# Patient Record
Sex: FEMALE | Race: WHITE | NOT HISPANIC OR LATINO | Employment: OTHER | URBAN - METROPOLITAN AREA
[De-identification: names, ages, dates, MRNs, and addresses within clinical notes are randomized per-mention and may not be internally consistent; named-entity substitution may affect disease eponyms.]

---

## 2017-02-13 ENCOUNTER — LAB (OUTPATIENT)
Dept: ENDOCRINOLOGY | Age: 57
End: 2017-02-13

## 2017-02-13 DIAGNOSIS — IMO0002 UNCONTROLLED TYPE 2 DIABETES MELLITUS WITH DIABETIC NEUROPATHY, WITH LONG-TERM CURRENT USE OF INSULIN: Primary | ICD-10-CM

## 2017-02-13 DIAGNOSIS — IMO0002 UNCONTROLLED TYPE 2 DIABETES MELLITUS WITH DIABETIC NEUROPATHY, WITH LONG-TERM CURRENT USE OF INSULIN: ICD-10-CM

## 2017-02-14 LAB
ALBUMIN SERPL-MCNC: 5 G/DL (ref 3.5–5.2)
ALBUMIN/CREAT UR: 299.5 MG/G CREAT (ref 0–30)
ALBUMIN/GLOB SERPL: 2.1 G/DL
ALP SERPL-CCNC: 117 U/L (ref 39–117)
ALT SERPL-CCNC: 23 U/L (ref 1–33)
AST SERPL-CCNC: 22 U/L (ref 1–32)
BILIRUB SERPL-MCNC: 0.5 MG/DL (ref 0.1–1.2)
BUN SERPL-MCNC: 16 MG/DL (ref 6–20)
BUN/CREAT SERPL: 17 (ref 7–25)
CALCIUM SERPL-MCNC: 10.1 MG/DL (ref 8.6–10.5)
CHLORIDE SERPL-SCNC: 99 MMOL/L (ref 98–107)
CO2 SERPL-SCNC: 24.4 MMOL/L (ref 22–29)
CREAT SERPL-MCNC: 0.94 MG/DL (ref 0.57–1)
CREAT UR-MCNC: 63.5 MG/DL
GLOBULIN SER CALC-MCNC: 2.4 GM/DL
GLUCOSE SERPL-MCNC: 146 MG/DL (ref 65–99)
HBA1C MFR BLD: 7.9 % (ref 4.8–5.6)
MICROALBUMIN UR-MCNC: 190.2 UG/ML
POTASSIUM SERPL-SCNC: 4.7 MMOL/L (ref 3.5–5.2)
PROT SERPL-MCNC: 7.4 G/DL (ref 6–8.5)
SODIUM SERPL-SCNC: 141 MMOL/L (ref 136–145)
TSH SERPL DL<=0.005 MIU/L-ACNC: 1.99 MIU/ML (ref 0.27–4.2)

## 2017-02-20 ENCOUNTER — OFFICE VISIT (OUTPATIENT)
Dept: ENDOCRINOLOGY | Age: 57
End: 2017-02-20

## 2017-02-20 ENCOUNTER — RESULTS ENCOUNTER (OUTPATIENT)
Dept: ENDOCRINOLOGY | Age: 57
End: 2017-02-20

## 2017-02-20 VITALS
OXYGEN SATURATION: 99 % | HEART RATE: 93 BPM | HEIGHT: 65 IN | DIASTOLIC BLOOD PRESSURE: 64 MMHG | WEIGHT: 177.2 LBS | BODY MASS INDEX: 29.52 KG/M2 | SYSTOLIC BLOOD PRESSURE: 132 MMHG

## 2017-02-20 DIAGNOSIS — R63.5 ABNORMAL WEIGHT GAIN: ICD-10-CM

## 2017-02-20 DIAGNOSIS — IMO0002 UNCONTROLLED TYPE II DIABETES MELLITUS WITH NEPHROPATHY: ICD-10-CM

## 2017-02-20 DIAGNOSIS — IMO0002 UNCONTROLLED TYPE 2 DIABETES MELLITUS WITH DIABETIC NEUROPATHY, WITH LONG-TERM CURRENT USE OF INSULIN: ICD-10-CM

## 2017-02-20 DIAGNOSIS — E16.1 HYPERINSULINISM: ICD-10-CM

## 2017-02-20 DIAGNOSIS — IMO0002 UNCONTROLLED TYPE 2 DIABETES MELLITUS WITH COMPLICATION, WITH LONG-TERM CURRENT USE OF INSULIN: ICD-10-CM

## 2017-02-20 DIAGNOSIS — IMO0002 UNCONTROLLED TYPE 2 DIABETES MELLITUS WITH COMPLICATION, WITH LONG-TERM CURRENT USE OF INSULIN: Primary | ICD-10-CM

## 2017-02-20 DIAGNOSIS — Z79.4 ENCOUNTER FOR LONG-TERM (CURRENT) USE OF INSULIN (HCC): ICD-10-CM

## 2017-02-20 PROCEDURE — 99214 OFFICE O/P EST MOD 30 MIN: CPT | Performed by: INTERNAL MEDICINE

## 2017-02-20 NOTE — PATIENT INSTRUCTIONS
Advised to increase the insulin gradually to keep the blood sugars less than 200  Also advised to check frequently  Try CoQ10 for cholesterol problem

## 2017-02-20 NOTE — PROGRESS NOTES
57 y.o.    Patient Care Team:  Hollis Dawkins MD as PCP - General    Chief Complaint:        F/U TYPE 2 DIABETES, UNCONTROLLED.   HERE TO DISCUSS LAB RESULTS.  Subjective     HPI     Patient is a 57-year-old white female with a past medical history of uncontrolled type 2 diabetes mellitus came for followup.  Patient is currently taking Humulin U-500 insulin.  She takes approximately 11-12 Kacie at about noon, which is her morning.  She takes an additional 8 or 9. Kacie at 9 PM while at work.    Patient reported that her blood sugars have been elevated over the past few months. Due to increasing stress at home and work.  She has several blood sugars in the 200-300 range and she is not adjusted her insulin upwards.  She was unable to get Saxenda from the insurance company. So she stopped using it  Uncontrolled type 2 diabetes mellitus with neuropathy.  Patient asymptomatic but has also previous chemotherapy contributing to neuropathy.  Uncontrolled type 2 diabetes mellitus with nephropathy.  Patient has microalbuminuria and elevated creatinine.  Hypoglycemia.  None recently in the past few months.  Abnormal weight gain.  Patient gained further weight in the past few months, primarily from lack of activity and noncompliance with her diet      Interval History    She is taking 10-15 kacie in the morning in 8-10 kacie in the evening  Uncontrolled type 2 diabetes mellitus with neuropathy  Patient is asymptomatic but some of the symptoms could be from previous chemotherapy  Uncontrolled type 2 diabetes mellitus with nephropathy and elevated creatinine  Patient is microalbuminuria  Patient denied any knowledge of diabetic retinopathy  Hypoglycemia  Patient had a few episodes of hypoglycemia in the past month  Patient had GOUT in the right knee and received a steroid injection. As a result her blood sugars are going up  She also had recurrent UTI and has been taking antibiotics     Interval History     .   SUMMARY  Patient  was extremely insulin resistant and has tried several insulins before trying Humulin U-500 insulin  patient is currently injecting up to 13 Harsha in the morning and 9 Harsha in the evening.  patient's blood sugars are fluctuating between   She occasionally has hypoglycemia usually at work, usually does not check but is able to take care of herself.  she reports that she stopped using 70/30 insulin   Diabetic neuropathy  Patient certainly is symptomatic but some of the symptoms may be from prior chemotherapy as well during breast cancer therapy  patient denied any knowledge of diabetic retinopathy  She also denied any knowledge of diabetic nephropathy  hypertension  Patient could not tolerate lisinopril in the past  Abnormal weight gain  Patient reported that she actually started losing weight. She started portion control and was dieting as well as eating healthier and exercising. She lost nearly 13 pounds in the past 6-8 months  She was able to decrease the insulin dose significantly and trying to keep her blood sugars mostly below 150  Patient reported that she has not been very compliant with her diet recently due to extreme stress at home she is trying to take custody of her granddaughter from her son and has been going through courts and other legal issues and has been very stressful.  Patient's currently under a lot of stress from work and has been losing hair over the past few months. Her primary care physician started her on antidepressives and approximately 3 months ago. She cannot tell about the response yet       Interval history      Patient reported that she recently resumed custody of her granddaughter and is feeling much better with very minimal stress. Work is hectic but she's feeling better  She reported that she's been cutting back on insulin since starting SAXENDA. Her appetite is different a suppressed. She denied any side effects.  She also started taking fish oil capsules 2 capsules  daily  She probably lost about 4-5 pounds since starting SAXENDA    The following portions of the patient's history were reviewed and updated as appropriate: allergies, current medications, past family history, past medical history, past social history, past surgical history and problem list.    Past Medical History   Diagnosis Date   • Breast cancer    • Diabetes mellitus type 2 with complications, uncontrolled    • Obesity    • Vitamin B12 deficiency      Family History   Problem Relation Age of Onset   • Heart disease Mother    • Heart disease Brother    • Heart disease Maternal Uncle      Social History     Social History   • Marital status:      Spouse name: N/A   • Number of children: N/A   • Years of education: N/A     Occupational History   • Not on file.     Social History Main Topics   • Smoking status: Never Smoker   • Smokeless tobacco: Not on file   • Alcohol use No   • Drug use: Not on file   • Sexual activity: Not on file     Other Topics Concern   • Not on file     Social History Narrative     Allergies   Allergen Reactions   • Sulfa Antibiotics Other (See Comments)     Abdominal Pain and Nausea   • Latex Itching       Current Outpatient Prescriptions:   •  allopurinol (ZYLOPRIM) 100 MG tablet, Take 100 mg by mouth daily., Disp: , Rfl:   •  cetirizine (ZyrTEC) 10 MG tablet, Take 10 mg by mouth daily., Disp: , Rfl:   •  ciprofloxacin (CIPRO) 500 MG tablet, Take 1 tablet by mouth 2 (Two) Times a Day., Disp: 20 tablet, Rfl: 0  •  Cranberry 200 MG capsule, Take  by mouth., Disp: , Rfl:   •  cyanocobalamin 1000 MCG/ML injection, Cyanocobalamin 1000 MCG/ML Injection Solution; Patient Sig: Cyanocobalamin 1000 MCG/ML Injection Solution ; 3; 0; 04-Nov-2014; Active, Disp: , Rfl:   •  diclofenac (VOLTAREN) 75 MG EC tablet, Take 75 mg by mouth 2 (two) times a day., Disp: , Rfl:   •  glucose blood test strip, OneTouch Ultra Blue In Vitro Strip; Patient Sig: OneTouch Ultra Blue In Vitro Strip TEST 5 TIMES  "DAILY; 450; 1; 19-Aug-2013; Active, Disp: , Rfl:   •  glucose blood test strip, 1 each by Other route 5 (five) times a day. Use as instructed, Disp: 450 each, Rfl: 1  •  HYDROcodone-acetaminophen (NORCO) 7.5-325 MG per tablet, Take 1 tablet by mouth every 6 (six) hours as needed for moderate pain (4-6)., Disp: 24 tablet, Rfl: 0  •  indomethacin (INDOCIN) 50 MG capsule, Take 50 mg by mouth 3 (three) times a day as needed for mild pain (1-3)., Disp: , Rfl:   •  Insulin Pen Needle 32G X 6 MM misc, , Disp: , Rfl:   •  insulin regular (HumuLIN R) 500 UNIT/ML CONCENTRATED injection, Inject  under the skin 2 (Two) Times a Day Before Meals. 15 DINO TWICE DAILY, Disp: 40 mL, Rfl: 2  •  Insulin Syringe-Needle U-100 31G X 15/64\" 1 ML misc, , Disp: , Rfl:   •  Insulin Syringe-Needle U-100 31G X 5/16\" 0.5 ML misc, 1 each 5 (five) times a day., Disp: 450 each, Rfl: 1  •  Liraglutide -Weight Management 18 MG/3ML solution pen-injector, Inject under the skin daily., Disp: , Rfl:   •  Omega-3 Fatty Acids (OMEGA 3 PO), Take  by mouth., Disp: , Rfl:   •  omeprazole (PriLOSEC) 20 MG capsule, Take 1 capsule (20 mg total) by mouth daily., Disp: 90 capsule, Rfl: 1  •  ONETOUCH DELICA LANCETS 33G misc, 4 each 4 (four) times a day., Disp: 360 each, Rfl: 1  •  pitavastatin calcium (LIVALO) 2 MG tablet tablet, Take 1 tablet by mouth nightly., Disp: , Rfl:         Review of Systems   Constitutional: Positive for fatigue. Negative for appetite change, chills and fever.   Cardiovascular: Negative for chest pain and palpitations.   Gastrointestinal: Negative for abdominal pain, constipation, diarrhea, nausea and vomiting.   Endocrine: Negative for cold intolerance and heat intolerance.   All other systems reviewed and are negative.      Objective       Vitals:    02/20/17 1409   BP: 132/64   Pulse: 93   SpO2: 99%   Weight: 177 lb 3.2 oz (80.4 kg)   Height: 65\" (165.1 cm)     Body mass index is 29.49 kg/(m^2).      Physical Exam   Constitutional: " She is oriented to person, place, and time. She appears well-developed and well-nourished.   HENT:   Head: Normocephalic and atraumatic.   Eyes: Conjunctivae and EOM are normal. Pupils are equal, round, and reactive to light.   Neck: Normal range of motion. Neck supple. No thyromegaly present.   Cardiovascular: Normal rate, regular rhythm, normal heart sounds and intact distal pulses.    Pulmonary/Chest: Effort normal and breath sounds normal.   Abdominal: Soft. Bowel sounds are normal. She exhibits no distension. There is no tenderness.   Musculoskeletal: Normal range of motion. She exhibits no edema.   Neurological: She is alert and oriented to person, place, and time. She has normal reflexes.   Skin: Skin is warm and dry. No rash noted.   Psychiatric: She has a normal mood and affect. Her behavior is normal.   Nursing note and vitals reviewed.    Results Review:     I reviewed the patient's new clinical results.    Medical records reviewed  Summary:      Lab on 02/13/2017   Component Date Value Ref Range Status   • Glucose 02/13/2017 146* 65 - 99 mg/dL Final   • BUN 02/13/2017 16  6 - 20 mg/dL Final   • Creatinine 02/13/2017 0.94  0.57 - 1.00 mg/dL Final   • eGFR Non  Am 02/13/2017 61  >60 mL/min/1.73 Final   • eGFR African Am 02/13/2017 74  >60 mL/min/1.73 Final   • BUN/Creatinine Ratio 02/13/2017 17.0  7.0 - 25.0 Final   • Sodium 02/13/2017 141  136 - 145 mmol/L Final   • Potassium 02/13/2017 4.7  3.5 - 5.2 mmol/L Final   • Chloride 02/13/2017 99  98 - 107 mmol/L Final   • Total CO2 02/13/2017 24.4  22.0 - 29.0 mmol/L Final   • Calcium 02/13/2017 10.1  8.6 - 10.5 mg/dL Final   • Total Protein 02/13/2017 7.4  6.0 - 8.5 g/dL Final   • Albumin 02/13/2017 5.00  3.50 - 5.20 g/dL Final   • Globulin 02/13/2017 2.4  gm/dL Final   • A/G Ratio 02/13/2017 2.1  g/dL Final   • Total Bilirubin 02/13/2017 0.5  0.1 - 1.2 mg/dL Final   • Alkaline Phosphatase 02/13/2017 117  39 - 117 U/L Final   • AST (SGOT) 02/13/2017 22   1 - 32 U/L Final   • ALT (SGPT) 02/13/2017 23  1 - 33 U/L Final   • Hemoglobin A1C 02/13/2017 7.90* 4.80 - 5.60 % Final    Comment: Hemoglobin A1C Ranges:  Increased Risk for Diabetes  5.7% to 6.4%  Diabetes                     >= 6.5%  Diabetic Goal                < 7.0%     • TSH 02/13/2017 1.990  0.270 - 4.200 mIU/mL Final   • Creatinine, Urine 02/13/2017 63.5  Not Estab. mg/dL Final   • Microalbumin, Urine 02/13/2017 190.2  Not Estab. ug/mL Final   • Microalbumin/Creatinine Ratio Urine 02/13/2017 299.5* 0.0 - 30.0 mg/g creat Final     Lab Results   Component Value Date    HGBA1C 7.90 (H) 02/13/2017    HGBA1C 6.80 (H) 10/10/2016    HGBA1C 6.7 (H) 02/05/2016     Lab Results   Component Value Date    MICROALBUR 190.2 02/13/2017    CREATININE 0.94 02/13/2017     Imaging Results (most recent)     None                Assessment and Plan:    Laxmi was seen today for diabetes.    Diagnoses and all orders for this visit:    Uncontrolled type 2 diabetes mellitus with complication, with long-term current use of insulin  -     Vitamin D 25 Hydroxy; Future  -     Vitamin B12; Future  -     Lipid Panel; Future  -     Hemoglobin A1c; Future  -     Comprehensive Metabolic Panel; Future  -     Microalbumin / Creatinine Urine Ratio; Future    Uncontrolled type II diabetes mellitus with nephropathy  -     Vitamin D 25 Hydroxy; Future  -     Vitamin B12; Future  -     Lipid Panel; Future  -     Hemoglobin A1c; Future  -     Comprehensive Metabolic Panel; Future  -     Microalbumin / Creatinine Urine Ratio; Future    Uncontrolled type 2 diabetes mellitus with diabetic neuropathy, with long-term current use of insulin  -     Vitamin D 25 Hydroxy; Future  -     Vitamin B12; Future  -     Lipid Panel; Future  -     Hemoglobin A1c; Future  -     Comprehensive Metabolic Panel; Future  -     Microalbumin / Creatinine Urine Ratio; Future    Abnormal weight gain  -     Vitamin D 25 Hydroxy; Future  -     Vitamin B12; Future  -     Lipid  Panel; Future  -     Hemoglobin A1c; Future  -     Comprehensive Metabolic Panel; Future  -     Microalbumin / Creatinine Urine Ratio; Future    Encounter for long-term (current) use of insulin  -     Vitamin D 25 Hydroxy; Future  -     Vitamin B12; Future  -     Lipid Panel; Future  -     Hemoglobin A1c; Future  -     Comprehensive Metabolic Panel; Future  -     Microalbumin / Creatinine Urine Ratio; Future    Hyperinsulinism  -     Vitamin D 25 Hydroxy; Future  -     Vitamin B12; Future  -     Lipid Panel; Future  -     Hemoglobin A1c; Future  -     Comprehensive Metabolic Panel; Future  -     Microalbumin / Creatinine Urine Ratio; Future        #1 uncontrolled type 2 diabetes mellitus     #2 hypoglycemia patient is extremely concerned about hypoglycemia reports that blood sugar of 70 causes her to be hypo   #3 diabetic neuropathy patient reports intermittent sharp shooting pains in the feet  #4 insulin management  #5 hypertension stable  #6 diabetic nephropathy with microalbuminuria  #7 hyperlipidemia:       Glucometer downloaded reviewed  Most of the blood sugars are 150-300 range.  Patient has very few readings in the past one month.    Her recent hemoglobin A1c 7.9% much higher than before.  I encouraged the patient to be compliant with her insulin regimen and diet. Once again.  I advised her to increase the Humulin U-500 insulin to 11 or 12 Harsha twice daily.  I also advised her to try CoQ10 for hyperlipidemia. She is unable to take any medication at this point.  Pitavastatin was not covered by her insurance company.    Patient will get lab testing before next visit.  She will return to follow up in 3 months    The total time spent for old record and lab review and face- to- face was more than 25 min of which greater than 50% of time was spent on counseling the patient on recommended evaluation and treatment options, instructions for management/treatment and /or follow up  and importance of compliance with  chosen management or treatment options       Miah Upton MD. FACE    02/20/17      EMR Dragon / transcription disclaimer:    Much of this encounter note is an electronic transcription/ translation of spoken language to printed text.  Electronic translation of spoken language may permit erroneous, or at times, nonsensical words or phrases to be inadvertently transcribed; although I have reviewed the note for such errors, some may still exist.

## 2017-02-25 ENCOUNTER — RESULTS ENCOUNTER (OUTPATIENT)
Dept: ENDOCRINOLOGY | Age: 57
End: 2017-02-25

## 2017-02-25 DIAGNOSIS — IMO0002 UNCONTROLLED TYPE II DIABETES MELLITUS WITH NEPHROPATHY: ICD-10-CM

## 2017-02-25 DIAGNOSIS — IMO0002 UNCONTROLLED TYPE 2 DIABETES MELLITUS WITH DIABETIC NEUROPATHY, WITH LONG-TERM CURRENT USE OF INSULIN: ICD-10-CM

## 2017-02-25 DIAGNOSIS — IMO0002 UNCONTROLLED TYPE 2 DIABETES MELLITUS WITH COMPLICATION, WITH LONG-TERM CURRENT USE OF INSULIN: ICD-10-CM

## 2017-02-25 DIAGNOSIS — Z79.4 ENCOUNTER FOR LONG-TERM (CURRENT) USE OF INSULIN (HCC): ICD-10-CM

## 2017-02-25 DIAGNOSIS — E16.1 HYPERINSULINISM: ICD-10-CM

## 2017-02-25 DIAGNOSIS — R63.5 ABNORMAL WEIGHT GAIN: ICD-10-CM

## 2017-05-22 ENCOUNTER — TELEPHONE (OUTPATIENT)
Dept: ENDOCRINOLOGY | Age: 57
End: 2017-05-22

## 2017-05-23 ENCOUNTER — TELEPHONE (OUTPATIENT)
Dept: ENDOCRINOLOGY | Age: 57
End: 2017-05-23

## 2017-06-05 ENCOUNTER — APPOINTMENT (OUTPATIENT)
Dept: WOMENS IMAGING | Facility: HOSPITAL | Age: 57
End: 2017-06-05

## 2017-06-05 PROCEDURE — 77062 BREAST TOMOSYNTHESIS BI: CPT | Performed by: RADIOLOGY

## 2017-06-05 PROCEDURE — G0279 TOMOSYNTHESIS, MAMMO: HCPCS | Performed by: RADIOLOGY

## 2017-06-05 PROCEDURE — G0204 DX MAMMO INCL CAD BI: HCPCS | Performed by: RADIOLOGY

## 2017-06-05 PROCEDURE — 77066 DX MAMMO INCL CAD BI: CPT | Performed by: RADIOLOGY

## 2017-06-05 PROCEDURE — 76641 ULTRASOUND BREAST COMPLETE: CPT | Performed by: RADIOLOGY

## 2017-06-05 PROCEDURE — MDREVSPNEW: Performed by: RADIOLOGY

## 2017-06-07 DIAGNOSIS — N64.4 BREAST PAIN: ICD-10-CM

## 2017-07-25 LAB
25(OH)D3+25(OH)D2 SERPL-MCNC: 32.2 NG/ML (ref 30–100)
ALBUMIN SERPL-MCNC: 4.9 G/DL (ref 3.5–5.2)
ALBUMIN/GLOB SERPL: 1.7 G/DL
ALP SERPL-CCNC: 125 U/L (ref 39–117)
ALT SERPL-CCNC: 24 U/L (ref 1–33)
AST SERPL-CCNC: 19 U/L (ref 1–32)
BILIRUB SERPL-MCNC: 0.6 MG/DL (ref 0.1–1.2)
BUN SERPL-MCNC: 19 MG/DL (ref 6–20)
BUN/CREAT SERPL: 19.4 (ref 7–25)
CALCIUM SERPL-MCNC: 10.8 MG/DL (ref 8.6–10.5)
CHLORIDE SERPL-SCNC: 100 MMOL/L (ref 98–107)
CHOLEST SERPL-MCNC: 290 MG/DL (ref 0–200)
CO2 SERPL-SCNC: 23.2 MMOL/L (ref 22–29)
CREAT SERPL-MCNC: 0.98 MG/DL (ref 0.57–1)
GLOBULIN SER CALC-MCNC: 2.9 GM/DL
GLUCOSE SERPL-MCNC: 203 MG/DL (ref 65–99)
HBA1C MFR BLD: 7.7 % (ref 4.8–5.6)
HDLC SERPL-MCNC: 40 MG/DL (ref 40–60)
LDLC SERPL CALC-MCNC: 171 MG/DL (ref 0–100)
POTASSIUM SERPL-SCNC: 5.2 MMOL/L (ref 3.5–5.2)
PROT SERPL-MCNC: 7.8 G/DL (ref 6–8.5)
SODIUM SERPL-SCNC: 141 MMOL/L (ref 136–145)
TRIGL SERPL-MCNC: 394 MG/DL (ref 0–150)
VIT B12 SERPL-MCNC: 517 PG/ML (ref 211–946)
VLDLC SERPL CALC-MCNC: 78.8 MG/DL (ref 5–40)

## 2017-07-31 ENCOUNTER — OFFICE VISIT (OUTPATIENT)
Dept: ENDOCRINOLOGY | Age: 57
End: 2017-07-31

## 2017-07-31 VITALS
WEIGHT: 173.2 LBS | SYSTOLIC BLOOD PRESSURE: 128 MMHG | DIASTOLIC BLOOD PRESSURE: 68 MMHG | OXYGEN SATURATION: 98 % | BODY MASS INDEX: 34 KG/M2 | HEIGHT: 60 IN | HEART RATE: 94 BPM

## 2017-07-31 DIAGNOSIS — IMO0002 UNCONTROLLED TYPE II DIABETES MELLITUS WITH NEPHROPATHY: ICD-10-CM

## 2017-07-31 DIAGNOSIS — IMO0002 UNCONTROLLED TYPE 2 DIABETES MELLITUS WITH COMPLICATION, WITH LONG-TERM CURRENT USE OF INSULIN: Primary | ICD-10-CM

## 2017-07-31 DIAGNOSIS — Z79.4 ENCOUNTER FOR LONG-TERM (CURRENT) USE OF INSULIN (HCC): ICD-10-CM

## 2017-07-31 DIAGNOSIS — IMO0002 UNCONTROLLED TYPE 2 DIABETES MELLITUS WITH DIABETIC NEUROPATHY, WITH LONG-TERM CURRENT USE OF INSULIN: ICD-10-CM

## 2017-07-31 DIAGNOSIS — R63.5 ABNORMAL WEIGHT GAIN: ICD-10-CM

## 2017-07-31 DIAGNOSIS — E16.1 HYPERINSULINISM: ICD-10-CM

## 2017-07-31 PROCEDURE — 99214 OFFICE O/P EST MOD 30 MIN: CPT | Performed by: INTERNAL MEDICINE

## 2017-07-31 RX ORDER — ESTRADIOL 0.1 MG/G
CREAM VAGINAL
Refills: 3 | COMMUNITY
Start: 2017-05-18 | End: 2018-04-13

## 2017-11-15 RX ORDER — PEN NEEDLE, DIABETIC 32GX 5/32"
NEEDLE, DISPOSABLE MISCELLANEOUS
Qty: 200 EACH | Refills: 3 | Status: SHIPPED | OUTPATIENT
Start: 2017-11-15 | End: 2018-04-13 | Stop reason: SDUPTHER

## 2018-01-15 ENCOUNTER — RESULTS ENCOUNTER (OUTPATIENT)
Dept: ENDOCRINOLOGY | Age: 58
End: 2018-01-15

## 2018-01-15 DIAGNOSIS — IMO0002 UNCONTROLLED TYPE 2 DIABETES MELLITUS WITH COMPLICATION, WITH LONG-TERM CURRENT USE OF INSULIN: ICD-10-CM

## 2018-01-15 DIAGNOSIS — IMO0002 UNCONTROLLED TYPE 2 DIABETES MELLITUS WITH COMPLICATION, WITH LONG-TERM CURRENT USE OF INSULIN: Primary | ICD-10-CM

## 2018-03-27 ENCOUNTER — TELEPHONE (OUTPATIENT)
Dept: ENDOCRINOLOGY | Age: 58
End: 2018-03-27

## 2018-03-27 NOTE — TELEPHONE ENCOUNTER
----- Message from Tessie JORI Anderson sent at 3/6/2018  4:14 PM EST -----  Contact: PATIENT LEFT VOICEMAIL 3-6-18 3:30PM  PATIENT RETURNED MY CALL DUE TO DR. GALARZA NOT AVAIL. ON 3-9-18. IN MESSAGE SHE  SAID SHE RECENTLY HAD QUADTRUPLE BYPASS AND SINCE HER SURGERY HER BLOOD SUGAR HAS BEEN RUNNING ABOUT 300 AND HAS REALLY  BEEN STRUGGLING AND ALMOST HAS TO NOT EAT TO KEEP HER BLOOD SUGAR DOWN. ASKING FOR CALL BACK CELL  [phone].  I RETURNED HER CALL AND OFFERED HER SOONER APPT THAN I HAD SCHEDULED ON 4-13-18, 1PM BUT SHE WAS NOT AVAILABLE.   I ASKED HER FOR BLOOD SUGAR READINGS AND IF I COULD TRANSFER HER CALL TO YOU AND SHE SAID SHE WILL CALL YOU BACK TOMORROW DUE TO GETTING READY FOR WORK. THANK YOU.    PATIENT NEVER CALLED BACK, I CALLED PATIENT AS WELL WITH NO RESPONSE.

## 2018-04-13 ENCOUNTER — OFFICE VISIT (OUTPATIENT)
Dept: ENDOCRINOLOGY | Age: 58
End: 2018-04-13

## 2018-04-13 VITALS
BODY MASS INDEX: 28.69 KG/M2 | SYSTOLIC BLOOD PRESSURE: 118 MMHG | WEIGHT: 172.2 LBS | HEART RATE: 74 BPM | DIASTOLIC BLOOD PRESSURE: 60 MMHG | HEIGHT: 65 IN

## 2018-04-13 DIAGNOSIS — IMO0002 UNCONTROLLED TYPE 2 DIABETES MELLITUS WITH COMPLICATION, WITH LONG-TERM CURRENT USE OF INSULIN: Primary | ICD-10-CM

## 2018-04-13 DIAGNOSIS — E16.1 HYPERINSULINISM: ICD-10-CM

## 2018-04-13 DIAGNOSIS — R63.5 ABNORMAL WEIGHT GAIN: ICD-10-CM

## 2018-04-13 DIAGNOSIS — Z79.4 ENCOUNTER FOR LONG-TERM (CURRENT) USE OF INSULIN (HCC): ICD-10-CM

## 2018-04-13 DIAGNOSIS — IMO0002 UNCONTROLLED TYPE II DIABETES MELLITUS WITH NEPHROPATHY: ICD-10-CM

## 2018-04-13 DIAGNOSIS — IMO0002 UNCONTROLLED TYPE 2 DIABETES MELLITUS WITH DIABETIC NEUROPATHY, WITH LONG-TERM CURRENT USE OF INSULIN: ICD-10-CM

## 2018-04-13 PROCEDURE — 99214 OFFICE O/P EST MOD 30 MIN: CPT | Performed by: INTERNAL MEDICINE

## 2018-04-13 RX ORDER — ATORVASTATIN CALCIUM 40 MG/1
40 TABLET, FILM COATED ORAL DAILY
Refills: 3 | COMMUNITY
Start: 2018-03-04 | End: 2018-08-06

## 2018-04-13 RX ORDER — PANTOPRAZOLE SODIUM 40 MG/1
40 TABLET, DELAYED RELEASE ORAL DAILY
Refills: 5 | COMMUNITY
Start: 2018-04-03 | End: 2019-10-08

## 2018-04-13 RX ORDER — ALLOPURINOL 300 MG/1
300 TABLET ORAL DAILY
Refills: 3 | COMMUNITY
Start: 2018-02-26 | End: 2019-03-11

## 2018-04-13 NOTE — PROGRESS NOTES
58 y.o.    Patient Care Team:  Hollis Dawkins MD as PCP - General    Chief Complaint:      F/U TYPE 2 DIABETES, UNCONTROLLED.   LABS DONE 2/1/2018     Subjective     HPI   Patient is a 58-year-old white female with a past history of uncontrolled type 2 diabetes mellitus came for follow-up  Patient is currently taking Humulin U-500 insulin  She is taking approximately 60 units in the morning and 50 units at night  She reports her blood sugars are anywhere in the 100-300 range  Hypoglycemia  Patient has had episodes of hypoglycemia with blood sugar dropping to 50-60 range  Uncontrolled type 2 diabetes mellitus with neuropathy  Patient is symptomatic  Uncontrolled type 2 diabetes mellitus with nephropathy  Patient has microalbuminuria and elevated creatinine.  Patient denied any knowledge of diabetic retinopathy  Abnormal weight gain  Patient is currently 172 pounds with a BMI of 28.7.      Interval History      .   SUMMARY  Patient was extremely insulin resistant and has tried several insulins before trying Humulin U-500 insulin  patient is currently injecting up to 13 Harsha in the morning and 9 Harsha in the evening.  patient's blood sugars are fluctuating between   She occasionally has hypoglycemia usually at work, usually does not check but is able to take care of herself.  she reports that she stopped using 70/30 insulin   Diabetic neuropathy  Patient certainly is symptomatic but some of the symptoms may be from prior chemotherapy as well during breast cancer therapy  patient denied any knowledge of diabetic retinopathy  She also denied any knowledge of diabetic nephropathy  hypertension  Patient could not tolerate lisinopril in the past  Abnormal weight gain  Patient reported that she actually started losing weight. She started portion control and was dieting as well as eating healthier and exercising. She lost nearly 13 pounds in the past 6-8 months  She was able to decrease the insulin dose  significantly and trying to keep her blood sugars mostly below 150  Patient reported that she has not been very compliant with her diet recently due to extreme stress at home she is trying to take custody of her granddaughter from her son and has been going through courts and other legal issues and has been very stressful.  Patient's currently under a lot of stress from work and has been losing hair over the past few months. Her primary care physician started her on antidepressives and approximately 3 months ago. She cannot tell about the response yet   The following portions of the patient's history were reviewed and updated as appropriate: allergies, current medications, past family history, past medical history, past social history, past surgical history and problem list.    Past Medical History:   Diagnosis Date   • Breast cancer    • Diabetes mellitus type 2 with complications, uncontrolled    • Obesity    • Vitamin B12 deficiency      Family History   Problem Relation Age of Onset   • Heart disease Mother    • Heart disease Brother    • Heart disease Maternal Uncle      Social History     Social History   • Marital status:      Spouse name: N/A   • Number of children: N/A   • Years of education: N/A     Occupational History   • Not on file.     Social History Main Topics   • Smoking status: Never Smoker   • Smokeless tobacco: Not on file   • Alcohol use No   • Drug use: Unknown   • Sexual activity: Not on file     Other Topics Concern   • Not on file     Social History Narrative   • No narrative on file     Allergies   Allergen Reactions   • Sulfa Antibiotics Other (See Comments)     Abdominal Pain and Nausea  Abdominal Pain and Nausea   • Latex Itching       Current Outpatient Prescriptions:   •  allopurinol (ZYLOPRIM) 100 MG tablet, Take 100 mg by mouth daily., Disp: , Rfl:   •  BD PEN NEEDLE NIURKA U/F 32G X 4 MM misc, USE TWICE DAILY, Disp: 200 each, Rfl: 3  •  cetirizine (ZyrTEC) 10 MG tablet, Take  "10 mg by mouth daily., Disp: , Rfl:   •  diclofenac (VOLTAREN) 75 MG EC tablet, Take 75 mg by mouth 2 (two) times a day., Disp: , Rfl:   •  ESTRACE VAGINAL 0.1 MG/GM vaginal cream, APPLY A PEA SIZED AMOUNT WITH FINGERTIP TO VAGINAL OPENING NIGHTLY FOR 2 WEEKS, THEN 3 NIGHTS A WEEK, Disp: , Rfl: 3  •  glucose blood test strip, OneTouch Ultra Blue In Vitro Strip; Patient Sig: OneTouch Ultra Blue In Vitro Strip TEST 5 TIMES DAILY; 450; 1; 19-Aug-2013; Active, Disp: , Rfl:   •  glucose blood test strip, 1 each by Other route 5 (five) times a day. Use as instructed, Disp: 450 each, Rfl: 1  •  indomethacin (INDOCIN) 50 MG capsule, Take 50 mg by mouth 3 (three) times a day as needed for mild pain (1-3)., Disp: , Rfl:   •  Insulin Regular Human, Conc, (HUMULIN R U-500 KWIKPEN) 500 UNIT/ML solution pen-injector CONCENTRATED injection, 75 UNITS BEFORE BREAKFAST AND 60 UNITS BEFORE SUPPER SC, Disp: 9 pen, Rfl: 3  •  Insulin Syringe-Needle U-100 31G X 15/64\" 1 ML misc, , Disp: , Rfl:   •  Insulin Syringe-Needle U-100 31G X 5/16\" 0.5 ML misc, 1 each 5 (five) times a day., Disp: 450 each, Rfl: 1  •  Omega-3 Fatty Acids (OMEGA 3 PO), Take  by mouth., Disp: , Rfl:   •  omeprazole (PriLOSEC) 20 MG capsule, Take 1 capsule (20 mg total) by mouth daily., Disp: 90 capsule, Rfl: 1  •  ONETOUCH DELICA LANCETS 33G misc, 4 each 4 (four) times a day., Disp: 360 each, Rfl: 1  •  terconazole (TERAZOL 7) 0.4 % vaginal cream, INSERT 1 APPLICATORFUL VAGINALLY AT BEDTIME, Disp: , Rfl: 0        Review of Systems   Constitutional: Negative for chills, fatigue and fever.   Cardiovascular: Negative for chest pain and palpitations.   Gastrointestinal: Negative for abdominal pain, constipation, diarrhea, nausea and vomiting.   Endocrine: Negative for cold intolerance and heat intolerance.   All other systems reviewed and are negative.      Objective       Vitals:    04/13/18 1314   BP: 118/60   Pulse: 74   Weight: 78.1 kg (172 lb 3.2 oz)   Height: 165.1 " "cm (65\")     Body mass index is 28.66 kg/m².      Physical Exam   Constitutional: She is oriented to person, place, and time. She appears well-developed and well-nourished.   HENT:   Head: Normocephalic and atraumatic.   Eyes: Conjunctivae and EOM are normal. Pupils are equal, round, and reactive to light.   Neck: Normal range of motion. Neck supple. No thyromegaly present.   Cardiovascular: Normal rate, regular rhythm, normal heart sounds and intact distal pulses.    Pulmonary/Chest: Effort normal and breath sounds normal.   Abdominal: Soft. Bowel sounds are normal. She exhibits no distension. There is no tenderness.   Musculoskeletal: Normal range of motion. She exhibits no edema.   Neurological: She is alert and oriented to person, place, and time. She has normal reflexes.   Skin: Skin is warm and dry. No rash noted.   Psychiatric: She has a normal mood and affect. Her behavior is normal.   Nursing note and vitals reviewed.    Results Review:     I reviewed the patient's new clinical results.    Medical records reviewed  Summary:      Results Encounter on 02/25/2017   Component Date Value Ref Range Status   • 25 Hydroxy, Vitamin D 07/25/2017 32.2  30.0 - 100.0 ng/mL Final    Comment: Reference Range for Total Vitamin D 25(OH)  Deficiency    <20.0 ng/mL  Insufficiency 21-29 ng/mL  Sufficiency    ng/mL  Toxicity      >100 ng/ml        • Vitamin B-12 07/25/2017 517  211 - 946 pg/mL Final   • Total Cholesterol 07/25/2017 290* 0 - 200 mg/dL Final   • Triglycerides 07/25/2017 394* 0 - 150 mg/dL Final   • HDL Cholesterol 07/25/2017 40  40 - 60 mg/dL Final   • VLDL Cholesterol 07/25/2017 78.8* 5 - 40 mg/dL Final   • LDL Cholesterol  07/25/2017 171* 0 - 100 mg/dL Final   • Hemoglobin A1C 07/25/2017 7.70* 4.80 - 5.60 % Final    Comment: Hemoglobin A1C Ranges:  Increased Risk for Diabetes  5.7% to 6.4%  Diabetes                     >= 6.5%  Diabetic Goal                < 7.0%     • Glucose 07/25/2017 203* 65 - 99 " mg/dL Final   • BUN 07/25/2017 19  6 - 20 mg/dL Final   • Creatinine 07/25/2017 0.98  0.57 - 1.00 mg/dL Final   • eGFR Non African Am 07/25/2017 58* >60 mL/min/1.73 Final   • eGFR African Am 07/25/2017 70  >60 mL/min/1.73 Final   • BUN/Creatinine Ratio 07/25/2017 19.4  7.0 - 25.0 Final   • Sodium 07/25/2017 141  136 - 145 mmol/L Final   • Potassium 07/25/2017 5.2  3.5 - 5.2 mmol/L Final   • Chloride 07/25/2017 100  98 - 107 mmol/L Final   • Total CO2 07/25/2017 23.2  22.0 - 29.0 mmol/L Final   • Calcium 07/25/2017 10.8* 8.6 - 10.5 mg/dL Final   • Total Protein 07/25/2017 7.8  6.0 - 8.5 g/dL Final   • Albumin 07/25/2017 4.90  3.50 - 5.20 g/dL Final   • Globulin 07/25/2017 2.9  gm/dL Final   • A/G Ratio 07/25/2017 1.7  g/dL Final   • Total Bilirubin 07/25/2017 0.6  0.1 - 1.2 mg/dL Final   • Alkaline Phosphatase 07/25/2017 125* 39 - 117 U/L Final   • AST (SGOT) 07/25/2017 19  1 - 32 U/L Final   • ALT (SGPT) 07/25/2017 24  1 - 33 U/L Final     Lab Results   Component Value Date    HGBA1C 7.70 (H) 07/24/2017    HGBA1C 7.90 (H) 02/13/2017    HGBA1C 6.80 (H) 10/10/2016     Lab Results   Component Value Date    MICROALBUR 190.2 02/13/2017    CREATININE 0.98 07/24/2017     Imaging Results (most recent)     None          Assessment and Plan:    Laxmi was seen today for diabetes.    Diagnoses and all orders for this visit:    Uncontrolled type 2 diabetes mellitus with complication, with long-term current use of insulin    Uncontrolled type II diabetes mellitus with nephropathy    Hyperinsulinism    Uncontrolled type 2 diabetes mellitus with diabetic neuropathy, with long-term current use of insulin    Encounter for long-term (current) use of insulin    Abnormal weight gain    Other orders  -     Insulin Regular Human, Conc, (HUMULIN R U-500 KWIKPEN) 500 UNIT/ML solution pen-injector CONCENTRATED injection; 75 UNITS BEFORE BREAKFAST AND 60 UNITS BEFORE SUPPER SC  -     glucose blood test strip; OneTouch Ultra Blue In Vitro  "Strip; Patient Sig: OneTouch Ultra Blue In Vitro Strip TEST 5 TIMES DAILY; 450; 1; 19-Aug-2013; Active  -     Insulin Pen Needle (BD PEN NEEDLE NIURKA U/F) 32G X 4 MM misc; 2 times daily         #1 uncontrolled type 2 diabetes mellitus      #2 hypoglycemia patient is extremely concerned about hypoglycemia reports that blood sugar of 70 causes her to be hypo   #3 diabetic neuropathy patient reports intermittent sharp shooting pains in the feet  #4 insulin management  #5 hypertension stable  #6 diabetic nephropathy with microalbuminuria  #7 hyperlipidemia:      Glucometer download reviewed  Patient's blood sugars are ranging from   she does have hypoglycemia at 65  Patient recently had coronary artery bypass grafting in December 2017  she is feeling much better  she is going to rehabilitation now    She is taking Humulin U-500 insulin twice daily  She is taking 60 units in the morning and 50 units at night  I advised him to continue to adjust insulin until the blood sugars are below 200    The total time spent for old record and lab review and face- to- face was more than 25 min of which greater than 15 min of time ( greater than 50% of the total time )  was spent face to face with the patient counseling and coordination of care on recommended evaluation and treatment options, instructions for management/treatment and /or follow up  and importance of compliance with chosen management or treatment options    Miah Upton MD. FACE    04/13/18      EMR Dragon / transcription disclaimer:     \"Dictated utilizing Dragon dictation\".         "

## 2018-08-06 ENCOUNTER — OFFICE VISIT (OUTPATIENT)
Dept: ENDOCRINOLOGY | Age: 58
End: 2018-08-06

## 2018-08-06 VITALS
BODY MASS INDEX: 28.22 KG/M2 | DIASTOLIC BLOOD PRESSURE: 62 MMHG | HEART RATE: 88 BPM | HEIGHT: 65 IN | SYSTOLIC BLOOD PRESSURE: 110 MMHG | WEIGHT: 169.4 LBS

## 2018-08-06 DIAGNOSIS — Z79.4 ENCOUNTER FOR LONG-TERM (CURRENT) USE OF INSULIN (HCC): ICD-10-CM

## 2018-08-06 DIAGNOSIS — E16.1 HYPERINSULINISM: ICD-10-CM

## 2018-08-06 DIAGNOSIS — R63.5 ABNORMAL WEIGHT GAIN: ICD-10-CM

## 2018-08-06 DIAGNOSIS — E78.5 HYPERLIPIDEMIA, UNSPECIFIED HYPERLIPIDEMIA TYPE: ICD-10-CM

## 2018-08-06 DIAGNOSIS — IMO0002 UNCONTROLLED TYPE II DIABETES MELLITUS WITH NEPHROPATHY: ICD-10-CM

## 2018-08-06 DIAGNOSIS — IMO0002 UNCONTROLLED TYPE 2 DIABETES MELLITUS WITH DIABETIC NEUROPATHY, WITH LONG-TERM CURRENT USE OF INSULIN: ICD-10-CM

## 2018-08-06 DIAGNOSIS — IMO0002 UNCONTROLLED TYPE 2 DIABETES MELLITUS WITH COMPLICATION, WITH LONG-TERM CURRENT USE OF INSULIN: Primary | ICD-10-CM

## 2018-08-06 PROCEDURE — 99214 OFFICE O/P EST MOD 30 MIN: CPT | Performed by: INTERNAL MEDICINE

## 2018-08-06 RX ORDER — NITROFURANTOIN 25; 75 MG/1; MG/1
CAPSULE ORAL
Refills: 0 | COMMUNITY
Start: 2018-07-09 | End: 2018-11-26

## 2018-08-06 NOTE — PROGRESS NOTES
To Whom It May Concern    Mrs. Laxmi Marcus is an uncontrolled type II diabetic currently under my care using high concentrated insulin twice daily and works night shift and had significant difficulty managing her diabetes as such.  She is very fearful of hypoglycemia and is advised to eat at least every 2 hours and check her blood sugars frequently to prevent hypoglycemia symptoms  Patient has indicated to me that she is being called for jury duty and in my honest opinion I do not believe she will be able to handle the jury duty with any focus since she is constantly concerned about her blood sugars and has to eat every 2 hours to prevent hypoglycemia  I request that she may be exempt from the jury duty for the above reasons     please feel free to call me if they have any further questions

## 2018-08-06 NOTE — PROGRESS NOTES
58 y.o.    Patient Care Team:  Hollis Dawkins MD as PCP - General    Chief Complaint:      F/U TYPE 2 DIABETES, UNCONTROLLED.  NO RECENT LABS.  Subjective     HPI   Patient is a 58-year-old white female with a past history of uncontrolled type 2 diabetes mellitus came for follow-up  Patient is currently taking Humulin U-500 insulin  She currently takes 75 units at approximately 2 PM upon waking up and takes about 60 units at midnight at work  She works night shift  Patient's blood sugars are ranging from 110-300   Hypoglycemia  Patient has been expressing occasional hypoglycemia usually at work  Uncontrolled type 2 diabetes mellitus with neuropathy  Patient is symptomatic of neuropathy  Uncontrolled type 2 diabetes mellitus with nephropathy  Patient is microalbuminuria and elevated creatinine  Patient denies any knowledge of diabetic retinopathy.  Abnormal weight gain  Patient currently weighs 169 pounds with a BMI of 28.2.        Interval History      .   SUMMARY  Patient was extremely insulin resistant and has tried several insulins before trying Humulin U-500 insulin  patient is currently injecting up to 13 Harsha in the morning and 9 Harsha in the evening.  patient's blood sugars are fluctuating between   She occasionally has hypoglycemia usually at work, usually does not check but is able to take care of herself.  she reports that she stopped using 70/30 insulin   Diabetic neuropathy  Patient certainly is symptomatic but some of the symptoms may be from prior chemotherapy as well during breast cancer therapy  patient denied any knowledge of diabetic retinopathy  She also denied any knowledge of diabetic nephropathy  hypertension  Patient could not tolerate lisinopril in the past  Abnormal weight gain  Patient reported that she actually started losing weight. She started portion control and was dieting as well as eating healthier and exercising. She lost nearly 13 pounds in the past 6-8 months  She was  able to decrease the insulin dose significantly and trying to keep her blood sugars mostly below 150  Patient reported that she has not been very compliant with her diet recently due to extreme stress at home she is trying to take custody of her granddaughter from her son and has been going through courts and other legal issues and has been very stressful.  Patient's currently under a lot of stress from work and has been losing hair over the past few months. Her primary care physician started her on antidepressives and approximately 3 months ago. She cannot tell about the response yet   The following portions of the patient's history were reviewed and updated as appropriate: allergies, current medications, past family history, past medical history, past social history, past surgical history and problem list.    Past Medical History:   Diagnosis Date   • Breast cancer (CMS/[facility])    • Diabetes mellitus type 2 with complications, uncontrolled (CMS/[facility])    • Obesity    • Vitamin B12 deficiency      Family History   Problem Relation Age of Onset   • Heart disease Mother    • Heart disease Brother    • Heart disease Maternal Uncle      Social History     Social History   • Marital status:      Spouse name: N/A   • Number of children: N/A   • Years of education: N/A     Occupational History   • Not on file.     Social History Main Topics   • Smoking status: Never Smoker   • Smokeless tobacco: Never Used   • Alcohol use No   • Drug use: Unknown   • Sexual activity: Not on file     Other Topics Concern   • Not on file     Social History Narrative   • No narrative on file     Allergies   Allergen Reactions   • Sulfa Antibiotics Other (See Comments)     Abdominal Pain and Nausea  Abdominal Pain and Nausea   • Latex Itching       Current Outpatient Prescriptions:   •  allopurinol (ZYLOPRIM) 300 MG tablet, Take 300 mg by mouth Daily., Disp: , Rfl: 3  •  aspirin 81 MG tablet, Take 81 mg by mouth., Disp: , Rfl:   •   "atorvastatin (LIPITOR) 40 MG tablet, Take 40 mg by mouth Daily., Disp: , Rfl: 3  •  cetirizine (ZyrTEC) 10 MG tablet, Take 10 mg by mouth daily., Disp: , Rfl:   •  diclofenac (VOLTAREN) 75 MG EC tablet, Take 75 mg by mouth 2 (two) times a day., Disp: , Rfl:   •  glucose blood test strip, 1 each by Other route 5 (five) times a day. Use as instructed, Disp: 450 each, Rfl: 1  •  glucose blood test strip, OneTouch Ultra Blue In Vitro Strip; Patient Sig: OneTouch Ultra Blue In Vitro Strip TEST 5 TIMES DAILY; 450; 1; 19-Aug-2013; Active, Disp: 450 each, Rfl: 1  •  Insulin Pen Needle (BD PEN NEEDLE NIURKA U/F) 32G X 4 MM misc, 2 times daily, Disp: 200 each, Rfl: 3  •  Insulin Regular Human, Conc, (HUMULIN R U-500 KWIKPEN) 500 UNIT/ML solution pen-injector CONCENTRATED injection, 75 UNITS BEFORE BREAKFAST AND 60 UNITS BEFORE SUPPER SC, Disp: 9 pen, Rfl: 3  •  Insulin Syringe-Needle U-100 31G X 15/64\" 1 ML misc, , Disp: , Rfl:   •  Insulin Syringe-Needle U-100 31G X 5/16\" 0.5 ML misc, 1 each 5 (five) times a day., Disp: 450 each, Rfl: 1  •  metoprolol tartrate (LOPRESSOR) 25 MG tablet, 2 (Two) Times a Day., Disp: , Rfl: 3  •  ONETOUCH DELICA LANCETS 33G misc, 4 each 4 (four) times a day., Disp: 360 each, Rfl: 1  •  pantoprazole (PROTONIX) 40 MG EC tablet, Take 40 mg by mouth Daily., Disp: , Rfl: 5        Review of Systems   Constitutional: Negative for chills, fatigue and fever.   Cardiovascular: Negative for chest pain and palpitations.   Gastrointestinal: Negative for abdominal pain, constipation, diarrhea, nausea and vomiting.   Endocrine: Negative for cold intolerance.   All other systems reviewed and are negative.      Objective       Vitals:    08/06/18 1349   BP: 110/62   Pulse: 88   Weight: 76.8 kg (169 lb 6.4 oz)   Height: 165.1 cm (65\")     Body mass index is 28.19 kg/m².      Physical Exam   Constitutional: She is oriented to person, place, and time. She appears well-developed and well-nourished.   HENT:   Head: " Normocephalic and atraumatic.   Eyes: Pupils are equal, round, and reactive to light. Conjunctivae and EOM are normal.   Neck: Normal range of motion. Neck supple. No thyromegaly present.   Cardiovascular: Normal rate, regular rhythm, normal heart sounds and intact distal pulses.    Pulmonary/Chest: Effort normal and breath sounds normal.   Abdominal: Soft. Bowel sounds are normal. She exhibits no distension. There is no tenderness.   Musculoskeletal: Normal range of motion. She exhibits no edema.   Neurological: She is alert and oriented to person, place, and time. She has normal reflexes.   Skin: Skin is warm and dry. No rash noted.   Psychiatric: She has a normal mood and affect. Her behavior is normal.   Nursing note and vitals reviewed.    Results Review:     I reviewed the patient's new clinical results.    Medical records reviewed  Summary:      Results Encounter on 02/25/2017   Component Date Value Ref Range Status   • 25 Hydroxy, Vitamin D 07/24/2017 32.2  30.0 - 100.0 ng/mL Final    Comment: Reference Range for Total Vitamin D 25(OH)  Deficiency    <20.0 ng/mL  Insufficiency 21-29 ng/mL  Sufficiency    ng/mL  Toxicity      >100 ng/ml        • Vitamin B-12 07/24/2017 517  211 - 946 pg/mL Final   • Total Cholesterol 07/24/2017 290* 0 - 200 mg/dL Final   • Triglycerides 07/24/2017 394* 0 - 150 mg/dL Final   • HDL Cholesterol 07/24/2017 40  40 - 60 mg/dL Final   • VLDL Cholesterol 07/24/2017 78.8* 5 - 40 mg/dL Final   • LDL Cholesterol  07/24/2017 171* 0 - 100 mg/dL Final   • Hemoglobin A1C 07/24/2017 7.70* 4.80 - 5.60 % Final    Comment: Hemoglobin A1C Ranges:  Increased Risk for Diabetes  5.7% to 6.4%  Diabetes                     >= 6.5%  Diabetic Goal                < 7.0%     • Glucose 07/24/2017 203* 65 - 99 mg/dL Final   • BUN 07/24/2017 19  6 - 20 mg/dL Final   • Creatinine 07/24/2017 0.98  0.57 - 1.00 mg/dL Final   • eGFR Non  Am 07/24/2017 58* >60 mL/min/1.73 Final   • eGFR African Am  07/24/2017 70  >60 mL/min/1.73 Final   • BUN/Creatinine Ratio 07/24/2017 19.4  7.0 - 25.0 Final   • Sodium 07/24/2017 141  136 - 145 mmol/L Final   • Potassium 07/24/2017 5.2  3.5 - 5.2 mmol/L Final   • Chloride 07/24/2017 100  98 - 107 mmol/L Final   • Total CO2 07/24/2017 23.2  22.0 - 29.0 mmol/L Final   • Calcium 07/24/2017 10.8* 8.6 - 10.5 mg/dL Final   • Total Protein 07/24/2017 7.8  6.0 - 8.5 g/dL Final   • Albumin 07/24/2017 4.90  3.50 - 5.20 g/dL Final   • Globulin 07/24/2017 2.9  gm/dL Final   • A/G Ratio 07/24/2017 1.7  g/dL Final   • Total Bilirubin 07/24/2017 0.6  0.1 - 1.2 mg/dL Final   • Alkaline Phosphatase 07/24/2017 125* 39 - 117 U/L Final   • AST (SGOT) 07/24/2017 19  1 - 32 U/L Final   • ALT (SGPT) 07/24/2017 24  1 - 33 U/L Final     Lab Results   Component Value Date    HGBA1C 7.70 (H) 07/24/2017    HGBA1C 7.90 (H) 02/13/2017    HGBA1C 6.80 (H) 10/10/2016     Lab Results   Component Value Date    MICROALBUR 190.2 02/13/2017    CREATININE 0.98 07/24/2017     Imaging Results (most recent)     None                Assessment and Plan:    Laxmi was seen today for diabetes.    Diagnoses and all orders for this visit:    Uncontrolled type 2 diabetes mellitus with complication, with long-term current use of insulin (CMS/[facility])  -     Comprehensive Metabolic Panel  -     Hemoglobin A1c  -     TSH  -     Microalbumin / Creatinine Urine Ratio - Urine, Clean Catch    Uncontrolled type 2 diabetes mellitus with diabetic neuropathy, with long-term current use of insulin (CMS/[facility])    Uncontrolled type II diabetes mellitus with nephropathy (CMS/[facility])    Hyperinsulinism    Encounter for long-term (current) use of insulin (CMS/[facility])    Abnormal weight gain    Hyperlipidemia, unspecified hyperlipidemia type      #1 uncontrolled type 2 diabetes mellitus      #2 hypoglycemia patient is extremely concerned about hypoglycemia reports that blood sugar of 70 causes her to be hypo   #3 diabetic neuropathy patient reports  "intermittent sharp shooting pains in the feet  #4 insulin management  #5 hypertension stable  #6 diabetic nephropathy with microalbuminuria  #7 hyperlipidemia:      Glucometer download reviewed  Patient's blood sugars are fluctuating from 100-300  She is not checking frequently  She is currently taking 75 units at 2 PM and she wakes up and 60 units at 10-12 midnight  Her blood sugars are generally higher  Patient is unable to check more frequently    I recommended that she might try [employer] personal CGM  Patient will get lab testing done today  After the lab results have been reviewed she'll be advised of any further changes in her insulin dosing  Patient verbalized understanding    Patient will return to follow-up in 3-4 months    The total time spent  was more than 25 min of which greater than 15 min of time ( greater than 50% of the total time )  was spent face to face with the patient counseling and coordination of care on recommended evaluation and treatment options, instructions for management/treatment and /or follow up  and importance of compliance with chosen management or treatment options    Miah Upton MD. FACE    08/06/18      EMR Dragon / transcription disclaimer:     \"Dictated utilizing Dragon dictation\".         "

## 2018-08-07 LAB
ALBUMIN SERPL-MCNC: 5 G/DL (ref 3.5–5.2)
ALBUMIN/CREAT UR: 61.4 MG/G CREAT (ref 0–30)
ALBUMIN/GLOB SERPL: 2.1 G/DL
ALP SERPL-CCNC: 114 U/L (ref 39–117)
ALT SERPL-CCNC: 22 U/L (ref 1–33)
AST SERPL-CCNC: 17 U/L (ref 1–32)
BILIRUB SERPL-MCNC: 0.6 MG/DL (ref 0.1–1.2)
BUN SERPL-MCNC: 23 MG/DL (ref 8–23)
BUN/CREAT SERPL: 21.7 (ref 7–25)
CALCIUM SERPL-MCNC: 10.5 MG/DL (ref 8.6–10.5)
CHLORIDE SERPL-SCNC: 101 MMOL/L (ref 98–107)
CO2 SERPL-SCNC: 20.9 MMOL/L (ref 22–29)
CREAT SERPL-MCNC: 1.06 MG/DL (ref 0.57–1)
CREAT UR-MCNC: 68.2 MG/DL
GLOBULIN SER CALC-MCNC: 2.4 GM/DL
GLUCOSE SERPL-MCNC: 167 MG/DL (ref 65–99)
HBA1C MFR BLD: 7.9 % (ref 4.8–5.6)
MICROALBUMIN UR-MCNC: 41.9 UG/ML
POTASSIUM SERPL-SCNC: 4.8 MMOL/L (ref 3.5–5.2)
PROT SERPL-MCNC: 7.4 G/DL (ref 6–8.5)
SODIUM SERPL-SCNC: 141 MMOL/L (ref 136–145)
TSH SERPL DL<=0.005 MIU/L-ACNC: 1.54 MIU/ML (ref 0.27–4.2)

## 2018-11-26 ENCOUNTER — OFFICE VISIT (OUTPATIENT)
Dept: ENDOCRINOLOGY | Age: 58
End: 2018-11-26

## 2018-11-26 VITALS
WEIGHT: 172.4 LBS | HEIGHT: 65 IN | SYSTOLIC BLOOD PRESSURE: 130 MMHG | HEART RATE: 74 BPM | BODY MASS INDEX: 28.72 KG/M2 | DIASTOLIC BLOOD PRESSURE: 72 MMHG

## 2018-11-26 DIAGNOSIS — IMO0002 DIABETES MELLITUS TYPE 2, UNCONTROLLED, WITH COMPLICATIONS: ICD-10-CM

## 2018-11-26 DIAGNOSIS — E78.5 HYPERLIPIDEMIA ASSOCIATED WITH TYPE 2 DIABETES MELLITUS (HCC): ICD-10-CM

## 2018-11-26 DIAGNOSIS — IMO0002 UNCONTROLLED TYPE II DIABETES MELLITUS WITH NEPHROPATHY: ICD-10-CM

## 2018-11-26 DIAGNOSIS — E11.69 HYPERLIPIDEMIA ASSOCIATED WITH TYPE 2 DIABETES MELLITUS (HCC): ICD-10-CM

## 2018-11-26 DIAGNOSIS — E16.1 HYPERINSULINISM: ICD-10-CM

## 2018-11-26 DIAGNOSIS — IMO0002 UNCONTROLLED TYPE 2 DIABETES MELLITUS WITH COMPLICATION, WITH LONG-TERM CURRENT USE OF INSULIN: Primary | ICD-10-CM

## 2018-11-26 DIAGNOSIS — IMO0002 TYPE II DIABETES MELLITUS WITH NEUROLOGICAL MANIFESTATIONS, UNCONTROLLED: ICD-10-CM

## 2018-11-26 DIAGNOSIS — Z79.4 ENCOUNTER FOR LONG-TERM (CURRENT) USE OF INSULIN (HCC): ICD-10-CM

## 2018-11-26 PROCEDURE — 99214 OFFICE O/P EST MOD 30 MIN: CPT | Performed by: INTERNAL MEDICINE

## 2018-11-26 NOTE — PROGRESS NOTES
58 y.o.    Patient Care Team:  Hollis Dawkins MD as PCP - General    Chief Complaint:      F/U TYPE 2 DIABETES, UNCONTROLLED.  NO RECENT LABS.  Subjective     HPI   Patient is a 58-year-old white female with a past history of uncontrolled type 2 diabetes mellitus with complications came for follow-up    Uncontrolled type 2 diabetes mellitus  Patient is currently taking Humulin U-500 insulin  She takes approximately 75 units at 2 PM 1 waking up and takes about 60 units at the midnight at work with a snack  Patient works night shift  She teaches students at [employer]  Her blood sugars are mostly in the 100 300 range  Hypoglycemia  Patient has occasional hypoglycemia with a blood sugar dropping below 100  Lowest blood sugar she remembers was 63  Uncontrolled type 2 diabetes mellitus with neuropathy  Patient is symptomatic of neuropathy  Uncontrolled type 2 diabetes mellitus with nephropathy  Patient reports elevated microalbumin and creatinine  Patient has regular nephrology follow-up  Patient denied any knowledge of diabetic retinopathy  Abnormal weight gain  Patient currently weighs 172 pounds with a BMI of 29  Hyperlipidemia associated with diabetes  Patient is currently on Lipitor 40 mg daily.  She reports compliance with the medication  Denies any side effects.    Interval History    SUMMARY  Patient was extremely insulin resistant and has tried several insulins before trying Humulin U-500 insulin  patient is currently injecting up to 13 Harsha in the morning and 9 Harsha in the evening.  patient's blood sugars are fluctuating between   She occasionally has hypoglycemia usually at work, usually does not check but is able to take care of herself.  she reports that she stopped using 70/30 insulin   Diabetic neuropathy  Patient certainly is symptomatic but some of the symptoms may be from prior chemotherapy as well during breast cancer therapy  patient denied any knowledge of diabetic retinopathy  She also denied  any knowledge of diabetic nephropathy  hypertension  Patient could not tolerate lisinopril in the past  Abnormal weight gain  Patient reported that she actually started losing weight. She started portion control and was dieting as well as eating healthier and exercising. She lost nearly 13 pounds in the past 6-8 months  She was able to decrease the insulin dose significantly and trying to keep her blood sugars mostly below 150  Patient reported that she has not been very compliant with her diet recently due to extreme stress at home she is trying to take custody of her granddaughter from her son and has been going through courts and other legal issues and has been very stressful.  Patient's currently under a lot of stress from work and has been losing hair over the past few months. Her primary care physician started her on antidepressives and approximately 3 months ago. She cannot tell about the response yet         The following portions of the patient's history were reviewed and updated as appropriate: allergies, current medications, past family history, past medical history, past social history, past surgical history and problem list.    Past Medical History:   Diagnosis Date   • Breast cancer (CMS/[facility])    • Diabetes mellitus type 2 with complications, uncontrolled (CMS/[facility])    • Obesity    • Vitamin B12 deficiency      Family History   Problem Relation Age of Onset   • Heart disease Mother    • Heart disease Brother    • Heart disease Maternal Uncle      Social History     Socioeconomic History   • Marital status:      Spouse name: Not on file   • Number of children: Not on file   • Years of education: Not on file   • Highest education level: Not on file   Social Needs   • Financial resource strain: Not on file   • Food insecurity - worry: Not on file   • Food insecurity - inability: Not on file   • Transportation needs - medical: Not on file   • Transportation needs - non-medical: Not on file  "  Occupational History   • Not on file   Tobacco Use   • Smoking status: Never Smoker   • Smokeless tobacco: Never Used   Substance and Sexual Activity   • Alcohol use: No   • Drug use: Not on file   • Sexual activity: Not on file   Other Topics Concern   • Not on file   Social History Narrative   • Not on file     Allergies   Allergen Reactions   • Sulfa Antibiotics Other (See Comments)     Abdominal Pain and Nausea  Abdominal Pain and Nausea  Abdominal Pain and Nausea   • Latex Itching       Current Outpatient Medications:   •  allopurinol (ZYLOPRIM) 300 MG tablet, Take 300 mg by mouth Daily., Disp: , Rfl: 3  •  aspirin 81 MG tablet, Take 81 mg by mouth., Disp: , Rfl:   •  cetirizine (ZyrTEC) 10 MG tablet, Take 10 mg by mouth daily., Disp: , Rfl:   •  diclofenac (VOLTAREN) 75 MG EC tablet, Take 75 mg by mouth 2 (two) times a day., Disp: , Rfl:   •  glucose blood test strip, 1 each by Other route 5 (five) times a day. Use as instructed, Disp: 450 each, Rfl: 1  •  Insulin Pen Needle (BD PEN NEEDLE NIURKA U/F) 32G X 4 MM misc, 2 times daily, Disp: 200 each, Rfl: 3  •  Insulin Regular Human, Conc, (HUMULIN R U-500 KWIKPEN) 500 UNIT/ML solution pen-injector CONCENTRATED injection, 75 UNITS BEFORE BREAKFAST AND 60 UNITS BEFORE SUPPER SC, Disp: 9 pen, Rfl: 3  •  Insulin Syringe-Needle U-100 31G X 15/64\" 1 ML misc, , Disp: , Rfl:   •  Insulin Syringe-Needle U-100 31G X 5/16\" 0.5 ML misc, 1 each 5 (five) times a day., Disp: 450 each, Rfl: 1  •  metoprolol tartrate (LOPRESSOR) 25 MG tablet, 2 (Two) Times a Day., Disp: , Rfl: 3  •  nitrofurantoin, macrocrystal-monohydrate, (MACROBID) 100 MG capsule, TAKE 1 CAPSULE BY MOUTH TWICE A DAY FOR 7 DAYS, Disp: , Rfl: 0  •  ONE TOUCH ULTRA TEST test strip, TEST 5 TIME PER DAY, Disp: 450 each, Rfl: 1  •  ONE TOUCH ULTRA TEST test strip, TEST 5 TIME PER DAY, Disp: 450 each, Rfl: 1  •  ONETOUCH DELICA LANCETS 33G misc, 4 each 4 (four) times a day., Disp: 360 each, Rfl: 1  •  pantoprazole " "(PROTONIX) 40 MG EC tablet, Take 40 mg by mouth Daily., Disp: , Rfl: 5        Review of Systems   Constitutional: Negative for chills, fatigue and fever.   Cardiovascular: Negative for chest pain and palpitations.   Gastrointestinal: Negative for abdominal pain, constipation, diarrhea, nausea and vomiting.   Endocrine: Negative for heat intolerance.   All other systems reviewed and are negative.      Objective       Vitals:    11/26/18 1418   BP: 130/72   Pulse: 74   Weight: 78.2 kg (172 lb 6.4 oz)   Height: 165.1 cm (65\")     Body mass index is 28.69 kg/m².      Physical Exam   Constitutional: She is oriented to person, place, and time. She appears well-developed and well-nourished.   HENT:   Head: Normocephalic and atraumatic.   Eyes: Conjunctivae and EOM are normal. Pupils are equal, round, and reactive to light.   Neck: Normal range of motion. Neck supple. No thyromegaly present.   Cardiovascular: Normal rate, regular rhythm, normal heart sounds and intact distal pulses.   Pulmonary/Chest: Effort normal and breath sounds normal.   Abdominal: Soft. Bowel sounds are normal. She exhibits no distension. There is no tenderness.   Musculoskeletal: Normal range of motion. She exhibits no edema.   Neurological: She is alert and oriented to person, place, and time. She has normal reflexes.   Skin: Skin is warm and dry. No rash noted.   Psychiatric: She has a normal mood and affect. Her behavior is normal.   Nursing note and vitals reviewed.    Results Review:     I reviewed the patient's new clinical results.    Medical records reviewed  Summary:      Office Visit on 08/06/2018   Component Date Value Ref Range Status   • Glucose 08/06/2018 167* 65 - 99 mg/dL Final   • BUN 08/06/2018 23  8 - 23 mg/dL Final   • Creatinine 08/06/2018 1.06* 0.57 - 1.00 mg/dL Final   • eGFR Non  Am 08/06/2018 53* >60 mL/min/1.73 Final   • eGFR African Am 08/06/2018 64  >60 mL/min/1.73 Final   • BUN/Creatinine Ratio 08/06/2018 21.7  7.0 " - 25.0 Final   • Sodium 08/06/2018 141  136 - 145 mmol/L Final   • Potassium 08/06/2018 4.8  3.5 - 5.2 mmol/L Final   • Chloride 08/06/2018 101  98 - 107 mmol/L Final   • Total CO2 08/06/2018 20.9* 22.0 - 29.0 mmol/L Final   • Calcium 08/06/2018 10.5  8.6 - 10.5 mg/dL Final   • Total Protein 08/06/2018 7.4  6.0 - 8.5 g/dL Final   • Albumin 08/06/2018 5.00  3.50 - 5.20 g/dL Final   • Globulin 08/06/2018 2.4  gm/dL Final   • A/G Ratio 08/06/2018 2.1  g/dL Final   • Total Bilirubin 08/06/2018 0.6  0.1 - 1.2 mg/dL Final   • Alkaline Phosphatase 08/06/2018 114  39 - 117 U/L Final   • AST (SGOT) 08/06/2018 17  1 - 32 U/L Final   • ALT (SGPT) 08/06/2018 22  1 - 33 U/L Final   • Hemoglobin A1C 08/06/2018 7.90* 4.80 - 5.60 % Final    Comment: Hemoglobin A1C Ranges:  Increased Risk for Diabetes  5.7% to 6.4%  Diabetes                     >= 6.5%  Diabetic Goal                < 7.0%     • TSH 08/06/2018 1.540  0.270 - 4.200 mIU/mL Final   • Creatinine, Urine 08/06/2018 68.2  Not Estab. mg/dL Final   • Microalbumin, Urine 08/06/2018 41.9  Not Estab. ug/mL Final   • Microalbumin/Creatinine Ratio 08/06/2018 61.4* 0.0 - 30.0 mg/g creat Final     Lab Results   Component Value Date    HGBA1C 7.90 (H) 08/06/2018    HGBA1C 7.70 (H) 07/24/2017    HGBA1C 7.90 (H) 02/13/2017     Lab Results   Component Value Date    MICROALBUR 41.9 08/06/2018    CREATININE 1.06 (H) 08/06/2018     Imaging Results (most recent)     None                Assessment and Plan:    Laxmi was seen today for diabetes.    Diagnoses and all orders for this visit:    Uncontrolled type 2 diabetes mellitus with complication, with long-term current use of insulin (CMS/[facility])  -     Comprehensive Metabolic Panel  -     Hemoglobin A1c  -     TSH  -     Microalbumin / Creatinine Urine Ratio - Urine, Clean Catch    Diabetes mellitus type 2, uncontrolled, with complications (CMS/[facility])    Type II diabetes mellitus with neurological manifestations, uncontrolled  "(CMS/[facility])    Uncontrolled type II diabetes mellitus with nephropathy (CMS/[facility])    Hyperinsulinism    Encounter for long-term (current) use of insulin (CMS/[facility])    Hyperlipidemia associated with type 2 diabetes mellitus (CMS/[facility])    Patient is currently taking Humulin U-500 insulin 75 units at 2 PM  She also takes 60 units at 10 PM to 1 AM at work  Patient's blood sugars are ranging from   Patient is unable to adjust insulin further  She typically eats small snacks all night at work rather than 1 large meal    Dexcom OR FreeStyle CGM would be ideal in her situation to monitor Accu-Cheks more closely and then adjust insulin as needed  Patient verbalized understanding  Patient will get lab testing done today  She will return to follow-up in 3-4 months.    The total time spent  was more than 25 min of which greater than 15 min of time (greater than 50% of the total time)  was spent face to face with the patient counseling and coordination of care on recommended evaluation and treatment options, instructions for management/treatment and /or follow up and importance of compliance with chosen management or treatment options      iMah Upton MD. FACE    11/26/18      EM[facility] Dragon / transcription disclaimer:     \"Dictated utilizing Dragon dictation\".         "

## 2018-11-27 LAB
ALBUMIN SERPL-MCNC: 4.5 G/DL (ref 3.5–5.2)
ALBUMIN/GLOB SERPL: 1.7 G/DL
ALP SERPL-CCNC: 107 U/L (ref 39–117)
ALT SERPL-CCNC: 23 U/L (ref 1–33)
AST SERPL-CCNC: 18 U/L (ref 1–32)
BILIRUB SERPL-MCNC: 0.4 MG/DL (ref 0.1–1.2)
BUN SERPL-MCNC: 20 MG/DL (ref 8–23)
BUN/CREAT SERPL: 21.3 (ref 7–25)
CALCIUM SERPL-MCNC: 10.1 MG/DL (ref 8.6–10.5)
CHLORIDE SERPL-SCNC: 102 MMOL/L (ref 98–107)
CO2 SERPL-SCNC: 24.8 MMOL/L (ref 22–29)
CREAT SERPL-MCNC: 0.94 MG/DL (ref 0.57–1)
GLOBULIN SER CALC-MCNC: 2.6 GM/DL
GLUCOSE SERPL-MCNC: 191 MG/DL (ref 65–99)
HBA1C MFR BLD: 7.21 % (ref 4.8–5.6)
POTASSIUM SERPL-SCNC: 4.6 MMOL/L (ref 3.5–5.2)
PROT SERPL-MCNC: 7.1 G/DL (ref 6–8.5)
SODIUM SERPL-SCNC: 141 MMOL/L (ref 136–145)
TSH SERPL DL<=0.005 MIU/L-ACNC: 1.77 MIU/ML (ref 0.27–4.2)
UNABLE TO VOID: NORMAL

## 2019-01-08 ENCOUNTER — TELEPHONE (OUTPATIENT)
Dept: ENDOCRINOLOGY | Age: 59
End: 2019-01-08

## 2019-01-08 NOTE — TELEPHONE ENCOUNTER
----- Message from Nelida Thapa MA sent at 1/7/2019  3:25 PM EST -----  Patient called stating that she has been sick since 12/20/18 and has been placed on steroids she stated that her blood sugars are over 400 and wants to know how much extra insulin she can take to get them back down   Good call back # is [phone]    CALLED AND LEFT MESSAGE FOR PATIENT THAT PER DR. GALARZA SHE CAN RAISE INSULIN BY 5 UNITS FOR BREAKFAST AND SUPPER DAILY UNTIL BELOW 200. I ASKED HER TO CALL AND LET ME KNOW SHE RECEIVED THIS MESSAGE

## 2019-03-11 ENCOUNTER — OFFICE VISIT (OUTPATIENT)
Dept: ENDOCRINOLOGY | Age: 59
End: 2019-03-11

## 2019-03-11 VITALS
SYSTOLIC BLOOD PRESSURE: 120 MMHG | WEIGHT: 168 LBS | DIASTOLIC BLOOD PRESSURE: 64 MMHG | BODY MASS INDEX: 27.99 KG/M2 | HEIGHT: 65 IN | HEART RATE: 88 BPM

## 2019-03-11 DIAGNOSIS — IMO0002 TYPE II DIABETES MELLITUS WITH NEUROLOGICAL MANIFESTATIONS, UNCONTROLLED: ICD-10-CM

## 2019-03-11 DIAGNOSIS — IMO0002 DIABETES MELLITUS TYPE 2, UNCONTROLLED, WITH COMPLICATIONS: Primary | ICD-10-CM

## 2019-03-11 DIAGNOSIS — Z79.4 ENCOUNTER FOR LONG-TERM (CURRENT) USE OF INSULIN (HCC): ICD-10-CM

## 2019-03-11 DIAGNOSIS — E78.5 HYPERLIPIDEMIA ASSOCIATED WITH TYPE 2 DIABETES MELLITUS (HCC): ICD-10-CM

## 2019-03-11 DIAGNOSIS — E11.69 HYPERLIPIDEMIA ASSOCIATED WITH TYPE 2 DIABETES MELLITUS (HCC): ICD-10-CM

## 2019-03-11 DIAGNOSIS — E16.1 HYPERINSULINISM: ICD-10-CM

## 2019-03-11 DIAGNOSIS — IMO0002 UNCONTROLLED TYPE II DIABETES MELLITUS WITH NEPHROPATHY: ICD-10-CM

## 2019-03-11 DIAGNOSIS — R63.5 ABNORMAL WEIGHT GAIN: ICD-10-CM

## 2019-03-11 PROCEDURE — 99214 OFFICE O/P EST MOD 30 MIN: CPT | Performed by: INTERNAL MEDICINE

## 2019-03-11 RX ORDER — LANCETS
EACH MISCELLANEOUS
Qty: 500 EACH | Refills: 2 | Status: SHIPPED | OUTPATIENT
Start: 2019-03-11

## 2019-03-11 RX ORDER — RANOLAZINE 1000 MG/1
1000 TABLET, FILM COATED, EXTENDED RELEASE ORAL EVERY 12 HOURS SCHEDULED
COMMUNITY
Start: 2019-02-26 | End: 2021-08-30 | Stop reason: ALTCHOICE

## 2019-03-11 RX ORDER — ROSUVASTATIN CALCIUM 5 MG/1
20 TABLET, COATED ORAL DAILY
COMMUNITY
Start: 2019-02-26 | End: 2021-08-30 | Stop reason: ALTCHOICE

## 2019-03-11 RX ORDER — TICAGRELOR 90 MG/1
90 TABLET ORAL 2 TIMES DAILY
COMMUNITY
Start: 2019-02-26 | End: 2021-08-30 | Stop reason: ALTCHOICE

## 2019-03-11 NOTE — PATIENT INSTRUCTIONS
Humulin U-500 insulin instructions    Blood sugar less than 150 take 50 units before breakfast and before dinner  150-200 then take 60 units before breakfast and dinner  201 or higher takes 70 units before breakfast and dinner    Documented log and insulin dose and bring it to the next office visit

## 2019-03-11 NOTE — PROGRESS NOTES
59 y.o.    Patient Care Team:  Hollis Dawkins MD as PCP - General    Chief Complaint:      F/U TYPE 2 DIABETES, UNCONTROLLED.  LAST LABS DONE IN HOSPITAL 2/23/19    Subjective     HPI   Patient is a 59-year-old white female with a past medical history of uncontrolled type 2 diabetes mellitus with complications came for follow-up    Uncontrolled type 2 diabetes mellitus  Patient is currently using Humulin U-500 insulin  She reports that she takes approximately 60 units in the morning and 50 units in the evening  She used to take higher doses but decrease the dose due to hypoglycemia  Patient is also fearful of hypoglycemia and keeping her blood sugars in the 150-250 range  As a result her hemoglobin A1c is still in the 7.5-7.7% range  Patient apparently had a heart attack in February 2019 and had to get 3 stents placed and is currently on blood thinners  Her cardiologist is recommending that she do a better job on the diabetes    Hypoglycemia  Patient occasionally reports hypoglycemia with a blood sugar dropping below 100  Uncontrolled type 2 diabetes mellitus with neuropathy  Patient is symptomatic of neuropathy in both feet  Uncontrolled type 2 diabetes mellitus nephropathy  Patient has elevated microalbuminuria and creatinine elevation  Patient has chronic kidney disease and has regular nephrology follow-up  Patient denied any knowledge of diabetic retinopathy    Abnormal weight gain  Patient currently weighs 168 pounds which is slightly lower than before  Hyperlipidemia associated with diabetes  Patient is currently on Lipitor 40 mg daily.  She reports compliance with medication  Denies any side effects.      Interval History    SUMMARY  Patient was extremely insulin resistant and has tried several insulins before trying Humulin U-500 insulin  patient is currently injecting up to 13 Harsha in the morning and 9 Harsha in the evening.  patient's blood sugars are fluctuating between   She occasionally has  hypoglycemia usually at work, usually does not check but is able to take care of herself.  she reports that she stopped using 70/30 insulin   Diabetic neuropathy  Patient certainly is symptomatic but some of the symptoms may be from prior chemotherapy as well during breast cancer therapy  patient denied any knowledge of diabetic retinopathy  She also denied any knowledge of diabetic nephropathy  hypertension  Patient could not tolerate lisinopril in the past  Abnormal weight gain  Patient reported that she actually started losing weight. She started portion control and was dieting as well as eating healthier and exercising. She lost nearly 13 pounds in the past 6-8 months  She was able to decrease the insulin dose significantly and trying to keep her blood sugars mostly below 150  Patient reported that she has not been very compliant with her diet recently due to extreme stress at home she is trying to take custody of her granddaughter from her son and has been going through courts and other legal issues and has been very stressful.  Patient's currently under a lot of stress from work and has been losing hair over the past few months. Her primary care physician started her on antidepressives and approximately 3 months ago. She cannot tell about the response yet        The following portions of the patient's history were reviewed and updated as appropriate: allergies, current medications, past family history, past medical history, past social history, past surgical history and problem list.    Past Medical History:   Diagnosis Date   • Breast cancer (CMS/HCC)    • Diabetes mellitus type 2 with complications, uncontrolled (CMS/HCC)    • Obesity    • Vitamin B12 deficiency      Family History   Problem Relation Age of Onset   • Heart disease Mother    • Heart disease Brother    • Heart disease Maternal Uncle      Social History     Socioeconomic History   • Marital status:      Spouse name: Not on file   •  "Number of children: Not on file   • Years of education: Not on file   • Highest education level: Not on file   Social Needs   • Financial resource strain: Not on file   • Food insecurity - worry: Not on file   • Food insecurity - inability: Not on file   • Transportation needs - medical: Not on file   • Transportation needs - non-medical: Not on file   Occupational History   • Not on file   Tobacco Use   • Smoking status: Never Smoker   • Smokeless tobacco: Never Used   Substance and Sexual Activity   • Alcohol use: No   • Drug use: Not on file   • Sexual activity: Not on file   Other Topics Concern   • Not on file   Social History Narrative   • Not on file     Allergies   Allergen Reactions   • Allopurinol Other (See Comments)     Mouth ulcers   • Sulfa Antibiotics Other (See Comments)     Abdominal Pain and Nausea  Abdominal Pain and Nausea  Abdominal Pain and Nausea   • Latex Itching       Current Outpatient Medications:   •  allopurinol (ZYLOPRIM) 300 MG tablet, Take 300 mg by mouth Daily., Disp: , Rfl: 3  •  aspirin 81 MG tablet, Take 81 mg by mouth., Disp: , Rfl:   •  cetirizine (ZyrTEC) 10 MG tablet, Take 10 mg by mouth daily., Disp: , Rfl:   •  diclofenac (VOLTAREN) 75 MG EC tablet, Take 75 mg by mouth 2 (two) times a day., Disp: , Rfl:   •  glucose blood test strip, 1 each by Other route 5 (five) times a day. Use as instructed, Disp: 450 each, Rfl: 1  •  Insulin Pen Needle (BD PEN NEEDLE NIURKA U/F) 32G X 4 MM misc, 2 times daily, Disp: 200 each, Rfl: 3  •  Insulin Regular Human, Conc, (HUMULIN R U-500 KWIKPEN) 500 UNIT/ML solution pen-injector CONCENTRATED injection, 75 UNITS BEFORE BREAKFAST AND 60 UNITS BEFORE SUPPER SC, Disp: 9 pen, Rfl: 3  •  Insulin Syringe-Needle U-100 31G X 15/64\" 1 ML misc, , Disp: , Rfl:   •  Insulin Syringe-Needle U-100 31G X 5/16\" 0.5 ML misc, 1 each 5 (five) times a day., Disp: 450 each, Rfl: 1  •  metoprolol tartrate (LOPRESSOR) 25 MG tablet, 2 (Two) Times a Day., Disp: , Rfl: " "3  •  MULTIPLE VIT-MIN-CALCIUM-FA PO, Take  by mouth., Disp: , Rfl:   •  ONE TOUCH ULTRA TEST test strip, TEST 5 TIME PER DAY, Disp: 450 each, Rfl: 1  •  ONE TOUCH ULTRA TEST test strip, TEST 5 TIME PER DAY, Disp: 450 each, Rfl: 1  •  ONETOUCH DELICA LANCETS 33G misc, 4 each 4 (four) times a day., Disp: 360 each, Rfl: 1  •  pantoprazole (PROTONIX) 40 MG EC tablet, Take 40 mg by mouth Daily., Disp: , Rfl: 5        Review of Systems   Constitutional: Positive for fatigue. Negative for chills and fever.   Cardiovascular: Negative for chest pain and palpitations.   Gastrointestinal: Positive for nausea. Negative for abdominal pain, constipation, diarrhea and vomiting.   Endocrine: Negative for cold intolerance and heat intolerance.   All other systems reviewed and are negative.      Objective       Vitals:    03/11/19 1444   BP: 120/64   Pulse: 88   Weight: 76.2 kg (168 lb)   Height: 165.1 cm (65\")     Body mass index is 27.96 kg/m².      Physical Exam   Constitutional: She is oriented to person, place, and time. She appears well-developed and well-nourished.   HENT:   Head: Normocephalic and atraumatic.   Eyes: Conjunctivae and EOM are normal. Pupils are equal, round, and reactive to light.   Neck: Normal range of motion. Neck supple. No thyromegaly present.   Cardiovascular: Normal rate, regular rhythm, normal heart sounds and intact distal pulses.   Pulmonary/Chest: Effort normal and breath sounds normal.   Abdominal: Soft. Bowel sounds are normal. She exhibits no distension. There is no tenderness.   Musculoskeletal: Normal range of motion. She exhibits no edema.   Neurological: She is alert and oriented to person, place, and time. She has normal reflexes.   Skin: Skin is warm and dry. No rash noted.   Psychiatric: She has a normal mood and affect. Her behavior is normal.   Nursing note and vitals reviewed.    Results Review:     I reviewed the patient's new clinical results.    Medical records " reviewed  Summary:      Office Visit on 11/26/2018   Component Date Value Ref Range Status   • Glucose 11/26/2018 191* 65 - 99 mg/dL Final   • BUN 11/26/2018 20  8 - 23 mg/dL Final   • Creatinine 11/26/2018 0.94  0.57 - 1.00 mg/dL Final   • eGFR Non  Am 11/26/2018 61  >60 mL/min/1.73 Final   • eGFR African Am 11/26/2018 74  >60 mL/min/1.73 Final   • BUN/Creatinine Ratio 11/26/2018 21.3  7.0 - 25.0 Final   • Sodium 11/26/2018 141  136 - 145 mmol/L Final   • Potassium 11/26/2018 4.6  3.5 - 5.2 mmol/L Final   • Chloride 11/26/2018 102  98 - 107 mmol/L Final   • Total CO2 11/26/2018 24.8  22.0 - 29.0 mmol/L Final   • Calcium 11/26/2018 10.1  8.6 - 10.5 mg/dL Final   • Total Protein 11/26/2018 7.1  6.0 - 8.5 g/dL Final   • Albumin 11/26/2018 4.50  3.50 - 5.20 g/dL Final   • Globulin 11/26/2018 2.6  gm/dL Final   • A/G Ratio 11/26/2018 1.7  g/dL Final   • Total Bilirubin 11/26/2018 0.4  0.1 - 1.2 mg/dL Final   • Alkaline Phosphatase 11/26/2018 107  39 - 117 U/L Final   • AST (SGOT) 11/26/2018 18  1 - 32 U/L Final   • ALT (SGPT) 11/26/2018 23  1 - 33 U/L Final   • Hemoglobin A1C 11/26/2018 7.21* 4.80 - 5.60 % Final    Comment: Hemoglobin A1C Ranges:  Increased Risk for Diabetes  5.7% to 6.4%  Diabetes                     >= 6.5%  Diabetic Goal                < 7.0%     • TSH 11/26/2018 1.770  0.270 - 4.200 mIU/mL Final   • Unable to Void 11/26/2018 Comment   Final    Comment: The patient was not able to render a urine sample and has been  instructed to return for a urine collection at their earliest  convenience.  The urine testing that you have requested has  been deleted from this report.  When the patient returns and  provides a urine specimen, the urine testing will be performed  and separately reported.       Lab Results   Component Value Date    HGBA1C 7.21 (H) 11/26/2018    HGBA1C 7.90 (H) 08/06/2018    HGBA1C 7.70 (H) 07/24/2017     Lab Results   Component Value Date    MICROALBUR 41.9 08/06/2018     CREATININE 0.94 11/26/2018     Imaging Results (most recent)     None                        Assessment and Plan:    Laxmi was seen today for diabetes.    Diagnoses and all orders for this visit:    Diabetes mellitus type 2, uncontrolled, with complications (CMS/[facility])    Type II diabetes mellitus with neurological manifestations, uncontrolled (CMS/[facility])    Uncontrolled type II diabetes mellitus with nephropathy (CMS/[facility])    Abnormal weight gain    Encounter for long-term (current) use of insulin (CMS/[facility])    Hyperinsulinism    Hyperlipidemia associated with type 2 diabetes mellitus (CMS/[facility])    Other orders  -     glucose blood (ACCU-CHEK SMARTVIEW) test strip; Use as instructed 5 TIMES DAILY  -     ACCU-CHEK FASTCLIX LANCETS misc; TO CHECK 5 TIMES DAILY  -     Insulin Regular Human, Conc, (HUMULIN R U-500 KWIKPEN) 500 UNIT/ML solution pen-injector CONCENTRATED injection; 75 UNITS BEFORE BREAKFAST AND 60 UNITS BEFORE SUPPER SC        Patient's glucose log reviewed  Majority of the blood sugars are still in the 200 range  They range from 100-250  No hypoglycemia noted    Patient is currently taking Humulin U-500 insulin 60 units in the morning and 50 units in the night before dinner  She is fearful of hypoglycemia and so she is taking smaller doses  Patient is also very concerned about checking frequently though she does check 4-5 times daily  I recommended dexcom CGM  Patient is not very keen on that  at this time due to insurance coverage    Due to the history of coronary artery bypass grafting 14 months ago and the recent myocardial infarction needing 3 stents 1 month ago and the patient's hemoglobin A1c is consistently in the 7.2-7.7% range I strongly recommended that patient must take a bolus type to get the blood sugars under control and get the hemoglobin A1c in the 6% range    I recommended MedSolfo insulin pump with CGM to get to goal at the same time avoid hypoglycemia  Patient will think it over    Patient  "will return to follow-up in 1 month    The total time spent  was more than 25 min of which greater than 15 min of time (greater than 50% of the total time)  was spent face to face with the patient counseling and coordination of care on recommended evaluation and treatment options, instructions for management/treatment and /or follow up and importance of compliance with chosen management or treatment options  Miah Upton MD. FACE    03/11/19      EMR Dragon / transcription disclaimer:     \"Dictated utilizing Dragon dictation\".         "

## 2019-03-15 ENCOUNTER — TELEPHONE (OUTPATIENT)
Dept: ENDOCRINOLOGY | Age: 59
End: 2019-03-15

## 2019-03-15 RX ORDER — BLOOD SUGAR DIAGNOSTIC
1 STRIP MISCELLANEOUS
Qty: 450 EACH | Refills: 1 | Status: SHIPPED | OUTPATIENT
Start: 2019-03-15 | End: 2023-02-06

## 2019-03-15 RX ORDER — BLOOD-GLUCOSE METER
1 EACH MISCELLANEOUS
Qty: 1 KIT | Refills: 1 | Status: SHIPPED | OUTPATIENT
Start: 2019-03-15 | End: 2023-02-06

## 2019-03-15 NOTE — TELEPHONE ENCOUNTER
----- Message from Tessie Anderson sent at 3/14/2019  3:50 PM EDT -----  Contact: patient  Patient said she printed a coupon online for a Anitra coupon that she got off the box and  [facility] [phone] told her the coupon did not accept Anitra.  [facility] has sent a request to our office.     Patient requested scripts for Accu-Chek Guide meter and she can get free with the coupon and a script for Accu Chek Guide test strips, 5xday, 90 day supply     Patient has to pay the same for 30 day supply as she does for 90 day supply for  Humulin R U500 Kwikpen, 70 units morning & 60 units at night. . She did not pick script up and asked for new script to be sent for 90 day supply to [address]. She has a coupon and will pay $25. For 90 day supply    Pt - [phone]    SCRIPT SENT

## 2019-03-18 ENCOUNTER — DOCUMENTATION (OUTPATIENT)
Dept: ENDOCRINOLOGY | Age: 59
End: 2019-03-18

## 2019-03-18 ENCOUNTER — TELEPHONE (OUTPATIENT)
Dept: ENDOCRINOLOGY | Age: 59
End: 2019-03-18

## 2019-03-18 NOTE — TELEPHONE ENCOUNTER
----- Message from Yara Sapp MA sent at 3/18/2019  8:34 AM EDT -----  Contact: pt  Pt called stated she called on Friday wanting to know if Dr. Sotelo will give her a medical excuse to be excluded from  jury duty.  Stated she had a heart attack and her b/s are running high and low. She asked to be called back. [phone]    [facility] , CALLED AND LEFT A MESSAGE ADVISING HER OF THIS.

## 2019-03-18 NOTE — PROGRESS NOTES
To whom it may concern    Laxmi Marcus date of birth January 12, 1960 he is currently a patient of uncontrolled type 2 diabetes mellitus with complications and is under my care for the past several years    Patient recently had CABG 14 months ago and a heart attack with stents 1 month ago.  Her blood sugar control is not optimized yet  Patient is physically not in shape to attend to jury duty and I request that she may be exempt from this responsibility    If you still have any further questions please feel free to call me at my office

## 2019-04-23 ENCOUNTER — OFFICE VISIT (OUTPATIENT)
Dept: ENDOCRINOLOGY | Age: 59
End: 2019-04-23

## 2019-04-23 VITALS
HEIGHT: 65 IN | DIASTOLIC BLOOD PRESSURE: 70 MMHG | HEART RATE: 69 BPM | SYSTOLIC BLOOD PRESSURE: 120 MMHG | WEIGHT: 162.6 LBS | BODY MASS INDEX: 27.09 KG/M2

## 2019-04-23 DIAGNOSIS — IMO0002 TYPE II DIABETES MELLITUS WITH NEUROLOGICAL MANIFESTATIONS, UNCONTROLLED: ICD-10-CM

## 2019-04-23 DIAGNOSIS — IMO0002 DIABETES MELLITUS TYPE 2, UNCONTROLLED, WITH COMPLICATIONS: Primary | ICD-10-CM

## 2019-04-23 DIAGNOSIS — I10 ESSENTIAL HYPERTENSION: ICD-10-CM

## 2019-04-23 DIAGNOSIS — E11.69 HYPERLIPIDEMIA ASSOCIATED WITH TYPE 2 DIABETES MELLITUS (HCC): ICD-10-CM

## 2019-04-23 DIAGNOSIS — IMO0002 UNCONTROLLED TYPE II DIABETES MELLITUS WITH NEPHROPATHY: ICD-10-CM

## 2019-04-23 DIAGNOSIS — E16.1 HYPERINSULINISM: ICD-10-CM

## 2019-04-23 DIAGNOSIS — Z79.4 ENCOUNTER FOR LONG-TERM (CURRENT) USE OF INSULIN (HCC): ICD-10-CM

## 2019-04-23 DIAGNOSIS — E78.5 HYPERLIPIDEMIA ASSOCIATED WITH TYPE 2 DIABETES MELLITUS (HCC): ICD-10-CM

## 2019-04-23 DIAGNOSIS — R63.5 ABNORMAL WEIGHT GAIN: ICD-10-CM

## 2019-04-23 PROCEDURE — 99214 OFFICE O/P EST MOD 30 MIN: CPT | Performed by: INTERNAL MEDICINE

## 2019-07-08 ENCOUNTER — OFFICE VISIT (OUTPATIENT)
Dept: ENDOCRINOLOGY | Age: 59
End: 2019-07-08

## 2019-07-08 VITALS
HEART RATE: 84 BPM | BODY MASS INDEX: 26.69 KG/M2 | DIASTOLIC BLOOD PRESSURE: 82 MMHG | HEIGHT: 65 IN | WEIGHT: 160.2 LBS | SYSTOLIC BLOOD PRESSURE: 120 MMHG

## 2019-07-08 DIAGNOSIS — R63.5 ABNORMAL WEIGHT GAIN: ICD-10-CM

## 2019-07-08 DIAGNOSIS — E16.1 HYPERINSULINISM: ICD-10-CM

## 2019-07-08 DIAGNOSIS — E78.5 HYPERLIPIDEMIA ASSOCIATED WITH TYPE 2 DIABETES MELLITUS (HCC): ICD-10-CM

## 2019-07-08 DIAGNOSIS — IMO0002 UNCONTROLLED TYPE II DIABETES MELLITUS WITH NEPHROPATHY: ICD-10-CM

## 2019-07-08 DIAGNOSIS — IMO0002 TYPE II DIABETES MELLITUS WITH NEUROLOGICAL MANIFESTATIONS, UNCONTROLLED: ICD-10-CM

## 2019-07-08 DIAGNOSIS — Z79.4 ENCOUNTER FOR LONG-TERM (CURRENT) USE OF INSULIN (HCC): ICD-10-CM

## 2019-07-08 DIAGNOSIS — E78.5 HYPERLIPIDEMIA, UNSPECIFIED HYPERLIPIDEMIA TYPE: ICD-10-CM

## 2019-07-08 DIAGNOSIS — IMO0002 DIABETES MELLITUS TYPE 2, UNCONTROLLED, WITH COMPLICATIONS: Primary | ICD-10-CM

## 2019-07-08 DIAGNOSIS — E11.69 HYPERLIPIDEMIA ASSOCIATED WITH TYPE 2 DIABETES MELLITUS (HCC): ICD-10-CM

## 2019-07-08 PROCEDURE — 99214 OFFICE O/P EST MOD 30 MIN: CPT | Performed by: INTERNAL MEDICINE

## 2019-07-08 NOTE — PROGRESS NOTES
59 y.o.    Patient Care Team:  Hollis Dawkins MD as PCP - General    Chief Complaint:      Subjective     HPI   Patient is a 59-year-old white female with a past history of uncontrolled type 2 diabetes mellitus with complications came for follow-up    Uncontrolled type 2 diabetes mellitus  Patient is currently taking Humulin U-500 insulin  He takes approximately 40 units in the p.m. after she wakes up 20 to 25 units with midnight meal patient reports that fasting blood sugars are still elevated    Patient does present fear of hypoglycemia  Uncontrolled type 2 diabetes mellitus with nephropathy  Patient has elevated microalbumin with creatinine elevation  He has chronic kidney disease and has regular nephrology follow-up  Uncontrolled type 2 diabetes mellitus and neuropathy  Patient reports symptoms of tingling and numbness in both feet    Patient denies any knowledge of diabetic retinopathy  Abnormal weight gain  Patient currently weighs 160 pounds with a BMI of 27  Hyperlipidemia associated with diabetes  Patient is currently taking Lipitor 40 mg daily.  Compliance with the medication    Patient reports that she is exercising almost daily  Exercise hypoglycemia noted      Interval History    SUMMARY  Patient was extremely insulin resistant and has tried several insulins before trying Humulin U-500 insulin  patient is currently injecting up to 13 Harsha in the morning and 9 Harsha in the evening.  patient's blood sugars are fluctuating between   She occasionally has hypoglycemia usually at work, usually does not check but is able to take care of herself.  she reports that she stopped using 70/30 insulin   Diabetic neuropathy  Patient certainly is symptomatic but some of the symptoms may be from prior chemotherapy as well during breast cancer therapy  patient denied any knowledge of diabetic retinopathy  She also denied any knowledge of diabetic nephropathy  hypertension  Patient could not tolerate lisinopril  in the past  Abnormal weight gain  Patient reported that she actually started losing weight. She started portion control and was dieting as well as eating healthier and exercising. She lost nearly 13 pounds in the past 6-8 months  She was able to decrease the insulin dose significantly and trying to keep her blood sugars mostly below 150  Patient reported that she has not been very compliant with her diet recently due to extreme stress at home she is trying to take custody of her granddaughter from her son and has been going through courts and other legal issues and has been very stressful.  Patient's currently under a lot of stress from work and has been losing hair over the past few months. Her primary care physician started her on antidepressives and approximately 3 months ago. She cannot tell about the response yet         The following portions of the patient's history were reviewed and updated as appropriate: allergies, current medications, past family history, past medical history, past social history, past surgical history and problem list.    Past Medical History:   Diagnosis Date   • Breast cancer (CMS/[facility])    • Diabetes mellitus type 2 with complications, uncontrolled (CMS/[facility])    • Obesity    • Vitamin B12 deficiency      Family History   Problem Relation Age of Onset   • Heart disease Mother    • Heart disease Brother    • Heart disease Maternal Uncle      Social History     Socioeconomic History   • Marital status:      Spouse name: Not on file   • Number of children: Not on file   • Years of education: Not on file   • Highest education level: Not on file   Tobacco Use   • Smoking status: Never Smoker   • Smokeless tobacco: Never Used   Substance and Sexual Activity   • Alcohol use: No     Allergies   Allergen Reactions   • Allopurinol Other (See Comments)     Mouth ulcers   • Sulfa Antibiotics Other (See Comments)     Abdominal Pain and Nausea  Abdominal Pain and Nausea  Abdominal Pain and  "Nausea   • Latex Itching       Current Outpatient Medications:   •  ACCU-CHEK FASTCLIX LANCETS misc, TO CHECK 5 TIMES DAILY, Disp: 500 each, Rfl: 2  •  ACCU-CHEK GUIDE test strip, 1 each by Other route 5 (Five) Times a Day. Use as instructed, Disp: 450 each, Rfl: 1  •  aspirin 81 MG tablet, Take 81 mg by mouth., Disp: , Rfl:   •  Blood Glucose Monitoring Suppl (ACCU-CHEK GUIDE) w/Device kit, 1 each 5 (Five) Times a Day., Disp: 1 kit, Rfl: 1  •  BRILINTA 90 MG tablet tablet, , Disp: , Rfl:   •  cetirizine (ZyrTEC) 10 MG tablet, Take 10 mg by mouth daily., Disp: , Rfl:   •  glucose blood (ACCU-CHEK SMARTVIEW) test strip, Use as instructed 5 TIMES DAILY, Disp: 450 each, Rfl: 2  •  Insulin Pen Needle (BD PEN NEEDLE NIURKA U/F) 32G X 4 MM misc, USE 2 TIMES DAILY, Disp: 200 each, Rfl: 1  •  Insulin Regular Human, Conc, (HUMULIN R U-500 KWIKPEN) 500 UNIT/ML solution pen-injector CONCENTRATED injection, 75 UNITS BEFORE BREAKFAST AND 60 UNITS BEFORE SUPPER SC, Disp: 27 pen, Rfl: 1  •  Insulin Syringe-Needle U-100 31G X 15/64\" 1 ML misc, , Disp: , Rfl:   •  Insulin Syringe-Needle U-100 31G X 5/16\" 0.5 ML misc, 1 each 5 (five) times a day., Disp: 450 each, Rfl: 1  •  metoprolol tartrate (LOPRESSOR) 25 MG tablet, 2 (Two) Times a Day., Disp: , Rfl: 3  •  MULTIPLE VIT-MIN-CALCIUM-FA PO, Take  by mouth., Disp: , Rfl:   •  pantoprazole (PROTONIX) 40 MG EC tablet, Take 40 mg by mouth Daily., Disp: , Rfl: 5  •  RANEXA 1000 MG 12 hr tablet, , Disp: , Rfl:   •  rosuvastatin (CRESTOR) 5 MG tablet, , Disp: , Rfl:         Review of Systems   Constitutional: Positive for fatigue. Negative for chills and fever.   Cardiovascular: Negative for chest pain and palpitations.   Gastrointestinal: Negative for abdominal pain, constipation, diarrhea, nausea and vomiting.   Endocrine: Positive for heat intolerance. Negative for cold intolerance.   All other systems reviewed and are negative.      Objective       Vitals:    07/08/19 1334   BP: 120/82 " "  Pulse: 84   Weight: 72.7 kg (160 lb 3.2 oz)   Height: 165.1 cm (65\")     Body mass index is 26.66 kg/m².      Physical Exam   Constitutional: She is oriented to person, place, and time. She appears well-developed and well-nourished.   HENT:   Head: Normocephalic and atraumatic.   Eyes: Conjunctivae and EOM are normal. Pupils are equal, round, and reactive to light.   Neck: Normal range of motion. Neck supple. No thyromegaly present.   Cardiovascular: Normal rate, regular rhythm, normal heart sounds and intact distal pulses.   Pulmonary/Chest: Effort normal and breath sounds normal.   Abdominal: Soft. Bowel sounds are normal. She exhibits no distension. There is no tenderness.   Musculoskeletal: Normal range of motion. She exhibits no edema.   Neurological: She is alert and oriented to person, place, and time. She has normal reflexes.   Skin: Skin is warm and dry. No rash noted.   Psychiatric: She has a normal mood and affect. Her behavior is normal.   Nursing note and vitals reviewed.    Results Review:     I reviewed the patient's new clinical results.    Medical records reviewed  Summary:      Office Visit on 11/26/2018   Component Date Value Ref Range Status   • Glucose 11/26/2018 191* 65 - 99 mg/dL Final   • BUN 11/26/2018 20  8 - 23 mg/dL Final   • Creatinine 11/26/2018 0.94  0.57 - 1.00 mg/dL Final   • eGFR Non  Am 11/26/2018 61  >60 mL/min/1.73 Final   • eGFR African Am 11/26/2018 74  >60 mL/min/1.73 Final   • BUN/Creatinine Ratio 11/26/2018 21.3  7.0 - 25.0 Final   • Sodium 11/26/2018 141  136 - 145 mmol/L Final   • Potassium 11/26/2018 4.6  3.5 - 5.2 mmol/L Final   • Chloride 11/26/2018 102  98 - 107 mmol/L Final   • Total CO2 11/26/2018 24.8  22.0 - 29.0 mmol/L Final   • Calcium 11/26/2018 10.1  8.6 - 10.5 mg/dL Final   • Total Protein 11/26/2018 7.1  6.0 - 8.5 g/dL Final   • Albumin 11/26/2018 4.50  3.50 - 5.20 g/dL Final   • Globulin 11/26/2018 2.6  gm/dL Final   • A/G Ratio 11/26/2018 1.7  g/dL " Final   • Total Bilirubin 11/26/2018 0.4  0.1 - 1.2 mg/dL Final   • Alkaline Phosphatase 11/26/2018 107  39 - 117 U/L Final   • AST (SGOT) 11/26/2018 18  1 - 32 U/L Final   • ALT (SGPT) 11/26/2018 23  1 - 33 U/L Final   • Hemoglobin A1C 11/26/2018 7.21* 4.80 - 5.60 % Final    Comment: Hemoglobin A1C Ranges:  Increased Risk for Diabetes  5.7% to 6.4%  Diabetes                     >= 6.5%  Diabetic Goal                < 7.0%     • TSH 11/26/2018 1.770  0.270 - 4.200 mIU/mL Final   • Unable to Void 11/26/2018 Comment   Final    Comment: The patient was not able to render a urine sample and has been  instructed to return for a urine collection at their earliest  convenience.  The urine testing that you have requested has  been deleted from this report.  When the patient returns and  provides a urine specimen, the urine testing will be performed  and separately reported.       Lab Results   Component Value Date    HGBA1C 7.21 (H) 11/26/2018    HGBA1C 7.90 (H) 08/06/2018    HGBA1C 7.70 (H) 07/24/2017     Lab Results   Component Value Date    MICROALBUR 41.9 08/06/2018    CREATININE 1.4 03/21/2019     Imaging Results (most recent)     None                Assessment and Plan:    Laxmi was seen today for diabetes.    Diagnoses and all orders for this visit:    Diabetes mellitus type 2, uncontrolled, with complications (CMS/[facility])  -     Comprehensive Metabolic Panel  -     Hemoglobin A1c  -     TSH  -     Lipid Panel  -     Microalbumin / Creatinine Urine Ratio - Urine, Clean Catch    Hyperlipidemia, unspecified hyperlipidemia type  -     Comprehensive Metabolic Panel  -     Hemoglobin A1c  -     TSH  -     Lipid Panel  -     Microalbumin / Creatinine Urine Ratio - Urine, Clean Catch    Type II diabetes mellitus with neurological manifestations, uncontrolled (CMS/[facility])    Uncontrolled type II diabetes mellitus with nephropathy (CMS/[facility])    Encounter for long-term (current) use of insulin (CMS/[facility])    Abnormal weight  "gain    Hyperinsulinism    Hyperlipidemia associated with type 2 diabetes mellitus (CMS/[facility])      Glucose log reviewed  Patient has blood sugars ranging from   Majority are in the 100 range  Patient had freestyle CGM placed last visit but apparently she threw it away and did not know that she has to bring it back    Her glucometer apparently communicates with the nursing team and they usually call her if the blood sugars are low or high-it appears to be a Wi-Fi enabled glucometer    Patient is currently taking only 35 to 40 units twice daily at 2 PM and 1 AM  She goes to bed at 7 AM in the morning  She reports that her blood sugar in the morning is usually 150 but after sleep it becomes 250  I advised the patient to take at least 10 or 15 units before he goes to bed at 6 AM     Patient will get lab testing done today  To return to follow-up in 3 months.     The total time spent  was more than 25 min of which greater than 15 min of time (greater than 50% of the total time)  was spent face to face with the patient counseling and coordination of care on recommended evaluation and treatment options, instructions for management/treatment and /or follow up and importance of compliance with chosen management or treatment options    Miah Upton MD. FACE    07/08/19      EMR Dragon / transcription disclaimer:     \"Dictated utilizing Dragon dictation\".         "

## 2019-07-09 LAB
ALBUMIN SERPL-MCNC: 4.9 G/DL (ref 3.5–5.2)
ALBUMIN/CREAT UR: 43.9 MG/G CREAT (ref 0–30)
ALBUMIN/GLOB SERPL: 2 G/DL
ALP SERPL-CCNC: 92 U/L (ref 39–117)
ALT SERPL-CCNC: 19 U/L (ref 1–33)
AST SERPL-CCNC: 18 U/L (ref 1–32)
BILIRUB SERPL-MCNC: 0.6 MG/DL (ref 0.2–1.2)
BUN SERPL-MCNC: 24 MG/DL (ref 8–23)
BUN/CREAT SERPL: 21.2 (ref 7–25)
CALCIUM SERPL-MCNC: 9.9 MG/DL (ref 8.6–10.5)
CHLORIDE SERPL-SCNC: 99 MMOL/L (ref 98–107)
CHOLEST SERPL-MCNC: 273 MG/DL (ref 0–200)
CO2 SERPL-SCNC: 26 MMOL/L (ref 22–29)
CREAT SERPL-MCNC: 1.13 MG/DL (ref 0.57–1)
CREAT UR-MCNC: 110.1 MG/DL
GLOBULIN SER CALC-MCNC: 2.5 GM/DL
GLUCOSE SERPL-MCNC: 151 MG/DL (ref 65–99)
HBA1C MFR BLD: 6.6 % (ref 4.8–5.6)
HDLC SERPL-MCNC: 50 MG/DL (ref 40–60)
INTERPRETATION: NORMAL
LDLC SERPL CALC-MCNC: 151 MG/DL (ref 0–100)
Lab: NORMAL
MICROALBUMIN UR-MCNC: 48.3 UG/ML
POTASSIUM SERPL-SCNC: 4.2 MMOL/L (ref 3.5–5.2)
PROT SERPL-MCNC: 7.4 G/DL (ref 6–8.5)
SODIUM SERPL-SCNC: 140 MMOL/L (ref 136–145)
TRIGL SERPL-MCNC: 359 MG/DL (ref 0–150)
TSH SERPL DL<=0.005 MIU/L-ACNC: 1.57 MIU/ML (ref 0.27–4.2)
VLDLC SERPL CALC-MCNC: 71.8 MG/DL

## 2019-10-08 ENCOUNTER — OFFICE VISIT (OUTPATIENT)
Dept: ENDOCRINOLOGY | Age: 59
End: 2019-10-08

## 2019-10-08 VITALS
WEIGHT: 157 LBS | SYSTOLIC BLOOD PRESSURE: 120 MMHG | HEART RATE: 79 BPM | BODY MASS INDEX: 26.16 KG/M2 | DIASTOLIC BLOOD PRESSURE: 60 MMHG | HEIGHT: 65 IN

## 2019-10-08 DIAGNOSIS — IMO0002 DIABETES MELLITUS TYPE 2, UNCONTROLLED, WITH COMPLICATIONS: Primary | ICD-10-CM

## 2019-10-08 DIAGNOSIS — R63.5 ABNORMAL WEIGHT GAIN: ICD-10-CM

## 2019-10-08 DIAGNOSIS — E11.69 HYPERLIPIDEMIA ASSOCIATED WITH TYPE 2 DIABETES MELLITUS (HCC): ICD-10-CM

## 2019-10-08 DIAGNOSIS — IMO0002 TYPE II DIABETES MELLITUS WITH NEUROLOGICAL MANIFESTATIONS, UNCONTROLLED: ICD-10-CM

## 2019-10-08 DIAGNOSIS — E78.5 HYPERLIPIDEMIA, UNSPECIFIED HYPERLIPIDEMIA TYPE: ICD-10-CM

## 2019-10-08 DIAGNOSIS — Z79.4 ENCOUNTER FOR LONG-TERM (CURRENT) USE OF INSULIN (HCC): ICD-10-CM

## 2019-10-08 DIAGNOSIS — E78.5 HYPERLIPIDEMIA ASSOCIATED WITH TYPE 2 DIABETES MELLITUS (HCC): ICD-10-CM

## 2019-10-08 DIAGNOSIS — IMO0002 UNCONTROLLED TYPE II DIABETES MELLITUS WITH NEPHROPATHY: ICD-10-CM

## 2019-10-08 DIAGNOSIS — E16.1 HYPERINSULINISM: ICD-10-CM

## 2019-10-08 PROCEDURE — 99214 OFFICE O/P EST MOD 30 MIN: CPT | Performed by: INTERNAL MEDICINE

## 2019-10-08 NOTE — PROGRESS NOTES
59 y.o.    Patient Care Team:  Hollis Dawkins MD as PCP - General    Chief Complaint:      F/U TYPE 2 DIABETES, UNCONTROLLED  NO RECENT LABS.  Subjective     HPI   Patient is a 59-year-old white female with a past history of uncontrolled type 2 diabetes mellitus with complications came for follow-up    Uncontrolled type 2 diabetes mellitus  Patient is currently taking Humulin U-500 insulin  She takes 40 units and 35 to 40 units twice daily  She works all night  Patient reports her blood sugars have been in the 200 range recently  They have been worsening over the past several months due to stress  Hypoglycemia  Patient is reporting occasional hypoglycemia with the blood sugars dropping below 70 usually at night at work  Uncontrolled type 2 diabetes mellitus with nephropathy  Patient has elevated microalbumin and creatinine  she has chronic kidney disease with regular follow-up with nephrology  Uncontrolled type 2 diabetes mellitus neuropathy  Patient reports symptoms of tingling numbness in both feet  Symptoms are worsening  Patient denies any knowledge of diabetic retinopathy  Abnormal weight gain  Patient currently weighs 157 pounds appears to have lost about 3 pounds since last visit  Hyperlipidemia associated with diabetes  Patient is currently taking Lipitor 40 mg daily.  she reports compliance with the medication and denies any side effects      Interval History    SUMMARY  Patient was extremely insulin resistant and has tried several insulins before trying Humulin U-500 insulin  patient is currently injecting up to 13 Harsha in the morning and 9 Harsha in the evening.  patient's blood sugars are fluctuating between   She occasionally has hypoglycemia usually at work, usually does not check but is able to take care of herself.  she reports that she stopped using 70/30 insulin   Diabetic neuropathy  Patient certainly is symptomatic but some of the symptoms may be from prior chemotherapy as well during  breast cancer therapy  patient denied any knowledge of diabetic retinopathy  She also denied any knowledge of diabetic nephropathy  hypertension  Patient could not tolerate lisinopril in the past  Abnormal weight gain  Patient reported that she actually started losing weight. She started portion control and was dieting as well as eating healthier and exercising. She lost nearly 13 pounds in the past 6-8 months  She was able to decrease the insulin dose significantly and trying to keep her blood sugars mostly below 150  Patient reported that she has not been very compliant with her diet recently due to extreme stress at home she is trying to take custody of her granddaughter from her son and has been going through courts and other legal issues and has been very stressful.  Patient's currently under a lot of stress from work and has been losing hair over the past few months. Her primary care physician started her on antidepressives and approximately 3 months ago. She cannot tell about the response yet      The following portions of the patient's history were reviewed and updated as appropriate: allergies, current medications, past family history, past medical history, past social history, past surgical history and problem list.    Past Medical History:   Diagnosis Date   • Breast cancer (CMS/[facility])    • Diabetes mellitus type 2 with complications, uncontrolled (CMS/[facility])    • Obesity    • Vitamin B12 deficiency      Family History   Problem Relation Age of Onset   • Heart disease Mother    • Heart disease Brother    • Heart disease Maternal Uncle      Social History     Socioeconomic History   • Marital status:      Spouse name: Not on file   • Number of children: Not on file   • Years of education: Not on file   • Highest education level: Not on file   Tobacco Use   • Smoking status: Never Smoker   • Smokeless tobacco: Never Used   Substance and Sexual Activity   • Alcohol use: No     Allergies   Allergen  "Reactions   • Allopurinol Other (See Comments)     Mouth ulcers   • Sulfa Antibiotics Other (See Comments)     Abdominal Pain and Nausea  Abdominal Pain and Nausea  Abdominal Pain and Nausea   • Latex Itching       Current Outpatient Medications:   •  ACCU-CHEK FASTCLIX LANCETS misc, TO CHECK 5 TIMES DAILY, Disp: 500 each, Rfl: 2  •  ACCU-CHEK GUIDE test strip, 1 each by Other route 5 (Five) Times a Day. Use as instructed, Disp: 450 each, Rfl: 1  •  aspirin 81 MG tablet, Take 81 mg by mouth., Disp: , Rfl:   •  BIOTIN PO, Take  by mouth., Disp: , Rfl:   •  Blood Glucose Monitoring Suppl (ACCU-CHEK GUIDE) w/Device kit, 1 each 5 (Five) Times a Day., Disp: 1 kit, Rfl: 1  •  BRILINTA 90 MG tablet tablet, , Disp: , Rfl:   •  cetirizine (ZyrTEC) 10 MG tablet, Take 10 mg by mouth daily., Disp: , Rfl:   •  glucose blood (ACCU-CHEK SMARTVIEW) test strip, Use as instructed 5 TIMES DAILY, Disp: 450 each, Rfl: 2  •  Insulin Pen Needle (BD PEN NEEDLE NIURKA U/F) 32G X 4 MM misc, USE 2 TIMES DAILY, Disp: 200 each, Rfl: 1  •  Insulin Regular Human, Conc, (HUMULIN R U-500 KWIKPEN) 500 UNIT/ML solution pen-injector CONCENTRATED injection, 75 UNITS BEFORE BREAKFAST AND 60 UNITS BEFORE SUPPER SC, Disp: 27 pen, Rfl: 1  •  Insulin Syringe-Needle U-100 31G X 15/64\" 1 ML misc, , Disp: , Rfl:   •  Insulin Syringe-Needle U-100 31G X 5/16\" 0.5 ML misc, 1 each 5 (five) times a day., Disp: 450 each, Rfl: 1  •  metoprolol tartrate (LOPRESSOR) 25 MG tablet, 2 (Two) Times a Day., Disp: , Rfl: 3  •  MULTIPLE VIT-MIN-CALCIUM-FA PO, Take  by mouth., Disp: , Rfl:   •  NIACIN PO, Take  by mouth., Disp: , Rfl:   •  RANEXA 1000 MG 12 hr tablet, , Disp: , Rfl:   •  rosuvastatin (CRESTOR) 5 MG tablet, , Disp: , Rfl:         Review of Systems   Constitutional: Positive for fatigue. Negative for chills and fever.   Cardiovascular: Positive for chest pain. Negative for palpitations.   Gastrointestinal: Negative for abdominal pain, constipation, diarrhea, nausea " "and vomiting.   Endocrine: Negative for cold intolerance and heat intolerance.   All other systems reviewed and are negative.      Objective       Vitals:    10/08/19 1357   BP: 120/60   Pulse: 79   Weight: 71.2 kg (157 lb)   Height: 165.1 cm (65\")     Body mass index is 26.13 kg/m².      Physical Exam   Constitutional: She is oriented to person, place, and time. She appears well-developed and well-nourished.   HENT:   Head: Normocephalic and atraumatic.   Eyes: Conjunctivae and EOM are normal. Pupils are equal, round, and reactive to light.   Neck: Normal range of motion. Neck supple. No thyromegaly present.   Cardiovascular: Normal rate, regular rhythm, normal heart sounds and intact distal pulses.   Pulmonary/Chest: Effort normal and breath sounds normal.   Abdominal: Soft. Bowel sounds are normal. She exhibits no distension. There is no tenderness.   Musculoskeletal: Normal range of motion. She exhibits no edema.   Neurological: She is alert and oriented to person, place, and time. She has normal reflexes.   Skin: Skin is warm and dry. No rash noted.   Negative for acanthosis and vitiligo or purple striae   Psychiatric: She has a normal mood and affect. Her behavior is normal.   Nursing note and vitals reviewed.    Results Review:     I reviewed the patient's new clinical results.    Medical records reviewed  Summary:      Office Visit on 07/08/2019   Component Date Value Ref Range Status   • Glucose 07/08/2019 151* 65 - 99 mg/dL Final   • BUN 07/08/2019 24* 8 - 23 mg/dL Final   • Creatinine 07/08/2019 1.13* 0.57 - 1.00 mg/dL Final   • eGFR Non  Am 07/08/2019 49* >60 mL/min/1.73 Final   • eGFR African Am 07/08/2019 59* >60 mL/min/1.73 Final   • BUN/Creatinine Ratio 07/08/2019 21.2  7.0 - 25.0 Final   • Sodium 07/08/2019 140  136 - 145 mmol/L Final   • Potassium 07/08/2019 4.2  3.5 - 5.2 mmol/L Final   • Chloride 07/08/2019 99  98 - 107 mmol/L Final   • Total CO2 07/08/2019 26.0  22.0 - 29.0 mmol/L Final "   • Calcium 07/08/2019 9.9  8.6 - 10.5 mg/dL Final   • Total Protein 07/08/2019 7.4  6.0 - 8.5 g/dL Final   • Albumin 07/08/2019 4.90  3.50 - 5.20 g/dL Final   • Globulin 07/08/2019 2.5  gm/dL Final   • A/G Ratio 07/08/2019 2.0  g/dL Final   • Total Bilirubin 07/08/2019 0.6  0.2 - 1.2 mg/dL Final   • Alkaline Phosphatase 07/08/2019 92  39 - 117 U/L Final   • AST (SGOT) 07/08/2019 18  1 - 32 U/L Final   • ALT (SGPT) 07/08/2019 19  1 - 33 U/L Final   • Hemoglobin A1C 07/08/2019 6.60* 4.80 - 5.60 % Final    Comment: Hemoglobin A1C Ranges:  Increased Risk for Diabetes  5.7% to 6.4%  Diabetes                     >= 6.5%  Diabetic Goal                < 7.0%     • TSH 07/08/2019 1.570  0.270 - 4.200 mIU/mL Final   • Total Cholesterol 07/08/2019 273* 0 - 200 mg/dL Final   • Triglycerides 07/08/2019 359* 0 - 150 mg/dL Final   • HDL Cholesterol 07/08/2019 50  40 - 60 mg/dL Final   • VLDL Cholesterol 07/08/2019 71.8  mg/dL Final   • LDL Cholesterol  07/08/2019 151* 0 - 100 mg/dL Final   • Creatinine, Urine 07/08/2019 110.1  Not Estab. mg/dL Final   • Microalbumin, Urine 07/08/2019 48.3  Not Estab. ug/mL Final   • Microalbumin/Creatinine Ratio 07/08/2019 43.9* 0.0 - 30.0 mg/g creat Final    Comment:                      Normal:                0.0 -  30.0                       Albuminuria:          31.0 - 300.0                       Clinical albuminuria:       >300.0     • Interpretation 07/08/2019 Note   Final    Supplemental report is available.   • PDF Image 07/08/2019 Not applicable   Final     Lab Results   Component Value Date    HGBA1C 6.40 (H) 10/08/2019    HGBA1C 6.60 (H) 07/08/2019    HGBA1C 7.21 (H) 11/26/2018     Lab Results   Component Value Date    MICROALBUR 24.7 10/08/2019    CREATININE 1.18 (H) 10/08/2019     Imaging Results (most recent)     None            Assessment and Plan:    Laxmi was seen today for diabetes.    Diagnoses and all orders for this visit:    Diabetes mellitus type 2, uncontrolled, with  "complications (CMS/[facility])  -     Comprehensive Metabolic Panel  -     Hemoglobin A1c  -     TSH  -     Lipid Panel  -     Microalbumin / Creatinine Urine Ratio - Urine, Clean Catch    Hyperlipidemia, unspecified hyperlipidemia type  -     Comprehensive Metabolic Panel  -     Hemoglobin A1c  -     TSH  -     Lipid Panel  -     Microalbumin / Creatinine Urine Ratio - Urine, Clean Catch    Type II diabetes mellitus with neurological manifestations, uncontrolled (CMS/[facility])    Uncontrolled type II diabetes mellitus with nephropathy (CMS/[facility])    Hyperlipidemia associated with type 2 diabetes mellitus (CMS/[facility])    Abnormal weight gain    Encounter for long-term (current) use of insulin (CMS/[facility])    Hyperinsulinism    Other orders  -     Cardiovascular Risk Assessment  -     Diabetes Patient Education    Patient's blood sugars are in the 100 200 range patient reports that her blood sugars have gotten worse over the past months  She is under a lot of stress at work  Patient is currently taking Humulin U-500 insulin 35 to 40 units twice daily since 2 PM and 1 AM he goes to bed at 7 AM in the morning and works all night ,  Patient reports hypoglycemia usually at work    I advised the patient to decrease the night insulin to 30 units to prevent hypoglycemia at work    Patient will get lab testing done today  To return to follow-up in 3 months      Miah Upton MD. FACE    10/25/19      EMR Dragon / transcription disclaimer:     \"Dictated utilizing Dragon dictation\".         "

## 2019-10-09 LAB
ALBUMIN SERPL-MCNC: 4.7 G/DL (ref 3.5–5.2)
ALBUMIN/CREAT UR: 41.8 MG/G CREAT (ref 0–30)
ALBUMIN/GLOB SERPL: 2.1 G/DL
ALP SERPL-CCNC: 98 U/L (ref 39–117)
ALT SERPL-CCNC: 15 U/L (ref 1–33)
AST SERPL-CCNC: 16 U/L (ref 1–32)
BILIRUB SERPL-MCNC: 0.7 MG/DL (ref 0.2–1.2)
BUN SERPL-MCNC: 21 MG/DL (ref 8–23)
BUN/CREAT SERPL: 17.8 (ref 7–25)
CALCIUM SERPL-MCNC: 9.6 MG/DL (ref 8.6–10.5)
CHLORIDE SERPL-SCNC: 101 MMOL/L (ref 98–107)
CHOLEST SERPL-MCNC: 226 MG/DL (ref 0–200)
CO2 SERPL-SCNC: 22.5 MMOL/L (ref 22–29)
CREAT SERPL-MCNC: 1.18 MG/DL (ref 0.57–1)
CREAT UR-MCNC: 59.1 MG/DL
GLOBULIN SER CALC-MCNC: 2.2 GM/DL
GLUCOSE SERPL-MCNC: 151 MG/DL (ref 65–99)
HBA1C MFR BLD: 6.4 % (ref 4.8–5.6)
HDLC SERPL-MCNC: 51 MG/DL (ref 40–60)
INTERPRETATION: NORMAL
LDLC SERPL CALC-MCNC: 128 MG/DL (ref 0–100)
Lab: NORMAL
MICROALBUMIN UR-MCNC: 24.7 UG/ML
POTASSIUM SERPL-SCNC: 4.6 MMOL/L (ref 3.5–5.2)
PROT SERPL-MCNC: 6.9 G/DL (ref 6–8.5)
SODIUM SERPL-SCNC: 141 MMOL/L (ref 136–145)
TRIGL SERPL-MCNC: 236 MG/DL (ref 0–150)
TSH SERPL DL<=0.005 MIU/L-ACNC: 1.3 UIU/ML (ref 0.27–4.2)
VLDLC SERPL CALC-MCNC: 47.2 MG/DL

## 2019-10-14 ENCOUNTER — TELEPHONE (OUTPATIENT)
Dept: ENDOCRINOLOGY | Age: 59
End: 2019-10-14

## 2019-10-14 NOTE — TELEPHONE ENCOUNTER
Sore big toe  She is a diabetic  Nail grown in the skin  Tried to cut it out and couldn't  Doesn't want to get an infection    Can you recommend a podiatrist for her    Please call [phone]

## 2019-10-15 DIAGNOSIS — IMO0002 DIABETES MELLITUS TYPE 2, UNCONTROLLED, WITH COMPLICATIONS: Primary | ICD-10-CM

## 2019-12-05 RX ORDER — INSULIN HUMAN 500 [IU]/ML
INJECTION, SOLUTION SUBCUTANEOUS
Qty: 24 ML | Refills: 2 | Status: SHIPPED | OUTPATIENT
Start: 2019-12-05 | End: 2020-01-20

## 2020-01-20 ENCOUNTER — OFFICE VISIT (OUTPATIENT)
Dept: ENDOCRINOLOGY | Age: 60
End: 2020-01-20

## 2020-01-20 VITALS
WEIGHT: 154 LBS | HEART RATE: 83 BPM | DIASTOLIC BLOOD PRESSURE: 68 MMHG | HEIGHT: 65 IN | BODY MASS INDEX: 25.66 KG/M2 | SYSTOLIC BLOOD PRESSURE: 110 MMHG

## 2020-01-20 DIAGNOSIS — E16.1 HYPERINSULINISM: ICD-10-CM

## 2020-01-20 DIAGNOSIS — Z79.4 ENCOUNTER FOR LONG-TERM (CURRENT) USE OF INSULIN (HCC): ICD-10-CM

## 2020-01-20 DIAGNOSIS — E11.69 HYPERLIPIDEMIA ASSOCIATED WITH TYPE 2 DIABETES MELLITUS (HCC): ICD-10-CM

## 2020-01-20 DIAGNOSIS — IMO0002 TYPE II DIABETES MELLITUS WITH NEUROLOGICAL MANIFESTATIONS, UNCONTROLLED: ICD-10-CM

## 2020-01-20 DIAGNOSIS — IMO0002 DIABETES MELLITUS TYPE 2, UNCONTROLLED, WITH COMPLICATIONS: Primary | ICD-10-CM

## 2020-01-20 DIAGNOSIS — IMO0002 UNCONTROLLED TYPE II DIABETES MELLITUS WITH NEPHROPATHY: ICD-10-CM

## 2020-01-20 DIAGNOSIS — E78.5 HYPERLIPIDEMIA ASSOCIATED WITH TYPE 2 DIABETES MELLITUS (HCC): ICD-10-CM

## 2020-01-20 DIAGNOSIS — E78.5 HYPERLIPIDEMIA, UNSPECIFIED HYPERLIPIDEMIA TYPE: ICD-10-CM

## 2020-01-20 PROCEDURE — 99214 OFFICE O/P EST MOD 30 MIN: CPT | Performed by: INTERNAL MEDICINE

## 2020-01-20 RX ORDER — METOPROLOL SUCCINATE 50 MG/1
12.5 TABLET, EXTENDED RELEASE ORAL EVERY EVENING
COMMUNITY
Start: 2019-12-10 | End: 2023-02-06

## 2020-01-20 NOTE — PROGRESS NOTES
60 y.o.    Patient Care Team:  Hollis Dawkins MD as PCP - General    Chief Complaint:      F/U TYPE 2 DIABETES, UNCONTROLLED.  NO RECENT LABS.  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  She reports compliance and denies any side effects    Coronary artery disease  Patient is status post CABG 2 years ago and apparently had a stent placed in December 2019.  Her cardiologist reported that her vessels are getting narrow and she may not be able to get any more stents and may need to get another bypass and patient is extremely afraid of that  Diabetes   Associated symptoms include fatigue. Pertinent negatives for diabetes include no chest pain.         Interval History      The following portions of the patient's history were reviewed and updated as appropriate: allergies, current medications, past family history, past medical history, past social history, past surgical history and problem list.    Past Medical History:   Diagnosis Date   • Breast cancer (CMS/HCC)    • Diabetes mellitus type 2 with complications, uncontrolled (CMS/HCC)    • Obesity    • Vitamin B12 deficiency   "    Family History   Problem Relation Age of Onset   • Heart disease Mother    • Heart disease Brother    • Heart disease Maternal Uncle      Social History     Socioeconomic History   • Marital status:      Spouse name: Not on file   • Number of children: Not on file   • Years of education: Not on file   • Highest education level: Not on file   Tobacco Use   • Smoking status: Never Smoker   • Smokeless tobacco: Never Used   Substance and Sexual Activity   • Alcohol use: No     Allergies   Allergen Reactions   • Allopurinol Other (See Comments)     Mouth ulcers   • Sulfa Antibiotics Other (See Comments)     Abdominal Pain and Nausea  Abdominal Pain and Nausea  Abdominal Pain and Nausea   • Latex Itching       Current Outpatient Medications:   •  ACCU-CHEK FASTCLIX LANCETS misc, TO CHECK 5 TIMES DAILY, Disp: 500 each, Rfl: 2  •  ACCU-CHEK GUIDE test strip, 1 each by Other route 5 (Five) Times a Day. Use as instructed, Disp: 450 each, Rfl: 1  •  aspirin 81 MG tablet, Take 81 mg by mouth., Disp: , Rfl:   •  BIOTIN PO, Take  by mouth., Disp: , Rfl:   •  Blood Glucose Monitoring Suppl (ACCU-CHEK GUIDE) w/Device kit, 1 each 5 (Five) Times a Day., Disp: 1 kit, Rfl: 1  •  BRILINTA 90 MG tablet tablet, , Disp: , Rfl:   •  cetirizine (ZyrTEC) 10 MG tablet, Take 10 mg by mouth daily., Disp: , Rfl:   •  glucose blood (ACCU-CHEK SMARTVIEW) test strip, Use as instructed 5 TIMES DAILY, Disp: 450 each, Rfl: 2  •  Insulin Pen Needle (BD PEN NEEDLE NIURKA U/F) 32G X 4 MM misc, USE 2 TIMES DAILY, Disp: 200 each, Rfl: 1  •  Insulin Syringe-Needle U-100 31G X 15/64\" 1 ML misc, , Disp: , Rfl:   •  MULTIPLE VIT-MIN-CALCIUM-FA PO, Take  by mouth., Disp: , Rfl:   •  RANEXA 1000 MG 12 hr tablet, , Disp: , Rfl:   •  rosuvastatin (CRESTOR) 5 MG tablet, , Disp: , Rfl:   •  insulin aspart (NOVOLOG FLEXPEN) 100 UNIT/ML solution pen-injector sc pen, 6 UNITS TIDAC SC, Disp: 15 mL, Rfl: 5  •  insulin degludec (TRESIBA FLEXTOUCH) 100 UNIT/ML " "solution pen-injector injection, 25 UNITS QAM, Disp: 15 mL, Rfl: 5  •  metoprolol succinate XL (TOPROL-XL) 50 MG 24 hr tablet, , Disp: , Rfl:         Review of Systems   Constitutional: Positive for fatigue. Negative for chills and fever.   Cardiovascular: Negative for chest pain and palpitations.   Gastrointestinal: Negative for abdominal pain, constipation, diarrhea, nausea and vomiting.   Endocrine: Negative for cold intolerance and heat intolerance.   All other systems reviewed and are negative.      Objective       Vitals:    01/20/20 1414   BP: 110/68   Pulse: 83   Weight: 69.9 kg (154 lb)   Height: 165.1 cm (65\")     Body mass index is 25.63 kg/m².      Physical Exam   Constitutional: She is oriented to person, place, and time. She appears well-developed and well-nourished.   HENT:   Head: Normocephalic and atraumatic.   Eyes: Pupils are equal, round, and reactive to light. Conjunctivae and EOM are normal.   Neck: Normal range of motion. Neck supple. No thyromegaly present.   Cardiovascular: Normal rate, regular rhythm, normal heart sounds and intact distal pulses.   Pulmonary/Chest: Effort normal and breath sounds normal.   Abdominal: Soft. Bowel sounds are normal. She exhibits no distension. There is no tenderness.   Musculoskeletal: Normal range of motion. She exhibits no edema.   Neurological: She is alert and oriented to person, place, and time. She has normal reflexes.   Skin: Skin is warm and dry. No rash noted.   Negative for acanthosis and vitiligo or purple striae   Psychiatric: She has a normal mood and affect. Her behavior is normal.   Nursing note and vitals reviewed.    Results Review:     I reviewed the patient's new clinical results.    Medical records reviewed  Summary: Ophthalmology records reviewed  Patient has no evidence of diabetic retinopathy      Office Visit on 10/08/2019   Component Date Value Ref Range Status   • Glucose 10/08/2019 151* 65 - 99 mg/dL Final   • BUN 10/08/2019 21  8 - " 23 mg/dL Final   • Creatinine 10/08/2019 1.18* 0.57 - 1.00 mg/dL Final   • eGFR Non  Am 10/08/2019 47* >60 mL/min/1.73 Final   • eGFR African Am 10/08/2019 56* >60 mL/min/1.73 Final   • BUN/Creatinine Ratio 10/08/2019 17.8  7.0 - 25.0 Final   • Sodium 10/08/2019 141  136 - 145 mmol/L Final   • Potassium 10/08/2019 4.6  3.5 - 5.2 mmol/L Final   • Chloride 10/08/2019 101  98 - 107 mmol/L Final   • Total CO2 10/08/2019 22.5  22.0 - 29.0 mmol/L Final   • Calcium 10/08/2019 9.6  8.6 - 10.5 mg/dL Final   • Total Protein 10/08/2019 6.9  6.0 - 8.5 g/dL Final   • Albumin 10/08/2019 4.70  3.50 - 5.20 g/dL Final   • Globulin 10/08/2019 2.2  gm/dL Final   • A/G Ratio 10/08/2019 2.1  g/dL Final   • Total Bilirubin 10/08/2019 0.7  0.2 - 1.2 mg/dL Final   • Alkaline Phosphatase 10/08/2019 98  39 - 117 U/L Final   • AST (SGOT) 10/08/2019 16  1 - 32 U/L Final   • ALT (SGPT) 10/08/2019 15  1 - 33 U/L Final   • Hemoglobin A1C 10/08/2019 6.40* 4.80 - 5.60 % Final    Comment: Hemoglobin A1C Ranges:  Increased Risk for Diabetes  5.7% to 6.4%  Diabetes                     >= 6.5%  Diabetic Goal                < 7.0%     • TSH 10/08/2019 1.300  0.270 - 4.200 uIU/mL Final   • Total Cholesterol 10/08/2019 226* 0 - 200 mg/dL Final   • Triglycerides 10/08/2019 236* 0 - 150 mg/dL Final   • HDL Cholesterol 10/08/2019 51  40 - 60 mg/dL Final   • VLDL Cholesterol 10/08/2019 47.2  mg/dL Final   • LDL Cholesterol  10/08/2019 128* 0 - 100 mg/dL Final   • Creatinine, Urine 10/08/2019 59.1  Not Estab. mg/dL Final   • Microalbumin, Urine 10/08/2019 24.7  Not Estab. ug/mL Final   • Microalbumin/Creatinine Ratio 10/08/2019 41.8* 0.0 - 30.0 mg/g creat Final    Comment:                      Normal:                0.0 -  30.0                       Albuminuria:          31.0 - 300.0                       Clinical albuminuria:       >300.0     • Interpretation 10/08/2019 Note   Final    Supplemental report is available.   • PDF Image 10/08/2019 Not  applicable   Final     Lab Results   Component Value Date    HGBA1C 6.40 (H) 10/08/2019    HGBA1C 6.60 (H) 07/08/2019    HGBA1C 7.7 (H) 02/23/2019     Lab Results   Component Value Date    MICROALBUR 24.7 10/08/2019    CREATININE 1.1 12/10/2019     Imaging Results (Most Recent)     None                Assessment and Plan:    Laxmi was seen today for diabetes.    Diagnoses and all orders for this visit:    Diabetes mellitus type 2, uncontrolled, with complications (CMS/[facility])  -     Comprehensive Metabolic Panel  -     Hemoglobin A1c  -     TSH  -     Microalbumin / Creatinine Urine Ratio - Urine, Clean Catch    Hyperlipidemia, unspecified hyperlipidemia type  -     Lipid Panel    Type II diabetes mellitus with neurological manifestations, uncontrolled (CMS/[facility])    Uncontrolled type II diabetes mellitus with nephropathy (CMS/[facility])    Hyperlipidemia associated with type 2 diabetes mellitus (CMS/[facility])    Hyperinsulinism    Encounter for long-term (current) use of insulin (CMS/[facility])    Other orders  -     insulin degludec (TRESIBA FLEXTOUCH) 100 UNIT/ML solution pen-injector injection; 25 UNITS QAM  -     insulin aspart (NOVOLOG FLEXPEN) 100 UNIT/ML solution pen-injector sc pen; 6 UNITS TIDAC SC  -     Insulin Pen Needle (BD PEN NEEDLE NIURKA U/F) 32G X 4 MM misc; USE 2 TIMES DAILY    Patient's blood sugars are ranging from   She is having extreme difficulty managing the insulin at work  She is taking currently Humulin U-500 insulin 25 units twice daily  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  She works nighttime at [employer]    Patient is very concerned about the recent cardiac stent placed in December 2019  She was apparently told that our next option would be another bypass surgery and she is worried about that    I advised the patient to discontinue the current insulin regimen  She will start Tresiba 25 units in the  "morning before she goes to bed  She will take NovoLog 6 units before each meal up to 3 times daily  Patient will bring the log sheet to the next office visit  She will return to follow-up in 6 weeks.        Miah Upton MD. FACE    01/20/20      EMR Dragon / transcription disclaimer:     \"Dictated utilizing Dragon dictation\".         "

## 2020-01-20 NOTE — PATIENT INSTRUCTIONS
STOP U-500 INSULIN    START TRESIBA 25 UNITS BEFORE BED IN AM    NOVOLOG 6 UNITS BEFORE EACH MEAL    NO MEAL= NO SHOT

## 2020-01-21 LAB
ALBUMIN SERPL-MCNC: 4.9 G/DL (ref 3.8–4.8)
ALBUMIN/CREAT UR: 40 MG/G CREAT (ref 0–29)
ALBUMIN/GLOB SERPL: 1.8 {RATIO} (ref 1.2–2.2)
ALP SERPL-CCNC: 110 IU/L (ref 39–117)
ALT SERPL-CCNC: 19 IU/L (ref 0–32)
AST SERPL-CCNC: 19 IU/L (ref 0–40)
BILIRUB SERPL-MCNC: 0.7 MG/DL (ref 0–1.2)
BUN SERPL-MCNC: 29 MG/DL (ref 8–27)
BUN/CREAT SERPL: 21 (ref 12–28)
CALCIUM SERPL-MCNC: 10.3 MG/DL (ref 8.7–10.3)
CHLORIDE SERPL-SCNC: 99 MMOL/L (ref 96–106)
CHOLEST SERPL-MCNC: 201 MG/DL (ref 100–199)
CO2 SERPL-SCNC: 21 MMOL/L (ref 20–29)
CREAT SERPL-MCNC: 1.4 MG/DL (ref 0.57–1)
CREAT UR-MCNC: 100.1 MG/DL
GLOBULIN SER CALC-MCNC: 2.7 G/DL (ref 1.5–4.5)
GLUCOSE SERPL-MCNC: 176 MG/DL (ref 65–99)
HBA1C MFR BLD: 6.7 % (ref 4.8–5.6)
HDLC SERPL-MCNC: 49 MG/DL
INTERPRETATION: NORMAL
INTERPRETATION: NORMAL
LDLC SERPL CALC-MCNC: 110 MG/DL (ref 0–99)
Lab: NORMAL
Lab: NORMAL
MICROALBUMIN UR-MCNC: 40.1 UG/ML
POTASSIUM SERPL-SCNC: 4.4 MMOL/L (ref 3.5–5.2)
PROT SERPL-MCNC: 7.6 G/DL (ref 6–8.5)
SODIUM SERPL-SCNC: 140 MMOL/L (ref 134–144)
TRIGL SERPL-MCNC: 211 MG/DL (ref 0–149)
TSH SERPL DL<=0.005 MIU/L-ACNC: 1.53 UIU/ML (ref 0.45–4.5)
VLDLC SERPL CALC-MCNC: 42 MG/DL (ref 5–40)

## 2020-02-24 ENCOUNTER — TELEPHONE (OUTPATIENT)
Dept: ENDOCRINOLOGY | Age: 60
End: 2020-02-24

## 2020-02-24 RX ORDER — INSULIN ASPART 100 [IU]/ML
15 INJECTION, SOLUTION INTRAVENOUS; SUBCUTANEOUS
Qty: 45 ML | Refills: 1 | Status: SHIPPED | OUTPATIENT
Start: 2020-02-24 | End: 2020-03-19

## 2020-02-24 NOTE — TELEPHONE ENCOUNTER
Script was written wrong for the quantity  Only got one box instead of 3    novolog flex pen     She received one box and now she is out     Patient states that the way he wrote it is that one box would last three months     Patient states that she needs samples while she is waiting to get this fixed   She last took it yesterday  Can she  some today around 3    Please give patient a call to fix this    She uses [facility] [place]  [phone]

## 2020-03-18 ENCOUNTER — TELEPHONE (OUTPATIENT)
Dept: ENDOCRINOLOGY | Age: 60
End: 2020-03-18

## 2020-03-19 ENCOUNTER — OFFICE VISIT (OUTPATIENT)
Dept: ENDOCRINOLOGY | Age: 60
End: 2020-03-19

## 2020-03-19 VITALS
BODY MASS INDEX: 25.52 KG/M2 | HEART RATE: 87 BPM | HEIGHT: 65 IN | DIASTOLIC BLOOD PRESSURE: 70 MMHG | OXYGEN SATURATION: 98 % | SYSTOLIC BLOOD PRESSURE: 134 MMHG | WEIGHT: 153.2 LBS

## 2020-03-19 DIAGNOSIS — E16.1 HYPERINSULINISM: ICD-10-CM

## 2020-03-19 DIAGNOSIS — IMO0002 UNCONTROLLED TYPE II DIABETES MELLITUS WITH NEPHROPATHY: ICD-10-CM

## 2020-03-19 DIAGNOSIS — E78.5 HYPERLIPIDEMIA ASSOCIATED WITH TYPE 2 DIABETES MELLITUS (HCC): ICD-10-CM

## 2020-03-19 DIAGNOSIS — R63.5 ABNORMAL WEIGHT GAIN: ICD-10-CM

## 2020-03-19 DIAGNOSIS — E11.69 HYPERLIPIDEMIA ASSOCIATED WITH TYPE 2 DIABETES MELLITUS (HCC): ICD-10-CM

## 2020-03-19 DIAGNOSIS — Z79.4 ENCOUNTER FOR LONG-TERM (CURRENT) USE OF INSULIN (HCC): ICD-10-CM

## 2020-03-19 DIAGNOSIS — IMO0002 DIABETES MELLITUS TYPE 2, UNCONTROLLED, WITH COMPLICATIONS: Primary | ICD-10-CM

## 2020-03-19 DIAGNOSIS — IMO0002 TYPE II DIABETES MELLITUS WITH NEUROLOGICAL MANIFESTATIONS, UNCONTROLLED: ICD-10-CM

## 2020-03-19 PROCEDURE — 99214 OFFICE O/P EST MOD 30 MIN: CPT | Performed by: INTERNAL MEDICINE

## 2020-03-19 RX ORDER — INSULIN ASPART INJECTION 100 [IU]/ML
INJECTION, SOLUTION SUBCUTANEOUS
Qty: 45 ML | Refills: 3 | Status: SHIPPED | OUTPATIENT
Start: 2020-03-19 | End: 2020-08-24 | Stop reason: SDUPTHER

## 2020-03-19 NOTE — PROGRESS NOTES
60 y.o.    Patient Care Team:  Hollis Dawkins MD as PCP - General    Chief Complaint:      F/U TYPE 2 DIABETES, UNCONTROLLED.  NO RECENT LABS.     Subjective     HPI   Patient is a 60-year-old white female with a history of uncontrolled type 2 diabetes mellitus with complications came for follow-up    Uncontrolled type 2 diabetes mellitus  Patient is currently taking Tresiba 24 units at night and Fiasp 68 units 4 times daily  She reported her blood sugars are much better with the new insulin  She works at night at [employer]  Hypoglycemia  Has occasional episodes of hypoglycemia with blood sugar dropping to 50 range  Uncontrolled type 2 diabetes mellitus with nephropathy  Patient has microalbuminuria and has chronic kidney disease with regular follow-up with nephrology  Uncontrolled type 2 diabetes mellitus with neuropathy  Patient reports symptoms are tingling numbness in both feet  Patient denied any knowledge of diabetic retinopathy  Hyperlipidemia associated with diabetes  Patient is currently taking Lipitor 40 mg daily.  Reports compliance denies side effects      Interval History      The following portions of the patient's history were reviewed and updated as appropriate: allergies, current medications, past family history, past medical history, past social history, past surgical history and problem list.    Past Medical History:   Diagnosis Date   • Breast cancer (CMS/[facility])    • Diabetes mellitus type 2 with complications, uncontrolled (CMS/[facility])    • Obesity    • Vitamin B12 deficiency      Family History   Problem Relation Age of Onset   • Heart disease Mother    • Heart disease Brother    • Heart disease Maternal Uncle      Social History     Socioeconomic History   • Marital status:      Spouse name: Not on file   • Number of children: Not on file   • Years of education: Not on file   • Highest education level: Not on file   Tobacco Use   • Smoking status: Never Smoker   • Smokeless tobacco: Never  "Used   Substance and Sexual Activity   • Alcohol use: No     Allergies   Allergen Reactions   • Allopurinol Other (See Comments)     Mouth ulcers   • Sulfa Antibiotics Other (See Comments)     Abdominal Pain and Nausea  Abdominal Pain and Nausea  Abdominal Pain and Nausea   • Latex Itching       Current Outpatient Medications:   •  ACCU-CHEK FASTCLIX LANCETS misc, TO CHECK 5 TIMES DAILY, Disp: 500 each, Rfl: 2  •  ACCU-CHEK GUIDE test strip, 1 each by Other route 5 (Five) Times a Day. Use as instructed, Disp: 450 each, Rfl: 1  •  aspirin 81 MG tablet, Take 81 mg by mouth., Disp: , Rfl:   •  BIOTIN PO, Take  by mouth., Disp: , Rfl:   •  Blood Glucose Monitoring Suppl (ACCU-CHEK GUIDE) w/Device kit, 1 each 5 (Five) Times a Day., Disp: 1 kit, Rfl: 1  •  BRILINTA 90 MG tablet tablet, 90 mg 2 (Two) Times a Day., Disp: , Rfl:   •  cetirizine (ZyrTEC) 10 MG tablet, Take 10 mg by mouth daily., Disp: , Rfl:   •  glucose blood (ACCU-CHEK SMARTVIEW) test strip, Use as instructed 5 TIMES DAILY, Disp: 450 each, Rfl: 2  •  Insulin Aspart, w/Niacinamide, (FIASP FLEXTOUCH SC), Inject  under the skin into the appropriate area as directed. 6-10 units qid, Disp: , Rfl:   •  insulin degludec (TRESIBA FLEXTOUCH) 100 UNIT/ML solution pen-injector injection, 25 UNITS QAM, Disp: 15 mL, Rfl: 5  •  Insulin Pen Needle (BD PEN NEEDLE NIURKA U/F) 32G X 4 MM misc, USE 2 TIMES DAILY, Disp: 200 each, Rfl: 1  •  Insulin Syringe-Needle U-100 31G X 15/64\" 1 ML misc, , Disp: , Rfl:   •  metoprolol succinate XL (TOPROL-XL) 50 MG 24 hr tablet, , Disp: , Rfl:   •  MULTIPLE VIT-MIN-CALCIUM-FA PO, Take  by mouth., Disp: , Rfl:   •  RANEXA 1000 MG 12 hr tablet, Take 1,000 mg by mouth Every 12 (Twelve) Hours., Disp: , Rfl:   •  rosuvastatin (CRESTOR) 5 MG tablet, Take 20 mg by mouth Daily., Disp: , Rfl:   •  Insulin aspart (FIASP FLEXTOUCH) 100 UNIT/ML solution pen-injector injection pen, 15 units sc tidac, Disp: 45 mL, Rfl: 3        Review of Systems " "  Constitutional: Negative.  Negative for chills, fatigue and fever.   Cardiovascular: Negative.  Negative for chest pain and palpitations.   Gastrointestinal: Negative.  Negative for abdominal pain, constipation, diarrhea, nausea and vomiting.   Endocrine: Negative.  Negative for cold intolerance and heat intolerance.   All other systems reviewed and are negative.      Objective       Vitals:    03/19/20 1306   BP: 134/70   Pulse: 87   SpO2: 98%   Weight: 69.5 kg (153 lb 3.2 oz)   Height: 165.1 cm (65\")     Body mass index is 25.49 kg/m².      Physical Exam   Constitutional: She is oriented to person, place, and time. She appears well-developed and well-nourished.   HENT:   Head: Normocephalic and atraumatic.   Eyes: Pupils are equal, round, and reactive to light. Conjunctivae and EOM are normal.   Neck: Normal range of motion. Neck supple. No thyromegaly present.   Cardiovascular: Normal rate, regular rhythm, normal heart sounds and intact distal pulses.   Pulmonary/Chest: Effort normal and breath sounds normal.   Abdominal: Soft. Bowel sounds are normal. She exhibits no distension. There is no tenderness.   Musculoskeletal: Normal range of motion. She exhibits no edema.   Neurological: She is alert and oriented to person, place, and time. She has normal reflexes.   Skin: Skin is warm and dry. No rash noted.   Negative for acanthosis and vitiligo or purple striae   Psychiatric: She has a normal mood and affect. Her behavior is normal.   Nursing note and vitals reviewed.    Results Review:     I reviewed the patient's new clinical results.    Medical records reviewed  Summary:      Office Visit on 01/20/2020   Component Date Value Ref Range Status   • Glucose 01/20/2020 176* 65 - 99 mg/dL Final   • BUN 01/20/2020 29* 8 - 27 mg/dL Final   • Creatinine 01/20/2020 1.40* 0.57 - 1.00 mg/dL Final   • eGFR Non African Am 01/20/2020 40* >59 mL/min/1.73 Final   • eGFR African Am 01/20/2020 46* >59 mL/min/1.73 Final   • " BUN/Creatinine Ratio 01/20/2020 21  12 - 28 Final   • Sodium 01/20/2020 140  134 - 144 mmol/L Final   • Potassium 01/20/2020 4.4  3.5 - 5.2 mmol/L Final   • Chloride 01/20/2020 99  96 - 106 mmol/L Final   • Total CO2 01/20/2020 21  20 - 29 mmol/L Final   • Calcium 01/20/2020 10.3  8.7 - 10.3 mg/dL Final   • Total Protein 01/20/2020 7.6  6.0 - 8.5 g/dL Final   • Albumin 01/20/2020 4.9* 3.8 - 4.8 g/dL Final                  **Please note reference interval change**   • Globulin 01/20/2020 2.7  1.5 - 4.5 g/dL Final   • A/G Ratio 01/20/2020 1.8  1.2 - 2.2 Final   • Total Bilirubin 01/20/2020 0.7  0.0 - 1.2 mg/dL Final   • Alkaline Phosphatase 01/20/2020 110  39 - 117 IU/L Final   • AST (SGOT) 01/20/2020 19  0 - 40 IU/L Final   • ALT (SGPT) 01/20/2020 19  0 - 32 IU/L Final   • Hemoglobin A1C 01/20/2020 6.7* 4.8 - 5.6 % Final    Comment:          Prediabetes: 5.7 - 6.4           Diabetes: >6.4           Glycemic control for adults with diabetes: <7.0     • TSH 01/20/2020 1.530  0.450 - 4.500 uIU/mL Final   • Creatinine, Urine 01/20/2020 100.1  Not Estab. mg/dL Final   • Microalbumin, Urine 01/20/2020 40.1  Not Estab. ug/mL Final   • Microalbumin/Creatinine Ratio 01/20/2020 40* 0 - 29 mg/g creat Final    Comment:                        Normal:                0 -  29                         Moderately increased: 30 - 300                         Severely Increased:       >300                **Please note reference interval change**     • Total Cholesterol 01/20/2020 201* 100 - 199 mg/dL Final   • Triglycerides 01/20/2020 211* 0 - 149 mg/dL Final   • HDL Cholesterol 01/20/2020 49  >39 mg/dL Final   • VLDL Cholesterol 01/20/2020 42* 5 - 40 mg/dL Final   • LDL Cholesterol  01/20/2020 110* 0 - 99 mg/dL Final   • Interpretation 01/20/2020 Note   Final    Supplemental report is available.   • PDF Image 01/20/2020 Not applicable   Final   • Interpretation 01/20/2020 Note   Final    Comment: -------------------------------  CHRONIC  KIDNEY DISEASE:  EGFR, BLOOD PRESSURE, AND PROTEINURIA ASSESSMENT  The overall regression of eGFR with time is not  statistically significant. Current eGFR is 40 mL/min/1.73mE2  corresponding to CKD stage 3b. Multiply eGFR by 1.159 if  patient is . Potassium is within goal and  has not changed significantly, was 4.6 and now is 4.4  mmol/L. Albuminuria is present and has not changed  significantly, was 41.8 and now is 40.1 mg/g creat. Glycemic  control (HB A1c: 6.7 %) is within goal.  EGFR, BLOOD PRESSURE, AND PROTEINURIA TREATMENT SUGGESTIONS  -  The current eGFR is significantly below the expected value  and has decreased, was 47 and now is 40 mL/min/1.73mE2.  Renal artery stenosis, obstruction, volume depletion,  NSAIDs, ACEI or ARB, or other medications and contrast  agents are common causes of a fall in eGFR. Based upon  current eGFR and presence of moderate proteinuria, patient  is at very high risk for adverse outcomes such                            as CKD  progression, CVD, and mortality. Guidelines suggest a target  blood pressure of 130/80 mmHg or less in patients with  albuminuria or proteinuria to reduce cardiovascular risk and  CKD progression. Proteinuria generally warrants use of ACEI  or ARB to slow CKD progression. Weight loss and optimal  glycemic control (in diabetes) are helpful in reducing  proteinuria.  EGFR, BLOOD PRESSURE, AND PROTEINURIA FOLLOW-UP  -  Spot Urine Panel (Albumin preferred) and Hemoglobin A1C  within 6 months; fasting Renal Panel within 1 week;  -  BONE and MINERAL ASSESSMENT  Calcium is within goal and has risen, was 9.6 and now is  10.3 mg/dL. Carbon Dioxide is below goal and has decreased,  was 23 and now is 21 mmol/L. Guidelines recommend the  measurement of 25-hydroxy vitamin D in patients with CKD.  BONE and MINERAL TREATMENT SUGGESTIONS  -  Interpretations require simultaneous measurements of serum  calcium and phosphorus. If not on alkali, begin  sodium  bicarbonate, one 650 mg pill 2-3 time                           s daily, otherwise  increase dose.  BONE and MINERAL FOLLOW-UP  -  fasting Renal Panel within 1 week; fasting PTH with Renal  Panel and 25-Hydroxy Vitamin D are recommended by KDOQI  guidelines, at least yearly;  -  LIPIDS ASSESSMENT  Blood was non-fasting; interpret these results with caution.  LDL-C is borderline high and has decreased, was 128 and now  is 110 mg/dL. Triglyceride is high and has decreased, was  236 and now is 211 mg/dL. Non-HDL Cholesterol is borderline  high and has decreased, was 175 and now is 152 mg/dL. HDL-C  is normal and has not changed significantly, was 51 and now  is 49 mg/dL.  LIPIDS TREATMENT SUGGESTIONS  -  Therapeutic lifestyle changes are always valuable to  maintain optimal blood lipid status (diet, exercise, weight  management). Begin statin. If statin already in use,  consider increasing dose to achieve at least a 50% LDL  reduction from baseline. Moderate or high intensity statin  is preferred. If statin cannot be tolerated or increased,  alternati                           ves include use of an intestinal agent (ezetimibe  or bile acid sequestrant), niacin, and/or fish oil.  LIPIDS FOLLOW-UP  -  fasting Lipid Panel within 3 months;  -  ANEMIA ASSESSMENT  Most recent order does not include a CBC Panel or iron  studies.  ANEMIA FOLLOW-UP  -  CBC is recommended by KDOQI guidelines, at least yearly;  -------------------------------  DISCLAIMER  These assessments and treatment suggestions are provided as  a convenience in support of the physician-patient  relationship and are not intended to replace the physician's  clinical judgment. They are derived from national guidelines  in addition to other evidence and expert opinion. The  clinician should consider this information within the  context of clinical opinion and the individual patient.  SEE GUIDANCE FOR CHRONIC KIDNEY DISEASE PROGRAM: Kidney  Disease  Improving Global Outcomes (KDIGO) clinical practice  guidelines are at http://kdigo.org/home/guidelines/.  [employer] Kidney Disease Outcomes                            Quality  Initiative (KDOQI (TM)), with its limitations and  disclaimers, are at www.kidney.org/professionals/KDOQI. This  program is intended for patients who have been diagnosed  with stages 3, 4, or pre-dialysis 5 CKD. It is not intended  for children, pregnant patients, or transplant patients.     • PDF Image 01/20/2020 Not applicable   Final     Lab Results   Component Value Date    HGBA1C 6.7 (H) 01/20/2020    HGBA1C 6.40 (H) 10/08/2019    HGBA1C 6.60 (H) 07/08/2019     Lab Results   Component Value Date    MICROALBUR 40.1 01/20/2020    CREATININE 1.40 (H) 01/20/2020     Imaging Results (Most Recent)     None                Assessment and Plan:    Laxmi was seen today for diabetes.    Diagnoses and all orders for this visit:    Diabetes mellitus type 2, uncontrolled, with complications (CMS/[facility])    Type II diabetes mellitus with neurological manifestations, uncontrolled (CMS/[facility])    Uncontrolled type II diabetes mellitus with nephropathy (CMS/[facility])    Hyperinsulinism    Encounter for long-term (current) use of insulin (CMS/[facility])    Abnormal weight gain    Hyperlipidemia associated with type 2 diabetes mellitus (CMS/[facility])    Other orders  -     Insulin aspart (FIASP FLEXTOUCH) 100 UNIT/ML solution pen-injector injection pen; 15 units sc tidac        Glucose log reviewed  Majority are in the 130-200 range  She occasionally has high blood sugars  Hemoglobin A1c 6.7% in January 2020 not under control    Patient is considering retiring through medical disability  Her cardiologist is supportive  I advised her to consider getting a  and then I will provide supportive documents    Encouraged the patient to consider adjusting her insulin until the blood sugars are below 150  She is currently taking Tresiba 24 units at night  She reports  "that the Fiasp was working better and is currently taking 6 to 8 units 4 times daily  Prescription was given  Patient return to follow-up in 3 months  Miah Upton MD. FACE    03/19/20      EMR Dragon / transcription disclaimer:     \"Dictated utilizing Dragon dictation\".         "

## 2020-03-23 ENCOUNTER — TELEPHONE (OUTPATIENT)
Dept: ENDOCRINOLOGY | Age: 60
End: 2020-03-23

## 2020-03-23 NOTE — TELEPHONE ENCOUNTER
Patient would like a return call today      Patient can be reached at [phone]     Needs a note from dr anthony   Needs disability   Does not want to work because she works around a lot of people and she has diabetes  And had cancer   Immune system is very low         Please call the patient   I explained to her that Dr anthony is not here but she is asking for a return call today

## 2020-03-23 NOTE — TELEPHONE ENCOUNTER
Patient would like a return call today     Patient can be reached at [phone]    Needs a note from dr anthony   Needs disability   Does not want to work because she works around a lot of people and she has diabetes  And had cancer   Immune system is very low       Please call the patient   I explained to her that Dr anthony is not here but she is asking for a return call today

## 2020-03-27 ENCOUNTER — DOCUMENTATION (OUTPATIENT)
Dept: ENDOCRINOLOGY | Age: 60
End: 2020-03-27

## 2020-03-27 NOTE — PROGRESS NOTES
To whom it may concern    Laxmi Marcus is a diabetic with complications currently under my care.  She has peripheral neuropathy and nephropathy leading to chronic kidney disease  Patient also has coronary artery disease and is status post CABG  Her functional status is very poor and I recommend applying for Social Security and disability

## 2020-08-24 ENCOUNTER — TELEPHONE (OUTPATIENT)
Dept: ENDOCRINOLOGY | Age: 60
End: 2020-08-24

## 2020-08-24 DIAGNOSIS — E11.65 UNCONTROLLED TYPE 2 DIABETES MELLITUS WITH HYPERGLYCEMIA (HCC): Primary | ICD-10-CM

## 2020-08-24 RX ORDER — INSULIN ASPART INJECTION 100 [IU]/ML
INJECTION, SOLUTION SUBCUTANEOUS
Qty: 45 ML | Refills: 3 | Status: SHIPPED | OUTPATIENT
Start: 2020-08-24 | End: 2021-08-30 | Stop reason: ALTCHOICE

## 2020-08-24 NOTE — TELEPHONE ENCOUNTER
FIASP  PHARMACY IS NOT FILLING DUE TO BEING FILLED TO SOON   SHOULD BE AT A LATER DATE  10 DAYS AWAY    SHE NEEDS IT   UNDER A LOT OF STRESS AND USING MORE THAN USUAL     USES [facility] [phone]

## 2021-07-16 ENCOUNTER — APPOINTMENT (OUTPATIENT)
Dept: WOMENS IMAGING | Facility: HOSPITAL | Age: 61
End: 2021-07-16

## 2021-07-27 ENCOUNTER — APPOINTMENT (OUTPATIENT)
Dept: WOMENS IMAGING | Facility: HOSPITAL | Age: 61
End: 2021-07-27

## 2021-07-27 PROCEDURE — 76641 ULTRASOUND BREAST COMPLETE: CPT | Performed by: RADIOLOGY

## 2021-07-27 PROCEDURE — G0279 TOMOSYNTHESIS, MAMMO: HCPCS | Performed by: RADIOLOGY

## 2021-07-27 PROCEDURE — 77066 DX MAMMO INCL CAD BI: CPT | Performed by: RADIOLOGY

## 2021-07-27 PROCEDURE — 77062 BREAST TOMOSYNTHESIS BI: CPT | Performed by: RADIOLOGY

## 2021-08-06 ENCOUNTER — APPOINTMENT (OUTPATIENT)
Dept: WOMENS IMAGING | Facility: HOSPITAL | Age: 61
End: 2021-08-06

## 2021-08-06 PROCEDURE — 19083 BX BREAST 1ST LESION US IMAG: CPT | Performed by: RADIOLOGY

## 2021-08-11 ENCOUNTER — TELEPHONE (OUTPATIENT)
Dept: SURGERY | Facility: CLINIC | Age: 61
End: 2021-08-11

## 2021-08-11 NOTE — TELEPHONE ENCOUNTER
New patient appointment with Dr. Red is scheduled on 8/20/2021 @ 12:30pm.    Called and left message with patient about appointment time, patient to call back and confirm.    Sent patient a reminder letter in the mail.

## 2021-08-12 ENCOUNTER — TELEPHONE (OUTPATIENT)
Dept: SURGERY | Facility: CLINIC | Age: 61
End: 2021-08-12

## 2021-08-12 NOTE — TELEPHONE ENCOUNTER
Breast MRI is scheduled on 8/18/2021 @ 10:45am, patient arrival 10:15am.    Called and left message with patient about appointment time, patient to call back and confirm.

## 2021-08-18 ENCOUNTER — HOSPITAL ENCOUNTER (OUTPATIENT)
Dept: MRI IMAGING | Facility: HOSPITAL | Age: 61
Discharge: HOME | End: 2021-08-18
Admitting: SURGERY

## 2021-08-18 DIAGNOSIS — C50.412 MALIGNANT NEOPLASM OF UPPER-OUTER QUADRANT OF LEFT FEMALE BREAST, UNSPECIFIED ESTROGEN RECEPTOR STATUS (HCC): ICD-10-CM

## 2021-08-18 PROCEDURE — 77049 MRI BREAST C-+ W/CAD BI: CPT

## 2021-08-18 PROCEDURE — 82565 ASSAY OF CREATININE: CPT

## 2021-08-18 PROCEDURE — A9577 INJ MULTIHANCE: HCPCS | Performed by: SURGERY

## 2021-08-18 PROCEDURE — 0 GADOBENATE DIMEGLUMINE 529 MG/ML SOLUTION: Performed by: SURGERY

## 2021-08-18 RX ADMIN — GADOBENATE DIMEGLUMINE 15 ML: 529 INJECTION, SOLUTION INTRAVENOUS at 11:40

## 2021-08-19 NOTE — PROGRESS NOTES
[facility]     Referring Provider: Will Johnston II, MD     Chief complaint: Newly diagnosed breast cancer     HPI: Ms. Laxmi Marcus is a 62 yo woman, seen at the request of Dr. Will Johnston, for a new diagnosis of left breast cancer. She has a past history of left breast cancer in 2006, pT1N1, intermediate grade, invasive ductal carcinoma, ER/IA positive, HER-2 negative. She underwent left lumpectomy & SLNB with completion axillary dissection, followed by reexcision for close margins. This was followed by AC x4 and 4 cycles of Taxol, then radiation. She was on tamoxifen for 2-1/2 years, then says she was switched to a different medication for a total of 5 years of endocrine therapy. Currently, I only have access to one note from the [facility] group in 2008. I have tried to get copies of the operative report and pathology report from [place], however these records are no longer held. The patient says that she thinks she had 9 lymph nodes removed.      She reports that she had a lot of issues with chemo, including severe bone pain requiring Dilaudid for months. Shortly after finishing all of her breast treatment, she had severe pancreatitis and shortly after that she became a brittle diabetic. She has a past history of CAD sp CABGx4 in 2017. Unfortunately it sounds like she had some sort of immediate stenosis, because she had a heart attack in early 2019 requiring stent placement x4. She is followed by cardiology and on Brilinta.    In May of this year, she noticed a painful left breast lump near the prior lumpectomy scar. She does not think it has changed at all since she first noticed. Her work-up is detailed in the oncologic history below. She denies any family history of breast or ovarian cancer. She was joined today in clinic by her . She gave consent for her  to be present during her examination and participate in the discussion.        Oncology/Hematology History Overview Note    06/05/2017, Bilateral Diagnostic MMG with [facility] & Bilateral Breast US ([facility]):  MMG:  Scattered areas pf fibroglandular density.   1. There is a stable post-surgical scar with associated trabecular thickening seen in the upper outer region of the left breast. Post-surgical scar is in an area of prior lumpectomy. Findings are consistent with post-surgical and post-radiation therapy changes.  2. The patient indicates recent onset of pain in the lateral and axillary tail region of the left breast. The symptomatic region is clearly marked and there is no suspicious finding in the region of clinical concern.  3. There is a stable small intramammary lymph node measuring 7 millimeters seen in the posterior one-third 1:30 o'clock region of the left breast located 8 centimeters from the nipple. There has been no significant change from the prior exam(s).  6. There is a stable area of asymmetric breast tissue seen in the superior region of the right breast.  US:  1. There is no evidence of any solid mass or abnormal cystic elements.  2. There is no evidence of any solid mass or abnormal cystic elements.  3. Ultrasound is suggestive of an oval small intramammary lymph node measuring 7 millimeters.  4. There is a small lymph node measuring 6 millimeters in the left axilla.  5. There is an oval small simple cyst with circumscribed margins measuring 4 millimeters seen in the right breast at 3 o'clock.  6. There is no evidence of any solid mass or abnormal cystic elements.  BI-RADS 2: Benign.    05/2021: Pt noticed a right breast lump.     Malignant neoplasm of upper-outer quadrant of left breast in female, estrogen receptor negative (CMS/HCC)   7/26/2021 Initial Diagnosis    Malignant neoplasm of upper-outer quadrant of left breast in female, estrogen receptor negative (CMS/HCC)     7/27/2021 Imaging    Bilateral Diagnostic MMG with [facility] & Left Breast US ([facility]):  MMG:  Scattered areas of fibroglandular density.  1. There is a new  oval mass measuring 20 mm with indistinct margins seen in the 1:30 o'clock region of the left breast located 4 centimeters from the nipple. This correlates to the palpable mass.   2. There is a stable post-surgical scar seen in the posterior one-third region of the left breast at 2 o'clock. Post-surgical scar is in an area of prior lumpectomy. Findings are consistent with post-surgical and post-radiation therapy changes.   In the right breast, no suspicious masses, significant calcifications or other abnormalities are seen.  US:  1. Ultrasound demonstrates a new oval parallel solid mass with poorly defined margins measuring 16 x 14 x 15 mm seen in the 1:30 o'clock region of the left breast located 4 centimeters from the nipple. This correlates with the mammographic finding.  3. There is an oval lymph node measuring 9 x 6 x 8 mm in the left axilla. This has an abnormal appearance with little hilum.  BI-RADS 4B: Suspicious.     8/6/2021 Biopsy    Left Breast & Axilla, US-Guided Biopsies ([facility]):    1. Breast, Left 1:30 O'clock, Core Needle Biopsy:  Invasive mammary carcinoma, no special type (invasive ductal carcinoma), combined histologic grade 3 (tubule formation 3, nuclear pleomorphism 3, mitotic activity 2), 12 mm in greatest dimension, present in 5 of 15 cores.   Focal ductal carcinoma in situ (DCIS), high nuclear grade, solid pattern, with associated focal necrosis 1.5 mm in greatest dimension, present in 3 of 15 cores.   Microcalcifications associated with invasive carcinoma, DCIS, and benign mammary ducts/lobules.  -Minicork clip.     ER negative (0%)  MT negative (0%)  Her2 negative (IHC 1+)  Ki-67 94.88%    2. Lymph Node, Left Axilla, Core Needle Biopsy:  Benign lymph node tissue with reactive follicular hyperplasia.   No definitive evidence of carcinoma (AE1/AE3).   No definitive evidence of lymphoma (CD3, CD20, CD21, and CD30).  -Hydromark clip. Concordant.     8/18/2021 Imaging    Bilateral Breast MRI (  [facility]):  In the posterior one-third lateral aspect of the left breast centered at the 3-o'clock position on the order of 6.5 cm from the nipple there is a focal signal void that is seen within the superior aspect of an irregular heterogenous masslike region that measures on the order of 2.4 cm in the superior inferior dimension, 2.5 cm in the anterior posterior dimension and 2.7 cm in the medial lateral dimension. Some of the internal character of the lesion demonstrates absence of enhancement. This represents the biopsy-proven site of malignancy with the mini  cork-shaped metallic clip in the superomedial portion of the lesion. Some areas of increased T1-weighted signal intensity are noted in the region and are consistent with areas of postbiopsy related hemorrhage.   There is postsurgical change seen in the left breast in this region associated with prior breast conservation therapy. No other areas of abnormal enhancement or morphology are seen within the left breast. I see no evidence for abnormal left breast skin, nipple or chest wall enhancement and there is no evidence for left axillary adenopathy. The patient is status post median sternotomy which limits the evaluation of the internal mammary chain for internal mammary adenopathy. Mild diffuse left breast skin thickening associated with prior breast conservation therapy is noted, but there is no abnormal enhancement of the skin.    There are no areas of abnormal enhancement or morphology in the right breast. Scattered stippled foci of variable enhancement are seen throughout the right breast, none of which appear dominant or clumped or associated with any suspicious mammographic features. I see no evidence for abnormal right breast skin, nipple or chest wall enhancement and there is no evidence for right axillary or internal mammary chain adenopathy. Visualization of the right internal mammary chain is not prohibited by the artifact from the patient's median  sternotomy wires.  BI-RADS 6: Known malignancy.         Review of Systems   Constitutional: Negative for appetite change, chills, diaphoresis, fatigue, fever and unexpected weight change.   HENT:   Positive for mouth sores. Negative for hearing loss, lump/mass, nosebleeds, sore throat, tinnitus, trouble swallowing and voice change.         Oral lichens planus   Eyes: Negative for eye problems and icterus.   Respiratory: Negative for chest tightness, cough, hemoptysis, shortness of breath and wheezing.    Cardiovascular: Negative for chest pain, leg swelling and palpitations.   Gastrointestinal: Negative for abdominal distention, abdominal pain, blood in stool, constipation, diarrhea, nausea, rectal pain and vomiting.   Endocrine: Negative for hot flashes.   Genitourinary: Negative for bladder incontinence, difficulty urinating, dyspareunia, dysuria, frequency, hematuria, menstrual problem, nocturia, pelvic pain, vaginal bleeding and vaginal discharge.    Musculoskeletal: Negative for arthralgias, back pain, flank pain, gait problem, myalgias, neck pain and neck stiffness.   Skin: Negative for itching, rash and wound.   Neurological: Negative for dizziness, extremity weakness, gait problem, headaches, light-headedness, numbness, seizures and speech difficulty.   Hematological: Negative for adenopathy. Does not bruise/bleed easily.   Psychiatric/Behavioral: Negative for confusion, decreased concentration, depression, sleep disturbance and suicidal ideas. The patient is not nervous/anxious.    I personally reviewed and updated the ROS.      Medications:    Current Outpatient Medications:   •  Insulin Pen Needle 32G X 5 MM misc, Inject 1 each under the skin into the appropriate area as directed 4 (Four) Times a Day., Disp: , Rfl:   •  ACCU-CHEK FASTCLIX LANCETS misc, TO CHECK 5 TIMES DAILY, Disp: 500 each, Rfl: 2  •  ACCU-CHEK GUIDE test strip, 1 each by Other route 5 (Five) Times a Day. Use as instructed, Disp: 450  "each, Rfl: 1  •  ascorbic acid (VITAMIN C) 250 MG tablet, Take 250 mg by mouth Daily., Disp: , Rfl:   •  aspirin 81 MG tablet, Take 81 mg by mouth., Disp: , Rfl:   •  BIOTIN PO, Take  by mouth., Disp: , Rfl:   •  Blood Glucose Monitoring Suppl (ACCU-CHEK GUIDE) w/Device kit, 1 each 5 (Five) Times a Day., Disp: 1 kit, Rfl: 1  •  BRILINTA 90 MG tablet tablet, 90 mg 2 (Two) Times a Day., Disp: , Rfl:   •  cetirizine (ZyrTEC) 10 MG tablet, Take 10 mg by mouth daily., Disp: , Rfl:   •  cholecalciferol (Cholecalciferol) 25 MCG (1000 UT) tablet, Take 1,000 Units by mouth Daily., Disp: , Rfl:   •  glucose blood (ACCU-CHEK SMARTVIEW) test strip, Use as instructed 5 TIMES DAILY, Disp: 450 each, Rfl: 2  •  Insulin aspart (FIASP FLEXTOUCH) 100 UNIT/ML solution pen-injector injection pen, 15-25  units sc tidac, Disp: 45 mL, Rfl: 3  •  Insulin Aspart, w/Niacinamide, (FIASP FLEXTOUCH SC), Inject  under the skin into the appropriate area as directed. 6-10 units qid, Disp: , Rfl:   •  insulin degludec (TRESIBA FLEXTOUCH) 100 UNIT/ML solution pen-injector injection, 25 UNITS QAM, Disp: 15 mL, Rfl: 5  •  Insulin Pen Needle (BD PEN NEEDLE NIURKA U/F) 32G X 4 MM misc, USE 2 TIMES DAILY, Disp: 200 each, Rfl: 1  •  Insulin Syringe-Needle U-100 31G X 15/64\" 1 ML misc, , Disp: , Rfl:   •  metoprolol succinate XL (TOPROL-XL) 50 MG 24 hr tablet, , Disp: , Rfl:   •  MULTIPLE VIT-MIN-CALCIUM-FA PO, Take  by mouth., Disp: , Rfl:   •  RANEXA 1000 MG 12 hr tablet, Take 1,000 mg by mouth Every 12 (Twelve) Hours., Disp: , Rfl:   •  rosuvastatin (CRESTOR) 5 MG tablet, Take 20 mg by mouth Daily., Disp: , Rfl:   •  zinc sulfate (ZINCATE) 220 (50 Zn) MG capsule, Take 220 mg by mouth Daily., Disp: , Rfl:       Allergies   Allergen Reactions   • Allopurinol Other (See Comments)     Mouth ulcers   • Dulaglutide Nausea And Vomiting   • Sulfa Antibiotics Other (See Comments)     Abdominal Pain and Nausea  Abdominal Pain and Nausea  Abdominal Pain and Nausea "   • Latex Itching       Past Medical History:   Diagnosis Date   • Breast cancer (CMS/HCC)    • Coronary artery disease    • Diabetes mellitus type 2 with complications, uncontrolled (CMS/HCC)    • Hyperlipidemia    • Obesity    • Vitamin B12 deficiency        Past Surgical History:   Procedure Laterality Date   • APPENDECTOMY     • BREAST LUMPECTOMY     • BREAST LUMPECTOMY WITH SENTINEL NODE BIOPSY AND AXILLARY NODE DISSECTION     • CHOLECYSTECTOMY     • CORONARY ARTERY BYPASS GRAFT     • DENTAL PROCEDURE     • HYSTERECTOMY         Family History   Problem Relation Age of Onset   • Heart disease Mother    • Heart disease Brother    • Heart disease Maternal Uncle    • Prostate cancer Maternal Uncle         Social History     Socioeconomic History   • Marital status:      Spouse name: Noel   • Number of children: 3   • Years of education: Not on file   • Highest education level: Not on file   Tobacco Use   • Smoking status: Never Smoker   • Smokeless tobacco: Never Used   Substance and Sexual Activity   • Alcohol use: No     Patient drinks 1 servings of caffeine per day.       GYNECOLOGIC HISTORY:   G: 3. P: 3. AB: 0.  Last menstrual period: , sp hysterectomy  Age at menarche: 9  Age at first childbirth: 17  Lactation/How lon months  Age at menopause: 49   Total years of oral contraceptive use: 25-30 years  Total years of hormone replacement therapy: 0      PHYSICAL EXAMINATION:   Vitals:    21 1253   BP: 106/69   Pulse: 71   Temp: 98.6 °F (37 °C)   SpO2: 98%     ECOG 0 - Asymptomatic  General: NAD, well appearing  Psych: a&o x 3, normal mood and affect  Eyes: EOMI, no scleral icterus  ENMT: neck supple without masses or thyromegaly, mucus membranes moist  Resp: normal effort, CTAB  CV: RRR, no murmurs, no edema  GI: soft, NT, ND  MSK: normal gait, normal ROM in bilateral shoulders  Lymph nodes: Left axillary scar; no cervical, supraclavicular or axillary lymphadenopathy  Breast: moderate  size, grade 3 ptosis, sternal scar  Right: No visible abnormalities on inspection while seated, with arms raised or hands on hips. No masses, skin changes, or nipple abnormalities.  Left: On inspection the left breast is smaller and uplifted versus the right. There is an upper outer quadrant horizontal scar with scar retraction. At 2:00, 5 cm FN, there is a firm 3 x 2.5 cm mass. This is located just lateral/inferior to the scar. No nipple abnormalities    Left breast, in-office ultrasound: At 2:00, 5 cm FN, there is an irregular hypoechoic mass with biopsy clip visualized. This is located adjacent to the underlying pectoralis muscle, without a clearly defined plane. This is also located about 2 mm deep to the skin.       I have independently reviewed her imaging and here are my findings:   In the left upper outer breast, there is an irregular mass which measures 2.7 cm maximally on MRI.      Assessment:  61 y.o. F with a new diagnosis of left breast cancer: High grade, triple negative, invasive ductal carcinoma; clinical T2N0, anatomic stage IIA, prognostic stage IIB.    -She has a past history of left breast cancer in 2006, pT1N1, intermediate grade, invasive ductal carcinoma, ER/TN positive, HER-2 negative. She underwent left lumpectomy & SLNB with completion axillary dissection, followed by reexcision for close margins. This was followed by AC x4 and 4 cycles of Taxol, then radiation and endocrine therapy.     Discussion:  I had an extensive discussion with the patient and her family about the nature of her breast cancer diagnosis. We reviewed the components of breast tissue including ducts and lobules. We reviewed her pathology report in detail. We reviewed breast cancer histology, including stage, grade, ER/TN receptors, HER2 receptors and how this applies to her diagnosis. We reviewed the basics of systemic and local/regional management of breast cancer. I explained that this is a new primary breast cancer and  not a recurrence.     We discussed that in her case, with triple negative cancer, chemotherapy or immunotherapy are really the only options for systemic treatment. We reviewed potential surgical treatments to include partial mastectomy, mastectomy, sentinel lymph node biopsy and axillary node dissection and discussed the rationale associated with each approach.     Regarding surgical management, because she has already had a lumpectomy and radiation, her only option is a mastectomy. Also she has already had a sentinel lymph node biopsy and axillary dissection, but she clearly has residual axillary lymph nodes given the benign biopsy. She is not a candidate for a repeat sentinel lymph node biopsy given the altered anatomy and will require an axillary dissection.    We discussed, that given her stage and tumor biology, chemotherapy is recommended and can occur either before or after surgery. She is a candidate for neoadjuvant chemotherapy. I explained that one benefit of neoadjuvant therapy is that it could potentially downstage the breast, making it easier to obtain negative margins. An additional benefit of neoadjuvant chemotherapy, is that it allows us to assess the in vivo response and need for any additional adjuvant treatment based on the final surgical pathology. In an ideal world, she would undergo some sort of neoadjuvant therapy, however her options will certainly be limited due to her previous treatment. I have already discussed her case with Dr. Beal today and she will be seeing her back in the next week.     We discussed that most breast cancer is not hereditary, however given her personal history of 2 primary breast cancers, this may play a role in her case. Genetic testing is warranted and was sent today. This could affect surgical decision making. Should the results show a pathologic mutation, I would recommend a contralateral risk-reduction mastectomy. She understands that this would not be for the  treatment of her left breast cancer and will not improve her prognosis long term. This would be to reduce her risk of a second, primary breast cancer.    Depending on what treatment Dr. Beal recommends, she may require port placement, so we went ahead and discussed this procedure. I described risks and potential complications associated with surgery, including, but not limited to, bleeding, infection, deformity, chronic pain, numbness, seroma, hematoma, deep venous thrombosis, pneumothorax, port malfunction, and the possibility of requiring additional surgery. She expressed an understanding of these factors and wished to proceed.    Plan:  A multidisciplinary plan has been formulated for the patient:      (1) Breast Surgical Oncology:  -F/u genetic testing. I will call her with results.  -Preoperative/prechemo cardiology evaluation.  -Possible port placement.  -I will see her mid-treatment with repeat in-office ultrasound and posttreatment with repeat MRI.     (2) Medical Oncology:  -Appointment scheduled with Dr. Beal on 8/30/2021    (3) Radiation Oncology:  -Not indicated.    Indigo Red MD    I spent 120 minutes caring for Laxmi on this date of service. This time includes time spent by me in the following activities: preparing for the visit, performing a medically appropriate examination and/or evaluation , counseling and educating the patient/family/caregiver, ordering medications, tests, or procedures, referring and communicating with other health care professionals , documenting information in the medical record and independently interpreting results and communicating that information with the patient/family/caregiver.      CC:  MD Hollis Brennan II, MD Vipul Panchal, MD

## 2021-08-20 ENCOUNTER — OFFICE VISIT (OUTPATIENT)
Dept: SURGERY | Facility: CLINIC | Age: 61
End: 2021-08-20

## 2021-08-20 VITALS
WEIGHT: 154 LBS | OXYGEN SATURATION: 98 % | BODY MASS INDEX: 25.66 KG/M2 | SYSTOLIC BLOOD PRESSURE: 106 MMHG | HEART RATE: 71 BPM | HEIGHT: 65 IN | TEMPERATURE: 98.6 F | DIASTOLIC BLOOD PRESSURE: 69 MMHG

## 2021-08-20 DIAGNOSIS — C50.412 MALIGNANT NEOPLASM OF UPPER-OUTER QUADRANT OF LEFT BREAST IN FEMALE, ESTROGEN RECEPTOR NEGATIVE (HCC): Primary | ICD-10-CM

## 2021-08-20 DIAGNOSIS — Z17.1 MALIGNANT NEOPLASM OF UPPER-OUTER QUADRANT OF LEFT BREAST IN FEMALE, ESTROGEN RECEPTOR NEGATIVE (HCC): Primary | ICD-10-CM

## 2021-08-20 LAB — CREAT BLDA-MCNC: 1.6 MG/DL (ref 0.6–1.3)

## 2021-08-20 PROCEDURE — 99205 OFFICE O/P NEW HI 60 MIN: CPT | Performed by: SURGERY

## 2021-08-20 PROCEDURE — 99417 PROLNG OP E/M EACH 15 MIN: CPT | Performed by: SURGERY

## 2021-08-20 RX ORDER — ASCORBIC ACID 250 MG
250 TABLET ORAL DAILY
COMMUNITY
End: 2021-09-13

## 2021-08-20 RX ORDER — MELATONIN
1000 DAILY
COMMUNITY
End: 2023-02-06

## 2021-08-20 RX ORDER — ZINC SULFATE 50(220)MG
220 CAPSULE ORAL DAILY
COMMUNITY
End: 2023-02-06

## 2021-08-23 ENCOUNTER — DOCUMENTATION (OUTPATIENT)
Dept: ONCOLOGY | Facility: CLINIC | Age: 61
End: 2021-08-23

## 2021-08-24 ENCOUNTER — TRANSCRIBE ORDERS (OUTPATIENT)
Dept: SURGERY | Facility: CLINIC | Age: 61
End: 2021-08-24

## 2021-08-24 DIAGNOSIS — Z17.1 MALIGNANT NEOPLASM OF UPPER-OUTER QUADRANT OF LEFT BREAST IN FEMALE, ESTROGEN RECEPTOR NEGATIVE (HCC): Primary | ICD-10-CM

## 2021-08-24 DIAGNOSIS — C50.412 MALIGNANT NEOPLASM OF UPPER-OUTER QUADRANT OF LEFT BREAST IN FEMALE, ESTROGEN RECEPTOR NEGATIVE (HCC): Primary | ICD-10-CM

## 2021-08-25 ENCOUNTER — PREP FOR SURGERY (OUTPATIENT)
Dept: OTHER | Facility: HOSPITAL | Age: 61
End: 2021-08-25

## 2021-08-25 DIAGNOSIS — C50.412 MALIGNANT NEOPLASM OF UPPER-OUTER QUADRANT OF LEFT BREAST IN FEMALE, ESTROGEN RECEPTOR NEGATIVE (HCC): Primary | ICD-10-CM

## 2021-08-25 DIAGNOSIS — Z17.1 MALIGNANT NEOPLASM OF UPPER-OUTER QUADRANT OF LEFT BREAST IN FEMALE, ESTROGEN RECEPTOR NEGATIVE (HCC): Primary | ICD-10-CM

## 2021-08-25 RX ORDER — CEFAZOLIN SODIUM 2 G/100ML
2 INJECTION, SOLUTION INTRAVENOUS ONCE
Status: CANCELLED | OUTPATIENT
Start: 2021-09-14 | End: 2021-08-25

## 2021-08-27 ENCOUNTER — TELEPHONE (OUTPATIENT)
Dept: SURGERY | Facility: CLINIC | Age: 61
End: 2021-08-27

## 2021-08-27 ENCOUNTER — TRANSCRIBE ORDERS (OUTPATIENT)
Dept: PREADMISSION TESTING | Facility: HOSPITAL | Age: 61
End: 2021-08-27

## 2021-08-27 DIAGNOSIS — Z01.818 OTHER SPECIFIED PRE-OPERATIVE EXAMINATION: Primary | ICD-10-CM

## 2021-08-27 NOTE — TELEPHONE ENCOUNTER
Faxed cardiac clearance request to Dr. Ogden on 8/30/2021, spoke with Glo on 8/26/2021, still pending, she said she will send another message

## 2021-08-27 NOTE — PROGRESS NOTES
Subjective     REASON FOR CONSULTATION: Ipsilateral left breast recurrent cancer triple negative  Provide an opinion on any further workup or treatment                             REQUESTING PHYSICIAN: MD Bradley Liao MD    RECORDS OBTAINED:  Records of the patients history including those obtained from the referring provider were reviewed and summarized in detail.    HISTORY OF PRESENT ILLNESS:  The patient is a 61 y.o. year old female who is here for an opinion about the above issue.    History of Present Illness patient is a 61-year-old white female who I had seen in 2006 with a  pT1cN1 grade 2 infiltrating ductal carcinoma of the left breast associated with high-grade DCIS solid and cribriform with lymphovascular invasion and a very close margin of in situ carcinoma for which she had reresection.  Tumor was ER/PA positive HER-2 negative and she had ??1 node positive.    At that time she had 4 cycles of Adriamycin Cytoxan followed by Taxol 175 every 3 weeks followed by radiation and 5 years of blockade 2-1/2 with tamoxifen in 2-1/2 with Aromasin.    She has not had a mammogram in a while and palpated a mass in her left breast and brought this to the attention of her physician who ordered diagnostic imaging.  Imaging confirmed a 2-1/2 cm mass in the left breast and this was biopsied along with a suspicious left axillary node.  The mass in the breast showed a grade 3 infiltrating ductal carcinoma that was triple negative with a Ki-67 of 90% and the axillary node was thankfully benign.  She was presented at our multidisciplinary conference and based on her comorbidities we felt that options for neoadjuvant chemotherapy with immunotherapy needed to be discussed and she is here today to discuss this.    Since I saw her in 2013 she has developed diabetes and is now on insulin.  She has coronary artery disease had open heart surgery with bypass and a  year and a half later had to have 3 stents placed.  Her last echocardiogram shows an ejection fraction of just under 50% in 2019.    She has some mild renal insufficiency.    She did have a history of polymyalgia rheumatica in 2010 and treated with steroids for a while    She is not a smoker or drinker never has been    47 gene [employer] genetics panel was negative    Past Medical History:   Diagnosis Date   • Breast cancer (CMS/[facility]) 2021    Left   • CKD (chronic kidney disease)     stage 3   • Coronary artery disease    • Diabetes mellitus type 2 with complications, uncontrolled (CMS/[facility])    • Gout    • History of pneumonia    • Hyperlipidemia    • Hypertension    • MRSA (methicillin resistant Staphylococcus aureus)    • NSTEMI (non-ST elevated myocardial infarction) (CMS/[facility]) 2019   • Obesity    • Vitamin B12 deficiency         Past Surgical History:   Procedure Laterality Date   • APPENDECTOMY     • BREAST LUMPECTOMY  2006   • BREAST LUMPECTOMY WITH SENTINEL NODE BIOPSY AND AXILLARY NODE DISSECTION     • CARDIAC CATHETERIZATION  2019    new de nova Ostial Ramus 95% stenosis, severe multivessel diabetic coronary vasculopathy with small vessels, occluded 3 of 4 bypass conduits, LIMA to LAD patent, all 3 vein grafts are occluded, PTCA/EMMANUEL to ostial Ramus 95% stenosis, patent OM1 and proximal and mid RCA stents x 3 from 2/2019, preserved LVSF, normal LVEDP, no significant AS or MR     • CHOLECYSTECTOMY  2006   • CORONARY ARTERY BYPASS GRAFT  2017    x4 with LIMA to mLAD, SVG to diagonal, SVG to OM, SVG to distal PDA     • CORONARY STENT PLACEMENT  12/2019    Drug eluting stent placement--2.0 x 26 mm Resolute [place] EMMANUEL to ostial Ramus     • CORONARY STENT PLACEMENT  02/2019    Drug eluting stent--2.25 x 38 mm and 2.5 x 15 mm Resolute [place] EMMANUEL to proximal mid RCA, 2.0 x 26 mm Resolute [place] EMMANUEL to mid circumflex/proximal OM1   • DENTAL PROCEDURE     • HYSTERECTOMY  2009   • TONSILLECTOMY  1961    age 1      ONCOLOGIC  HISTORY:  In the Spring of 2006 routine screening mammo  graphy was performed with abnormality noted.  Biopsy is performed with ultrasound guidance consistent with invasive ductal carcinoma.  She subsequently underwent lumpectomy with sentinel node biopsy with subsequent axillary dissection performed with findi  ngs of grade 2 intraductal and invasive carcinoma.  ER/TN and HER2/keith are said to be performed under accession #[id number] at [facility].  Final diagnosis revealed invasive ductal carcinoma at 1.3 cm intermediate grade with Sarah   score 6/9, high grade DCIS solid and cribriform types are noted, extensive intraductal component is absent.  Lymphovascular invasion was present.  Invasive carcinoma was present within microns of the inked anterior specimen margin and .15 cm of the inked   medial specimen margin.  In situ carcinoma was present 0.05 cm of the inferior specimen margin focally with T1,PN1,MX or stage IIA at least confirmed by pathology review.   The patient'  s primary tumor was ER/TN positive, HER-2 oncoprotein negative.          With that, case was discussed with the patient's surgeon, Wilfredo Hernandez Jr., M.D. with plans to proceed with re-excision.          Re-excision was performed on 8/07/06 (specimen #[id number]) with no evidence of malignancy or atypia appreciated.  With that on 9/13/06 Ad  riamycin/Cytoxan x four cycles to be followed by Taxol x four cycles, subsequent radiation therapy and Arimidex therapy will be initiated.        Taxol therapy x four is initiated 12/6/06 with Arimidex to follow.  The patient had significant arthralgias related to Taxol therapy after her first cycle of treatment.        After the second cycle of  Taxol January 2007, she underwent MRI of the liver to further evaluate fatty changes of the liver and they were confirmed with some focal areas suggesting hemangioma or adenom  a ?.  A PET scan was recommended.  It was noted some increase  in the transaminases and on 1/17/06 after discussion between Dr. Tracy, Dr. Molina and Dr. Nicole, it was elected to withhold the third cycle of Taxol and do further evaluation including GI c  onsultation with Dr. aLne.  This was approved by Dr. Gala Veras.        Dr. Lane saw the patient and confirmed the diagnosis of fatty liver.  He saw no objection to completing the last two cycles of chemotherapy that recommended the patient lose 15-20 lbs an  d have a followup scan in three months.  The third dose of Taxol was given on 02/07/07.          Fourth cycle of Taxol given 2/28/07.        The patient has had an endometrial ablation in the past but am unsure of her menopausal status and therefore LH, FSH and Estrad  iol will be drawn.  We obtain our prior DEXA scan from 2006 as well.  We will determine whether she is a candidate for tamoxifen versus Arimidex adjuvant therapy and  will start adjuvant radiation therapy 3/28/07 .  On 4/13/07 she is back for review with   a  n estradiol of 21, FSH 43, LH 31 which are all in the post menopausal range.  However, because of persistent severe post Taxol bony and joint aches requiring Dilaudid, we elected to not start with an aromatase inhibitor but start with tamoxifen and then s  witch to Arimidex after two and a half years.    The patient received B12 injections monthly from 6-09-06 until 3-28-07 and then resumed injections on     7-18-07.         The patient has had an endometrial ablation in the past but I am unsure of her menopausal status and therefore LH, FSH and Estradiol will be drawn.  We obtain our prior DEXA scan from 2006 as well.  We will determine whether she is a candida  te for tamoxifen versus Arimidex adjuvant therapy and will start adjuvant radiation therapy 3/28/07.  On 4/13/07 she is back for review with an estradiol of 21, FSH 43, LH 31 which are all in the post-menopausal range.  However, because of persistent angie  re post Taxol bony and joint  aches requiring Dilaudid, we elected to not start with an aromatase inhibitor but start with tamoxifen and then switch to Arimidex after two and a half years.        The patient was re-evaluated on 2-11-09.  She has been at her baseline health condition.  She had mammogram in September 2008 in Dr. Snowden'   office, her gynecologist.   We will try and obtain a report of that.  The patient continues taking tamoxifen daily   with significant hot flashes and sweating.  The patient has been taking Effexor 75 mg q. day.   I discussed with the patient that we will add low dose Clonidine 0.1 mg daily before sleep.   I cautioned the patient that this may cause low blood pressure wi  t  h dizziness and fainting.   I discussed with the patient that if Clonidine does not help with symptom control for hot flashes and sweating, that her lisinopril dose needs to be decreased.   If Clonidine does not help with symptom control but causes side e  f  fects with dizziness and low blood pressure that it will need to be discontinued.   According to previous plan, the patient will be switched to Arimidex after 2-1/2 years of tamoxifen which will be close to the time when she comes back for the next follow  up.           Aromasin 25 mg daily is initiated 08/10/09 approximately 2 1/2 years out from initiation of tamoxifen therapy.  Five years of total therapy would be intended.        With complaints of diffuse arthralgias, Aromasin is withheld while further evaluation is undertaken.         On review through Rheumatology, Dr. Lori Moore, polymyalgia rheumatic is confirmed and steroid therapy initiated.          On 6-21-10, Aromasin therapy was resumed in the background of prednisone 6 mg q. day.           On review 11/09/11 with increase in left knee pain, she had accelerated her Diclofenac therapies and presents with hemoglobin of 10.5 with anemia studies requested.         PET scan 6/15/12 reveals no hypermetabolic activity with  particular attention paid to a paraspinous palpable nodule.          She was discharged from the practice in 2013    Current Outpatient Medications on File Prior to Visit   Medication Sig Dispense Refill   • ACCU-CHEK FASTCLIX LANCETS misc TO CHECK 5 TIMES DAILY 500 each 2   • ACCU-CHEK GUIDE test strip 1 each by Other route 5 (Five) Times a Day. Use as instructed 450 each 1   • ascorbic acid (VITAMIN C) 250 MG tablet Take 250 mg by mouth Daily.     • aspirin 81 MG tablet Take 81 mg by mouth.     • Blood Glucose Monitoring Suppl (ACCU-CHEK GUIDE) w/Device kit 1 each 5 (Five) Times a Day. 1 kit 1   • cetirizine (ZyrTEC) 10 MG tablet Take 10 mg by mouth daily.     • cholecalciferol (Cholecalciferol) 25 MCG (1000 UT) tablet Take 1,000 Units by mouth Daily.     • glucose blood (ACCU-CHEK SMARTVIEW) test strip Use as instructed 5 TIMES DAILY 450 each 2   • Insulin Aspart, w/Niacinamide, (FIASP FLEXTOUCH SC) Inject  under the skin into the appropriate area as directed. 6-10 units qid     • Insulin Degludec (Tresiba FlexTouch) 200 UNIT/ML solution pen-injector pen injection INJECT 26 UNITS INTO THE SKIN EVERY MORNING FOR 90 DAYS.     • Insulin Pen Needle 32G X 6 MM misc INJECT 1 EACH INTO THE SKIN 4 (FOUR) TIMES DAILY.     • metoprolol succinate XL (TOPROL-XL) 50 MG 24 hr tablet      • MULTIPLE VIT-MIN-CALCIUM-FA PO Take  by mouth.     • ranolazine (RANEXA) 500 MG 12 hr tablet TAKE 1 TABLET BY MOUTH 2 (TWO) TIMES DAILY.     • rosuvastatin (CRESTOR) 20 MG tablet Take 20 mg by mouth Daily.     • ticagrelor (Brilinta) 60 MG tablet tablet Take 60 mg by mouth.     • zinc sulfate (ZINCATE) 220 (50 Zn) MG capsule Take 220 mg by mouth Daily.     • [DISCONTINUED] BIOTIN PO Take  by mouth.     • [DISCONTINUED] BRILINTA 90 MG tablet tablet 90 mg 2 (Two) Times a Day.     • [DISCONTINUED] Insulin aspart (FIASP FLEXTOUCH) 100 UNIT/ML solution pen-injector injection pen 15-25  units sc tidac 45 mL 3   • [DISCONTINUED] insulin degludec  "(TRESIBA FLEXTOUCH) 100 UNIT/ML solution pen-injector injection 25 UNITS QAM 15 mL 5   • [DISCONTINUED] Insulin Pen Needle (BD PEN NEEDLE NIURKA U/F) 32G X 4 MM misc USE 2 TIMES DAILY 200 each 1   • [DISCONTINUED] Insulin Pen Needle 32G X 5 MM misc Inject 1 each under the skin into the appropriate area as directed 4 (Four) Times a Day.     • [DISCONTINUED] Insulin Syringe-Needle U-100 31G X 15/64\" 1 ML misc      • [DISCONTINUED] RANEXA 1000 MG 12 hr tablet Take 1,000 mg by mouth Every 12 (Twelve) Hours.     • [DISCONTINUED] rosuvastatin (CRESTOR) 5 MG tablet Take 20 mg by mouth Daily.       No current facility-administered medications on file prior to visit.        ALLERGIES:    Allergies   Allergen Reactions   • Allopurinol Other (See Comments)     Mouth ulcers   • Dulaglutide Nausea And Vomiting   • Sulfa Antibiotics Other (See Comments)     Abdominal Pain and Nausea  Abdominal Pain and Nausea  Abdominal Pain and Nausea   • Latex Itching        Social History     Socioeconomic History   • Marital status:      Spouse name: Noel   • Number of children: 3   • Years of education: Not on file   • Highest education level: Not on file   Tobacco Use   • Smoking status: Never Smoker   • Smokeless tobacco: Never Used   Substance and Sexual Activity   • Alcohol use: No        Family History   Problem Relation Age of Onset   • Heart disease Mother    • Coronary artery disease Mother    • Heart disease Brother    • Heart disease Maternal Uncle    • Prostate cancer Maternal Uncle    • Heart disease Maternal Grandfather         Review of Systems   Constitutional: Positive for fatigue.   HENT: Negative.    Respiratory: Negative.    Cardiovascular: Negative.    Gastrointestinal: Negative.    Genitourinary: Negative.    Musculoskeletal: Negative.    Neurological: Positive for numbness (Minimal numbness in her feet).   Psychiatric/Behavioral: Negative.         Objective     Vitals:    08/30/21 0912   BP: 141/75   Pulse: 66 " "  Resp: 16   Temp: 96.8 °F (36 °C)   TempSrc: Skin   SpO2: 100%   Weight: 71.3 kg (157 lb 1.6 oz)   Height: 163.8 cm (64.49\")  Comment: NEW Ht   PainSc: 0-No pain     Current Status 8/30/2021   ECOG score 0       Physical Exam  Chest:             CONSTITUTIONAL:  Vital signs reviewed.  No distress, looks comfortable.  EYES:  Conjunctivae and lids unremarkable.  PERRLA  EARS,NOSE,MOUTH,THROAT:  Ears and nose appear unremarkable.  Lips, teeth, gums appear unremarkable.  RESPIRATORY:  Normal respiratory effort.  Lungs clear to auscultation bilaterally  BREASTS: Left breast shows a 2 x 2 centimeter mass lateral to the nipple areolar complex at 3:00 freely mobile no axillary adenopathy right breast is benign.  CARDIOVASCULAR:  Normal S1, S2.  No murmurs rubs or gallops.  No significant lower extremity edema.  GASTROINTESTINAL: Abdomen appears unremarkable.  Nontender.  No hepatomegaly.  No splenomegaly.  LYMPHATIC:  No cervical, supraclavicular, axillary lymphadenopathy.  SKIN:  Warm.  No rashes.  PSYCHIATRIC:  Normal judgment and insight.  Normal mood and affect.      RECENT LABS:  Hematology WBC   Date Value Ref Range Status   08/30/2021 10.86 (H) 3.40 - 10.80 10*3/mm3 Final   12/10/2019 9.53 4.5 - 11.0 10*3/uL Final     RBC   Date Value Ref Range Status   08/30/2021 4.30 3.77 - 5.28 10*6/mm3 Final   12/10/2019 4.07 4.0 - 5.2 10*6/uL Final     Hemoglobin   Date Value Ref Range Status   08/30/2021 11.5 (L) 12.0 - 15.9 g/dL Final   12/10/2019 10.7 (L) 12.0 - 16.0 g/dL Final     Hematocrit   Date Value Ref Range Status   08/30/2021 36.0 34.0 - 46.6 % Final   12/10/2019 33.4 (L) 36.0 - 46.0 % Final     Platelets   Date Value Ref Range Status   08/30/2021 358 140 - 450 10*3/mm3 Final   12/10/2019 313 140 - 440 10*3/uL Final          08/06/21 08/17/2021 08/18/2021    COMPARISON: Mammography, 07/27/2021 and 08/06/2021.  IMPRESSION:  1. Biopsy-proven malignancy in the posterior one-third of the left  breast " at the 3-o'clock position measuring on the order of 2.7 cm in  greatest dimension. The mini cork-shaped metallic clip is within the  superomedial aspect of the lesion. No other suspicious findings are seen  within left breast and there is no evidence for left axillary  adenopathy. Evaluation for left internal mammary chain adenopathy is  limited due to the presence of median sternotomy wires.  2. There are no findings suspicious for malignancy in the right breast.     BI-RADS Category 6: Known biopsy-proven malignancy.     This report was finalized on 8/18/2021 5:33 PM by Dr. Jon Granados M.D.           Assessment/Plan   1.[id number] grade 3 triple negative left breast cancer ipsilateral recurrence 15 years after prior breast cancer-    2.pT1cN1 grade 2 infiltrating ductal carcinoma left breast ER/IL positive HER-2 negative in 2006-  Patient had a lumpectomy radiation/Adriamycin Cytoxan and Taxol/2 half years of tamoxifen and 2 and half years of aromatase inhibitor    3.  History of polymyalgia rheumatica currently on no treatment    4.  Currently type 2 diabetes insulin-dependent    5.  Coronary artery disease post bypass surgery and stenting with a baseline ejection fraction of 48% in 2019    6.  Mild peripheral neuropathy in her feet    7.  CKD 2/3 probably from diabetes    8. unvaccinated against COVID-19    Plan  1.  PET scan for staging and repeat echocardiogram  2.  Port placement for neoadjuvant chemo  3.Patient has an appointment for second opinion [place] in about 10 days and we will review these recommendations.    I had a long discussion with her and I told her that this breast cancer was much more aggressive than the first breast cancer she had and the only option for treatment with chemotherapy with or without immunotherapy.    Based on her cardiac dysfunction on her last echocardiogram I doubt we can give her any meaningful amount of anthracyclines and I recommended weekly carbotaxol and Keytruda  which has been recently approved in the situation followed by surgery and further decisions depending on residual disease in the breast.    She has not had any issues with polymyalgia and while we will have to watch closely for this with the immunotherapy.    She is currently not a candidate for clinical trial based on her prior cancer and her underlying comorbidities    She is agreeable to chemotherapy but wants to see what we will have to say which I think is very reasonable as this is a complicated clinical situation with her comorbidities.    I strongly urged her to get vaccinated against COVID-19 which she is at high risk for based on her diabetes and age    I spent 60 total minutes, face-to-face, caring for Laxmi today.  Greater than 50% of this time involved counseling and/or coordination of care as documented within this note.

## 2021-08-30 ENCOUNTER — APPOINTMENT (OUTPATIENT)
Dept: PREADMISSION TESTING | Facility: HOSPITAL | Age: 61
End: 2021-08-30

## 2021-08-30 ENCOUNTER — TELEPHONE (OUTPATIENT)
Dept: ONCOLOGY | Facility: CLINIC | Age: 61
End: 2021-08-30

## 2021-08-30 ENCOUNTER — CONSULT (OUTPATIENT)
Dept: ONCOLOGY | Facility: CLINIC | Age: 61
End: 2021-08-30

## 2021-08-30 ENCOUNTER — LAB (OUTPATIENT)
Dept: OTHER | Facility: HOSPITAL | Age: 61
End: 2021-08-30

## 2021-08-30 VITALS
HEART RATE: 66 BPM | BODY MASS INDEX: 26.82 KG/M2 | SYSTOLIC BLOOD PRESSURE: 141 MMHG | HEIGHT: 64 IN | OXYGEN SATURATION: 100 % | WEIGHT: 157.1 LBS | DIASTOLIC BLOOD PRESSURE: 75 MMHG | RESPIRATION RATE: 16 BRPM | TEMPERATURE: 96.8 F

## 2021-08-30 DIAGNOSIS — Z17.1 MALIGNANT NEOPLASM OF UPPER-OUTER QUADRANT OF LEFT BREAST IN FEMALE, ESTROGEN RECEPTOR NEGATIVE (HCC): Primary | ICD-10-CM

## 2021-08-30 DIAGNOSIS — C50.412 MALIGNANT NEOPLASM OF UPPER-OUTER QUADRANT OF LEFT BREAST IN FEMALE, ESTROGEN RECEPTOR NEGATIVE (HCC): Primary | ICD-10-CM

## 2021-08-30 DIAGNOSIS — C50.919 MALIGNANT NEOPLASM OF FEMALE BREAST, UNSPECIFIED ESTROGEN RECEPTOR STATUS, UNSPECIFIED LATERALITY, UNSPECIFIED SITE OF BREAST (HCC): Primary | ICD-10-CM

## 2021-08-30 PROBLEM — Z45.2 FITTING AND ADJUSTMENT OF VASCULAR CATHETER: Status: ACTIVE | Noted: 2021-08-30

## 2021-08-30 LAB
BASOPHILS # BLD AUTO: 0.12 10*3/MM3 (ref 0–0.2)
BASOPHILS NFR BLD AUTO: 1.1 % (ref 0–1.5)
DEPRECATED RDW RBC AUTO: 43.8 FL (ref 37–54)
EOSINOPHIL # BLD AUTO: 0.4 10*3/MM3 (ref 0–0.4)
EOSINOPHIL NFR BLD AUTO: 3.7 % (ref 0.3–6.2)
ERYTHROCYTE [DISTWIDTH] IN BLOOD BY AUTOMATED COUNT: 14.6 % (ref 12.3–15.4)
HCT VFR BLD AUTO: 36 % (ref 34–46.6)
HGB BLD-MCNC: 11.5 G/DL (ref 12–15.9)
IMM GRANULOCYTES # BLD AUTO: 0.06 10*3/MM3 (ref 0–0.05)
IMM GRANULOCYTES NFR BLD AUTO: 0.6 % (ref 0–0.5)
LYMPHOCYTES # BLD AUTO: 2.1 10*3/MM3 (ref 0.7–3.1)
LYMPHOCYTES NFR BLD AUTO: 19.3 % (ref 19.6–45.3)
MCH RBC QN AUTO: 26.7 PG (ref 26.6–33)
MCHC RBC AUTO-ENTMCNC: 31.9 G/DL (ref 31.5–35.7)
MCV RBC AUTO: 83.7 FL (ref 79–97)
MONOCYTES # BLD AUTO: 0.8 10*3/MM3 (ref 0.1–0.9)
MONOCYTES NFR BLD AUTO: 7.4 % (ref 5–12)
NEUTROPHILS NFR BLD AUTO: 67.9 % (ref 42.7–76)
NEUTROPHILS NFR BLD AUTO: 7.38 10*3/MM3 (ref 1.7–7)
NRBC BLD AUTO-RTO: 0 /100 WBC (ref 0–0.2)
PLATELET # BLD AUTO: 358 10*3/MM3 (ref 140–450)
PMV BLD AUTO: 9.7 FL (ref 6–12)
RBC # BLD AUTO: 4.3 10*6/MM3 (ref 3.77–5.28)
WBC # BLD AUTO: 10.86 10*3/MM3 (ref 3.4–10.8)

## 2021-08-30 PROCEDURE — 36415 COLL VENOUS BLD VENIPUNCTURE: CPT

## 2021-08-30 PROCEDURE — 99205 OFFICE O/P NEW HI 60 MIN: CPT | Performed by: INTERNAL MEDICINE

## 2021-08-30 PROCEDURE — 85025 COMPLETE CBC W/AUTO DIFF WBC: CPT | Performed by: INTERNAL MEDICINE

## 2021-08-30 RX ORDER — RANOLAZINE 500 MG/1
TABLET, EXTENDED RELEASE ORAL
COMMUNITY
Start: 2021-06-21

## 2021-08-30 RX ORDER — HEPARIN SODIUM (PORCINE) LOCK FLUSH IV SOLN 100 UNIT/ML 100 UNIT/ML
500 SOLUTION INTRAVENOUS AS NEEDED
OUTPATIENT
Start: 2021-09-13

## 2021-08-30 RX ORDER — ROSUVASTATIN CALCIUM 20 MG/1
20 TABLET, COATED ORAL NIGHTLY
COMMUNITY
Start: 2021-06-25

## 2021-08-30 RX ORDER — INSULIN DEGLUDEC 200 U/ML
INJECTION, SOLUTION SUBCUTANEOUS
COMMUNITY
Start: 2021-05-10

## 2021-08-30 RX ORDER — SODIUM CHLORIDE 0.9 % (FLUSH) 0.9 %
10 SYRINGE (ML) INJECTION AS NEEDED
OUTPATIENT
Start: 2021-09-13

## 2021-08-30 NOTE — TELEPHONE ENCOUNTER
Clinical Case Management/[place]:    Patient is a 61 year old female who was seen today by Dr. Beal for a new oncology consultation regarding recent diagnosis of malignant neoplasm of left breast upper-outer quadrant, estrogen receptor negative. During this consultation with Dr. Beal, patient completed the Distress Thermometer which indicated a score of 7, marking the following concerns: treatment decisions, dealing with partner, fears, sadness, worry, sleep, and feeling swollen.     OSW made two attempts to reach patient by home phone and cell phone to introduce self/role and to assess for any needs. OSW left messages with contact information and requested a return call.    OSW to remain available.     NATIVIDAD Horn, CSW  Oncology Social Worker   [facility]

## 2021-09-01 ENCOUNTER — TELEPHONE (OUTPATIENT)
Dept: SURGERY | Facility: CLINIC | Age: 61
End: 2021-09-01

## 2021-09-01 NOTE — TELEPHONE ENCOUNTER
Port placement is scheduled on 9/14/2021 @ 3:00pm, patient arrival 1:00pm.    PAT is scheduled on 9/13/2021 @ 9:30am.    Patient to start holding bilenta 9/8/2021.    Called and spoke to patient, patient expressed v/u of appointment time.    Sent patient a reminder letter in the mail.

## 2021-09-03 ENCOUNTER — APPOINTMENT (OUTPATIENT)
Dept: CARDIOLOGY | Facility: HOSPITAL | Age: 61
End: 2021-09-03

## 2021-09-04 ENCOUNTER — APPOINTMENT (OUTPATIENT)
Dept: LAB | Facility: HOSPITAL | Age: 61
End: 2021-09-04

## 2021-09-08 ENCOUNTER — APPOINTMENT (OUTPATIENT)
Dept: PET IMAGING | Facility: HOSPITAL | Age: 61
End: 2021-09-08

## 2021-09-13 ENCOUNTER — APPOINTMENT (OUTPATIENT)
Dept: ONCOLOGY | Facility: HOSPITAL | Age: 61
End: 2021-09-13

## 2021-09-13 ENCOUNTER — PRE-ADMISSION TESTING (OUTPATIENT)
Dept: PREADMISSION TESTING | Facility: HOSPITAL | Age: 61
End: 2021-09-13

## 2021-09-13 ENCOUNTER — TELEPHONE (OUTPATIENT)
Dept: SURGERY | Facility: CLINIC | Age: 61
End: 2021-09-13

## 2021-09-13 VITALS
WEIGHT: 155 LBS | TEMPERATURE: 99.5 F | BODY MASS INDEX: 26.46 KG/M2 | OXYGEN SATURATION: 100 % | HEART RATE: 66 BPM | DIASTOLIC BLOOD PRESSURE: 74 MMHG | HEIGHT: 64 IN | SYSTOLIC BLOOD PRESSURE: 120 MMHG | RESPIRATION RATE: 20 BRPM

## 2021-09-13 LAB
ANION GAP SERPL CALCULATED.3IONS-SCNC: 13.8 MMOL/L (ref 5–15)
BUN SERPL-MCNC: 27 MG/DL (ref 8–23)
BUN/CREAT SERPL: 19.4 (ref 7–25)
CALCIUM SPEC-SCNC: 9.5 MG/DL (ref 8.6–10.5)
CHLORIDE SERPL-SCNC: 100 MMOL/L (ref 98–107)
CO2 SERPL-SCNC: 20.2 MMOL/L (ref 22–29)
CREAT SERPL-MCNC: 1.39 MG/DL (ref 0.57–1)
DEPRECATED RDW RBC AUTO: 45 FL (ref 37–54)
EOSINOPHIL # BLD MANUAL: 0.06 10*3/MM3 (ref 0–0.4)
EOSINOPHIL NFR BLD MANUAL: 1 % (ref 0.3–6.2)
ERYTHROCYTE [DISTWIDTH] IN BLOOD BY AUTOMATED COUNT: 14.5 % (ref 12.3–15.4)
GFR SERPL CREATININE-BSD FRML MDRD: 38 ML/MIN/1.73
GLUCOSE SERPL-MCNC: 158 MG/DL (ref 65–99)
HCT VFR BLD AUTO: 35.6 % (ref 34–46.6)
HGB BLD-MCNC: 11.3 G/DL (ref 12–15.9)
LYMPHOCYTES # BLD MANUAL: 0.17 10*3/MM3 (ref 0.7–3.1)
LYMPHOCYTES NFR BLD MANUAL: 1 % (ref 5–12)
LYMPHOCYTES NFR BLD MANUAL: 3 % (ref 19.6–45.3)
MCH RBC QN AUTO: 27.1 PG (ref 26.6–33)
MCHC RBC AUTO-ENTMCNC: 31.7 G/DL (ref 31.5–35.7)
MCV RBC AUTO: 85.4 FL (ref 79–97)
MONOCYTES # BLD AUTO: 0.06 10*3/MM3 (ref 0.1–0.9)
NEUTROPHILS # BLD AUTO: 5.49 10*3/MM3 (ref 1.7–7)
NEUTROPHILS NFR BLD MANUAL: 95 % (ref 42.7–76)
NRBC BLD AUTO-RTO: 0 /100 WBC (ref 0–0.2)
PLAT MORPH BLD: NORMAL
PLATELET # BLD AUTO: 214 10*3/MM3 (ref 140–450)
PMV BLD AUTO: 9.8 FL (ref 6–12)
POTASSIUM SERPL-SCNC: 4.2 MMOL/L (ref 3.5–5.2)
RBC # BLD AUTO: 4.17 10*6/MM3 (ref 3.77–5.28)
RBC MORPH BLD: NORMAL
SARS-COV-2 ORF1AB RESP QL NAA+PROBE: DETECTED
SODIUM SERPL-SCNC: 134 MMOL/L (ref 136–145)
WBC # BLD AUTO: 5.78 10*3/MM3 (ref 3.4–10.8)
WBC MORPH BLD: NORMAL

## 2021-09-13 PROCEDURE — 85007 BL SMEAR W/DIFF WBC COUNT: CPT

## 2021-09-13 PROCEDURE — 85025 COMPLETE CBC W/AUTO DIFF WBC: CPT

## 2021-09-13 PROCEDURE — 36415 COLL VENOUS BLD VENIPUNCTURE: CPT

## 2021-09-13 PROCEDURE — 80048 BASIC METABOLIC PNL TOTAL CA: CPT

## 2021-09-13 PROCEDURE — C9803 HOPD COVID-19 SPEC COLLECT: HCPCS

## 2021-09-13 PROCEDURE — U0004 COV-19 TEST NON-CDC HGH THRU: HCPCS

## 2021-09-13 RX ORDER — ONDANSETRON HYDROCHLORIDE 8 MG/1
8 TABLET, FILM COATED ORAL EVERY 8 HOURS PRN
COMMUNITY
Start: 2021-08-31 | End: 2021-12-28

## 2021-09-13 RX ORDER — LIDOCAINE AND PRILOCAINE 25; 25 MG/G; MG/G
1 CREAM TOPICAL AS NEEDED
Status: ON HOLD | COMMUNITY
Start: 2021-08-31 | End: 2022-01-04

## 2021-09-13 RX ORDER — ACETAMINOPHEN 500 MG
1000 TABLET ORAL EVERY 6 HOURS PRN
COMMUNITY

## 2021-09-13 RX ORDER — PROMETHAZINE HYDROCHLORIDE 25 MG/1
12.5-25 TABLET ORAL EVERY 6 HOURS PRN
COMMUNITY
Start: 2021-08-31 | End: 2021-12-28

## 2021-09-13 NOTE — DISCHARGE INSTRUCTIONS
Take the following medications the morning of surgery:    ranexa    If you are on prescription narcotic pain medication to control your pain you may also take that medication the morning of surgery.    General Instructions:  • Do not eat solid food after midnight the night before surgery.  • You may drink clear liquids day of surgery but must stop at least one hour before your hospital arrival time.  • It is beneficial for you to have a clear drink that contains carbohydrates the day of surgery.  We suggest a 12 to 20 ounce bottle of Gatorade or Powerade for non-diabetic patients or a 12 to 20 ounce bottle of G2 or Powerade Zero for diabetic patients. (Pediatric patients, are not advised to drink a 12 to 20 ounce carbohydrate drink)    Clear liquids are liquids you can see through.  Nothing red in color.     Plain water                               Sports drinks  Sodas                                   Gelatin (Jell-O)  Fruit juices without pulp such as white grape juice and apple juice  Popsicles that contain no fruit or yogurt  Tea or coffee (no cream or milk added)  Gatorade / Powerade  G2 / Powerade Zero    • Infants may have breast milk up to four hours before surgery.  • Infants drinking formula may drink formula up to six hours before surgery.   • Patients who avoid smoking, chewing tobacco and alcohol for 4 weeks prior to surgery have a reduced risk of post-operative complications.  Quit smoking as many days before surgery as you can.  • Do not smoke, use chewing tobacco or drink alcohol the day of surgery.   • If applicable bring your C-PAP/ BI-PAP machine.  • Bring any papers given to you in the doctor’s office.  • Wear clean comfortable clothes.  • Do not wear contact lenses, false eyelashes or make-up.  Bring a case for your glasses.   • Bring crutches or walker if applicable.  • Remove all piercings.  Leave jewelry and any other valuables at home.  • Hair extensions with metal clips must be removed  prior to surgery.  • The Pre-Admission Testing nurse will instruct you to bring medications if unable to obtain an accurate list in Pre-Admission Testing.        If you were given a blood bank ID arm band remember to bring it with you the day of surgery.    Preventing a Surgical Site Infection:  • For 2 to 3 days before surgery, avoid shaving with a razor because the razor can irritate skin and make it easier to develop an infection.    • Any areas of open skin can increase the risk of a post-operative wound infection by allowing bacteria to enter and travel throughout the body.  Notify your surgeon if you have any skin wounds / rashes even if it is not near the expected surgical site.  The area will need assessed to determine if surgery should be delayed until it is healed.  • The night prior to surgery shower using a fresh bar of anti-bacterial soap (such as Dial) and clean washcloth.  Sleep in a clean bed with clean clothing.  Do not allow pets to sleep with you.  • Shower on the morning of surgery using a fresh bar of anti-bacterial soap (such as Dial) and clean washcloth.  Dry with a clean towel and dress in clean clothing.  • Ask your surgeon if you will be receiving antibiotics prior to surgery.  • Make sure you, your family, and all healthcare providers clean their hands with soap and water or an alcohol based hand  before caring for you or your wound.    Day of surgery:9/14/2021   1300 ( 1pm)  Your arrival time is approximately two hours before your scheduled surgery time.  Upon arrival, a Pre-op nurse and Anesthesiologist will review your health history, obtain vital signs, and answer questions you may have.  The only belongings needed at this time will be a list of your home medications and if applicable your C-PAP/BI-PAP machine.  A Pre-op nurse will start an IV and you may receive medication in preparation for surgery, including something to help you relax.     Please be aware that surgery does  come with discomfort.  We want to make every effort to control your discomfort so please discuss any uncontrolled symptoms with your nurse.   Your doctor will most likely have prescribed pain medications.      If you are going home after surgery you will receive individualized written care instructions before being discharged.  A responsible adult must drive you to and from the hospital on the day of your surgery and stay with you for 24 hours.  Discharge prescriptions can be filled by the hospital pharmacy during regular pharmacy hours.  If you are having surgery late in the day/evening your prescription may be e-prescribed to your pharmacy.  Please verify your pharmacy hours or chose a 24 hour pharmacy to avoid not having access to your prescription because your pharmacy has closed for the day.    If you are staying overnight following surgery, you will be transported to your hospital room following the recovery period.  [facility] has all private rooms.    If you have any questions please call Pre-Admission Testing at [phone].  Deductibles and co-payments are collected on the day of service. Please be prepared to pay the required co-pay, deductible or deposit on the day of service as defined by your plan.    Patient Education for Self-Quarantine Process    • Following your COVID testing, we strongly recommend that you wear a mask when you are with other people and practice social distancing.   • Limit your activities to only required outings.  • Wash your hands with soap and water frequently for at least 20 seconds.   • Avoid touching your eyes, nose and mouth with unwashed hands.  • Do not share anything - utensils, drinking glasses, food from the same bowl.   • Sanitize household surfaces daily. Include all high touch areas (door handles, light switches, phones, countertops, etc.)    Call your surgeon immediately if you experience any of the following symptoms:  • Sore Throat  • Shortness  of Breath or difficulty breathing  • Cough  • Chills  • Body soreness or muscle pain  • Headache  • Fever  • New loss of taste or smell  • Do not arrive for your surgery ill.  Your procedure will need to be rescheduled to another time.  You will need to call your physician before the day of surgery to avoid any unnecessary exposure to hospital staff as well as other patients.

## 2021-09-13 NOTE — TELEPHONE ENCOUNTER
Pre-op COVID test on 9/13/2021 came back positive.      Port placement on 9/14/2021 has been cancelled.    Port placement will be rescheduled to 10/1/2021, I will call patient back with details.    Dr. Red spoke to Dr. Sandoval and Dr. Sandoval's office will contact patient regarding chemo.    Called and spoke to patient, patient expressed v/u of this.

## 2021-09-14 ENCOUNTER — TELEPHONE (OUTPATIENT)
Dept: SURGERY | Facility: CLINIC | Age: 61
End: 2021-09-14

## 2021-09-14 NOTE — TELEPHONE ENCOUNTER
I called patient and let her know that her genetic testing was negative for a mutation.     Dr. Red will give her a copy of the results at her postoperative appointment.

## 2021-09-15 ENCOUNTER — TRANSCRIBE ORDERS (OUTPATIENT)
Dept: SLEEP MEDICINE | Facility: HOSPITAL | Age: 61
End: 2021-09-15

## 2021-09-15 DIAGNOSIS — Z01.818 OTHER SPECIFIED PRE-OPERATIVE EXAMINATION: Primary | ICD-10-CM

## 2021-09-21 ENCOUNTER — TELEPHONE (OUTPATIENT)
Dept: SURGERY | Facility: CLINIC | Age: 61
End: 2021-09-21

## 2021-09-21 NOTE — TELEPHONE ENCOUNTER
Port placement is scheduled on 10/1/2021 @ 8:00am, patient arrival 6:00am.    COVID test is scheduled on 9/29/2021 @ 12:00pm.    Called and left message with patient about appointment times, patient to call back and confirm.

## 2021-09-29 ENCOUNTER — LAB (OUTPATIENT)
Dept: LAB | Facility: HOSPITAL | Age: 61
End: 2021-09-29

## 2021-09-29 DIAGNOSIS — Z01.818 OTHER SPECIFIED PRE-OPERATIVE EXAMINATION: ICD-10-CM

## 2021-09-29 LAB — SARS-COV-2 ORF1AB RESP QL NAA+PROBE: DETECTED

## 2021-09-29 PROCEDURE — U0004 COV-19 TEST NON-CDC HGH THRU: HCPCS

## 2021-09-29 PROCEDURE — C9803 HOPD COVID-19 SPEC COLLECT: HCPCS

## 2021-10-01 ENCOUNTER — APPOINTMENT (OUTPATIENT)
Dept: GENERAL RADIOLOGY | Facility: HOSPITAL | Age: 61
End: 2021-10-01

## 2021-10-01 ENCOUNTER — HOSPITAL ENCOUNTER (OUTPATIENT)
Facility: HOSPITAL | Age: 61
Setting detail: HOSPITAL OUTPATIENT SURGERY
Discharge: HOME | End: 2021-10-01
Attending: SURGERY | Admitting: SURGERY

## 2021-10-01 ENCOUNTER — ANESTHESIA (OUTPATIENT)
Dept: PERIOP | Facility: HOSPITAL | Age: 61
End: 2021-10-01

## 2021-10-01 ENCOUNTER — ANESTHESIA EVENT (OUTPATIENT)
Dept: PERIOP | Facility: HOSPITAL | Age: 61
End: 2021-10-01

## 2021-10-01 VITALS
TEMPERATURE: 97.5 F | OXYGEN SATURATION: 99 % | SYSTOLIC BLOOD PRESSURE: 144 MMHG | RESPIRATION RATE: 16 BRPM | HEART RATE: 73 BPM | DIASTOLIC BLOOD PRESSURE: 77 MMHG

## 2021-10-01 DIAGNOSIS — C50.412 MALIGNANT NEOPLASM OF UPPER-OUTER QUADRANT OF LEFT BREAST IN FEMALE, ESTROGEN RECEPTOR NEGATIVE (HCC): ICD-10-CM

## 2021-10-01 DIAGNOSIS — Z17.1 MALIGNANT NEOPLASM OF UPPER-OUTER QUADRANT OF LEFT BREAST IN FEMALE, ESTROGEN RECEPTOR NEGATIVE (HCC): ICD-10-CM

## 2021-10-01 LAB
GLUCOSE BLDC GLUCOMTR-MCNC: 179 MG/DL (ref 70–130)
GLUCOSE BLDC GLUCOMTR-MCNC: 185 MG/DL (ref 70–130)

## 2021-10-01 PROCEDURE — S0260 H&P FOR SURGERY: HCPCS | Performed by: SURGERY

## 2021-10-01 PROCEDURE — 25010000002 ONDANSETRON PER 1 MG: Performed by: NURSE ANESTHETIST, CERTIFIED REGISTERED

## 2021-10-01 PROCEDURE — 77001 FLUOROGUIDE FOR VEIN DEVICE: CPT | Performed by: SURGERY

## 2021-10-01 PROCEDURE — 25010000002 HEPARIN (PORCINE) PER 1000 UNITS: Performed by: SURGERY

## 2021-10-01 PROCEDURE — 25010000002 PROPOFOL 10 MG/ML EMULSION: Performed by: NURSE ANESTHETIST, CERTIFIED REGISTERED

## 2021-10-01 PROCEDURE — 82962 GLUCOSE BLOOD TEST: CPT

## 2021-10-01 PROCEDURE — 76000 FLUOROSCOPY <1 HR PHYS/QHP: CPT

## 2021-10-01 PROCEDURE — 25010000002 MIDAZOLAM PER 1 MG: Performed by: ANESTHESIOLOGY

## 2021-10-01 PROCEDURE — 25010000003 CEFAZOLIN IN DEXTROSE 2-4 GM/100ML-% SOLUTION: Performed by: SURGERY

## 2021-10-01 PROCEDURE — 36561 INSERT TUNNELED CV CATH: CPT | Performed by: SURGERY

## 2021-10-01 PROCEDURE — C1788 PORT, INDWELLING, IMP: HCPCS | Performed by: SURGERY

## 2021-10-01 PROCEDURE — 76937 US GUIDE VASCULAR ACCESS: CPT | Performed by: SURGERY

## 2021-10-01 PROCEDURE — 25010000002 PHENYLEPHRINE 10 MG/ML SOLUTION: Performed by: NURSE ANESTHETIST, CERTIFIED REGISTERED

## 2021-10-01 DEVICE — POWERPORT CLEARVUE IMPLANTABLE PORT WITH ATTACHABLE 8F POLYURETHANE OPEN-ENDED SINGLE-LUMEN VENOUS CATHETER INTERMEDIATE KIT
Type: IMPLANTABLE DEVICE | Site: CHEST | Status: FUNCTIONAL
Brand: POWERPORT CLEARVUE

## 2021-10-01 RX ORDER — IBUPROFEN 600 MG/1
600 TABLET ORAL ONCE AS NEEDED
Status: DISCONTINUED | OUTPATIENT
Start: 2021-10-01 | End: 2021-10-01 | Stop reason: HOSPADM

## 2021-10-01 RX ORDER — ONDANSETRON 2 MG/ML
4 INJECTION INTRAMUSCULAR; INTRAVENOUS ONCE AS NEEDED
Status: DISCONTINUED | OUTPATIENT
Start: 2021-10-01 | End: 2021-10-01 | Stop reason: HOSPADM

## 2021-10-01 RX ORDER — ONDANSETRON 2 MG/ML
INJECTION INTRAMUSCULAR; INTRAVENOUS AS NEEDED
Status: DISCONTINUED | OUTPATIENT
Start: 2021-10-01 | End: 2021-10-01 | Stop reason: SURG

## 2021-10-01 RX ORDER — NALOXONE HCL 0.4 MG/ML
0.2 VIAL (ML) INJECTION AS NEEDED
Status: DISCONTINUED | OUTPATIENT
Start: 2021-10-01 | End: 2021-10-01 | Stop reason: HOSPADM

## 2021-10-01 RX ORDER — DIPHENHYDRAMINE HYDROCHLORIDE 50 MG/ML
12.5 INJECTION INTRAMUSCULAR; INTRAVENOUS
Status: DISCONTINUED | OUTPATIENT
Start: 2021-10-01 | End: 2021-10-01 | Stop reason: HOSPADM

## 2021-10-01 RX ORDER — LIDOCAINE HYDROCHLORIDE 20 MG/ML
INJECTION, SOLUTION INFILTRATION; PERINEURAL AS NEEDED
Status: DISCONTINUED | OUTPATIENT
Start: 2021-10-01 | End: 2021-10-01 | Stop reason: SURG

## 2021-10-01 RX ORDER — DIPHENHYDRAMINE HCL 25 MG
25 CAPSULE ORAL
Status: DISCONTINUED | OUTPATIENT
Start: 2021-10-01 | End: 2021-10-01 | Stop reason: HOSPADM

## 2021-10-01 RX ORDER — FENTANYL CITRATE 50 UG/ML
50 INJECTION, SOLUTION INTRAMUSCULAR; INTRAVENOUS
Status: DISCONTINUED | OUTPATIENT
Start: 2021-10-01 | End: 2021-10-01 | Stop reason: HOSPADM

## 2021-10-01 RX ORDER — HYDROCODONE BITARTRATE AND ACETAMINOPHEN 7.5; 325 MG/1; MG/1
1 TABLET ORAL ONCE AS NEEDED
Status: DISCONTINUED | OUTPATIENT
Start: 2021-10-01 | End: 2021-10-01 | Stop reason: HOSPADM

## 2021-10-01 RX ORDER — LABETALOL HYDROCHLORIDE 5 MG/ML
5 INJECTION, SOLUTION INTRAVENOUS
Status: DISCONTINUED | OUTPATIENT
Start: 2021-10-01 | End: 2021-10-01 | Stop reason: HOSPADM

## 2021-10-01 RX ORDER — PROMETHAZINE HYDROCHLORIDE 25 MG/1
25 TABLET ORAL ONCE AS NEEDED
Status: DISCONTINUED | OUTPATIENT
Start: 2021-10-01 | End: 2021-10-01 | Stop reason: HOSPADM

## 2021-10-01 RX ORDER — SODIUM CHLORIDE 0.9 % (FLUSH) 0.9 %
10 SYRINGE (ML) INJECTION AS NEEDED
Status: DISCONTINUED | OUTPATIENT
Start: 2021-10-01 | End: 2021-10-01 | Stop reason: HOSPADM

## 2021-10-01 RX ORDER — MIDAZOLAM HYDROCHLORIDE 1 MG/ML
1 INJECTION INTRAMUSCULAR; INTRAVENOUS
Status: DISCONTINUED | OUTPATIENT
Start: 2021-10-01 | End: 2021-10-01 | Stop reason: HOSPADM

## 2021-10-01 RX ORDER — LIDOCAINE HYDROCHLORIDE 10 MG/ML
0.5 INJECTION, SOLUTION EPIDURAL; INFILTRATION; INTRACAUDAL; PERINEURAL ONCE AS NEEDED
Status: COMPLETED | OUTPATIENT
Start: 2021-10-01 | End: 2021-10-01

## 2021-10-01 RX ORDER — SODIUM CHLORIDE 0.9 % (FLUSH) 0.9 %
10 SYRINGE (ML) INJECTION EVERY 12 HOURS SCHEDULED
Status: DISCONTINUED | OUTPATIENT
Start: 2021-10-01 | End: 2021-10-01 | Stop reason: HOSPADM

## 2021-10-01 RX ORDER — OXYCODONE AND ACETAMINOPHEN 10; 325 MG/1; MG/1
1 TABLET ORAL EVERY 4 HOURS PRN
Status: DISCONTINUED | OUTPATIENT
Start: 2021-10-01 | End: 2021-10-01 | Stop reason: HOSPADM

## 2021-10-01 RX ORDER — PROMETHAZINE HYDROCHLORIDE 25 MG/1
25 SUPPOSITORY RECTAL ONCE AS NEEDED
Status: DISCONTINUED | OUTPATIENT
Start: 2021-10-01 | End: 2021-10-01 | Stop reason: HOSPADM

## 2021-10-01 RX ORDER — SODIUM CHLORIDE, SODIUM LACTATE, POTASSIUM CHLORIDE, CALCIUM CHLORIDE 600; 310; 30; 20 MG/100ML; MG/100ML; MG/100ML; MG/100ML
9 INJECTION, SOLUTION INTRAVENOUS CONTINUOUS PRN
Status: DISCONTINUED | OUTPATIENT
Start: 2021-10-01 | End: 2021-10-01 | Stop reason: HOSPADM

## 2021-10-01 RX ORDER — CEFAZOLIN SODIUM 2 G/100ML
2 INJECTION, SOLUTION INTRAVENOUS ONCE
Status: COMPLETED | OUTPATIENT
Start: 2021-10-01 | End: 2021-10-01

## 2021-10-01 RX ORDER — PHENYLEPHRINE HYDROCHLORIDE 10 MG/ML
INJECTION INTRAVENOUS AS NEEDED
Status: DISCONTINUED | OUTPATIENT
Start: 2021-10-01 | End: 2021-10-01 | Stop reason: SURG

## 2021-10-01 RX ORDER — PROPOFOL 10 MG/ML
VIAL (ML) INTRAVENOUS AS NEEDED
Status: DISCONTINUED | OUTPATIENT
Start: 2021-10-01 | End: 2021-10-01 | Stop reason: SURG

## 2021-10-01 RX ORDER — HYDRALAZINE HYDROCHLORIDE 20 MG/ML
5 INJECTION INTRAMUSCULAR; INTRAVENOUS
Status: DISCONTINUED | OUTPATIENT
Start: 2021-10-01 | End: 2021-10-01 | Stop reason: HOSPADM

## 2021-10-01 RX ORDER — EPHEDRINE SULFATE 50 MG/ML
INJECTION, SOLUTION INTRAVENOUS AS NEEDED
Status: DISCONTINUED | OUTPATIENT
Start: 2021-10-01 | End: 2021-10-01 | Stop reason: SURG

## 2021-10-01 RX ORDER — FLUMAZENIL 0.1 MG/ML
0.2 INJECTION INTRAVENOUS AS NEEDED
Status: DISCONTINUED | OUTPATIENT
Start: 2021-10-01 | End: 2021-10-01 | Stop reason: HOSPADM

## 2021-10-01 RX ORDER — EPHEDRINE SULFATE 50 MG/ML
5 INJECTION, SOLUTION INTRAVENOUS ONCE AS NEEDED
Status: DISCONTINUED | OUTPATIENT
Start: 2021-10-01 | End: 2021-10-01 | Stop reason: HOSPADM

## 2021-10-01 RX ORDER — BUPIVACAINE HYDROCHLORIDE 2.5 MG/ML
INJECTION, SOLUTION EPIDURAL; INFILTRATION; INTRACAUDAL AS NEEDED
Status: DISCONTINUED | OUTPATIENT
Start: 2021-10-01 | End: 2021-10-01 | Stop reason: HOSPADM

## 2021-10-01 RX ORDER — HYDROMORPHONE HYDROCHLORIDE 1 MG/ML
0.5 INJECTION, SOLUTION INTRAMUSCULAR; INTRAVENOUS; SUBCUTANEOUS
Status: DISCONTINUED | OUTPATIENT
Start: 2021-10-01 | End: 2021-10-01 | Stop reason: HOSPADM

## 2021-10-01 RX ORDER — IPRATROPIUM BROMIDE AND ALBUTEROL SULFATE 2.5; .5 MG/3ML; MG/3ML
3 SOLUTION RESPIRATORY (INHALATION) ONCE AS NEEDED
Status: DISCONTINUED | OUTPATIENT
Start: 2021-10-01 | End: 2021-10-01 | Stop reason: HOSPADM

## 2021-10-01 RX ADMIN — LIDOCAINE HYDROCHLORIDE 100 MG: 20 INJECTION, SOLUTION INFILTRATION; PERINEURAL at 08:03

## 2021-10-01 RX ADMIN — LIDOCAINE HYDROCHLORIDE 0.5 ML: 10 INJECTION, SOLUTION EPIDURAL; INFILTRATION; INTRACAUDAL; PERINEURAL at 07:00

## 2021-10-01 RX ADMIN — SODIUM CHLORIDE, POTASSIUM CHLORIDE, SODIUM LACTATE AND CALCIUM CHLORIDE 9 ML/HR: 600; 310; 30; 20 INJECTION, SOLUTION INTRAVENOUS at 07:00

## 2021-10-01 RX ADMIN — PHENYLEPHRINE HYDROCHLORIDE 100 MCG: 10 INJECTION, SOLUTION INTRAVENOUS at 08:26

## 2021-10-01 RX ADMIN — EPHEDRINE SULFATE 10 MG: 50 INJECTION INTRAVENOUS at 08:26

## 2021-10-01 RX ADMIN — PHENYLEPHRINE HYDROCHLORIDE 100 MCG: 10 INJECTION, SOLUTION INTRAVENOUS at 08:16

## 2021-10-01 RX ADMIN — ONDANSETRON 4 MG: 2 INJECTION INTRAMUSCULAR; INTRAVENOUS at 08:53

## 2021-10-01 RX ADMIN — SODIUM CHLORIDE, POTASSIUM CHLORIDE, SODIUM LACTATE AND CALCIUM CHLORIDE: 600; 310; 30; 20 INJECTION, SOLUTION INTRAVENOUS at 08:51

## 2021-10-01 RX ADMIN — PROPOFOL 150 MG: 10 INJECTION, EMULSION INTRAVENOUS at 08:03

## 2021-10-01 RX ADMIN — MIDAZOLAM 1 MG: 1 INJECTION INTRAMUSCULAR; INTRAVENOUS at 07:52

## 2021-10-01 RX ADMIN — CEFAZOLIN SODIUM 2 G: 2 INJECTION, SOLUTION INTRAVENOUS at 08:06

## 2021-10-01 RX ADMIN — PHENYLEPHRINE HYDROCHLORIDE 100 MCG: 10 INJECTION, SOLUTION INTRAVENOUS at 08:51

## 2021-10-01 NOTE — OP NOTE
Operative Report    Patient Name:  Laxmi Marcus  YOB: 1960    Date of Surgery:  10/1/2021    Pre-op Diagnosis:   Malignant neoplasm of upper-outer quadrant of left breast in female, estrogen receptor negative (CMS/HCC) [C50.412, Z17.1]       Post-Op Diagnosis:  Malignant neoplasm of upper-outer quadrant of left breast in female, estrogen receptor negative (HCC) [C50.412, Z17.1]    Procedure:  INSERTION VENOUS ACCESS DEVICE      Staff:  Surgeon(s):  Indigo Red MD      Anesthesia: General    Estimated Blood Loss: 5 mL    Implants:    Implant Name Type Inv. Item Serial No.  Lot No. LRB No. Used Action   KT PORT CLEARVUE POWERPORT ISP W/8F POLY CATH - [id number] Implant KT PORT CLEARVUE POWERPORT ISP W/8F POLY CATH  [employer] [id number] Right 1 Implanted       Specimen:          None        Findings: Successful right IJ port placement with tip at the cavoatrial junction.     Complications: None     Indications: The patient is a 61 y.o. F with a new diagnosis of left breast cancer: High grade, triple negative, invasive ductal carcinoma; clinical T2N0, anatomic stage IIA, prognostic stage IIB. Her treatment will include neoadjuvant chemotherapy. She presents today for port placement. The risks and benefits were discussed preoperatively and she understood and agreed to proceed.        Description of Procedure:  The patient was identified in the preoperative area and brought to the operating room and placed supine on the table. Patient verification was performed and anesthesia was induced. The patient was positioned with a shoulder roll and both arms tucked. The anterior chest and neck were prepped and draped in standard sterile fashion. A time out was performed and all were in agreement. I confirmed that the patient had received preoperative antibiotics.     The patient was placed in Trendelenburg position and I used ultrasound to identify the right internal jugular  vein. The skin above this area was infiltrated with local anesthesia. A small stab incision was made at the site of needle entry. The right internal jugular vein was accessed with the needle on the first stick. The guidewire was advanced using a Seldinger technique with no resistance. I checked wire placement with ultrasound and visualized the wire within the vein. Wire location was then verified using fluoroscopy. A transverse incision was made on the right chest two finger breadths below the clavicle and an anterior chest wall pocket was created. The port was placed inside with a snug fit and two 2-0 prolene stitches were placed from the chest wall to each side of the port. These were tagged with hemostats. The catheter was tunneled to the area of wire entry in the neck. The 2-0 prolene was then tied.     Fluoroscopy was used to verify wire placement and to tailor the length of the catheter. The patient was again placed in Trendelenburg position and I advanced the dilator and introducer over the guidewire under fluoroscopic guidance. The dilator and the wire were removed and the catheter was placed into the introducer, which was then peeled away. Fluoroscopy demonstrated good placement of the port with no kinks in the catheter. I accessed the port and it had excellent backflow and flushed easily. The chest incision was closed with interrupted 3-0 Vicryl deep dermal sutures and a running subcuticular 4-0 Monocryl suture. The neck incision was closed with a single interrupted 3-0 Vicryl deep dermal suture. Dermabond was applied to both wounds. Counts were correct at the end of the case. The patient was awakened from anesthesia and taken to the PACU in stable condition.    Indigo Red MD     Date: 10/1/2021  Time: 09:05 EDT

## 2021-10-01 NOTE — H&P
[facility]      Referring Provider: Will Johnston II, MD     Chief complaint: Newly diagnosed breast cancer     HPI:   8/20/21:  Ms. Laxmi Marcus is a 60 yo woman, seen at the request of Dr. Will Johnston, for a new diagnosis of left breast cancer. She has a past history of left breast cancer in 2006, pT1N1, intermediate grade, invasive ductal carcinoma, ER/TX positive, HER-2 negative. She underwent left lumpectomy & SLNB with completion axillary dissection, followed by reexcision for close margins. This was followed by AC x4 and 4 cycles of Taxol, then radiation. She was on tamoxifen for 2-1/2 years, then says she was switched to a different medication for a total of 5 years of endocrine therapy. Currently, I only have access to one note from the [facility] group in 2008. I have tried to get copies of the operative report and pathology report from [place], however these records are no longer held. The patient says that she thinks she had 9 lymph nodes removed.       She reports that she had a lot of issues with chemo, including severe bone pain requiring Dilaudid for months. Shortly after finishing all of her breast treatment, she had severe pancreatitis and shortly after that she became a brittle diabetic. She has a past history of CAD sp CABGx4 in 2017. Unfortunately it sounds like she had some sort of immediate stenosis, because she had a heart attack in early 2019 requiring stent placement x4. She is followed by cardiology and on Brilinta.     In May of this year, she noticed a painful left breast lump near the prior lumpectomy scar. She does not think it has changed at all since she first noticed. Her work-up is detailed in the oncologic history below. She denies any family history of breast or ovarian cancer.     10/1/21, Interval History:  Port placement was initially delayed because she was positive for Covid. She has already had 2 cycles of chemotherapy via a peripheral IV. She presents  today for surgery. She denies any new complaints.             Oncology/Hematology History Overview Note    06/05/2017, Bilateral Diagnostic MMG with Jaswant & Bilateral Breast US ([facility]):  MMG:  Scattered areas pf fibroglandular density.   1. There is a stable post-surgical scar with associated trabecular thickening seen in the upper outer region of the left breast. Post-surgical scar is in an area of prior lumpectomy. Findings are consistent with post-surgical and post-radiation therapy changes.  2. The patient indicates recent onset of pain in the lateral and axillary tail region of the left breast. The symptomatic region is clearly marked and there is no suspicious finding in the region of clinical concern.  3. There is a stable small intramammary lymph node measuring 7 millimeters seen in the posterior one-third 1:30 o'clock region of the left breast located 8 centimeters from the nipple. There has been no significant change from the prior exam(s).  6. There is a stable area of asymmetric breast tissue seen in the superior region of the right breast.  US:  1. There is no evidence of any solid mass or abnormal cystic elements.  2. There is no evidence of any solid mass or abnormal cystic elements.  3. Ultrasound is suggestive of an oval small intramammary lymph node measuring 7 millimeters.  4. There is a small lymph node measuring 6 millimeters in the left axilla.  5. There is an oval small simple cyst with circumscribed margins measuring 4 millimeters seen in the right breast at 3 o'clock.  6. There is no evidence of any solid mass or abnormal cystic elements.  BI-RADS 2: Benign.     05/2021: Pt noticed a right breast lump.      Malignant neoplasm of upper-outer quadrant of left breast in female, estrogen receptor negative (CMS/HCC)    7/26/2021 Initial Diagnosis      Malignant neoplasm of upper-outer quadrant of left breast in female, estrogen receptor negative (CMS/HCC)       7/27/2021 Imaging      Bilateral  Diagnostic MMG with Jaswant & Left Breast US ([facility]):  MMG:  Scattered areas of fibroglandular density.  1. There is a new oval mass measuring 20 mm with indistinct margins seen in the 1:30 o'clock region of the left breast located 4 centimeters from the nipple. This correlates to the palpable mass.   2. There is a stable post-surgical scar seen in the posterior one-third region of the left breast at 2 o'clock. Post-surgical scar is in an area of prior lumpectomy. Findings are consistent with post-surgical and post-radiation therapy changes.   In the right breast, no suspicious masses, significant calcifications or other abnormalities are seen.  US:  1. Ultrasound demonstrates a new oval parallel solid mass with poorly defined margins measuring 16 x 14 x 15 mm seen in the 1:30 o'clock region of the left breast located 4 centimeters from the nipple. This correlates with the mammographic finding.  3. There is an oval lymph node measuring 9 x 6 x 8 mm in the left axilla. This has an abnormal appearance with little hilum.  BI-RADS 4B: Suspicious.       8/6/2021 Biopsy      Left Breast & Axilla, US-Guided Biopsies ([facility]):     1. Breast, Left 1:30 O'clock, Core Needle Biopsy:  Invasive mammary carcinoma, no special type (invasive ductal carcinoma), combined histologic grade 3 (tubule formation 3, nuclear pleomorphism 3, mitotic activity 2), 12 mm in greatest dimension, present in 5 of 15 cores.   Focal ductal carcinoma in situ (DCIS), high nuclear grade, solid pattern, with associated focal necrosis 1.5 mm in greatest dimension, present in 3 of 15 cores.   Microcalcifications associated with invasive carcinoma, DCIS, and benign mammary ducts/lobules.  -[place] clip.      ER negative (0%)  AZ negative (0%)  Her2 negative (IHC 1+)  Ki-67 94.88%     2. Lymph Node, Left Axilla, Core Needle Biopsy:  Benign lymph node tissue with reactive follicular hyperplasia.   No definitive evidence of carcinoma (AE1/AE3).   No definitive  evidence of lymphoma (CD3, CD20, CD21, and CD30).  -Hydromark clip. Concordant.       8/18/2021 Imaging      Bilateral Breast MRI ([facility]):  In the posterior one-third lateral aspect of the left breast centered at the 3-o'clock position on the order of 6.5 cm from the nipple there is a focal signal void that is seen within the superior aspect of an irregular heterogenous masslike region that measures on the order of 2.4 cm in the superior inferior dimension, 2.5 cm in the anterior posterior dimension and 2.7 cm in the medial lateral dimension. Some of the internal character of the lesion demonstrates absence of enhancement. This represents the biopsy-proven site of malignancy with the mini  cork-shaped metallic clip in the superomedial portion of the lesion. Some areas of increased T1-weighted signal intensity are noted in the region and are consistent with areas of postbiopsy related hemorrhage.   There is postsurgical change seen in the left breast in this region associated with prior breast conservation therapy. No other areas of abnormal enhancement or morphology are seen within the left breast. I see no evidence for abnormal left breast skin, nipple or chest wall enhancement and there is no evidence for left axillary adenopathy. The patient is status post median sternotomy which limits the evaluation of the internal mammary chain for internal mammary adenopathy. Mild diffuse left breast skin thickening associated with prior breast conservation therapy is noted, but there is no abnormal enhancement of the skin.    There are no areas of abnormal enhancement or morphology in the right breast. Scattered stippled foci of variable enhancement are seen throughout the right breast, none of which appear dominant or clumped or associated with any suspicious mammographic features. I see no evidence for abnormal right breast skin, nipple or chest wall enhancement and there is no evidence for right axillary or internal  mammary chain adenopathy. Visualization of the right internal mammary chain is not prohibited by the artifact from the patient's median sternotomy wires.  BI-RADS 6: Known malignancy.             Review of Systems   See interval history.        Medications:  See chart.               Allergies   Allergen Reactions   • Allopurinol Other (See Comments)       Mouth ulcers   • Dulaglutide Nausea And Vomiting   • Sulfa Antibiotics Other (See Comments)       Abdominal Pain and Nausea  Abdominal Pain and Nausea  Abdominal Pain and Nausea   • Latex Itching         Medical History        Past Medical History:   Diagnosis Date   • Breast cancer (CMS/HCC)     • Coronary artery disease     • Diabetes mellitus type 2 with complications, uncontrolled (CMS/HCC)     • Hyperlipidemia     • Obesity     • Vitamin B12 deficiency              Surgical History         Past Surgical History:   Procedure Laterality Date   • APPENDECTOMY       • BREAST LUMPECTOMY       • BREAST LUMPECTOMY WITH SENTINEL NODE BIOPSY AND AXILLARY NODE DISSECTION       • CHOLECYSTECTOMY       • CORONARY ARTERY BYPASS GRAFT       • DENTAL PROCEDURE       • HYSTERECTOMY                      Family History   Problem Relation Age of Onset   • Heart disease Mother     • Heart disease Brother     • Heart disease Maternal Uncle     • Prostate cancer Maternal Uncle           Social History   Social History            Socioeconomic History   • Marital status:        Spouse name: Noel   • Number of children: 3   • Years of education: Not on file   • Highest education level: Not on file   Tobacco Use   • Smoking status: Never Smoker   • Smokeless tobacco: Never Used   Substance and Sexual Activity   • Alcohol use: No         Patient drinks 1 servings of caffeine per day.        GYNECOLOGIC HISTORY:   G: 3. P: 3. AB: 0.  Last menstrual period: , sp hysterectomy  Age at menarche: 9  Age at first childbirth: 17  Lactation/How lon months  Age at menopause: 49              Total years of oral contraceptive use: 25-30 years  Total years of hormone replacement therapy: 0        PHYSICAL EXAMINATION:   /64 (BP Location: Right arm, Patient Position: Lying)   Pulse 76   Temp 98.2 °F (36.8 °C) (Oral)   Resp 20   SpO2 98%   ECOG 0 - Asymptomatic  General: NAD, well appearing  Psych: a&o x 3, normal mood and affect  Eyes: EOMI, no scleral icterus  ENMT: neck supple without masses or thyromegaly, mucus membranes moist  Resp: normal effort, CTAB  CV: RRR, no murmurs, no edema  GI: soft, NT, ND  MSK: normal gait, normal ROM in bilateral shoulders  Lymph nodes: Left axillary scar; no cervical, supraclavicular or axillary lymphadenopathy  Breast: moderate size, grade 3 ptosis, sternal scar  Right: No visible abnormalities on inspection while seated, with arms raised or hands on hips. No masses, skin changes, or nipple abnormalities.  Left: On inspection the left breast is smaller and uplifted versus the right. There is an upper outer quadrant horizontal scar with scar retraction. At 2:00, 5 cm FN, there is a firm 3 x 2.5 cm mass. This is located just lateral/inferior to the scar. No nipple abnormalities       I have independently reviewed her imaging and here are my findings:   In the left upper outer breast, there is an irregular mass which measures 2.7 cm maximally on MRI.        Assessment:  61 y.o. F with a new diagnosis of left breast cancer: High grade, triple negative, invasive ductal carcinoma; clinical T2N0, anatomic stage IIA, prognostic stage IIB.    -She has a past history of left breast cancer in 2006, pT1N1, intermediate grade, invasive ductal carcinoma, ER/NE positive, HER-2 negative. She underwent left lumpectomy & SLNB with completion axillary dissection, followed by reexcision for close margins. This was followed by AC x4 and 4 cycles of Taxol, then radiation and endocrine therapy.      Plan:  -Port placement.     Indigo Red MD

## 2021-10-01 NOTE — ANESTHESIA POSTPROCEDURE EVALUATION
Patient: Laxmi Marcus    Procedure Summary     Date: 10/01/21 Room / Location:  [facility]  [facility]    Anesthesia Start: 0758 Anesthesia Stop: 0907    Procedure: INSERTION VENOUS ACCESS DEVICE (Right ) Diagnosis:       Malignant neoplasm of upper-outer quadrant of left breast in female, estrogen receptor negative (HCC)      (Malignant neoplasm of upper-outer quadrant of left breast in female, estrogen receptor negative (CMS/HCC) [C50.412, Z17.1])    Surgeons: Indigo Red MD Provider: Josué Ashton MD    Anesthesia Type: general ASA Status: 3          Anesthesia Type: general    Vitals  Vitals Value Taken Time   /64 10/01/21 0931   Temp 36.4 °C (97.5 °F) 10/01/21 0930   Pulse 76 10/01/21 0934   Resp 16 10/01/21 0930   SpO2 100 % 10/01/21 0935   Vitals shown include unvalidated device data.        Post Anesthesia Care and Evaluation    Patient location during evaluation: bedside  Patient participation: complete - patient participated  Level of consciousness: awake and alert  Pain score: 0  Pain management: adequate  Airway patency: patent  Anesthetic complications: No anesthetic complications  PONV Status: none  Cardiovascular status: acceptable and hemodynamically stable  Respiratory status: acceptable and spontaneous ventilation  Hydration status: acceptable    Comments: /77   Pulse 73   Temp 36.4 °C (97.5 °F) (Temporal)   Resp 16   SpO2 99%

## 2021-10-01 NOTE — ANESTHESIA PREPROCEDURE EVALUATION
Anesthesia Evaluation     Patient summary reviewed and Nursing notes reviewed   NPO Solid Status: > 8 hours  NPO Liquid Status: > 2 hours           Airway   Mallampati: II  TM distance: >3 FB  Neck ROM: full  No difficulty expected  Dental - normal exam     Pulmonary - negative pulmonary ROS and normal exam   Cardiovascular - normal exam  Exercise tolerance: good (4-7 METS)    ECG reviewed  PT is on anticoagulation therapy  Patient on routine beta blocker and Beta blocker given within 24 hours of surgery    (+) hypertension, valvular problems/murmurs, past MI  >12 months, CAD, CABG >6 Months, cardiac stents Drug eluting stent more than 12 months ago hyperlipidemia,       Neuro/Psych- negative ROS  GI/Hepatic/Renal/Endo    (+)   renal disease CRI, diabetes mellitus type 2,     Musculoskeletal (-) negative ROS    Abdominal    Substance History - negative use     OB/GYN          Other - negative ROS                       Anesthesia Plan    ASA 3     general     intravenous induction     Anesthetic plan, all risks, benefits, and alternatives have been provided, discussed and informed consent has been obtained with: patient.    Plan discussed with CRNA.

## 2021-10-01 NOTE — ANESTHESIA PROCEDURE NOTES
Airway  Urgency: elective    Date/Time: 10/1/2021 8:04 AM  Airway not difficult    General Information and Staff    Patient location during procedure: OR  Anesthesiologist: Josué Ashton MD  CRNA: Ashlyn Nash CRNA    Indications and Patient Condition  Indications for airway management: airway protection    Preoxygenated: yes  MILS maintained throughout  Mask difficulty assessment: 0 - not attempted    Final Airway Details  Final airway type: supraglottic airway      Successful airway: classic  Size 4    Number of attempts at approach: 1  Assessment: lips, teeth, and gum same as pre-op and atraumatic intubation

## 2021-10-07 ENCOUNTER — TELEPHONE (OUTPATIENT)
Dept: SURGERY | Facility: CLINIC | Age: 61
End: 2021-10-07

## 2021-10-07 NOTE — TELEPHONE ENCOUNTER
Mid-NACT appointment with Dr. Red is scheduled on 10/29/2021 @ 10:00am.    Called and left message with patient about appointment time, patient to call back and confirm.    Sent patient a reminder letter in the mail.

## 2021-10-14 ENCOUNTER — MDT ASSESSMENT (OUTPATIENT)
Dept: OTHER | Facility: HOSPITAL | Age: 61
End: 2021-10-14

## 2021-10-14 NOTE — PROGRESS NOTES
MULTIDISCIPLINARY TREATMENT PLANNING CONFERENCE  DATE: 10/15/2021       SPECIALTY: Breast Conference    PRESENTER: Agnieszka Beal MD    SITE: Left Breast        Please discuss clinical/working stage, TNM, Stage Group, National Treatment Guidelines, and Prognostic Indicators.       CONFERENCE SUMMARY:  Second breast cancer-currently triple negative with poor cardiac function and insulin-dependent diabetes cT2N0?  Triple negative recommend neoadjuvant carbo Taxol and Keytruda                          AJCC STAGE: T2N1M0        REFERRAL SUPPORT   Psychosocial Assessment:  []                Clinical Trials:  []                Genetic Testing:  []                Geriatric Assessment:  []                Smoking Cessation:  []                Nutrition Assessment:  []                Social Work Evaluation/Barriers to Care:  []                Behavioral Oncology Evaluation:  []                Palliative Care:  []        EVIDENCE BASED NATIONAL TREATMENT GUIDELINES:  []                   SOCIAL HISTORY:   reports that she has never smoked. She has never used smokeless tobacco. She reports previous alcohol use. She reports that she does not use drugs.                  PAST MEDICAL HISTORY:   has a past medical history of Breast cancer (HCC) (2021), CKD (chronic kidney disease), Coronary artery disease, Diabetes mellitus type 2 with complications, uncontrolled (HCC), Frequent UTI, Gout, Heart murmur, History of cancer chemotherapy, History of pneumonia, History of radiation therapy, Hyperlipidemia, Hypertension, NSTEMI (non-ST elevated myocardial infarction) (HCC) (2019), Obesity, and Vitamin B12 deficiency.                  PAST SURGICAL HISTORY:  @                 IMAGING:  MRI Breast Bilateral With & Without Contrast    Result Date: 8/18/2021  1. Biopsy-proven malignancy in the posterior one-third of the left breast at the 3-o'clock position measuring on the order of 2.7 cm in greatest dimension. The mini cork-shaped metallic  clip is within the superomedial aspect of the lesion. No other suspicious findings are seen within left breast and there is no evidence for left axillary adenopathy. Evaluation for left internal mammary chain adenopathy is limited due to the presence of median sternotomy wires. 2. There are no findings suspicious for malignancy in the right breast.  BI-RADS Category 6: Known biopsy-proven malignancy.  This report was finalized on 8/18/2021 5:33 PM by Dr. Jon Granados M.D.                      SURGICAL PROCEDURE / PATHOLOGY:     8/6/2021: [id number] (OPUS)                         Labs & Biomarkers:      8/6/2021:           8/20/2021: [facility]: Negative, 47 genes.

## 2021-10-29 ENCOUNTER — OFFICE VISIT (OUTPATIENT)
Dept: SURGERY | Facility: CLINIC | Age: 61
End: 2021-10-29

## 2021-10-29 VITALS
SYSTOLIC BLOOD PRESSURE: 109 MMHG | DIASTOLIC BLOOD PRESSURE: 68 MMHG | HEIGHT: 64 IN | RESPIRATION RATE: 18 BRPM | HEART RATE: 83 BPM | WEIGHT: 155 LBS | BODY MASS INDEX: 26.46 KG/M2 | OXYGEN SATURATION: 98 %

## 2021-10-29 DIAGNOSIS — C50.412 MALIGNANT NEOPLASM OF UPPER-OUTER QUADRANT OF LEFT BREAST IN FEMALE, ESTROGEN RECEPTOR NEGATIVE (HCC): Primary | ICD-10-CM

## 2021-10-29 DIAGNOSIS — Z17.1 MALIGNANT NEOPLASM OF UPPER-OUTER QUADRANT OF LEFT BREAST IN FEMALE, ESTROGEN RECEPTOR NEGATIVE (HCC): Primary | ICD-10-CM

## 2021-10-29 PROCEDURE — 76642 ULTRASOUND BREAST LIMITED: CPT | Performed by: SURGERY

## 2021-10-29 PROCEDURE — 99215 OFFICE O/P EST HI 40 MIN: CPT | Performed by: SURGERY

## 2021-10-29 NOTE — PROGRESS NOTES
[facility]     Referring Provider: Will Johnston II, MD     Chief complaint: F/u Mid-NACT      HPI:   8/20/21:  Ms. Laxmi Marcus is a 60 yo woman, seen at the request of Dr. Will Johnston, for a new diagnosis of left breast cancer. She has a past history of left breast cancer in 2006, pT1N1, intermediate grade, invasive ductal carcinoma, ER/OH positive, HER-2 negative. She underwent left lumpectomy & SLNB with completion axillary dissection, followed by reexcision for close margins. This was followed by AC x4 and 4 cycles of Taxol, then radiation. She was on tamoxifen for 2-1/2 years, then says she was switched to a different medication for a total of 5 years of endocrine therapy. Currently, I only have access to one note from the [facility] group in 2008. I have tried to get copies of the operative report and pathology report from [facility], however these records are no longer held. The patient says that she thinks she had 9 lymph nodes removed.       She reports that she had a lot of issues with chemo, including severe bone pain requiring Dilaudid for months. Shortly after finishing all of her breast treatment, she had severe pancreatitis and shortly after that she became a brittle diabetic. She has a past history of CAD sp CABGx4 in 2017. Unfortunately it sounds like she had some sort of immediate stenosis, because she had a heart attack in early 2019 requiring stent placement x4. She is followed by cardiology and on Brilinta.     In May of this year, she noticed a painful left breast lump near the prior lumpectomy scar. She does not think it has changed at all since she first noticed. Her work-up is detailed in the oncologic history below. She denies any family history of breast or ovarian cancer.      10/29/21, Interval History:  After her last visit, she underwent a PET scan which was negative for distant disease and her genetic testing was negative for mutation. She started neoadjuvant therapy  with carbo/Taxol/Keytruda on 9/7/21. Her second dose of Taxol was temporarily held because she developed Covid, however she received her seventh dose yesterday. She reports that the mass has gotten much smaller and it is now difficult for her to find.       Oncology/Hematology History Overview Note   06/05/2017, Bilateral Diagnostic MMG with [facility] & Bilateral Breast US ([facility]):  MMG:  Scattered areas pf fibroglandular density.   1. There is a stable post-surgical scar with associated trabecular thickening seen in the upper outer region of the left breast. Post-surgical scar is in an area of prior lumpectomy. Findings are consistent with post-surgical and post-radiation therapy changes.  2. The patient indicates recent onset of pain in the lateral and axillary tail region of the left breast. The symptomatic region is clearly marked and there is no suspicious finding in the region of clinical concern.  3. There is a stable small intramammary lymph node measuring 7 millimeters seen in the posterior one-third 1:30 o'clock region of the left breast located 8 centimeters from the nipple. There has been no significant change from the prior exam(s).  6. There is a stable area of asymmetric breast tissue seen in the superior region of the right breast.  US:  1. There is no evidence of any solid mass or abnormal cystic elements.  2. There is no evidence of any solid mass or abnormal cystic elements.  3. Ultrasound is suggestive of an oval small intramammary lymph node measuring 7 millimeters.  4. There is a small lymph node measuring 6 millimeters in the left axilla.  5. There is an oval small simple cyst with circumscribed margins measuring 4 millimeters seen in the right breast at 3 o'clock.  6. There is no evidence of any solid mass or abnormal cystic elements.  BI-RADS 2: Benign.    05/2021: Pt noticed a right breast lump.     Malignant neoplasm of upper-outer quadrant of left breast in female, estrogen receptor negative  (HCC)   7/26/2021 Initial Diagnosis    Malignant neoplasm of upper-outer quadrant of left breast in female, estrogen receptor negative (CMS/HCC)     7/27/2021 Imaging    Bilateral Diagnostic MMG with [facility] & Left Breast US ([facility]):  MMG:  Scattered areas of fibroglandular density.  1. There is a new oval mass measuring 20 mm with indistinct margins seen in the 1:30 o'clock region of the left breast located 4 centimeters from the nipple. This correlates to the palpable mass.   2. There is a stable post-surgical scar seen in the posterior one-third region of the left breast at 2 o'clock. Post-surgical scar is in an area of prior lumpectomy. Findings are consistent with post-surgical and post-radiation therapy changes.   In the right breast, no suspicious masses, significant calcifications or other abnormalities are seen.  US:  1. Ultrasound demonstrates a new oval parallel solid mass with poorly defined margins measuring 16 x 14 x 15 mm seen in the 1:30 o'clock region of the left breast located 4 centimeters from the nipple. This correlates with the mammographic finding.  3. There is an oval lymph node measuring 9 x 6 x 8 mm in the left axilla. This has an abnormal appearance with little hilum.  BI-RADS 4B: Suspicious.     8/6/2021 Biopsy    Left Breast & Axilla, US-Guided Biopsies ([facility]):    1. Breast, Left 1:30 O'clock, Core Needle Biopsy:  Invasive mammary carcinoma, no special type (invasive ductal carcinoma), combined histologic grade 3 (tubule formation 3, nuclear pleomorphism 3, mitotic activity 2), 12 mm in greatest dimension, present in 5 of 15 cores.   Focal ductal carcinoma in situ (DCIS), high nuclear grade, solid pattern, with associated focal necrosis 1.5 mm in greatest dimension, present in 3 of 15 cores.   Microcalcifications associated with invasive carcinoma, DCIS, and benign mammary ducts/lobules.  -Minicork clip.     ER negative (0%)  GA negative (0%)  Her2 negative (IHC 1+)  Ki-67 94.88%    2. Lymph  Node, Left Axilla, Core Needle Biopsy:  Benign lymph node tissue with reactive follicular hyperplasia.   No definitive evidence of carcinoma (AE1/AE3).   No definitive evidence of lymphoma (CD3, CD20, CD21, and CD30).  -Hydromark clip. Concordant.     8/18/2021 Imaging    Bilateral Breast MRI ([facility]):  In the posterior one-third lateral aspect of the left breast centered at the 3-o'clock position on the order of 6.5 cm from the nipple there is a focal signal void that is seen within the superior aspect of an irregular heterogenous masslike region that measures on the order of 2.4 cm in the superior inferior dimension, 2.5 cm in the anterior posterior dimension and 2.7 cm in the medial lateral dimension. Some of the internal character of the lesion demonstrates absence of enhancement. This represents the biopsy-proven site of malignancy with the mini  cork-shaped metallic clip in the superomedial portion of the lesion. Some areas of increased T1-weighted signal intensity are noted in the region and are consistent with areas of postbiopsy related hemorrhage.   There is postsurgical change seen in the left breast in this region associated with prior breast conservation therapy. No other areas of abnormal enhancement or morphology are seen within the left breast. I see no evidence for abnormal left breast skin, nipple or chest wall enhancement and there is no evidence for left axillary adenopathy. The patient is status post median sternotomy which limits the evaluation of the internal mammary chain for internal mammary adenopathy. Mild diffuse left breast skin thickening associated with prior breast conservation therapy is noted, but there is no abnormal enhancement of the skin.    There are no areas of abnormal enhancement or morphology in the right breast. Scattered stippled foci of variable enhancement are seen throughout the right breast, none of which appear dominant or clumped or associated with any suspicious  mammographic features. I see no evidence for abnormal right breast skin, nipple or chest wall enhancement and there is no evidence for right axillary or internal mammary chain adenopathy. Visualization of the right internal mammary chain is not prohibited by the artifact from the patient's median sternotomy wires.  BI-RADS 6: Known malignancy.     8/20/2021 Genetic Testing    [facility] Common Hereditary Cancers Panel (47 genes):    Negative     9/3/2021 Imaging    PET CT ([place]):  1. FDG avid left upper outer breast mass compatible with primary breast malignancy.  2. No evidence of metastatic disease.  3. Subcutaneous infiltrative changes in the ventral lower abdomen with low-level FDG uptake which may represent injection sites, panniculitis, or prior port sites.  4. Air-fluid level within the urinary bladder which may represent cystitis in the absence of recent catheterization.     9/13/2021 - 9/13/2021 Chemotherapy    OP BREAST Pembrolizumab 200 mg / PACLItaxel / CARBOplatin AUC=5         Review of Systems:  See interval history.       Medications:    Current Outpatient Medications:   •  ACCU-CHEK FASTCLIX LANCETS [facility] [place], TO CHECK 5 TIMES DAILY, Disp: 500 each, Rfl: 2  •  ACCU-CHEK GUIDE test strip, 1 each by Other route 5 (Five) Times a Day. Use as instructed, Disp: 450 each, Rfl: 1  •  acetaminophen (TYLENOL) 500 MG tablet, Take 1,000 mg by mouth Every 6 (Six) Hours As Needed for Mild Pain ., Disp: , Rfl:   •  Ascorbic Acid (VITAMIN C PO), Take 1 tablet by mouth Daily., Disp: , Rfl:   •  aspirin 81 MG tablet, Take 81 mg by mouth Daily., Disp: , Rfl:   •  Blood Glucose Monitoring Suppl (ACCU-CHEK GUIDE) w/Device kit, 1 each 5 (Five) Times a Day., Disp: 1 kit, Rfl: 1  •  cetirizine (ZyrTEC) 10 MG tablet, Take 10 mg by mouth daily., Disp: , Rfl:   •  cholecalciferol (Cholecalciferol) 25 MCG (1000 UT) tablet, Take 1,000 Units by mouth Daily., Disp: , Rfl:   •  glucose blood (ACCU-CHEK SMARTVIEW) test strip, Use as  instructed 5 TIMES DAILY, Disp: 450 each, Rfl: 2  •  Insulin Aspart, w/Niacinamide, (FIASP FLEXTOUCH SC), Inject  under the skin into the appropriate area as directed. 15-25 units 4 times daily, Disp: , Rfl:   •  Insulin Degludec (Tresiba FlexTouch) 200 UNIT/ML solution pen-injector pen injection, INJECT 26 UNITS INTO THE SKIN EVERY MORNING FOR 90 DAYS., Disp: , Rfl:   •  Insulin Pen Needle 32G X 6 MM misc, INJECT 1 EACH INTO THE SKIN 4 (FOUR) TIMES DAILY., Disp: , Rfl:   •  lidocaine-prilocaine (EMLA) 2.5-2.5 % cream, Apply 1 application topically to the appropriate area as directed As Needed., Disp: , Rfl:   •  metoprolol succinate XL (TOPROL-XL) 50 MG 24 hr tablet, Take 50 mg by mouth Every Evening., Disp: , Rfl:   •  MULTIPLE VIT-MIN-CALCIUM-FA PO, Take 1 tablet by mouth Daily., Disp: , Rfl:   •  ondansetron (ZOFRAN) 8 MG tablet, Take 8 mg by mouth Every 8 (Eight) Hours As Needed for Nausea., Disp: , Rfl:   •  promethazine (PHENERGAN) 25 MG tablet, Take 12.5-25 mg by mouth Every 6 (Six) Hours As Needed for Nausea., Disp: , Rfl:   •  ranolazine (RANEXA) 500 MG 12 hr tablet, TAKE 1 TABLET BY MOUTH 2 (TWO) TIMES DAILY., Disp: , Rfl:   •  rosuvastatin (CRESTOR) 20 MG tablet, Take 20 mg by mouth Every Night., Disp: , Rfl:   •  ticagrelor (Brilinta) 60 MG tablet tablet, Take 60 mg by mouth 2 (Two) Times a Day., Disp: , Rfl:   •  zinc sulfate (ZINCATE) 220 (50 Zn) MG capsule, Take 220 mg by mouth Daily., Disp: , Rfl:       Allergies   Allergen Reactions   • Dulaglutide Nausea And Vomiting     Another name for trulicity   • Sulfa Antibiotics Other (See Comments)     Abdominal Pain and Nausea  Abdominal Pain and Nausea  Abdominal Pain and Nausea   • Uloric [Febuxostat] Other (See Comments)     Mouth sores   • Latex Itching       Family History   Problem Relation Age of Onset   • Heart disease Mother    • Coronary artery disease Mother    • Heart disease Brother    • Heart disease Maternal Uncle    • Prostate cancer  Maternal Uncle    • Heart disease Maternal Grandfather    • Malig Hyperthermia Neg Hx        PHYSICAL EXAMINATION:   Vitals:    10/29/21 1018   BP: 109/68   Pulse: 83   Resp: 18   SpO2: 98%     ECOG 0 - Asymptomatic  General: NAD, well appearing  Psych: a&o x 3, normal mood and affect  Eyes: EOMI, no scleral icterus  ENMT: neck supple without masses or thyromegaly, mucus membranes moist  MSK: normal gait, normal ROM in bilateral shoulders  Lymph nodes: Left axillary scar; no cervical, supraclavicular or axillary lymphadenopathy  Breast: moderate size, grade 3 ptosis, sternal scar  Right: No visible abnormalities on inspection while seated, with arms raised or hands on hips. No masses, skin changes, or nipple abnormalities.  Left: On inspection the left breast is smaller and uplifted versus the right. There is an upper outer quadrant horizontal scar with scar retraction. At 2:00, 5 cm FN, there is a firm 1.5 cm mass (previously 3 x 2.5 cm). This is located just lateral/inferior to the scar. No nipple abnormalities      Procedure: Diagnostic Ultrasound, Left Breast, Limited  Indication: Assess response mid-NACT  Comparisons: 8/20/21 in-office US  Description of procedure: Using a 10MHz linear transducer, I scanned the breast at the area of interest.  Findings: At 2:00, 5 cm FN, there is an irregular hypoechoic mass with biopsy clip visualized. This measures 15 x 10 x 15 mm (previously 18 x 15 x 24 mm on 8/20/21). In addition, the mass now has a more heterogeneous appearance and is less hypoechoic.  This is still located adjacent to the underlying pectoralis muscle, without a clearly defined plane. This the mass is now located about 5 mm deep to the skin (previously 2 mm).   Impression: Interval decrease in size of the known malignancy in the upper outer left breast. This correlates with clinical exam and is consistent with a partial radiologic response.  BI-RADS 6: Known malignancy       Assessment:  61 y.o. F with a  diagnosis of left breast cancer: High grade, triple negative, invasive ductal carcinoma; clinical T2N0, anatomic stage IIA, prognostic stage IIB. She is currently undergoing neoadjuvant chemotherapy with carbo/Taxol/Keytruda.  -She has a past history of left breast cancer in 2006, pT1N1, intermediate grade, invasive ductal carcinoma, ER/OK positive, HER-2 negative. She underwent left lumpectomy & SLNB with completion axillary dissection, followed by reexcision for close margins. This was followed by AC x4 and 4 cycles of Taxol, then radiation and endocrine therapy.     Discussion:  We again reviewed the plan for surgery, which will be a left modified radical mastectomy (given her history of prior lumpectomy/radiation and sentinel lymph node biopsy/axillary dissection). Because her genetic testing was negative, there is no indication for a contralateral risk reducing mastectomy. She is interested in reconstruction and I will refer her to plastic surgery to discuss what this would entail. Mastectomy will include removal of her nipple-areola-complex, and likely the prior lumpectomy scar in the upper outer quadrant. If her chemotherapy proceeds as scheduled, her last dose of Taxol should be on 12/2/21. We will tentatively plan for surgery about 4 weeks later.    Plan:  -Plastic surgery referral.    -Continue follow-up with Dr. Sandoval.  -Follow-up the week of 12/6/21 for preoperative planning. We will tentatively plan for surgery the week of 1/3/21, which will be a left modified radical mastectomy with immediate reconstruction.  -She will let me know if her chemotherapy schedule is adjusted so that I can plan accordingly.    Indigo Red MD    I spent 50 minutes caring for Laxmi on this date of service. This time includes time spent by me in the following activities: preparing for the visit, performing a medically appropriate examination and/or evaluation , counseling and educating the patient/family/caregiver,  referring and communicating with other health care professionals , documenting information in the medical record and independently interpreting results and communicating that information with the patient/family/caregiver.  I spent 5 minutes on the separately reported service of left breast ultrasound. This time is not included in the time used to support the E/M service also reported today.      CC:  MD Hollis Brennan II, MD Vipul Panchal, MD Blakely Kute, MD

## 2021-11-02 ENCOUNTER — TELEPHONE (OUTPATIENT)
Dept: SURGERY | Facility: CLINIC | Age: 61
End: 2021-11-02

## 2021-11-02 NOTE — TELEPHONE ENCOUNTER
Plastics appointment with 11/16/2021 @ 3:30pm.    F/u appointment with Dr. Red is scheduled on 12/10/2021 @ 10:30am.    Tentative surgery date is 1/4/2021.    Called and spoke to patient, patient expressed v/u of appointment times.    Sent patient a reminder letter in the mail.

## 2021-12-09 NOTE — PROGRESS NOTES
[facility]     Referring Provider: Will Johnston II, MD     Chief complaint: F/u Post-NACT      HPI:   8/20/21:  Ms. Laxmi Marcus is a 62 yo woman, seen at the request of Dr. Will Johnston, for a new diagnosis of left breast cancer. She has a past history of left breast cancer in 2006, pT1N1, intermediate grade, invasive ductal carcinoma, ER/IA positive, HER-2 negative. She underwent left lumpectomy & SLNB with completion axillary dissection, followed by reexcision for close margins. This was followed by AC x4 and 4 cycles of Taxol, then radiation. She was on tamoxifen for 2-1/2 years, then says she was switched to a different medication for a total of 5 years of endocrine therapy. Currently, I only have access to one note from the [facility] group in 2008. I have tried to get copies of the operative report and pathology report from [facility], however these records are no longer held. The patient says that she thinks she had 9 lymph nodes removed.       She reports that she had a lot of issues with chemo, including severe bone pain requiring Dilaudid for months. Shortly after finishing all of her breast treatment, she had severe pancreatitis and shortly after that she became a brittle diabetic. She has a past history of CAD sp CABGx4 in 2017. Unfortunately it sounds like she had some sort of immediate stenosis, because she had a heart attack in early 2019 requiring stent placement x4. She is followed by cardiology and on Brilinta.     In May of this year, she noticed a painful left breast lump near the prior lumpectomy scar. She does not think it has changed at all since she first noticed. Her work-up is detailed in the oncologic history below. She denies any family history of breast or ovarian cancer.      10/29/21:  After her last visit, she underwent a PET scan which was negative for distant disease and her genetic testing was negative for mutation. She started neoadjuvant therapy with  carbo/Taxol/Keytruda on 9/7/21. Her second dose of Taxol was temporarily held because she developed Covid, however she received her seventh dose yesterday. She reports that the mass has gotten much smaller and it is now difficult for her to find.    12/10/21, Interval History:  She completed her last dose of Taxol on 12/4/21. After her last visit, she saw Dr. Araya. Because of her diabetes and history of a CABG, he recommended delayed reconstruction and the patient is okay with this.       Oncology/Hematology History Overview Note   06/05/2017, Bilateral Diagnostic MMG with [facility] & Bilateral Breast US ([facility]):  MMG:  Scattered areas pf fibroglandular density.   1. There is a stable post-surgical scar with associated trabecular thickening seen in the upper outer region of the left breast. Post-surgical scar is in an area of prior lumpectomy. Findings are consistent with post-surgical and post-radiation therapy changes.  2. The patient indicates recent onset of pain in the lateral and axillary tail region of the left breast. The symptomatic region is clearly marked and there is no suspicious finding in the region of clinical concern.  3. There is a stable small intramammary lymph node measuring 7 millimeters seen in the posterior one-third 1:30 o'clock region of the left breast located 8 centimeters from the nipple. There has been no significant change from the prior exam(s).  6. There is a stable area of asymmetric breast tissue seen in the superior region of the right breast.  US:  1. There is no evidence of any solid mass or abnormal cystic elements.  2. There is no evidence of any solid mass or abnormal cystic elements.  3. Ultrasound is suggestive of an oval small intramammary lymph node measuring 7 millimeters.  4. There is a small lymph node measuring 6 millimeters in the left axilla.  5. There is an oval small simple cyst with circumscribed margins measuring 4 millimeters seen in the right breast at 3  o'clock.  6. There is no evidence of any solid mass or abnormal cystic elements.  BI-RADS 2: Benign.    05/2021: Pt noticed a right breast lump.     Malignant neoplasm of upper-outer quadrant of left breast in female, estrogen receptor negative (HCC)   7/26/2021 Initial Diagnosis    Malignant neoplasm of upper-outer quadrant of left breast in female, estrogen receptor negative (CMS/HCC)     7/27/2021 Imaging    Bilateral Diagnostic MMG with J[facility] & Left Breast US ([facility]):  MMG:  Scattered areas of fibroglandular density.  1. There is a new oval mass measuring 20 mm with indistinct margins seen in the 1:30 o'clock region of the left breast located 4 centimeters from the nipple. This correlates to the palpable mass.   2. There is a stable post-surgical scar seen in the posterior one-third region of the left breast at 2 o'clock. Post-surgical scar is in an area of prior lumpectomy. Findings are consistent with post-surgical and post-radiation therapy changes.   In the right breast, no suspicious masses, significant calcifications or other abnormalities are seen.  US:  1. Ultrasound demonstrates a new oval parallel solid mass with poorly defined margins measuring 16 x 14 x 15 mm seen in the 1:30 o'clock region of the left breast located 4 centimeters from the nipple. This correlates with the mammographic finding.  3. There is an oval lymph node measuring 9 x 6 x 8 mm in the left axilla. This has an abnormal appearance with little hilum.  BI-RADS 4B: Suspicious.     8/6/2021 Biopsy    Left Breast & Axilla, US-Guided Biopsies ([facility]):    1. Breast, Left 1:30 O'clock, Core Needle Biopsy:  Invasive mammary carcinoma, no special type (invasive ductal carcinoma), combined histologic grade 3 (tubule formation 3, nuclear pleomorphism 3, mitotic activity 2), 12 mm in greatest dimension, present in 5 of 15 cores.   Focal ductal carcinoma in situ (DCIS), high nuclear grade, solid pattern, with associated focal necrosis 1.5 mm in  greatest dimension, present in 3 of 15 cores.   Microcalcifications associated with invasive carcinoma, DCIS, and benign mammary ducts/lobules.  -[place] clip.     ER negative (0%)  CT negative (0%)  Her2 negative (IHC 1+)  Ki-67 94.88%    2. Lymph Node, Left Axilla, Core Needle Biopsy:  Benign lymph node tissue with reactive follicular hyperplasia.   No definitive evidence of carcinoma (AE1/AE3).   No definitive evidence of lymphoma (CD3, CD20, CD21, and CD30).  -Hydromark clip. Concordant.     8/18/2021 Imaging    Bilateral Breast MRI ([facility]):  In the posterior one-third lateral aspect of the left breast centered at the 3-o'clock position on the order of 6.5 cm from the nipple there is a focal signal void that is seen within the superior aspect of an irregular heterogenous masslike region that measures on the order of 2.4 cm in the superior inferior dimension, 2.5 cm in the anterior posterior dimension and 2.7 cm in the medial lateral dimension. Some of the internal character of the lesion demonstrates absence of enhancement. This represents the biopsy-proven site of malignancy with the mini  cork-shaped metallic clip in the superomedial portion of the lesion. Some areas of increased T1-weighted signal intensity are noted in the region and are consistent with areas of postbiopsy related hemorrhage.   There is postsurgical change seen in the left breast in this region associated with prior breast conservation therapy. No other areas of abnormal enhancement or morphology are seen within the left breast. I see no evidence for abnormal left breast skin, nipple or chest wall enhancement and there is no evidence for left axillary adenopathy. The patient is status post median sternotomy which limits the evaluation of the internal mammary chain for internal mammary adenopathy. Mild diffuse left breast skin thickening associated with prior breast conservation therapy is noted, but there is no abnormal enhancement of the  skin.    There are no areas of abnormal enhancement or morphology in the right breast. Scattered stippled foci of variable enhancement are seen throughout the right breast, none of which appear dominant or clumped or associated with any suspicious mammographic features. I see no evidence for abnormal right breast skin, nipple or chest wall enhancement and there is no evidence for right axillary or internal mammary chain adenopathy. Visualization of the right internal mammary chain is not prohibited by the artifact from the patient's median sternotomy wires.  BI-RADS 6: Known malignancy.     8/20/2021 Genetic Testing    [employer] Common Hereditary Cancers Panel (47 genes):    Negative     9/3/2021 Imaging    PET CT ([place]):  1. FDG avid left upper outer breast mass compatible with primary breast malignancy.  2. No evidence of metastatic disease.  3. Subcutaneous infiltrative changes in the ventral lower abdomen with low-level FDG uptake which may represent injection sites, panniculitis, or prior port sites.  4. Air-fluid level within the urinary bladder which may represent cystitis in the absence of recent catheterization.     9/13/2021 - 9/13/2021 Chemotherapy    OP BREAST Pembrolizumab 200 mg / PACLItaxel / CARBOplatin AUC=5         Review of Systems:  See interval history.       Medications:    Current Outpatient Medications:   •  ACCU-CHEK FASTCLIX LANCETS misc, TO CHECK 5 TIMES DAILY, Disp: 500 each, Rfl: 2  •  ACCU-CHEK GUIDE test strip, 1 each by Other route 5 (Five) Times a Day. Use as instructed, Disp: 450 each, Rfl: 1  •  acetaminophen (TYLENOL) 500 MG tablet, Take 1,000 mg by mouth Every 6 (Six) Hours As Needed for Mild Pain ., Disp: , Rfl:   •  Ascorbic Acid (VITAMIN C PO), Take 1 tablet by mouth Daily., Disp: , Rfl:   •  Blood Glucose Monitoring Suppl (ACCU-CHEK GUIDE) w/Device kit, 1 each 5 (Five) Times a Day., Disp: 1 kit, Rfl: 1  •  cetirizine (ZyrTEC) 10 MG tablet, Take 10 mg by mouth daily., Disp: ,  Rfl:   •  cholecalciferol (Cholecalciferol) 25 MCG (1000 UT) tablet, Take 1,000 Units by mouth Daily., Disp: , Rfl:   •  glucose blood (ACCU-CHEK SMARTVIEW) test strip, Use as instructed 5 TIMES DAILY, Disp: 450 each, Rfl: 2  •  Insulin Aspart, w/Niacinamide, (FIASP FLEXTOUCH SC), Inject  under the skin into the appropriate area as directed. 15-25 units 4 times daily, Disp: , Rfl:   •  Insulin Degludec (Tresiba FlexTouch) 200 UNIT/ML solution pen-injector pen injection, INJECT 26 UNITS INTO THE SKIN EVERY MORNING FOR 90 DAYS., Disp: , Rfl:   •  Insulin Pen Needle 32G X 6 MM misc, INJECT 1 EACH INTO THE SKIN 4 (FOUR) TIMES DAILY., Disp: , Rfl:   •  lidocaine-prilocaine (EMLA) 2.5-2.5 % cream, Apply 1 application topically to the appropriate area as directed As Needed., Disp: , Rfl:   •  MULTIPLE VIT-MIN-CALCIUM-FA PO, Take 1 tablet by mouth Daily., Disp: , Rfl:   •  ondansetron (ZOFRAN) 8 MG tablet, Take 8 mg by mouth Every 8 (Eight) Hours As Needed for Nausea., Disp: , Rfl:   •  promethazine (PHENERGAN) 25 MG tablet, Take 12.5-25 mg by mouth Every 6 (Six) Hours As Needed for Nausea., Disp: , Rfl:   •  ranolazine (RANEXA) 500 MG 12 hr tablet, TAKE 1 TABLET BY MOUTH 2 (TWO) TIMES DAILY., Disp: , Rfl:   •  rosuvastatin (CRESTOR) 20 MG tablet, Take 20 mg by mouth Every Night., Disp: , Rfl:   •  ticagrelor (Brilinta) 60 MG tablet tablet, Take 60 mg by mouth 2 (Two) Times a Day., Disp: , Rfl:   •  zinc sulfate (ZINCATE) 220 (50 Zn) MG capsule, Take 220 mg by mouth Daily., Disp: , Rfl:   •  aspirin 81 MG tablet, Take 81 mg by mouth Daily., Disp: , Rfl:   •  metoprolol succinate XL (TOPROL-XL) 50 MG 24 hr tablet, Take 50 mg by mouth Every Evening., Disp: , Rfl:       Allergies   Allergen Reactions   • Dulaglutide Nausea And Vomiting     Another name for trulicity   • Sulfa Antibiotics Other (See Comments)     Abdominal Pain and Nausea  Abdominal Pain and Nausea  Abdominal Pain and Nausea   • Uloric [Febuxostat] Other (See  Comments)     Mouth sores   • Latex Itching       Family History   Problem Relation Age of Onset   • Heart disease Mother    • Coronary artery disease Mother    • Heart disease Brother    • Heart disease Maternal Uncle    • Prostate cancer Maternal Uncle    • Heart disease Maternal Grandfather    • Malig Hyperthermia Neg Hx        PHYSICAL EXAMINATION:   Vitals:    12/10/21 1129   BP: 132/70   Pulse: 93   SpO2: 97%     ECOG 0 - Asymptomatic  General: NAD, well appearing  Psych: a&o x 3, normal mood and affect  Eyes: EOMI, no scleral icterus  ENMT: neck supple without masses or thyromegaly, mucus membranes moist  MSK: normal gait, normal ROM in bilateral shoulders  Lymph nodes: Left axillary scar; no cervical, supraclavicular or axillary lymphadenopathy  Breast: moderate size, grade 3 ptosis, sternal scar  Right: No visible abnormalities on inspection while seated, with arms raised or hands on hips. No masses, skin changes, or nipple abnormalities.  Left: On inspection the left breast is smaller and uplifted versus the right. There is an upper outer quadrant horizontal scar with scar retraction. At 2:00, 5 cm FN, there is a firm 1 cm mass. This is located just lateral/inferior to the scar. No nipple abnormalities      Procedure: Diagnostic Ultrasound, Left Breast, Limited  Indication: Assess response post-NACT  Comparisons: 10/29/21 in-office US  Description of procedure: Using a 10MHz linear transducer, I scanned the breast at the area of interest.  Findings: At 2:00, 5 cm FN, there is an irregular hypoechoic mass with biopsy clip visualized at the superior/anterior aspect of the mass. This measures 12 x 9 x 12 mm (previously 15 x 10 x 15 mm on 10/29/21 and 18 x 15 x 24 mm on 8/20/21). This is still located adjacent to the underlying pectoralis muscle, without a clearly defined plane. The mass is located about 5 mm deep to the skin.  Impression: Interval decrease in size of the known malignancy in the upper outer  left breast. This correlates with clinical exam and is consistent with a partial radiologic response.  BI-RADS 6: Known malignancy       Assessment:  61 y.o. F with a diagnosis of left breast cancer: High grade, triple negative, invasive ductal carcinoma; clinical T2N0, anatomic stage IIA, prognostic stage IIB. She completed neoadjuvant carbo/Taxol/Keytruda on 12/4/21.  -She has a past history of left breast cancer in 2006, pT1N1, intermediate grade, invasive ductal carcinoma, ER/AL positive, HER-2 negative. She underwent left lumpectomy & SLNB with completion axillary dissection, followed by reexcision for close margins. This was followed by AC x4 and 4 cycles of Taxol, then radiation and endocrine therapy.     Discussion:  We again reviewed the plan for surgery, which will be a left modified radical mastectomy (given her history of prior lumpectomy/radiation and sentinel lymph node biopsy/axillary dissection). Because her genetic testing was negative, there is no indication for a contralateral risk reducing mastectomy. Mastectomy will include removal of her nipple-areola-complex and the prior lumpectomy scar in the upper outer quadrant. I will leave the remaining skin to be used for delayed reconstruction.    I explained that axillary node dissection is associated with an increased risk of lymphedema versus sentinel lymph node biopsy (15-30% vs. <10%), that there is an increased risk of permanent upper inner arm numbness, and a risk of injury to motor nerves that control the shoulder muscles. I described additional risks and potential complications associated with surgery, including, but not limited to, bleeding, infection, deformity/poor cosmetic result, chronic pain, neurovascular injury, numbness, seroma, hematoma, deep venous thrombosis, skin flap necrosis, disease recurrence and the possibility of requiring additional surgery. We also discussed other treatment options including the option of not undergoing  any surgical treatment and the risks associated with this including disease progression. She expressed an understanding of these factors and wished to proceed.    Plan:  -Preoperative OT evaluation for bioimpedance testing.  -Continue follow-up with Dr. Sandoval.  -Left modified radical mastectomy.    Indigo Red MD    I spent 45 minutes caring for Laxmi on this date of service. This time includes time spent by me in the following activities: preparing for the visit, performing a medically appropriate examination and/or evaluation , counseling and educating the patient/family/caregiver, referring and communicating with other health care professionals , documenting information in the medical record and independently interpreting results and communicating that information with the patient/family/caregiver.  I spent 5 minutes on the separately reported service of left breast ultrasound. This time is not included in the time used to support the E/M service also reported today.      CC:  MD Hollis Brennan II, MD Vipul Panchal, MD Blakely Kute, MD

## 2021-12-10 ENCOUNTER — OFFICE VISIT (OUTPATIENT)
Dept: SURGERY | Facility: CLINIC | Age: 61
End: 2021-12-10

## 2021-12-10 ENCOUNTER — PREP FOR SURGERY (OUTPATIENT)
Dept: OTHER | Facility: HOSPITAL | Age: 61
End: 2021-12-10

## 2021-12-10 ENCOUNTER — TRANSCRIBE ORDERS (OUTPATIENT)
Dept: SURGERY | Facility: CLINIC | Age: 61
End: 2021-12-10

## 2021-12-10 VITALS
SYSTOLIC BLOOD PRESSURE: 132 MMHG | OXYGEN SATURATION: 97 % | HEIGHT: 65 IN | BODY MASS INDEX: 26.33 KG/M2 | DIASTOLIC BLOOD PRESSURE: 70 MMHG | WEIGHT: 158 LBS | HEART RATE: 93 BPM

## 2021-12-10 DIAGNOSIS — C50.412 MALIGNANT NEOPLASM OF UPPER-OUTER QUADRANT OF LEFT BREAST IN FEMALE, ESTROGEN RECEPTOR NEGATIVE (HCC): Primary | ICD-10-CM

## 2021-12-10 DIAGNOSIS — Z17.1 MALIGNANT NEOPLASM OF UPPER-OUTER QUADRANT OF LEFT BREAST IN FEMALE, ESTROGEN RECEPTOR NEGATIVE (HCC): Primary | ICD-10-CM

## 2021-12-10 DIAGNOSIS — C50.412 MALIGNANT NEOPLASM OF UPPER-OUTER QUADRANT OF LEFT BREAST IN FEMALE, ESTROGEN RECEPTOR NEGATIVE: Primary | ICD-10-CM

## 2021-12-10 DIAGNOSIS — Z17.1 MALIGNANT NEOPLASM OF UPPER-OUTER QUADRANT OF LEFT BREAST IN FEMALE, ESTROGEN RECEPTOR NEGATIVE: Primary | ICD-10-CM

## 2021-12-10 PROCEDURE — 76642 ULTRASOUND BREAST LIMITED: CPT | Performed by: SURGERY

## 2021-12-10 PROCEDURE — 99215 OFFICE O/P EST HI 40 MIN: CPT | Performed by: SURGERY

## 2021-12-10 RX ORDER — HEPARIN SODIUM 5000 [USP'U]/ML
5000 INJECTION, SOLUTION INTRAVENOUS; SUBCUTANEOUS
Status: CANCELLED | OUTPATIENT
Start: 2022-01-04 | End: 2022-01-05

## 2021-12-10 RX ORDER — CEFAZOLIN SODIUM 2 G/100ML
2 INJECTION, SOLUTION INTRAVENOUS ONCE
Status: CANCELLED | OUTPATIENT
Start: 2022-01-04 | End: 2021-12-10

## 2021-12-15 ENCOUNTER — TELEPHONE (OUTPATIENT)
Dept: SURGERY | Facility: CLINIC | Age: 61
End: 2021-12-15

## 2021-12-15 NOTE — TELEPHONE ENCOUNTER
OT appointment is scheduled on 12/23/2021 @ 8:15am.    Surgery is scheduled on 1/4/2021 @ 9:30am, patient arrival 7:30am.    PAT is scheduled on 12/28/2021 @ 3:00pm.    Post-op appointment with Dr. Red is scheduled on 1/19/2021 @ 8:30am.    Called and spoke to patient, patient expressed v/u of appointment times.    Sent patient a reminder letter in the mail.

## 2021-12-23 ENCOUNTER — HOSPITAL ENCOUNTER (OUTPATIENT)
Dept: OCCUPATIONAL THERAPY | Facility: HOSPITAL | Age: 61
Setting detail: THERAPIES SERIES
Discharge: HOME | End: 2021-12-23

## 2021-12-23 DIAGNOSIS — Z91.89 AT RISK FOR LYMPHEDEMA: Primary | ICD-10-CM

## 2021-12-23 DIAGNOSIS — C50.412 MALIGNANT NEOPLASM OF UPPER-OUTER QUADRANT OF LEFT BREAST IN FEMALE, ESTROGEN RECEPTOR NEGATIVE (HCC): ICD-10-CM

## 2021-12-23 DIAGNOSIS — Z17.1 MALIGNANT NEOPLASM OF UPPER-OUTER QUADRANT OF LEFT BREAST IN FEMALE, ESTROGEN RECEPTOR NEGATIVE (HCC): ICD-10-CM

## 2021-12-23 PROCEDURE — 97165 OT EVAL LOW COMPLEX 30 MIN: CPT

## 2021-12-23 PROCEDURE — 93702 BIS XTRACELL FLUID ANALYSIS: CPT

## 2021-12-23 PROCEDURE — 97535 SELF CARE MNGMENT TRAINING: CPT

## 2021-12-23 NOTE — THERAPY EVALUATION
Outpatient Occupational Therapy Lymphedema Initial Evaluation  [facility]     Patient Name: Laxmi Marcus  : 1960  MRN: [id number]  Today's Date: 2021      Visit Date: 2021  Patient and therapist both masked. Therapist with face shield and gloves.  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 (HCC)   • Hyperinsulinism   • Hyperlipidemia associated with type 2 diabetes mellitus (HCC)   • Malignant neoplasm of upper-outer quadrant of left breast in female, estrogen receptor negative (HCC)   • Fitting and adjustment of vascular catheter        Past Medical History:   Diagnosis Date   • Breast cancer (HCC)     Left   • CKD (chronic kidney disease)     stage 3   • Coronary artery disease    • Diabetes mellitus type 2 with complications, uncontrolled (HCC)    • Frequent UTI    • Gout    • Heart murmur    • History of cancer chemotherapy    • History of pneumonia    • History of radiation therapy    • Hyperlipidemia    • Hypertension    • NSTEMI (non-ST elevated myocardial infarction) (HCC) 2019   • Obesity    • Vitamin B12 deficiency         Past Surgical History:   Procedure Laterality Date   • APPENDECTOMY     • BREAST LUMPECTOMY     • BREAST LUMPECTOMY WITH SENTINEL NODE BIOPSY AND AXILLARY NODE DISSECTION     • CARDIAC CATHETERIZATION  2019    new de nova Ostial Ramus 95% stenosis, severe multivessel diabetic coronary vasculopathy with small vessels, occluded 3 of 4 bypass conduits, LIMA to LAD patent, all 3 vein grafts are occluded, PTCA/EMMANUEL to ostial Ramus 95%  stenosis, patent OM1 and proximal and mid RCA stents x 3 from 2/2019, preserved LVSF, normal LVEDP, no significant AS or MR     • CHOLECYSTECTOMY  2006   • CORONARY ARTERY BYPASS GRAFT  2017    x4 with LIMA to mLAD, SVG to diagonal, SVG to OM, SVG to distal PDA     • CORONARY STENT PLACEMENT  12/2019    Drug eluting stent placement--2.0 x 26 mm Resolute [place] EMMANUEL to ostial Ramus     • CORONARY STENT PLACEMENT  02/2019    Drug eluting stent--2.25 x 38 mm and 2.5 x 15 mm Resolute [place] EMMANUEL to proximal mid RCA, 2.0 x 26 mm Resolute [place] EMMANUEL to mid circumflex/proximal OM1   • DENTAL PROCEDURE     • HYSTERECTOMY  2009   • TONSILLECTOMY  1961    age 1   • VENOUS ACCESS DEVICE (PORT) INSERTION Right 10/1/2021    Procedure: INSERTION VENOUS ACCESS DEVICE;  Surgeon: Indigo Red MD;  Location: [facility] OR [facility] [place];  Service: General;  Laterality: Right;         Visit Dx:     ICD-10-CM ICD-9-CM   1. At risk for lymphedema  Z91.89 V49.89   2. Malignant neoplasm of upper-outer quadrant of left breast in female, estrogen receptor negative (HCC)  C50.412 174.4    Z17.1 V86.1        Patient History     Row Name 12/23/21 1700             History    Chief Complaint --  preoperative evaluation and education  -RE      Date Current Problem(s) Began 12/10/22  -RE      Brief Description of Current Complaint Patient presents for a preopertive evaluation prior to planned L modified radical mastectomy on 1/4/2022.  -RE      Patient/Caregiver Goals Know what to do to help the symptoms; Other (comment)  education  -RE      Are you or can you be pregnant No  -RE              Pain     Pain at Present 0  -RE              Fall Risk Assessment    Any falls in the past year: No  -RE              Services    Are you currently receiving Home Health services No  -RE              Daily Activities    Primary Language English  -RE      Are you able to read Yes  -RE      Are you able to write Yes  -RE      How does patient learn best? Listening; Reading;  Demonstration  -RE      Teaching needs identified Management of Condition  -RE      Patient is concerned about/has problems with Performing home management (household chores, shopping, care of dependents); Difficulty with self care (i.e. bathing, dressing, toileting:; Other (comment)  lymphedema  -RE      Does patient have problems with the following? Anxiety  -RE      Barriers to learning None  -RE      Pt Participated in POC and Goals Yes  -RE              Safety    Are you being hurt, hit, or frightened by anyone at home or in your life? No  -RE      Are you being neglected by a caregiver No  -RE            User Key  (r) = Recorded By, (t) = Taken By, (c) = Cosigned By    Initials Name Provider Type    RE Rachel Toney OTR Occupational Therapist                 Lymphedema     Row Name 12/23/21 1700             Subjective Pain    Able to rate subjective pain? yes  -RE      Pre-Treatment Pain Level 0  -RE              Lymphedema Assessment    Lymphedema Classification LUE:  -RE      Lymphedema Cancer Related Sx left; simple mastectomy; sentinel node biopsy; axillary dissection  2006  -RE      Lymphedema Surgery Comments planned L modified radical mastectomy with delayed reconstruction  -RE      Chemo Received yes  -RE      Chemo Treatments #/Timeframe in 2006 and 2021  -RE      Radiation Therapy Received yes  in 2006  -RE      Infections or Cellulitis? no  -RE              General ROM    GENERAL ROM COMMENTS B UE's WNL  -RE              Compression/Skin Care    Compression/Skin Care Comments Measured for compression sleeve adn gauntlet 20-30 mmHg,  Jobst marci lite both small  -RE              L-Dex Bioimpedence Screening    L-Dex Measurement Extremity LUE  -RE      L-Dex Patient Position Standing  -RE      L-Dex UE Dominate Side Right  -RE      L-Dex UE At Risk Side Left  -RE      L-Dex UE Pre Surgical Value Yes  -RE      L-Dex UE Score 2.5  -RE      L-Dex UE Baseline Score 2.5  -RE      L-Dex UE Value Change 0   -RE      L-Dex UE Comment WNL  -RE            User Key  (r) = Recorded By, (t) = Taken By, (c) = Cosigned By    Initials Name Provider Type    Rachel Beverly OTR Occupational Therapist                        Therapy Education  Education Details: Discussed lymphedema precautions and gave written information.  Recommended compression during exercise and heavy UE activity and air travel. Instructed in use and care of compression sleeve. Explained purpose of Bioimpedance testing including recommended frequency. Discussed patient's L-dex value.  Given: Symptoms/condition management, Other (comment)  Program: New  How Provided: Verbal, Written  Provided to: Patient  Level of Understanding: Teach back education performed, Verbalized  55009 - OT Self Care/Mgmt Minutes: 15         OT Goals     Row Name 12/23/21 1800          OT Short Term Goals    STG Date to Achieve 01/06/22  -RE     STG 1 Patient will demonstrate proper awareness of “What is Lymphedema?” and “Healthy Habits” for improved prevention, management, care of symptoms and ease of transition to self-care of condition.  -RE     STG 1 Progress New  -RE     STG 2 Pt will demonstrate understanding of use of compression sleeve for edema prevention, exercise and air travel.  -RE     STG 2 Progress New  -RE            Long Term Goals    LTG Date to Achieve 01/20/22  -RE     LTG 1 Patient will participate in bioimpedance scans every 3 months as a method of early detection of lymphedema to allow for early intervention.  -RE     LTG 1 Progress New  -RE     LTG 2 Patient's bioimpedance score to remain below 6.5 for decreased risk of stage II lymphedema.  -RE     LTG 2 Progress New  -RE            Time Calculation    OT Goal Re-Cert Due Date 03/23/22  -RE           User Key  (r) = Recorded By, (t) = Taken By, (c) = Cosigned By    Initials Name Provider Type    Rachel Beverly OTR Occupational Therapist                 OT Assessment/Plan     Row Name 12/23/21 3138           OT Assessment    Impairments Impaired lymphatic circulation  -RE     Assessment Comments Patient presents to OT pre-operatively for planned left BRCA surgery to include mastectomy and planned ALND with delayed reconstruction on 1/4/2022.  Baseline ROM, postural and  baseline bioimpedance measurements were taken today to be compared to measurements retaken 3 post-surgery.  At that time, any reduced movement, decline in function or postural issues will be addressed with skilled therapy and new goals will be established.  Personal risk factors for lymphedema post-operatively for the L upper extremity and trunk quadrant were also assessed today and basic lymphedema precautions were discussed.  L-dex was 2.5 which is WNL.  -RE     Please refer to paper survey for additional self-reported information No  -RE     OT Diagnosis at riskk for lymphedema  -RE     OT Rehab Potential Good  -RE     Patient/caregiver participated in establishment of treatment plan and goals Yes  -RE     Patient would benefit from skilled therapy intervention Yes  -RE            OT Plan    OT Frequency Other (comment)  -RE     Predicted Duration of Therapy Intervention (OT) Follow for bioimpedance every 3 months . Follow up ost op for ROM as indicated  -RE     Planned CPT's? OT EVAL LOW COMPLEXITY: 35904; OT SELF CARE/MGMT/TRAIN 15 MIN: 71427; OT MANUAL THERAPY EA 15 MIN: 07681; OT BIS XTRACELL FLUID ANALYSIS: 37960; OT THER PROC EA 15 MIN: [id number]  -RE     Planned Therapy Interventions (Optional Details) home exercise program; manual therapy techniques; patient/family education; other (see comments)  bioimpedance  -RE           User Key  (r) = Recorded By, (t) = Taken By, (c) = Cosigned By    Initials Name Provider Type    RE Rachel Toney OTR Occupational Therapist                Outcome Measure Options: Quick DASH  Quick DASH  Open a tight or new jar.: No Difficulty  Do heavy household chores (e.g., wash walls, wash floors): Moderate  Difficulty  Carry a shopping bag or briefcase: No Difficulty  Wash your back: No Difficulty  Use a knife to cut food: No Difficulty  Recreational activities in which you take some force or impact through your arm, should or hand (e.g. golf, hammering, tennis, etc.): Mild Difficulty  During the past week, to what extent has your arm, shoulder, or hand problem interfered with your normal social activites with family, friends, neighbors or groups?: Not at all  During the past week, were you limited in your work or other regular daily activities as a result of your arm, shoulder or hand problem?: Not limited at all  Arm, Shoulder, or hand pain: None  Tingling (pins and needles) in your arm, shoulder, or hand: Mild  During the past week, how much difficulty have you had sleeping because of the pain in your arm, shoulder or hand?: No difficulty  Number of Questions Answered: 11  Quick DASH Score: 9.09  Quick Dash Comments: 9 percent disability         Time Calculation:   OT Start Time: 0815  OT Stop Time: 0905  OT Time Calculation (min): 50 min  Total Timed Code Minutes- OT: 15 minute(s)  Timed Charges  18053 - OT Self Care/Mgmt Minutes: 15  Untimed Charges  OT Eval/Re-eval Minutes: 20  13745 - OT Bioimpedence Rx Minutes: 15  Total Minutes  Timed Charges Total Minutes: 15  Untimed Charges Total Minutes: 35   Total Minutes: 50     Therapy Charges for Today     Code Description Service Date Service Provider Modifiers Qty    01008475880 HC OT SELF CARE/MGMT/TRAIN EA 15 MIN 12/23/2021 Rachel Toney OTR GO 1    [id number]  OT BIS XTRACELL FLUID ANALYSIS 12/23/2021 Rachel Toney OTR GO 1    [id number]  OT EVAL LOW COMPLEXITY 1 12/23/2021 Rachel Toney OTR GO 1                    CATIE Schumacher  12/23/2021

## 2021-12-28 ENCOUNTER — PRE-ADMISSION TESTING (OUTPATIENT)
Dept: PREADMISSION TESTING | Facility: HOSPITAL | Age: 61
End: 2021-12-28

## 2021-12-28 VITALS
HEART RATE: 97 BPM | HEIGHT: 65 IN | SYSTOLIC BLOOD PRESSURE: 160 MMHG | BODY MASS INDEX: 26.99 KG/M2 | WEIGHT: 162 LBS | OXYGEN SATURATION: 100 % | RESPIRATION RATE: 18 BRPM | TEMPERATURE: 98.6 F | DIASTOLIC BLOOD PRESSURE: 74 MMHG

## 2021-12-28 DIAGNOSIS — C50.412 MALIGNANT NEOPLASM OF UPPER-OUTER QUADRANT OF LEFT BREAST IN FEMALE, ESTROGEN RECEPTOR NEGATIVE (HCC): ICD-10-CM

## 2021-12-28 DIAGNOSIS — Z17.1 MALIGNANT NEOPLASM OF UPPER-OUTER QUADRANT OF LEFT BREAST IN FEMALE, ESTROGEN RECEPTOR NEGATIVE (HCC): ICD-10-CM

## 2021-12-28 LAB
ANION GAP SERPL CALCULATED.3IONS-SCNC: 13.6 MMOL/L (ref 5–15)
BASOPHILS # BLD AUTO: 0.06 10*3/MM3 (ref 0–0.2)
BASOPHILS NFR BLD AUTO: 0.7 % (ref 0–1.5)
BUN SERPL-MCNC: 17 MG/DL (ref 8–23)
BUN/CREAT SERPL: 14.3 (ref 7–25)
CALCIUM SPEC-SCNC: 10.3 MG/DL (ref 8.6–10.5)
CHLORIDE SERPL-SCNC: 101 MMOL/L (ref 98–107)
CO2 SERPL-SCNC: 24.4 MMOL/L (ref 22–29)
CREAT SERPL-MCNC: 1.19 MG/DL (ref 0.57–1)
DEPRECATED RDW RBC AUTO: 56.5 FL (ref 37–54)
EOSINOPHIL # BLD AUTO: 0.16 10*3/MM3 (ref 0–0.4)
EOSINOPHIL NFR BLD AUTO: 1.8 % (ref 0.3–6.2)
ERYTHROCYTE [DISTWIDTH] IN BLOOD BY AUTOMATED COUNT: 15.9 % (ref 12.3–15.4)
GFR SERPL CREATININE-BSD FRML MDRD: 46 ML/MIN/1.73
GLUCOSE SERPL-MCNC: 247 MG/DL (ref 65–99)
HCT VFR BLD AUTO: 31.4 % (ref 34–46.6)
HGB BLD-MCNC: 10.1 G/DL (ref 12–15.9)
IMM GRANULOCYTES # BLD AUTO: 0.12 10*3/MM3 (ref 0–0.05)
IMM GRANULOCYTES NFR BLD AUTO: 1.3 % (ref 0–0.5)
LYMPHOCYTES # BLD AUTO: 1.51 10*3/MM3 (ref 0.7–3.1)
LYMPHOCYTES NFR BLD AUTO: 16.5 % (ref 19.6–45.3)
MCH RBC QN AUTO: 31.2 PG (ref 26.6–33)
MCHC RBC AUTO-ENTMCNC: 32.2 G/DL (ref 31.5–35.7)
MCV RBC AUTO: 96.9 FL (ref 79–97)
MONOCYTES # BLD AUTO: 0.81 10*3/MM3 (ref 0.1–0.9)
MONOCYTES NFR BLD AUTO: 8.9 % (ref 5–12)
NEUTROPHILS NFR BLD AUTO: 6.48 10*3/MM3 (ref 1.7–7)
NEUTROPHILS NFR BLD AUTO: 70.8 % (ref 42.7–76)
NRBC BLD AUTO-RTO: 0.1 /100 WBC (ref 0–0.2)
PLATELET # BLD AUTO: 293 10*3/MM3 (ref 140–450)
PMV BLD AUTO: 8.8 FL (ref 6–12)
POTASSIUM SERPL-SCNC: 4.3 MMOL/L (ref 3.5–5.2)
RBC # BLD AUTO: 3.24 10*6/MM3 (ref 3.77–5.28)
SODIUM SERPL-SCNC: 139 MMOL/L (ref 136–145)
WBC NRBC COR # BLD: 9.14 10*3/MM3 (ref 3.4–10.8)

## 2021-12-28 PROCEDURE — 36415 COLL VENOUS BLD VENIPUNCTURE: CPT

## 2021-12-28 PROCEDURE — 80048 BASIC METABOLIC PNL TOTAL CA: CPT

## 2021-12-28 PROCEDURE — 85025 COMPLETE CBC W/AUTO DIFF WBC: CPT

## 2021-12-28 NOTE — DISCHARGE INSTRUCTIONS
ARRIVE DAY OF SURGERY AT  7:30 AM      Take the following medications the morning of surgery:  RANEXA      If you are on prescription narcotic pain medication to control your pain you may also take that medication the morning of surgery.    General Instructions:  • Do not eat solid food after midnight the night before surgery.  • You may drink clear liquids day of surgery but must stop at least one hour before your hospital arrival time.  • It is beneficial for you to have a clear drink that contains carbohydrates the day of surgery.  We suggest a 12 to 20 ounce bottle of Gatorade or Powerade for non-diabetic patients or a 12 to 20 ounce bottle of G2 or Powerade Zero for diabetic patients. (Pediatric patients, are not advised to drink a 12 to 20 ounce carbohydrate drink)    Clear liquids are liquids you can see through.  Nothing red in color.     Plain water                               Sports drinks  Sodas                                   Gelatin (Jell-O)  Fruit juices without pulp such as white grape juice and apple juice  Popsicles that contain no fruit or yogurt  Tea or coffee (no cream or milk added)  Gatorade / Powerade  G2 / Powerade Zero    • Infants may have breast milk up to four hours before surgery.  • Infants drinking formula may drink formula up to six hours before surgery.   • Patients who avoid smoking, chewing tobacco and alcohol for 4 weeks prior to surgery have a reduced risk of post-operative complications.  Quit smoking as many days before surgery as you can.  • Do not smoke, use chewing tobacco or drink alcohol the day of surgery.   • If applicable bring your C-PAP/ BI-PAP machine.  • Bring any papers given to you in the doctor’s office.  • Wear clean comfortable clothes.  • Do not wear contact lenses, false eyelashes or make-up.  Bring a case for your glasses.   • Bring crutches or walker if applicable.  • Remove all piercings.  Leave jewelry and any other valuables at home.  • Hair  extensions with metal clips must be removed prior to surgery.  • The Pre-Admission Testing nurse will instruct you to bring medications if unable to obtain an accurate list in Pre-Admission Testing.            Preventing a Surgical Site Infection:  • For 2 to 3 days before surgery, avoid shaving with a razor because the razor can irritate skin and make it easier to develop an infection.    • Any areas of open skin can increase the risk of a post-operative wound infection by allowing bacteria to enter and travel throughout the body.  Notify your surgeon if you have any skin wounds / rashes even if it is not near the expected surgical site.  The area will need assessed to determine if surgery should be delayed until it is healed.  • The night prior to surgery shower using a fresh bar of anti-bacterial soap (such as Dial) and clean washcloth.  Sleep in a clean bed with clean clothing.  Do not allow pets to sleep with you.  • Shower on the morning of surgery using a fresh bar of anti-bacterial soap (such as Dial) and clean washcloth.  Dry with a clean towel and dress in clean clothing.  • Ask your surgeon if you will be receiving antibiotics prior to surgery.  • Make sure you, your family, and all healthcare providers clean their hands with soap and water or an alcohol based hand  before caring for you or your wound.    Day of surgery:  Your arrival time is approximately two hours before your scheduled surgery time.  Upon arrival, a Pre-op nurse and Anesthesiologist will review your health history, obtain vital signs, and answer questions you may have.  The only belongings needed at this time will be a list of your home medications and if applicable your C-PAP/BI-PAP machine.  A Pre-op nurse will start an IV and you may receive medication in preparation for surgery, including something to help you relax.     Please be aware that surgery does come with discomfort.  We want to make every effort to control your  discomfort so please discuss any uncontrolled symptoms with your nurse.   Your doctor will most likely have prescribed pain medications.      If you are going home after surgery you will receive individualized written care instructions before being discharged.  A responsible adult must drive you to and from the hospital on the day of your surgery and stay with you for 24 hours.  Discharge prescriptions can be filled by the hospital pharmacy during regular pharmacy hours.  If you are having surgery late in the day/evening your prescription may be e-prescribed to your pharmacy.  Please verify your pharmacy hours or chose a 24 hour pharmacy to avoid not having access to your prescription because your pharmacy has closed for the day.    If you are staying overnight following surgery, you will be transported to your hospital room following the recovery period.  [facility] has all private rooms.    If you have any questions please call Pre-Admission Testing at [phone].  Deductibles and co-payments are collected on the day of service. Please be prepared to pay the required co-pay, deductible or deposit on the day of service as defined by your plan.    Patient Education for Self-Quarantine Process    • Following your COVID testing, we strongly recommend that you wear a mask when you are with other people and practice social distancing.   • Limit your activities to only required outings.  • Wash your hands with soap and water frequently for at least 20 seconds.   • Avoid touching your eyes, nose and mouth with unwashed hands.  • Do not share anything - utensils, drinking glasses, food from the same bowl.   • Sanitize household surfaces daily. Include all high touch areas (door handles, light switches, phones, countertops, etc.)    Call your surgeon immediately if you experience any of the following symptoms:  • Sore Throat  • Shortness of Breath or difficulty breathing  • Cough  • Chills  • Body soreness  or muscle pain  • Headache  • Fever  • New loss of taste or smell  • Do not arrive for your surgery ill.  Your procedure will need to be rescheduled to another time.  You will need to call your physician before the day of surgery to avoid any unnecessary exposure to hospital staff as well as other patients.

## 2022-01-03 ENCOUNTER — LAB (OUTPATIENT)
Dept: LAB | Facility: HOSPITAL | Age: 62
End: 2022-01-03

## 2022-01-03 DIAGNOSIS — Z17.1 MALIGNANT NEOPLASM OF UPPER-OUTER QUADRANT OF LEFT BREAST IN FEMALE, ESTROGEN RECEPTOR NEGATIVE: ICD-10-CM

## 2022-01-03 DIAGNOSIS — C50.412 MALIGNANT NEOPLASM OF UPPER-OUTER QUADRANT OF LEFT BREAST IN FEMALE, ESTROGEN RECEPTOR NEGATIVE: ICD-10-CM

## 2022-01-03 LAB — SARS-COV-2 ORF1AB RESP QL NAA+PROBE: NOT DETECTED

## 2022-01-03 PROCEDURE — C9803 HOPD COVID-19 SPEC COLLECT: HCPCS

## 2022-01-03 PROCEDURE — U0004 COV-19 TEST NON-CDC HGH THRU: HCPCS

## 2022-01-04 ENCOUNTER — ANESTHESIA (OUTPATIENT)
Dept: PERIOP | Facility: HOSPITAL | Age: 62
End: 2022-01-04

## 2022-01-04 ENCOUNTER — HOSPITAL ENCOUNTER (OUTPATIENT)
Facility: HOSPITAL | Age: 62
Discharge: HOME | End: 2022-01-05
Attending: SURGERY | Admitting: SURGERY

## 2022-01-04 ENCOUNTER — ANESTHESIA EVENT (OUTPATIENT)
Dept: PERIOP | Facility: HOSPITAL | Age: 62
End: 2022-01-04

## 2022-01-04 DIAGNOSIS — C50.412 MALIGNANT NEOPLASM OF UPPER-OUTER QUADRANT OF LEFT BREAST IN FEMALE, ESTROGEN RECEPTOR NEGATIVE: ICD-10-CM

## 2022-01-04 DIAGNOSIS — Z17.1 MALIGNANT NEOPLASM OF UPPER-OUTER QUADRANT OF LEFT BREAST IN FEMALE, ESTROGEN RECEPTOR NEGATIVE: ICD-10-CM

## 2022-01-04 LAB
GLUCOSE BLDC GLUCOMTR-MCNC: 172 MG/DL (ref 70–130)
GLUCOSE BLDC GLUCOMTR-MCNC: 194 MG/DL (ref 70–130)
GLUCOSE BLDC GLUCOMTR-MCNC: 206 MG/DL (ref 70–130)

## 2022-01-04 PROCEDURE — 88307 TISSUE EXAM BY PATHOLOGIST: CPT | Performed by: SURGERY

## 2022-01-04 PROCEDURE — 25010000002 FENTANYL CITRATE (PF) 50 MCG/ML SOLUTION: Performed by: NURSE ANESTHETIST, CERTIFIED REGISTERED

## 2022-01-04 PROCEDURE — 88341 IMHCHEM/IMCYTCHM EA ADD ANTB: CPT | Performed by: SURGERY

## 2022-01-04 PROCEDURE — 0 CEFAZOLIN IN DEXTROSE 2-4 GM/100ML-% SOLUTION: Performed by: SURGERY

## 2022-01-04 PROCEDURE — 25010000002 CEFAZOLIN 1-4 GM/50ML-% SOLUTION: Performed by: SURGERY

## 2022-01-04 PROCEDURE — 25010000002 PHENYLEPHRINE 10 MG/ML SOLUTION: Performed by: NURSE ANESTHETIST, CERTIFIED REGISTERED

## 2022-01-04 PROCEDURE — 19307 MAST MOD RAD: CPT | Performed by: SPECIALIST/TECHNOLOGIST, OTHER

## 2022-01-04 PROCEDURE — 25010000002 DROPERIDOL PER 5 MG: Performed by: NURSE ANESTHETIST, CERTIFIED REGISTERED

## 2022-01-04 PROCEDURE — 82962 GLUCOSE BLOOD TEST: CPT

## 2022-01-04 PROCEDURE — 25010000002 SUCCINYLCHOLINE PER 20 MG: Performed by: NURSE ANESTHETIST, CERTIFIED REGISTERED

## 2022-01-04 PROCEDURE — 19307 MAST MOD RAD: CPT | Performed by: SURGERY

## 2022-01-04 PROCEDURE — G0378 HOSPITAL OBSERVATION PER HR: HCPCS

## 2022-01-04 PROCEDURE — 88360 TUMOR IMMUNOHISTOCHEM/MANUAL: CPT | Performed by: SURGERY

## 2022-01-04 PROCEDURE — 88309 TISSUE EXAM BY PATHOLOGIST: CPT | Performed by: SURGERY

## 2022-01-04 PROCEDURE — 94799 UNLISTED PULMONARY SVC/PX: CPT

## 2022-01-04 PROCEDURE — 25010000002 ONDANSETRON PER 1 MG: Performed by: NURSE ANESTHETIST, CERTIFIED REGISTERED

## 2022-01-04 PROCEDURE — 25010000002 PROPOFOL 10 MG/ML EMULSION: Performed by: NURSE ANESTHETIST, CERTIFIED REGISTERED

## 2022-01-04 PROCEDURE — 25010000002 HEPARIN (PORCINE) PER 1000 UNITS: Performed by: SURGERY

## 2022-01-04 PROCEDURE — 25010000002 HYDROMORPHONE PER 4 MG: Performed by: NURSE ANESTHETIST, CERTIFIED REGISTERED

## 2022-01-04 PROCEDURE — C1889 IMPLANT/INSERT DEVICE, NOC: HCPCS | Performed by: SURGERY

## 2022-01-04 PROCEDURE — 88342 IMHCHEM/IMCYTCHM 1ST ANTB: CPT | Performed by: SURGERY

## 2022-01-04 DEVICE — LIGACLIP MCA MULTIPLE CLIP APPLIERS, 20 SMALL CLIPS
Type: IMPLANTABLE DEVICE | Site: BREAST | Status: FUNCTIONAL
Brand: LIGACLIP

## 2022-01-04 DEVICE — LIGACLIP MCA MULTIPLE CLIP APPLIERS, 20 MEDIUM CLIPS
Type: IMPLANTABLE DEVICE | Site: BREAST | Status: FUNCTIONAL
Brand: LIGACLIP

## 2022-01-04 DEVICE — ABSORBABLE HEMOSTAT (OXIDIZED REGENERATED CELLULOSE, U.S.P.)
Type: IMPLANTABLE DEVICE | Site: BREAST | Status: FUNCTIONAL
Brand: SURGICEL

## 2022-01-04 RX ORDER — SODIUM CHLORIDE 0.9 % (FLUSH) 0.9 %
10 SYRINGE (ML) INJECTION EVERY 12 HOURS SCHEDULED
Status: DISCONTINUED | OUTPATIENT
Start: 2022-01-04 | End: 2022-01-04 | Stop reason: HOSPADM

## 2022-01-04 RX ORDER — HYDRALAZINE HYDROCHLORIDE 20 MG/ML
5 INJECTION INTRAMUSCULAR; INTRAVENOUS
Status: DISCONTINUED | OUTPATIENT
Start: 2022-01-04 | End: 2022-01-04 | Stop reason: HOSPADM

## 2022-01-04 RX ORDER — OXYCODONE AND ACETAMINOPHEN 7.5; 325 MG/1; MG/1
1 TABLET ORAL EVERY 4 HOURS PRN
Status: DISCONTINUED | OUTPATIENT
Start: 2022-01-04 | End: 2022-01-04 | Stop reason: HOSPADM

## 2022-01-04 RX ORDER — FAMOTIDINE 10 MG/ML
20 INJECTION, SOLUTION INTRAVENOUS
Status: COMPLETED | OUTPATIENT
Start: 2022-01-04 | End: 2022-01-04

## 2022-01-04 RX ORDER — IBUPROFEN 600 MG/1
600 TABLET ORAL ONCE AS NEEDED
Status: DISCONTINUED | OUTPATIENT
Start: 2022-01-04 | End: 2022-01-04 | Stop reason: HOSPADM

## 2022-01-04 RX ORDER — PROPOFOL 10 MG/ML
VIAL (ML) INTRAVENOUS AS NEEDED
Status: DISCONTINUED | OUTPATIENT
Start: 2022-01-04 | End: 2022-01-04 | Stop reason: SURG

## 2022-01-04 RX ORDER — LABETALOL HYDROCHLORIDE 5 MG/ML
5 INJECTION, SOLUTION INTRAVENOUS
Status: DISCONTINUED | OUTPATIENT
Start: 2022-01-04 | End: 2022-01-04 | Stop reason: HOSPADM

## 2022-01-04 RX ORDER — IPRATROPIUM BROMIDE AND ALBUTEROL SULFATE 2.5; .5 MG/3ML; MG/3ML
3 SOLUTION RESPIRATORY (INHALATION) ONCE AS NEEDED
Status: DISCONTINUED | OUTPATIENT
Start: 2022-01-04 | End: 2022-01-04 | Stop reason: HOSPADM

## 2022-01-04 RX ORDER — LIDOCAINE HYDROCHLORIDE 20 MG/ML
INJECTION, SOLUTION INFILTRATION; PERINEURAL AS NEEDED
Status: DISCONTINUED | OUTPATIENT
Start: 2022-01-04 | End: 2022-01-04 | Stop reason: SURG

## 2022-01-04 RX ORDER — SODIUM CHLORIDE 0.9 % (FLUSH) 0.9 %
10 SYRINGE (ML) INJECTION AS NEEDED
Status: DISCONTINUED | OUTPATIENT
Start: 2022-01-04 | End: 2022-01-04 | Stop reason: HOSPADM

## 2022-01-04 RX ORDER — DROPERIDOL 2.5 MG/ML
0.62 INJECTION, SOLUTION INTRAMUSCULAR; INTRAVENOUS ONCE AS NEEDED
Status: COMPLETED | OUTPATIENT
Start: 2022-01-04 | End: 2022-01-04

## 2022-01-04 RX ORDER — TRAMADOL HYDROCHLORIDE 50 MG/1
50 TABLET ORAL EVERY 6 HOURS PRN
Status: DISCONTINUED | OUTPATIENT
Start: 2022-01-04 | End: 2022-01-05 | Stop reason: HOSPADM

## 2022-01-04 RX ORDER — DIPHENHYDRAMINE HYDROCHLORIDE 50 MG/ML
12.5 INJECTION INTRAMUSCULAR; INTRAVENOUS
Status: DISCONTINUED | OUTPATIENT
Start: 2022-01-04 | End: 2022-01-04 | Stop reason: HOSPADM

## 2022-01-04 RX ORDER — ONDANSETRON 4 MG/1
4 TABLET, FILM COATED ORAL EVERY 6 HOURS PRN
Status: DISCONTINUED | OUTPATIENT
Start: 2022-01-04 | End: 2022-01-05 | Stop reason: HOSPADM

## 2022-01-04 RX ORDER — MIDAZOLAM HYDROCHLORIDE 1 MG/ML
1 INJECTION INTRAMUSCULAR; INTRAVENOUS
Status: DISCONTINUED | OUTPATIENT
Start: 2022-01-04 | End: 2022-01-04 | Stop reason: HOSPADM

## 2022-01-04 RX ORDER — DIPHENHYDRAMINE HCL 25 MG
25 CAPSULE ORAL
Status: DISCONTINUED | OUTPATIENT
Start: 2022-01-04 | End: 2022-01-04 | Stop reason: HOSPADM

## 2022-01-04 RX ORDER — SODIUM CHLORIDE, SODIUM LACTATE, POTASSIUM CHLORIDE, CALCIUM CHLORIDE 600; 310; 30; 20 MG/100ML; MG/100ML; MG/100ML; MG/100ML
100 INJECTION, SOLUTION INTRAVENOUS CONTINUOUS
Status: DISCONTINUED | OUTPATIENT
Start: 2022-01-04 | End: 2022-01-04 | Stop reason: HOSPADM

## 2022-01-04 RX ORDER — ACETAMINOPHEN 500 MG
1000 TABLET ORAL EVERY 8 HOURS SCHEDULED
Status: DISCONTINUED | OUTPATIENT
Start: 2022-01-05 | End: 2022-01-05 | Stop reason: HOSPADM

## 2022-01-04 RX ORDER — HEPARIN SODIUM 5000 [USP'U]/ML
5000 INJECTION, SOLUTION INTRAVENOUS; SUBCUTANEOUS
Status: COMPLETED | OUTPATIENT
Start: 2022-01-04 | End: 2022-01-04

## 2022-01-04 RX ORDER — EPHEDRINE SULFATE 50 MG/ML
5 INJECTION, SOLUTION INTRAVENOUS ONCE AS NEEDED
Status: DISCONTINUED | OUTPATIENT
Start: 2022-01-04 | End: 2022-01-04 | Stop reason: HOSPADM

## 2022-01-04 RX ORDER — DEXTROSE MONOHYDRATE 25 G/50ML
25 INJECTION, SOLUTION INTRAVENOUS
Status: DISCONTINUED | OUTPATIENT
Start: 2022-01-04 | End: 2022-01-05 | Stop reason: HOSPADM

## 2022-01-04 RX ORDER — DOCUSATE SODIUM 100 MG/1
100 CAPSULE, LIQUID FILLED ORAL 2 TIMES DAILY
Status: DISCONTINUED | OUTPATIENT
Start: 2022-01-04 | End: 2022-01-05 | Stop reason: HOSPADM

## 2022-01-04 RX ORDER — METOPROLOL SUCCINATE 25 MG/1
25 TABLET, EXTENDED RELEASE ORAL EVERY EVENING
Status: DISCONTINUED | OUTPATIENT
Start: 2022-01-04 | End: 2022-01-05 | Stop reason: HOSPADM

## 2022-01-04 RX ORDER — FLUMAZENIL 0.1 MG/ML
0.2 INJECTION INTRAVENOUS AS NEEDED
Status: DISCONTINUED | OUTPATIENT
Start: 2022-01-04 | End: 2022-01-04 | Stop reason: HOSPADM

## 2022-01-04 RX ORDER — NALOXONE HCL 0.4 MG/ML
0.2 VIAL (ML) INJECTION AS NEEDED
Status: DISCONTINUED | OUTPATIENT
Start: 2022-01-04 | End: 2022-01-04 | Stop reason: HOSPADM

## 2022-01-04 RX ORDER — SODIUM CHLORIDE, SODIUM LACTATE, POTASSIUM CHLORIDE, CALCIUM CHLORIDE 600; 310; 30; 20 MG/100ML; MG/100ML; MG/100ML; MG/100ML
9 INJECTION, SOLUTION INTRAVENOUS CONTINUOUS PRN
Status: DISCONTINUED | OUTPATIENT
Start: 2022-01-04 | End: 2022-01-04 | Stop reason: HOSPADM

## 2022-01-04 RX ORDER — ONDANSETRON 2 MG/ML
4 INJECTION INTRAMUSCULAR; INTRAVENOUS EVERY 6 HOURS PRN
Status: DISCONTINUED | OUTPATIENT
Start: 2022-01-04 | End: 2022-01-05 | Stop reason: HOSPADM

## 2022-01-04 RX ORDER — HYDROMORPHONE HYDROCHLORIDE 1 MG/ML
0.5 INJECTION, SOLUTION INTRAMUSCULAR; INTRAVENOUS; SUBCUTANEOUS
Status: DISCONTINUED | OUTPATIENT
Start: 2022-01-04 | End: 2022-01-04 | Stop reason: HOSPADM

## 2022-01-04 RX ORDER — ROCURONIUM BROMIDE 10 MG/ML
INJECTION, SOLUTION INTRAVENOUS AS NEEDED
Status: DISCONTINUED | OUTPATIENT
Start: 2022-01-04 | End: 2022-01-04 | Stop reason: SURG

## 2022-01-04 RX ORDER — MAGNESIUM HYDROXIDE 1200 MG/15ML
LIQUID ORAL AS NEEDED
Status: DISCONTINUED | OUTPATIENT
Start: 2022-01-04 | End: 2022-01-04 | Stop reason: HOSPADM

## 2022-01-04 RX ORDER — ONDANSETRON 2 MG/ML
4 INJECTION INTRAMUSCULAR; INTRAVENOUS ONCE AS NEEDED
Status: COMPLETED | OUTPATIENT
Start: 2022-01-04 | End: 2022-01-04

## 2022-01-04 RX ORDER — BUPIVACAINE HYDROCHLORIDE AND EPINEPHRINE 2.5; 5 MG/ML; UG/ML
INJECTION, SOLUTION EPIDURAL; INFILTRATION; INTRACAUDAL; PERINEURAL AS NEEDED
Status: DISCONTINUED | OUTPATIENT
Start: 2022-01-04 | End: 2022-01-04 | Stop reason: HOSPADM

## 2022-01-04 RX ORDER — RANOLAZINE 500 MG/1
500 TABLET, EXTENDED RELEASE ORAL EVERY 12 HOURS SCHEDULED
Status: DISCONTINUED | OUTPATIENT
Start: 2022-01-04 | End: 2022-01-05 | Stop reason: HOSPADM

## 2022-01-04 RX ORDER — PHENYLEPHRINE HYDROCHLORIDE 10 MG/ML
INJECTION INTRAVENOUS AS NEEDED
Status: DISCONTINUED | OUTPATIENT
Start: 2022-01-04 | End: 2022-01-04 | Stop reason: SURG

## 2022-01-04 RX ORDER — SUCCINYLCHOLINE CHLORIDE 20 MG/ML
INJECTION INTRAMUSCULAR; INTRAVENOUS AS NEEDED
Status: DISCONTINUED | OUTPATIENT
Start: 2022-01-04 | End: 2022-01-04 | Stop reason: SURG

## 2022-01-04 RX ORDER — HYDROCODONE BITARTRATE AND ACETAMINOPHEN 7.5; 325 MG/1; MG/1
1 TABLET ORAL ONCE AS NEEDED
Status: DISCONTINUED | OUTPATIENT
Start: 2022-01-04 | End: 2022-01-04 | Stop reason: HOSPADM

## 2022-01-04 RX ORDER — DEXMEDETOMIDINE HYDROCHLORIDE 100 UG/ML
INJECTION, SOLUTION INTRAVENOUS AS NEEDED
Status: DISCONTINUED | OUTPATIENT
Start: 2022-01-04 | End: 2022-01-04 | Stop reason: SURG

## 2022-01-04 RX ORDER — GABAPENTIN 100 MG/1
100 CAPSULE ORAL EVERY 8 HOURS SCHEDULED
Status: DISCONTINUED | OUTPATIENT
Start: 2022-01-05 | End: 2022-01-05 | Stop reason: HOSPADM

## 2022-01-04 RX ORDER — CEFAZOLIN SODIUM 1 G/50ML
1 INJECTION, SOLUTION INTRAVENOUS EVERY 8 HOURS
Status: COMPLETED | OUTPATIENT
Start: 2022-01-04 | End: 2022-01-05

## 2022-01-04 RX ORDER — FENTANYL CITRATE 50 UG/ML
INJECTION, SOLUTION INTRAMUSCULAR; INTRAVENOUS AS NEEDED
Status: DISCONTINUED | OUTPATIENT
Start: 2022-01-04 | End: 2022-01-04 | Stop reason: SURG

## 2022-01-04 RX ORDER — NICOTINE POLACRILEX 4 MG
15 LOZENGE BUCCAL
Status: DISCONTINUED | OUTPATIENT
Start: 2022-01-04 | End: 2022-01-05 | Stop reason: HOSPADM

## 2022-01-04 RX ORDER — CEFAZOLIN SODIUM 2 G/100ML
2 INJECTION, SOLUTION INTRAVENOUS ONCE
Status: COMPLETED | OUTPATIENT
Start: 2022-01-04 | End: 2022-01-04

## 2022-01-04 RX ORDER — FENTANYL CITRATE 50 UG/ML
50 INJECTION, SOLUTION INTRAMUSCULAR; INTRAVENOUS
Status: DISCONTINUED | OUTPATIENT
Start: 2022-01-04 | End: 2022-01-04 | Stop reason: HOSPADM

## 2022-01-04 RX ADMIN — DOCUSATE SODIUM 100 MG: 100 CAPSULE, LIQUID FILLED ORAL at 20:22

## 2022-01-04 RX ADMIN — DEXMEDETOMIDINE 10 MCG: 100 INJECTION, SOLUTION, CONCENTRATE INTRAVENOUS at 12:09

## 2022-01-04 RX ADMIN — HYDROMORPHONE HYDROCHLORIDE 0.5 MG: 1 INJECTION, SOLUTION INTRAMUSCULAR; INTRAVENOUS; SUBCUTANEOUS at 15:21

## 2022-01-04 RX ADMIN — METOPROLOL SUCCINATE 25 MG: 25 TABLET, EXTENDED RELEASE ORAL at 21:32

## 2022-01-04 RX ADMIN — FENTANYL CITRATE 50 MCG: 0.05 INJECTION, SOLUTION INTRAMUSCULAR; INTRAVENOUS at 12:12

## 2022-01-04 RX ADMIN — ONDANSETRON 4 MG: 2 INJECTION INTRAMUSCULAR; INTRAVENOUS at 15:43

## 2022-01-04 RX ADMIN — FENTANYL CITRATE 50 MCG: 50 INJECTION INTRAMUSCULAR; INTRAVENOUS at 14:54

## 2022-01-04 RX ADMIN — PROPOFOL 120 MG: 10 INJECTION, EMULSION INTRAVENOUS at 12:05

## 2022-01-04 RX ADMIN — SODIUM CHLORIDE, POTASSIUM CHLORIDE, SODIUM LACTATE AND CALCIUM CHLORIDE: 600; 310; 30; 20 INJECTION, SOLUTION INTRAVENOUS at 14:31

## 2022-01-04 RX ADMIN — PROPOFOL 30 MG: 10 INJECTION, EMULSION INTRAVENOUS at 13:10

## 2022-01-04 RX ADMIN — DEXMEDETOMIDINE 5 MCG: 100 INJECTION, SOLUTION, CONCENTRATE INTRAVENOUS at 12:45

## 2022-01-04 RX ADMIN — HYDROMORPHONE HYDROCHLORIDE 0.5 MG: 1 INJECTION, SOLUTION INTRAMUSCULAR; INTRAVENOUS; SUBCUTANEOUS at 15:04

## 2022-01-04 RX ADMIN — RANOLAZINE 500 MG: 500 TABLET, FILM COATED, EXTENDED RELEASE ORAL at 21:32

## 2022-01-04 RX ADMIN — FAMOTIDINE 20 MG: 10 INJECTION INTRAVENOUS at 11:48

## 2022-01-04 RX ADMIN — HEPARIN SODIUM 5000 UNITS: 5000 INJECTION INTRAVENOUS; SUBCUTANEOUS at 11:48

## 2022-01-04 RX ADMIN — CEFAZOLIN SODIUM 1 G: 1 INJECTION, SOLUTION INTRAVENOUS at 21:29

## 2022-01-04 RX ADMIN — FENTANYL CITRATE 50 MCG: 0.05 INJECTION, SOLUTION INTRAMUSCULAR; INTRAVENOUS at 12:01

## 2022-01-04 RX ADMIN — PHENYLEPHRINE HYDROCHLORIDE 100 MCG: 10 INJECTION, SOLUTION INTRAVENOUS at 13:25

## 2022-01-04 RX ADMIN — SUCCINYLCHOLINE CHLORIDE 120 MG: 20 INJECTION, SOLUTION INTRAMUSCULAR; INTRAVENOUS; PARENTERAL at 12:05

## 2022-01-04 RX ADMIN — DEXMEDETOMIDINE 5 MCG: 100 INJECTION, SOLUTION, CONCENTRATE INTRAVENOUS at 13:11

## 2022-01-04 RX ADMIN — HYDROMORPHONE HYDROCHLORIDE 0.5 MG: 1 INJECTION, SOLUTION INTRAMUSCULAR; INTRAVENOUS; SUBCUTANEOUS at 15:14

## 2022-01-04 RX ADMIN — HYDROMORPHONE HYDROCHLORIDE 0.5 MG: 1 INJECTION, SOLUTION INTRAMUSCULAR; INTRAVENOUS; SUBCUTANEOUS at 14:57

## 2022-01-04 RX ADMIN — CEFAZOLIN SODIUM 2 G: 2 INJECTION, SOLUTION INTRAVENOUS at 11:49

## 2022-01-04 RX ADMIN — LIDOCAINE HYDROCHLORIDE 60 MG: 20 INJECTION, SOLUTION INFILTRATION; PERINEURAL at 12:04

## 2022-01-04 RX ADMIN — PROPOFOL 50 MG: 10 INJECTION, EMULSION INTRAVENOUS at 12:12

## 2022-01-04 RX ADMIN — ACETAMINOPHEN 1000 MG: 500 TABLET ORAL at 23:33

## 2022-01-04 RX ADMIN — GABAPENTIN 100 MG: 100 CAPSULE ORAL at 23:33

## 2022-01-04 RX ADMIN — SODIUM CHLORIDE, POTASSIUM CHLORIDE, SODIUM LACTATE AND CALCIUM CHLORIDE 9 ML/HR: 600; 310; 30; 20 INJECTION, SOLUTION INTRAVENOUS at 11:47

## 2022-01-04 RX ADMIN — ROCURONIUM BROMIDE 5 MG: 50 INJECTION INTRAVENOUS at 12:04

## 2022-01-04 RX ADMIN — DROPERIDOL 0.62 MG: 2.5 INJECTION, SOLUTION INTRAMUSCULAR; INTRAVENOUS at 16:05

## 2022-01-04 NOTE — ANESTHESIA PREPROCEDURE EVALUATION
Anesthesia Evaluation     Patient summary reviewed                Airway   Mallampati: II  Neck ROM: full  No difficulty expected  Dental      Pulmonary    Cardiovascular     Rhythm: regular    (+) hypertension, past MI , CABG,       Neuro/Psych  GI/Hepatic/Renal/Endo    (+) obesity,   diabetes mellitus,     Musculoskeletal     Abdominal    Substance History      OB/GYN          Other      history of cancer remission      Other Comment: Hb 10.1                  Anesthesia Plan    ASA 3     general       Anesthetic plan, all risks, benefits, and alternatives have been provided, discussed and informed consent has been obtained with: patient.  Use of blood products discussed with patient .

## 2022-01-04 NOTE — ANESTHESIA POSTPROCEDURE EVALUATION
"Patient: Laxmi Marcus    Procedure Summary     Date: 01/04/22 Room / Location: [facility] OR 09 / [facility] MAIN OR    Anesthesia Start: 1158 Anesthesia Stop: 1437    Procedure: LEFT MODIFIED RADICAL MASTECTOMY (Left Breast) Diagnosis:       Malignant neoplasm of upper-outer quadrant of left breast in female, estrogen receptor negative (HCC)      (Malignant neoplasm of upper-outer quadrant of left breast in female, estrogen receptor negative (HCC) [C50.412, Z17.1])    Surgeons: Indigo Red MD Provider: Kiran Davis MD    Anesthesia Type: general ASA Status: 3          Anesthesia Type: general    Vitals  Vitals Value Taken Time   /68 01/04/22 1701   Temp 36.7 °C (98 °F) 01/04/22 1435   Pulse 79 01/04/22 1705   Resp 16 01/04/22 1630   SpO2 95 % 01/04/22 1705   Vitals shown include unvalidated device data.        Post Anesthesia Care and Evaluation    Patient location during evaluation: bedside  Patient participation: complete - patient participated  Level of consciousness: awake and alert  Pain management: adequate  Airway patency: patent  Anesthetic complications: No anesthetic complications    Cardiovascular status: acceptable  Respiratory status: acceptable  Hydration status: acceptable    Comments: /56   Pulse 73   Temp 36.7 °C (98 °F) (Oral)   Resp 16   Ht 165.1 cm (65\")   Wt 72.3 kg (159 lb 6.3 oz)   LMP  (LMP Unknown)   SpO2 99%   BMI 26.52 kg/m²       "

## 2022-01-04 NOTE — OP NOTE
Operative Report    Patient Name:  Laxmi Marcus  YOB: 1960    Date of Surgery:  1/4/2022    Pre-op Diagnosis:   Malignant neoplasm of upper-outer quadrant of left breast in female, estrogen receptor negative (HCC) [C50.412, Z17.1]       Post-Op Diagnosis:  Malignant neoplasm of upper-outer quadrant of left breast in female, estrogen receptor negative (HCC) [C50.412, Z17.1]    Procedure:  LEFT MODIFIED RADICAL MASTECTOMY      Staff:  Surgeon(s):  Indigo Red MD    Assistant: Ally Fraga CSA  was responsible for performing the following activities: Retraction, Suction, Irrigation and Suturing and their skilled assistance was necessary for the success of this case.     Anesthesia: General    Estimated Blood Loss: 30 mL    Implants:    Implant Name Type Inv. Item Serial No.  Lot No. LRB No. Used Action   CLIPAPPLR M/ ENDO LIGACLIP 9 3/8IN SM - [id number] Implant CLIPAPPLR M/ ENDO LIGACLIP 9 3/8IN   [employer]  [employer] J [employer] J . Left 1 Implanted   CLIPAPPLR M/ ENDO LIGACLIP9 3/8IN MD - [id number] Implant CLIPAPPLR M/ ENDO LIGACLIP9 3/8IN MD  [employer]  [employer] J . Left 1 Implanted   HEMOST ABS SURGICEL 2X14 - [id number] Implant HEMOST ABS SURGICEL 2X14  [employer]  [employer] . Left 1 Implanted       Specimen:          Specimens     ID Source Type Tests Collected By Collected At Frozen?    A Breast, Left Tissue · TISSUE PATHOLOGY EXAM   Indigo Red MD 1/4/22 1236 No    Description: left modified radical mastectomy, stitch @ 12 o clock     B Breast, Left Tissue · TISSUE PATHOLOGY EXAM   Indigo Red MD 1/4/22 5327 No    Description: additional posterior margin @ 2 o clock, 3x3 cm, stitch marks true margin             Findings: Dense scar tissue in the left axilla.    Complications: None     Indications: The patient is a 61 y.o. F with a diagnosis of left breast cancer: High grade, triple negative, invasive ductal carcinoma; clinical  T2N0, anatomic stage IIA, prognostic stage IIB. She completed neoadjuvant carbo/Taxol/Keytruda on 12/4/21. She has a past history of left breast cancer in 2006, pT1N1, sp left lumpectomy & SLNB with completion axillary dissection. This was followed by chemotherapy, radiation, and endocrine therapy. She is not a candidate for immediate reconstruction because of her comorbidities, however the plan is for her eventually to have delayed reconstruction. She presents today for surgery. The risks and benefits were discussed preoperatively and she understood and agreed to proceed.    Description of Procedure:   The patient was identified in the preoperative area and brought to the operating room and placed supine on the table. Patient verification was performed and general anesthesia was induced. The patient was prepped and draped in sterile fashion with the left arm prepped into the field. A time out was performed and all were in agreement. I confirmed that the patient had received preoperative antibiotics and chemical prophylaxis.      I marked an elliptical skin sparing incision, which encompassed the nipple-areola-complex, the prior upper outer quadrant lumpectomy scar, and the skin overlying her tumor. I made an incision through the skin and subcutaneous tissue. I created tissue flaps using electrocautery superiorly to the level of the clavicle, inferiorly to the inframammary fold, and medially to the sternal border. The lateral mastectomy flap was continued superiorly to the estimated level of the axillary vein and laterally to the latisimuss dorsi. Once the flaps were raised the breast was carefully removed from the pectoralis major and serratus using electrocautery and reflected laterally. The edge of the pectoralis major was identified and exposed. Dissection progressed under the pectoralis major extending to the pectoralis minor muscle. There was dense scar tissue in her left axilla from her prior axillary  dissection. The pectoral muscles were retracted superiorly and medially with a retractor being careful to preserve the median pectoral bundle. The level II manisha tissue deep to the pectoralis minor was swept down towards the specimen for inclusion. The axillary vein was then identified using careful blunt dissection and cleared of overlying fat. The first branch off the axillary vein was not identified and presumably this was ligated during her prior axillary dissection in 2006. There was some dense scar tissue along the axillary vein in the location where the first branch was likely previously located. The thoracodorsal nerve was then identified deep to this area and preserved. The manisha tissue was carefully dissected off the chest wall and the long thoracic nerve was identified and preserved. The axillary contents were dissected out from medial to lateral being sure to visualize and preserve the neurovascular structures. Once the dissection was complete the axillary contents in continuity with the left breast were removed. The specimen was marked with a short superior stitch (12:00).     Preoperative imaging demonstrated that the mass was close to the underlying pectoralis muscle, so I took an additional posterior margin at 2:00. This consisted of a 3 x 3 cm area of superficial pectoralis muscle fibers. The wound bed was copiously irrigated with sterile water and hemostasis acheived. The axillary vein, thoracodoral nerve bundle and long thoracic nerve were visualized and intact. Next a pectoralis muscle block was performed. The interfascial plane between pectoralis major and minor was identified and 10 mL of 0.25% bupivacaine with epinephrine was injected into this space. Next the fascia overlying the serratus anterior, under pectoralis minor, was identified and this space was injected with 10 mL of local anesthetic. The mastectomy flaps and pectoralis muscle were coated with 5 mL of liquid thrombin. Surgicel was  placed in the axillary wound bed. Two 19 Fr DEE drains were placed, one in the mastectomy cavity and one in the axilla. These were brought out through the lateral chest wall skin inferior to the incision, and secured with a 3-0 nylon. The deep dermis was closed with interrupted 3-0 vicryl suture. The skin was closed with 4-0 subcuticular running monocryl and covered with dermabond. A SorbaView dressing placed over the drain sites. The patient was placed in an Ace wrap which was stuff with fluffs. Counts were correct at the end of the case. The patient was awakened from anesthesia and taken to the PACU in stable condition.    Indigo Red MD     Date: 1/4/2022  Time: 14:40 EST     Wound Care: Petrolatum

## 2022-01-04 NOTE — ANESTHESIA PROCEDURE NOTES
Airway  Urgency: elective    Date/Time: 1/4/2022 12:06 PM  Airway not difficult    General Information and Staff    Patient location during procedure: OR  Anesthesiologist: Kiran Davis MD  CRNA: Tash Carmen CRNA    Indications and Patient Condition  Indications for airway management: airway protection    Preoxygenated: yes  MILS maintained throughout  Mask difficulty assessment: 0 - not attempted    Final Airway Details  Final airway type: endotracheal airway      Successful airway: ETT  Cuffed: yes   Successful intubation technique: direct laryngoscopy  Facilitating devices/methods: anterior pressure/BURP  Endotracheal tube insertion site: oral  Blade: Roxanne  Blade size: 3  ETT size (mm): 7.0  Cormack-Lehane Classification: grade IIa - partial view of glottis  Placement verified by: chest auscultation and capnometry   Cuff volume (mL): 6  Measured from: lips  ETT/EBT  to lips (cm): 21  Number of attempts at approach: 1  Assessment: lips, teeth, and gum same as pre-op and atraumatic intubation    Additional Comments  Atraumatic ET Tube placement.  Teeth as pre-op. BLEBS.  -ABD sounds.  +ET CO2.  Secured to face

## 2022-01-05 VITALS
DIASTOLIC BLOOD PRESSURE: 69 MMHG | HEART RATE: 74 BPM | WEIGHT: 159.39 LBS | TEMPERATURE: 98 F | OXYGEN SATURATION: 95 % | BODY MASS INDEX: 26.56 KG/M2 | HEIGHT: 65 IN | SYSTOLIC BLOOD PRESSURE: 104 MMHG | RESPIRATION RATE: 18 BRPM

## 2022-01-05 LAB — GLUCOSE BLDC GLUCOMTR-MCNC: 158 MG/DL (ref 70–130)

## 2022-01-05 PROCEDURE — 25010000002 CEFAZOLIN 1-4 GM/50ML-% SOLUTION: Performed by: SURGERY

## 2022-01-05 PROCEDURE — 82962 GLUCOSE BLOOD TEST: CPT

## 2022-01-05 PROCEDURE — G0378 HOSPITAL OBSERVATION PER HR: HCPCS

## 2022-01-05 PROCEDURE — 99024 POSTOP FOLLOW-UP VISIT: CPT | Performed by: SURGERY

## 2022-01-05 RX ORDER — PSEUDOEPHEDRINE HCL 30 MG
100 TABLET ORAL 2 TIMES DAILY
Qty: 20 CAPSULE | Refills: 0 | Status: SHIPPED | OUTPATIENT
Start: 2022-01-05 | End: 2022-01-15

## 2022-01-05 RX ORDER — TRAMADOL HYDROCHLORIDE 50 MG/1
50 TABLET ORAL EVERY 6 HOURS PRN
Qty: 16 TABLET | Refills: 0 | Status: SHIPPED | OUTPATIENT
Start: 2022-01-05 | End: 2022-01-11

## 2022-01-05 RX ORDER — GABAPENTIN 100 MG/1
100 CAPSULE ORAL EVERY 8 HOURS SCHEDULED
Qty: 42 CAPSULE | Refills: 0 | Status: SHIPPED | OUTPATIENT
Start: 2022-01-05 | End: 2022-08-26

## 2022-01-05 RX ORDER — INSULIN ASPART INJECTION 100 [IU]/ML
15 INJECTION, SOLUTION SUBCUTANEOUS
Status: DISCONTINUED | OUTPATIENT
Start: 2022-01-05 | End: 2022-01-05 | Stop reason: HOSPADM

## 2022-01-05 RX ORDER — ACETAMINOPHEN 500 MG
1000 TABLET ORAL EVERY 8 HOURS SCHEDULED
Qty: 30 TABLET | Refills: 0 | Status: SHIPPED | OUTPATIENT
Start: 2022-01-05 | End: 2022-01-10

## 2022-01-05 RX ADMIN — RANOLAZINE 500 MG: 500 TABLET, FILM COATED, EXTENDED RELEASE ORAL at 08:46

## 2022-01-05 RX ADMIN — CEFAZOLIN SODIUM 1 G: 1 INJECTION, SOLUTION INTRAVENOUS at 04:20

## 2022-01-05 RX ADMIN — INSULIN ASPART INJECTION 15 UNITS: 100 INJECTION, SOLUTION SUBCUTANEOUS at 08:46

## 2022-01-05 RX ADMIN — DOCUSATE SODIUM 100 MG: 100 CAPSULE, LIQUID FILLED ORAL at 08:46

## 2022-01-05 RX ADMIN — GABAPENTIN 100 MG: 100 CAPSULE ORAL at 06:25

## 2022-01-05 RX ADMIN — ACETAMINOPHEN 1000 MG: 500 TABLET ORAL at 06:25

## 2022-01-05 NOTE — PROGRESS NOTES
Case Management Discharge Note      Final Note: Discharged home. Sara Walker, CYNTHIA         Transportation Services  Private: Car    Final Discharge Disposition Code: 01 - home or self-care

## 2022-01-05 NOTE — PLAN OF CARE
Goal Outcome Evaluation:  VSS, left chest incision dry & intact with liquid skin adhesive, gauze & ace wrap, DEE x 2 draining serosangenous drainage, spouse return demonstrated drain care supplies given, voiding w/o difficulty, no c/o pain d/c home  Plan of Care Reviewed With: patient, spouse

## 2022-01-05 NOTE — PROGRESS NOTES
Breast Surgery Progress Note    Chief complaint: sp surgery    Subjective:  Ms. Marcus did well overnight. She has eaten, has been up out of bed, and her pain is controlled. Her breast DEE put out 60 mL since surgery, her axillary 15 mL, both serosanguineous.    Objective:  Vitals:    01/05/22 0500   BP: 123/69   Pulse: 75   Resp: 18   Temp: 98.3 °F (36.8 °C)   SpO2: 98%       Physical Exam:  General: NAD  HEENT: NCAT, EOMI  Lungs: respirations nonlabored  Chest: Left mastectomy incision is c/d/i with Dermabond, flaps healthy    Assessment:  61 y.o. female POD 1 left modified radical mastectomy.    Plan:  -Drain teaching.  -Discharge home later this morning.   -She already has a follow-up appointment with me scheduled.  -I will call her with pathology results.    Indigo Red MD

## 2022-01-05 NOTE — PLAN OF CARE
Goal Outcome Evaluation:  Plan of Care Reviewed With: patient        Progress: improving  Outcome Summary: VSS. Incision with steristips .  dressingCDI. Roberto x 2 with small output.  Scheduled tylenol and gabapentin given.  voiding freely.  possible discharge today

## 2022-01-05 NOTE — PROGRESS NOTES
Continued Stay Note  [facility]     Patient Name: Laxmi Marcus  MRN: [id number]  Today's Date: 1/5/2022    Admit Date: 1/4/2022     Discharge Plan     Row Name 01/05/22 0930       Plan    Plan Home no needs    Plan Comments Discharge order noted. Met with patient who confirmed DC plan is home. Stated spouse will assist as needed and will provide transportation at DC. Denies any needs/equipment.               Discharge Codes    No documentation.               Expected Discharge Date and Time     Expected Discharge Date Expected Discharge Time    Jan 5, 2022             Sara Walker RN

## 2022-01-10 ENCOUNTER — TELEPHONE (OUTPATIENT)
Dept: SURGERY | Facility: CLINIC | Age: 62
End: 2022-01-10

## 2022-01-10 NOTE — TELEPHONE ENCOUNTER
DEE Drain Totals:     OR Date: 1/4/22    Left axillary drain #1  Wed. 1/5- 42 cc   Thurs. 1/6- 60 cc  Fri. 1/7- 50 cc  Sat. 1/8- 60 cc  Sun. 1/9- 60 cc  Mon. 1/10-55 cc  Tues. 1/11- 45 cc  Wed. 1/12- 40 cc    Left breast drain #2  Wed. 1/5-14 cc  Thurs. 1/6-19 cc  Fri. 1/7- 24 cc  Sat. 1/8- 15 cc  Sun. 1/9- 12 cc   Mon. 1/10-10 cc  Tues. 1/11- 10 cc  Wed. 1/12-12 cc

## 2022-01-10 NOTE — TELEPHONE ENCOUNTER
I called Ms. Marcus to discuss her pathology report. She is recovering well from surgery. I will see her at her scheduled follow-up appointment.      Indigo Red MD

## 2022-01-13 ENCOUNTER — CLINICAL SUPPORT (OUTPATIENT)
Dept: SURGERY | Facility: CLINIC | Age: 62
End: 2022-01-13

## 2022-01-13 NOTE — PROGRESS NOTES
Patient came in today for Left Breast DEE Drain removal.     Patient tolerated removal very well. Dressed with 4x4, patient will keep clean and dry x 2 days.

## 2022-01-19 ENCOUNTER — OFFICE VISIT (OUTPATIENT)
Dept: SURGERY | Facility: CLINIC | Age: 62
End: 2022-01-19

## 2022-01-19 VITALS
RESPIRATION RATE: 17 BRPM | BODY MASS INDEX: 26.49 KG/M2 | DIASTOLIC BLOOD PRESSURE: 68 MMHG | SYSTOLIC BLOOD PRESSURE: 116 MMHG | WEIGHT: 159 LBS | OXYGEN SATURATION: 98 % | HEIGHT: 65 IN | HEART RATE: 76 BPM

## 2022-01-19 DIAGNOSIS — C50.412 MALIGNANT NEOPLASM OF UPPER-OUTER QUADRANT OF LEFT BREAST IN FEMALE, ESTROGEN RECEPTOR NEGATIVE: ICD-10-CM

## 2022-01-19 DIAGNOSIS — Z12.31 ENCOUNTER FOR SCREENING MAMMOGRAM FOR MALIGNANT NEOPLASM OF BREAST: ICD-10-CM

## 2022-01-19 DIAGNOSIS — Z48.89 POSTOPERATIVE VISIT: Primary | ICD-10-CM

## 2022-01-19 DIAGNOSIS — Z17.1 MALIGNANT NEOPLASM OF UPPER-OUTER QUADRANT OF LEFT BREAST IN FEMALE, ESTROGEN RECEPTOR NEGATIVE: ICD-10-CM

## 2022-01-19 PROCEDURE — 99024 POSTOP FOLLOW-UP VISIT: CPT | Performed by: SURGERY

## 2022-01-19 NOTE — PROGRESS NOTES
[facility]     Referring Provider: Will Johnston II, MD     Chief complaint: Postoperative visit      HPI:   8/20/21:  Ms. Laxmi Marcus is a 62 yo woman, seen at the request of Dr. Will Johnston, for a new diagnosis of left breast cancer. She has a past history of left breast cancer in 2006, pT1N1, intermediate grade, invasive ductal carcinoma, ER/TX positive, HER-2 negative. She underwent left lumpectomy & SLNB with completion axillary dissection, followed by reexcision for close margins. This was followed by AC x4 and 4 cycles of Taxol, then radiation. She was on tamoxifen for 2-1/2 years, then says she was switched to a different medication for a total of 5 years of endocrine therapy. Currently, I only have access to one note from the [facility] group in 2008. I have tried to get copies of the operative report and pathology report from [place], however these records are no longer held. The patient says that she thinks she had 9 lymph nodes removed.       She reports that she had a lot of issues with chemo, including severe bone pain requiring Dilaudid for months. Shortly after finishing all of her breast treatment, she had severe pancreatitis and shortly after that she became a brittle diabetic. She has a past history of CAD sp CABGx4 in 2017. Unfortunately it sounds like she had some sort of immediate stenosis, because she had a heart attack in early 2019 requiring stent placement x4. She is followed by cardiology and on Brilinta.     In May of this year, she noticed a painful left breast lump near the prior lumpectomy scar. She does not think it has changed at all since she first noticed. Her work-up is detailed in the oncologic history below. She denies any family history of breast or ovarian cancer.      10/29/21:  After her last visit, she underwent a PET scan which was negative for distant disease and her genetic testing was negative for mutation. She started neoadjuvant therapy with  carbo/Taxol/Keytruda on 9/7/21. Her second dose of Taxol was temporarily held because she developed Covid, however she received her seventh dose yesterday. She reports that the mass has gotten much smaller and it is now difficult for her to find.    12/10/21:  She completed her last dose of Taxol on 12/4/21. After her last visit, she saw Dr. Araya. Because of her diabetes and history of a CABG, he recommended delayed reconstruction and the patient is okay with this.    1/19/22, Interval History:  She underwent left modified radical mastectomy on 1/4/22. Her pathology was sent out for a second opinion and the final report is still pending. She has been recovering well and has no complaints. She came into the office last week to have her breast drain removed. The axillary drain has had out <20 mL/day for the past few days.       Oncology/Hematology History Overview Note   06/05/2017, Bilateral Diagnostic MMG with Jaswant & Bilateral Breast US ([facility]):  MMG:  Scattered areas pf fibroglandular density.   1. There is a stable post-surgical scar with associated trabecular thickening seen in the upper outer region of the left breast. Post-surgical scar is in an area of prior lumpectomy. Findings are consistent with post-surgical and post-radiation therapy changes.  2. The patient indicates recent onset of pain in the lateral and axillary tail region of the left breast. The symptomatic region is clearly marked and there is no suspicious finding in the region of clinical concern.  3. There is a stable small intramammary lymph node measuring 7 millimeters seen in the posterior one-third 1:30 o'clock region of the left breast located 8 centimeters from the nipple. There has been no significant change from the prior exam(s).  6. There is a stable area of asymmetric breast tissue seen in the superior region of the right breast.  US:  1. There is no evidence of any solid mass or abnormal cystic elements.  2. There is no evidence  of any solid mass or abnormal cystic elements.  3. Ultrasound is suggestive of an oval small intramammary lymph node measuring 7 millimeters.  4. There is a small lymph node measuring 6 millimeters in the left axilla.  5. There is an oval small simple cyst with circumscribed margins measuring 4 millimeters seen in the right breast at 3 o'clock.  6. There is no evidence of any solid mass or abnormal cystic elements.  BI-RADS 2: Benign.    05/2021: Pt noticed a right breast lump.     Malignant neoplasm of upper-outer quadrant of left breast in female, estrogen receptor negative (HCC)   7/26/2021 Initial Diagnosis    Malignant neoplasm of upper-outer quadrant of left breast in female, estrogen receptor negative (CMS/HCC)     7/27/2021 Imaging    Bilateral Diagnostic MMG with Jaswant & Left Breast US ([facility]):  MMG:  Scattered areas of fibroglandular density.  1. There is a new oval mass measuring 20 mm with indistinct margins seen in the 1:30 o'clock region of the left breast located 4 centimeters from the nipple. This correlates to the palpable mass.   2. There is a stable post-surgical scar seen in the posterior one-third region of the left breast at 2 o'clock. Post-surgical scar is in an area of prior lumpectomy. Findings are consistent with post-surgical and post-radiation therapy changes.   In the right breast, no suspicious masses, significant calcifications or other abnormalities are seen.  US:  1. Ultrasound demonstrates a new oval parallel solid mass with poorly defined margins measuring 16 x 14 x 15 mm seen in the 1:30 o'clock region of the left breast located 4 centimeters from the nipple. This correlates with the mammographic finding.  3. There is an oval lymph node measuring 9 x 6 x 8 mm in the left axilla. This has an abnormal appearance with little hilum.  BI-RADS 4B: Suspicious.     8/6/2021 Biopsy    Left Breast & Axilla, US-Guided Biopsies ([facility]):    1. Breast, Left 1:30 O'clock, Core Needle  Biopsy:  Invasive mammary carcinoma, no special type (invasive ductal carcinoma), combined histologic grade 3 (tubule formation 3, nuclear pleomorphism 3, mitotic activity 2), 12 mm in greatest dimension, present in 5 of 15 cores.   Focal ductal carcinoma in situ (DCIS), high nuclear grade, solid pattern, with associated focal necrosis 1.5 mm in greatest dimension, present in 3 of 15 cores.   Microcalcifications associated with invasive carcinoma, DCIS, and benign mammary ducts/lobules.  -Minicork clip.     ER negative (0%)  UT negative (0%)  Her2 negative (IHC 1+)  Ki-67 94.88%    2. Lymph Node, Left Axilla, Core Needle Biopsy:  Benign lymph node tissue with reactive follicular hyperplasia.   No definitive evidence of carcinoma (AE1/AE3).   No definitive evidence of lymphoma (CD3, CD20, CD21, and CD30).  -Hydromark clip. Concordant.     8/18/2021 Imaging    Bilateral Breast MRI ([facility]):  In the posterior one-third lateral aspect of the left breast centered at the 3-o'clock position on the order of 6.5 cm from the nipple there is a focal signal void that is seen within the superior aspect of an irregular heterogenous masslike region that measures on the order of 2.4 cm in the superior inferior dimension, 2.5 cm in the anterior posterior dimension and 2.7 cm in the medial lateral dimension. Some of the internal character of the lesion demonstrates absence of enhancement. This represents the biopsy-proven site of malignancy with the mini  cork-shaped metallic clip in the superomedial portion of the lesion. Some areas of increased T1-weighted signal intensity are noted in the region and are consistent with areas of postbiopsy related hemorrhage.   There is postsurgical change seen in the left breast in this region associated with prior breast conservation therapy. No other areas of abnormal enhancement or morphology are seen within the left breast. I see no evidence for abnormal left breast skin, nipple or chest wall  enhancement and there is no evidence for left axillary adenopathy. The patient is status post median sternotomy which limits the evaluation of the internal mammary chain for internal mammary adenopathy. Mild diffuse left breast skin thickening associated with prior breast conservation therapy is noted, but there is no abnormal enhancement of the skin.    There are no areas of abnormal enhancement or morphology in the right breast. Scattered stippled foci of variable enhancement are seen throughout the right breast, none of which appear dominant or clumped or associated with any suspicious mammographic features. I see no evidence for abnormal right breast skin, nipple or chest wall enhancement and there is no evidence for right axillary or internal mammary chain adenopathy. Visualization of the right internal mammary chain is not prohibited by the artifact from the patient's median sternotomy wires.  BI-RADS 6: Known malignancy.     8/20/2021 Genetic Testing    [employer] Common Hereditary Cancers Panel (47 genes):    Negative     9/3/2021 Imaging    PET CT ([place]):  1. FDG avid left upper outer breast mass compatible with primary breast malignancy.  2. No evidence of metastatic disease.  3. Subcutaneous infiltrative changes in the ventral lower abdomen with low-level FDG uptake which may represent injection sites, panniculitis, or prior port sites.  4. Air-fluid level within the urinary bladder which may represent cystitis in the absence of recent catheterization.     9/13/2021 - 9/13/2021 Chemotherapy    OP BREAST Pembrolizumab 200 mg / PACLItaxel / CARBOplatin AUC=5     1/4/2022 Surgery    Left modified radical mastectomy  Preliminary Diagnosis  1. Left Breast, Modified Radical Mastectomy S/P Neoadjuvant Chemotherapy (332 grams):                A. FIBROTIC TUMOR BED WITH FOCAL RESIDUAL ATYPICAL CELL GROUPS (SEE COMMENT).                B. Negative for lymphovascular space invasion.  C. Clip and biopsy site changes  are present within tumor bed.    D. Marked treatment effect with associated reactive epithelial changes and squamous metaplasia,        consistent with prior procedure-related changes.    E. Atypical lobular hyperplasia.   F. Background breast parenchyma with intraductal papilloma involved by usual ductal hyperplasia,       fibroadenomatoid change, and columnar cell change.    G. Unremarkable skin and nipple.    H. Eleven lymph nodes, negative for carcinoma (0/11):                1. Clip and biopsy site changes present in a single lymph node.  2. All lymph nodes are negative for treatment effect.    3. Separate collection of nerves, suggestive of traumatic neuroma.      2. Left Breast, Additional Tissue Margin at 2:00, Re-excision:   A. Benign skeletal muscle with no significant histopathologic changes.         Review of Systems:  See interval history.       Medications:    Current Outpatient Medications:   •  ACCU-CHEK FASTCLIX LANCETS [facility] [place], TO CHECK 5 TIMES DAILY, Disp: 500 each, Rfl: 2  •  ACCU-CHEK GUIDE test strip, 1 each by Other route 5 (Five) Times a Day. Use as instructed, Disp: 450 each, Rfl: 1  •  acetaminophen (TYLENOL) 500 MG tablet, Take 1,000 mg by mouth Every 6 (Six) Hours As Needed for Mild Pain ., Disp: , Rfl:   •  Ascorbic Acid (VITAMIN C PO), Take 1 tablet by mouth Daily., Disp: , Rfl:   •  aspirin 81 MG tablet, Take 81 mg by mouth Daily., Disp: , Rfl:   •  Blood Glucose Monitoring Suppl (ACCU-CHEK GUIDE) w/Device kit, 1 each 5 (Five) Times a Day., Disp: 1 kit, Rfl: 1  •  cetirizine (ZyrTEC) 10 MG tablet, Take 10 mg by mouth daily., Disp: , Rfl:   •  cholecalciferol (Cholecalciferol) 25 MCG (1000 UT) tablet, Take 1,000 Units by mouth Daily., Disp: , Rfl:   •  gabapentin (NEURONTIN) 100 MG capsule, Take 1 capsule by mouth Every 8 (Eight) Hours for 14 days., Disp: 42 capsule, Rfl: 0  •  glucose blood (ACCU-CHEK SMARTVIEW) test strip, Use as instructed 5 TIMES DAILY, Disp: 450 each, Rfl: 2  •   Insulin Aspart, w/Niacinamide, (FIASP FLEXTOUCH SC), Inject  under the skin into the appropriate area as directed. 15-25 units 4 times daily, Disp: , Rfl:   •  Insulin Degludec (Tresiba FlexTouch) 200 UNIT/ML solution pen-injector pen injection, INJECT 26 UNITS INTO THE SKIN EVERY MORNING FOR 90 DAYS., Disp: , Rfl:   •  Insulin Pen Needle 32G X 6 MM misc, INJECT 1 EACH INTO THE SKIN 4 (FOUR) TIMES DAILY., Disp: , Rfl:   •  metoprolol succinate XL (TOPROL-XL) 50 MG 24 hr tablet, Take 25 mg by mouth Every Evening., Disp: , Rfl:   •  MULTIPLE VIT-MIN-CALCIUM-FA PO, Take 1 tablet by mouth Daily., Disp: , Rfl:   •  ranolazine (RANEXA) 500 MG 12 hr tablet, TAKE 1 TABLET BY MOUTH 2 (TWO) TIMES DAILY., Disp: , Rfl:   •  rosuvastatin (CRESTOR) 20 MG tablet, Take 20 mg by mouth Every Night., Disp: , Rfl:   •  ticagrelor (Brilinta) 60 MG tablet tablet, Take 60 mg by mouth 2 (Two) Times a Day., Disp: , Rfl:   •  zinc sulfate (ZINCATE) 220 (50 Zn) MG capsule, Take 220 mg by mouth Daily., Disp: , Rfl:       Allergies   Allergen Reactions   • Dulaglutide Nausea And Vomiting     Another name for trulicity   • Sulfa Antibiotics Other (See Comments)     Abdominal Pain and NauseA   • Uloric [Febuxostat] Other (See Comments)     Mouth sores   • Latex Itching       Family History   Problem Relation Age of Onset   • Heart disease Mother    • Coronary artery disease Mother    • Heart disease Brother    • Heart disease Maternal Uncle    • Prostate cancer Maternal Uncle    • Heart disease Maternal Grandfather    • Malig Hyperthermia Neg Hx        PHYSICAL EXAMINATION:   Vitals:    01/19/22 0824   BP: 116/68   Pulse: 76   Resp: 17   SpO2: 98%     ECOG 0 - Asymptomatic  General: NAD, well appearing  Psych: a&o x 3, normal mood and affect  Eyes: EOMI, no scleral icterus  ENMT: neck supple without masses or thyromegaly, mucus membranes moist  MSK: normal gait, normal ROM in bilateral shoulders  Lymph nodes: She still has prior left axillary  scar, no axillary swelling  Breast: moderate size, grade 3 ptosis, sternal scar  Right: deferred.  Left: Sp mastectomy with well-healing incision with Dermabond intact. Flaps healthy.       Assessment:  62 y.o. F with a diagnosis of left breast cancer: High grade, triple negative, invasive ductal carcinoma; clinical T2N0, anatomic stage IIA, prognostic stage IIB. She completed neoadjuvant carbo/Taxol/Keytruda on 12/4/21. She underwent left modified radical mastectomy on 1/4/22.  -She has a past history of left breast cancer in 2006, pT1N1, intermediate grade, invasive ductal carcinoma, ER/ME positive, HER-2 negative. She underwent left lumpectomy & SLNB with completion axillary dissection, followed by reexcision for close margins. This was followed by AC x4 and 4 cycles of Taxol, then radiation and endocrine therapy.     Plan:  -Axillary drain removed today.  -Follow-up with OT/LE clinic.  -Continue follow-up with Dr. Sandoval.  -Continue follow-up with Dr. Araya for eventual delayed reconstruction.  -Follow-up in 7/2022 with right mammogram with NP.    Indigo Red MD      CC:  MD Hollis Brennan II, MD Vipul Panchal, MD Blakely Kute, MD

## 2022-01-24 ENCOUNTER — APPOINTMENT (OUTPATIENT)
Dept: OCCUPATIONAL THERAPY | Facility: HOSPITAL | Age: 62
End: 2022-01-24

## 2022-02-01 ENCOUNTER — HOSPITAL ENCOUNTER (OUTPATIENT)
Dept: OCCUPATIONAL THERAPY | Facility: HOSPITAL | Age: 62
Discharge: HOME | End: 2022-02-01
Admitting: SURGERY

## 2022-02-01 DIAGNOSIS — C50.412 MALIGNANT NEOPLASM OF UPPER-OUTER QUADRANT OF LEFT BREAST IN FEMALE, ESTROGEN RECEPTOR NEGATIVE: ICD-10-CM

## 2022-02-01 DIAGNOSIS — Z17.1 MALIGNANT NEOPLASM OF UPPER-OUTER QUADRANT OF LEFT BREAST IN FEMALE, ESTROGEN RECEPTOR NEGATIVE: ICD-10-CM

## 2022-02-01 DIAGNOSIS — Z91.89 AT RISK FOR LYMPHEDEMA: Primary | ICD-10-CM

## 2022-02-01 PROCEDURE — 97535 SELF CARE MNGMENT TRAINING: CPT

## 2022-02-01 NOTE — THERAPY RE-EVALUATION
Outpatient Occupational Therapy Lymphedema Re-Assessment  [facility]     Patient Name: Laxmi Marcus  : 1960  MRN: [id number]  Today's Date: 2022      Visit Date: 2022  Patient and therapist both masked. Therapist with face shield and gloves.  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 (HCC)   • Hyperinsulinism   • Hyperlipidemia associated with type 2 diabetes mellitus (HCC)   • Malignant neoplasm of upper-outer quadrant of left breast in female, estrogen receptor negative (HCC)   • Fitting and adjustment of vascular catheter        Past Medical History:   Diagnosis Date   • Awareness under anesthesia    • Breast cancer (HCC)     Left   • CKD (chronic kidney disease)     stage 3   • Coronary artery disease    • COVID-19 2021   • Diabetes mellitus type 2 with complications, uncontrolled (HCC)    • Frequent UTI    • Gout    • Heart murmur    • History of cancer chemotherapy 2021    LAST DOSE   • History of pneumonia    • History of radiation therapy    • Hyperlipidemia    • Hypertension    • NSTEMI (non-ST elevated myocardial infarction) (HCC)    • Obesity    • Sinus congestion    • Slow to wake up after anesthesia    • Vitamin B12 deficiency         Past Surgical History:   Procedure Laterality Date   • APPENDECTOMY     • BREAST LUMPECTOMY     • BREAST LUMPECTOMY WITH SENTINEL NODE BIOPSY AND AXILLARY NODE DISSECTION     • CARDIAC CATHETERIZATION  2019    new de nova Ostial Ramus 95% stenosis, severe multivessel diabetic coronary vasculopathy with  small vessels, occluded 3 of 4 bypass conduits, LIMA to LAD patent, all 3 vein grafts are occluded, PTCA/EMMANUEL to ostial Ramus 95% stenosis, patent OM1 and proximal and mid RCA stents x 3 from 2/2019, preserved LVSF, normal LVEDP, no significant AS or MR     • CHOLECYSTECTOMY  2006   • CORONARY ARTERY BYPASS GRAFT  2017    x4 with LIMA to mLAD, SVG to diagonal, SVG to OM, SVG to distal PDA     • CORONARY STENT PLACEMENT  12/2019    Drug eluting stent placement--2.0 x 26 mm Resolute [place] EMMANUEL to ostial Ramus     • CORONARY STENT PLACEMENT  02/2019    Drug eluting stent--2.25 x 38 mm and 2.5 x 15 mm Resolute [place] EMMANUEL to proximal mid RCA, 2.0 x 26 mm Resolute [place] EMMANUEL to mid circumflex/proximal OM1   • DENTAL PROCEDURE     • HYSTERECTOMY  2009   • MASTECTOMY Left 1/4/2022    Procedure: LEFT MODIFIED RADICAL MASTECTOMY;  Surgeon: Indigo Red MD;  Location: [facility] OR;  Service: General;  Laterality: Left;   • TONSILLECTOMY  1961    age 1   • VENOUS ACCESS DEVICE (PORT) INSERTION Right 10/1/2021    Procedure: INSERTION VENOUS ACCESS DEVICE;  Surgeon: Indigo Red MD;  [facility] [facility] [place] [facility] [place];  Service: General;  Laterality: Right;         Visit Dx:     ICD-10-CM ICD-9-CM   1. At risk for lymphedema  Z91.89 V49.89   2. Malignant neoplasm of upper-outer quadrant of left breast in female, estrogen receptor negative (HCC)  C50.412 174.4    Z17.1 V86.1            Lymphedema     Row Name 02/01/22 1800             Subjective Pain    Able to rate subjective pain? yes  -RE      Pre-Treatment Pain Level 0  -RE              General ROM    GENERAL ROM COMMENTS mild pec tightness and soft tissue tightness in axilla  -RE            User Key  (r) = Recorded By, (t) = Taken By, (c) = Cosigned By    Initials Name Provider Type    Rachel Beverly OTR Occupational Therapist                        Therapy Education  Education Details: Access Code: [email or web address]: [email or web address]  02/01/2022Prepared by: Rachel JensenisesSupine Chest Stretch with Elbows Bent - 2 x daily - 7 x weekly - 2 sets - 10 repsSupine Shoulder Abduction AROM - 2 x daily - 7 x weekly - 2 sets - 10 repsShoulder External Rotation and Scapular Retraction - 2 x daily - 7 x weekly - 2 sets - 10 repsSupine Shoulder Flexion Extension Full Range AROM - 1 x daily - 7 x weekly - 3 sets - 10 reps  Given: HEP  Program: New  How Provided: Verbal  Provided to: Patient  Level of Understanding: Teach back education performed, Verbalized, Demonstrated  47515 - OT Self Care/Mgmt Minutes: 45         OT Goals     Row Name 02/01/22 1800          OT Short Term Goals    STG 1 Patient will demonstrate proper awareness of “What is Lymphedema?” and “Healthy Habits” for improved prevention, management, care of symptoms and ease of transition to self-care of condition.  -RE     STG 1 Progress Partially Met  -RE     STG 2 Pt will demonstrate understanding of use of compression sleeve for edema prevention, exercise and air travel.  -RE     STG 2 Progress Met  -RE     STG 3 Patient independent and compliant with initial home exercise program focused on gentle AROM and stretching to improve AROM to pre-surgical level.  -RE     STG 3 Progress New  -RE            Long Term Goals    LTG 1 Patient will participate in bioimpedance scans every 3 months as a method of early detection of lymphedema to allow for early intervention.  -RE     LTG 1 Progress Partially Met; Ongoing  -RE     LTG 2 Patient's bioimpedance score to remain below 6.5 for decreased risk of stage II lymphedema.  -RE     LTG 2 Progress Met; Ongoing  -RE            Time Calculation    OT Goal Re-Cert Due Date 03/23/22  -RE           User Key  (r) = Recorded By, (t) = Taken By, (c) = Cosigned By    Initials Name Provider Type    RE Rachel Toney, OTR Occupational Therapist                 OT Assessment/Plan     Row Name 02/01/22 1812          OT Assessment    Impairments Impaired lymphatic  circulation  -RE     Assessment Comments Patient presents for post operative reassessment. Her last drain was removed 1/19/2022. Due some unexpected bills lately she prefers to wait another 2 weeks for her bioimpedance measurement.  -RE     OT Diagnosis at risk for lymphedema  -RE     OT Rehab Potential Good  -RE     Patient/caregiver participated in establishment of treatment plan and goals Yes  -RE     Patient would benefit from skilled therapy intervention Yes  -RE            OT Plan    OT Frequency Other (comment)  -RE     Predicted Duration of Therapy Intervention (OT) follow up bioimpedance in 2 weeks then follow every every 3 months  -RE     Planned CPT's? OT THER PROC EA 15 MIN: [id number]; OT SELF CARE/MGMT/TRAIN 15 MIN: 65983; OT MANUAL THERAPY EA 15 MIN: 21545; OT BIS XTRACELL FLUID ANALYSIS: 81546  -RE     OT Plan Comments follow up in 2 weeks  -RE           User Key  (r) = Recorded By, (t) = Taken By, (c) = Cosigned By    Initials Name Provider Type    RE Rachel Toney OTR Occupational Therapist                          Time Calculation:   OT Start Time: 1125  OT Stop Time: 1210  OT Time Calculation (min): 45 min  Total Timed Code Minutes- OT: 45 minute(s)  Timed Charges  44275 - OT Self Care/Mgmt Minutes: 45  Total Minutes  Timed Charges Total Minutes: 45   Total Minutes: 45     Therapy Charges for Today     Code Description Service Date Service Provider Modifiers Qty    [id number]  OT SELF CARE/MGMT/TRAIN EA 15 MIN 2/1/2022 Rachel Toney OTR GO 3                    CATIE Schumacher  2/1/2022

## 2022-02-15 ENCOUNTER — HOSPITAL ENCOUNTER (OUTPATIENT)
Dept: OCCUPATIONAL THERAPY | Facility: HOSPITAL | Age: 62
Setting detail: THERAPIES SERIES
Discharge: HOME | End: 2022-02-15

## 2022-02-15 DIAGNOSIS — Z17.1 MALIGNANT NEOPLASM OF UPPER-OUTER QUADRANT OF LEFT BREAST IN FEMALE, ESTROGEN RECEPTOR NEGATIVE: ICD-10-CM

## 2022-02-15 DIAGNOSIS — C50.412 MALIGNANT NEOPLASM OF UPPER-OUTER QUADRANT OF LEFT BREAST IN FEMALE, ESTROGEN RECEPTOR NEGATIVE: ICD-10-CM

## 2022-02-15 DIAGNOSIS — Z91.89 AT RISK FOR LYMPHEDEMA: Primary | ICD-10-CM

## 2022-02-15 PROCEDURE — 97535 SELF CARE MNGMENT TRAINING: CPT

## 2022-02-15 PROCEDURE — 93702 BIS XTRACELL FLUID ANALYSIS: CPT

## 2022-02-15 NOTE — THERAPY TREATMENT NOTE
Outpatient Occupational Therapy Lymphedema Progress Note  [facility]     Patient Name: Laxmi Marcus  : 1960  MRN: [id number]  Today's Date: 2/15/2022      Visit Date: 02/15/2022  Patient and therapist both masked. Therapist with face shield and gloves.  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 (HCC)   • Hyperinsulinism   • Hyperlipidemia associated with type 2 diabetes mellitus (HCC)   • Malignant neoplasm of upper-outer quadrant of left breast in female, estrogen receptor negative (HCC)   • Fitting and adjustment of vascular catheter        Past Medical History:   Diagnosis Date   • Awareness under anesthesia    • Breast cancer (HCC)     Left   • CKD (chronic kidney disease)     stage 3   • Coronary artery disease    • COVID-19 2021   • Diabetes mellitus type 2 with complications, uncontrolled (HCC)    • Frequent UTI    • Gout    • Heart murmur    • History of cancer chemotherapy 2021    LAST DOSE   • History of pneumonia    • History of radiation therapy    • Hyperlipidemia    • Hypertension    • NSTEMI (non-ST elevated myocardial infarction) (HCC)    • Obesity    • Sinus congestion    • Slow to wake up after anesthesia    • Vitamin B12 deficiency         Past Surgical History:   Procedure Laterality Date   • APPENDECTOMY     • BREAST LUMPECTOMY     • BREAST LUMPECTOMY WITH SENTINEL NODE BIOPSY AND AXILLARY NODE DISSECTION     • CARDIAC CATHETERIZATION  2019    new de nova Ostial Ramus 95% stenosis, severe multivessel diabetic coronary vasculopathy with  small vessels, occluded 3 of 4 bypass conduits, LIMA to LAD patent, all 3 vein grafts are occluded, PTCA/EMMANUEL to ostial Ramus 95% stenosis, patent OM1 and proximal and mid RCA stents x 3 from 2/2019, preserved LVSF, normal LVEDP, no significant AS or MR     • CHOLECYSTECTOMY  2006   • CORONARY ARTERY BYPASS GRAFT  2017    x4 with LIMA to mLAD, SVG to diagonal, SVG to OM, SVG to distal PDA     • CORONARY STENT PLACEMENT  12/2019    Drug eluting stent placement--2.0 x 26 mm Resolute [place] EMMANUEL to ostial Ramus     • CORONARY STENT PLACEMENT  02/2019    Drug eluting stent--2.25 x 38 mm and 2.5 x 15 mm Resolute [place] EMMANUEL to proximal mid RCA, 2.0 x 26 mm Resolute [place] EMMANUEL to mid circumflex/proximal OM1   • DENTAL PROCEDURE     • HYSTERECTOMY  2009   • MASTECTOMY Left 1/4/2022    Procedure: LEFT MODIFIED RADICAL MASTECTOMY;  Surgeon: Indigo Red MD;  Location: [facility] OR;  Service: General;  Laterality: Left;   • TONSILLECTOMY  1961    age 1   • VENOUS ACCESS DEVICE (PORT) INSERTION Right 10/1/2021    Procedure: INSERTION VENOUS ACCESS DEVICE;  Surgeon: Indigo Red MD;  Location: [facility] OR [facility];  Service: General;  Laterality: Right;         Visit Dx:      ICD-10-CM ICD-9-CM   1. At risk for lymphedema  Z91.89 V49.89   2. Malignant neoplasm of upper-outer quadrant of left breast in female, estrogen receptor negative (HCC)  C50.412 174.4    Z17.1 V86.1        Lymphedema     Row Name 02/15/22 1000             Subjective Pain    Able to rate subjective pain? yes  -RE      Pre-Treatment Pain Level 0  -RE      Post-Treatment Pain Level 0  -RE              Subjective Comments    Subjective Comments no new c/o. exercises are going well.  -RE              General ROM    GENERAL ROM COMMENTS B UE AROM WNL  -RE              L-Dex Bioimpedence Screening    L-Dex Measurement Extremity LUE  -RE      L-Dex Patient Position Standing  -RE      L-Dex UE Dominate Side Right  -RE      L-Dex UE At Risk Side Left  -RE       L-Dex UE Pre Surgical Value Yes  -RE      L-Dex UE Score 4.3  -RE      L-Dex UE Baseline Score 2.5  -RE      L-Dex UE Value Change 1.8  -RE      L-Dex UE Comment WNL  -RE            User Key  (r) = Recorded By, (t) = Taken By, (c) = Cosigned By    Initials Name Provider Type    Rachel Beverly, OTR Occupational Therapist                         OT Assessment/Plan     Row Name 02/15/22 1512          OT Assessment    Assessment Comments Patient returns for bioimpedance assessment. She is now 6 weeks post op.  Her last measurement was preoperatively.  L-Dex value today is 4.3 which is WNL. She has no new c/o.  Today we reviewed sleeve use. I recommended follow up in 3 months.  -RE            OT Plan    OT Plan Comments Bioimpedance in 3 months.  -RE           User Key  (r) = Recorded By, (t) = Taken By, (c) = Cosigned By    Initials Name Provider Type    Rachel Beverly OTR Occupational Therapist                 The patient had a post op  SOZO measurement which I reviewed today. The score is WNL, see in EMR. Bioimpedance spectroscopy helps identify the onset of lymphedema in an arm or leg before patients experience noticeable swelling. Research has shown that the early detection of lymphedema using L-Dex combined with treatment can reduce progression to chronic lymphedema by 95% in breast cancer patients. Whenever possible, patients are tested for baseline L-Dex score before cancer treatment begins and then are reassessed during regular follow-up visits using the SOZO device. Otherwise, this can be started postoperatively and continued during regular follow-up visits. If the patient's L-Dex score increases above normal levels, that is a sign that lymphedema is developing and a referral is made to physical therapy for further evaluation and early compression treatment. Lymphedema assessment with the SOZO L-Dex score is recommended to be done every 3 months for the first 3 years and then every 6 months for years 4 and  5 followed by annually afterwards.       OT Goals     Row Name 02/15/22 1000          OT Short Term Goals    STG Date to Achieve 03/01/22  -RE     STG 1 Patient will demonstrate proper awareness of “What is Lymphedema?” and “Healthy Habits” for improved prevention, management, care of symptoms and ease of transition to self-care of condition.  -RE     STG 1 Progress Partially Met; Met  -RE     STG 2 Pt will demonstrate understanding of use of compression sleeve for edema prevention, exercise and air travel.  -RE     STG 2 Progress Met  -RE     STG 3 Patient independent and compliant with initial home exercise program focused on gentle AROM and stretching to improve AROM to pre-surgical level.  -RE     STG 3 Progress Met  -RE            Long Term Goals    LTG Date to Achieve 03/15/22  -RE     LTG 1 Patient will participate in bioimpedance scans every 3 months as a method of early detection of lymphedema to allow for early intervention.  -RE     LTG 1 Progress Ongoing; Met  -RE     LTG 2 Patient's bioimpedance score to remain below 6.5 for decreased risk of stage II lymphedema.  -RE     LTG 2 Progress Met; Ongoing  -RE            Time Calculation    OT Goal Re-Cert Due Date 03/23/22  -RE           User Key  (r) = Recorded By, (t) = Taken By, (c) = Cosigned By    Initials Name Provider Type    Rachel Beverly OTR Occupational Therapist                Therapy Education  Education Details: Discussed patient's current L-Dex value of 4.3 and recommended sleeve wear during air travel and UE exercise. Educated on signs and symptoms that would indicate the need to return for medical assessment and then a return to therapy including swelling, heaviness, unusual muscle fatigue, redness, pain and fever.  Continue with HEP.  Given: HEP, Symptoms/condition management  Program: New, Reinforced  How Provided: Verbal  Provided to: Patient, Caregiver  Level of Understanding: Teach back education performed, Verbalized  85413 - OT  Self Care/Mgmt Minutes: 30    Outcome Measure Options: Quick DASH  Quick DASH  Open a tight or new jar.: Mild Difficulty  Do heavy household chores (e.g., wash walls, wash floors): Mild Difficulty  Carry a shopping bag or briefcase: No Difficulty  Wash your back: No Difficulty  Use a knife to cut food: No Difficulty  Recreational activities in which you take some force or impact through your arm, should or hand (e.g. golf, hammering, tennis, etc.): No Difficulty  During the past week, to what extent has your arm, shoulder, or hand problem interfered with your normal social activites with family, friends, neighbors or groups?: Not at all  During the past week, were you limited in your work or other regular daily activities as a result of your arm, shoulder or hand problem?: Not limited at all  Arm, Shoulder, or hand pain: None  Tingling (pins and needles) in your arm, shoulder, or hand: Mild  During the past week, how much difficulty have you had sleeping because of the pain in your arm, shoulder or hand?: Mild Difficulty  Number of Questions Answered: 11  Quick DASH Score: 9.09  Quick Dash Comments: 9 percent disability           Time Calculation:   OT Start Time: 1015  OT Stop Time: 1100  OT Time Calculation (min): 45 min  Total Timed Code Minutes- OT: 30 minute(s)  Timed Charges  90901 - OT Self Care/Mgmt Minutes: 30  Untimed Charges  82417 - OT Bioimpedence Rx Minutes: 15  Total Minutes  Timed Charges Total Minutes: 30  Untimed Charges Total Minutes: 15   Total Minutes: 45     Therapy Charges for Today     Code Description Service Date Service Provider Modifiers Qty    27945396588 HC OT SELF CARE/MGMT/TRAIN EA 15 MIN 2/15/2022 Rachel Toney OTR GO 2    22278630440 HC OT BIS XTRACELL FLUID ANALYSIS 2/15/2022 Rachel Toney OTR GO 1                      CATIE Schumacher  2/15/2022

## 2022-05-16 ENCOUNTER — HOSPITAL ENCOUNTER (OUTPATIENT)
Dept: OCCUPATIONAL THERAPY | Facility: HOSPITAL | Age: 62
Setting detail: THERAPIES SERIES
Discharge: HOME | End: 2022-05-16

## 2022-05-16 DIAGNOSIS — C50.412 MALIGNANT NEOPLASM OF UPPER-OUTER QUADRANT OF LEFT BREAST IN FEMALE, ESTROGEN RECEPTOR NEGATIVE: ICD-10-CM

## 2022-05-16 DIAGNOSIS — Z17.1 MALIGNANT NEOPLASM OF UPPER-OUTER QUADRANT OF LEFT BREAST IN FEMALE, ESTROGEN RECEPTOR NEGATIVE: ICD-10-CM

## 2022-05-16 DIAGNOSIS — Z91.89 AT RISK FOR LYMPHEDEMA: Primary | ICD-10-CM

## 2022-05-16 PROCEDURE — 97535 SELF CARE MNGMENT TRAINING: CPT

## 2022-05-16 PROCEDURE — 93702 BIS XTRACELL FLUID ANALYSIS: CPT

## 2022-05-16 NOTE — THERAPY PROGRESS REPORT/RE-CERT
Outpatient Occupational Therapy Lymphedema Progress Note  [facility]     Patient Name: Laxmi Marcus  : 1960  MRN: [id number]  Today's Date: 2022      Visit Date: 2022  Patient and therapist both masked. Therapist with face shield and gloves.  Patient Active Problem List   Diagnosis   • Abnormal weight gain   • Uncontrolled type 2 diabetes mellitus   • Hypoglycemia due to endogenous hyperinsulinemia   • Hyperlipidemia   • Hypertension   • Diabetes mellitus type 2, uncontrolled, with complications   • Encounter for long-term (current) use of insulin ([facility])   • Type II diabetes mellitus with neurological manifestations, uncontrolled   • Uncontrolled type II diabetes mellitus with nephropathy   • Hyperinsulinism   • Hyperlipidemia associated with type 2 diabetes mellitus (HCC)   • Malignant neoplasm of upper-outer quadrant of left breast in female, estrogen receptor negative (HCC)   • Fitting and adjustment of vascular catheter        Past Medical History:   Diagnosis Date   • Awareness under anesthesia    • Breast cancer (HCC)     Left   • CKD (chronic kidney disease)     stage 3   • Coronary artery disease    • COVID-19 2021   • Diabetes mellitus type 2 with complications, uncontrolled    • Frequent UTI    • Gout    • Heart murmur    • History of cancer chemotherapy 2021    LAST DOSE   • History of pneumonia    • History of radiation therapy    • Hyperlipidemia    • Hypertension    • NSTEMI (non-ST elevated myocardial infarction) ([facility])    • Obesity    • Sinus congestion    • Slow to wake up after anesthesia    • Vitamin B12 deficiency         Past Surgical History:   Procedure Laterality Date   • APPENDECTOMY     • BREAST LUMPECTOMY     • BREAST LUMPECTOMY WITH SENTINEL NODE BIOPSY AND AXILLARY NODE DISSECTION     • CARDIAC CATHETERIZATION      new de nova Ostial Ramus 95% stenosis, severe multivessel diabetic coronary vasculopathy with small vessels, occluded 3 of 4  "bypass conduits, [place] to LAD patent, all 3 vein grafts are occluded, PTCA/EMMANUEL to ostial Ramus 95% stenosis, patent OM1 and proximal and mid RCA stents x 3 from 2/2019, preserved LVSF, normal LVEDP, no significant AS or MR     • CHOLECYSTECTOMY  2006   • CORONARY ARTERY BYPASS GRAFT  2017    x4 with LIMA to mLAD, SVG to diagonal, SVG to OM, SVG to distal PDA     • CORONARY STENT PLACEMENT  12/2019    Drug eluting stent placement--2.0 x 26 mm Resolute [place] EMMANUEL to ostial Ramus     • CORONARY STENT PLACEMENT  02/2019    Drug eluting stent--2.25 x 38 mm and 2.5 x 15 mm Resolute [place] EMMANUEL to proximal mid RCA, 2.0 x 26 mm Resolute [place] EMMANUEL to mid circumflex/proximal OM1   • DENTAL PROCEDURE     • HYSTERECTOMY  2009   • MASTECTOMY Left 1/4/2022    Procedure: LEFT MODIFIED RADICAL MASTECTOMY;  Surgeon: Indigo Red MD;  Location: [facility] OR;  Service: General;  Laterality: Left;   • TONSILLECTOMY  1961    age 1   • VENOUS ACCESS DEVICE (PORT) INSERTION Right 10/1/2021    Procedure: INSERTION VENOUS ACCESS DEVICE;  Surgeon: Indigo Red MD;  Location: [facility] OR [facility];  Service: General;  Laterality: Right;         Visit Dx:      ICD-10-CM ICD-9-CM   1. At risk for lymphedema  Z91.89 V49.89   2. Malignant neoplasm of upper-outer quadrant of left breast in female, estrogen receptor negative (HCC)  C50.412 174.4    Z17.1 V86.1        Lymphedema     Row Name 05/16/22 1300             Subjective Pain    Able to rate subjective pain? yes  -RE      Pre-Treatment Pain Level 0  -RE      Post-Treatment Pain Level 0  -RE              Subjective Comments    Subjective Comments Is wearing sleeve about 2 days a week. Does have some upper arm pain with \"over use\" per patient. Sleeve wear resolves symptoms.  -RE              General ROM    GENERAL ROM COMMENTS Dionisio UE AROM WNL  -RE              MMT (Manual Muscle Testing)    General MMT Comments L UE strength is grossly>4/5.  -RE              L-Dex Bioimpedence Screening    " L-Dex Measurement Extremity LUE  -RE      L-Dex UE Dominate Side Right  -RE      L-Dex UE At Risk Side Left  -RE      L-Dex UE Pre Surgical Value Yes  -RE      L-Dex UE Score 2  -RE      L-Dex UE Baseline Score 2.5  -RE      L-Dex UE Value Change -0.5  -RE      L-Dex UE Comment WNL  -RE            User Key  (r) = Recorded By, (t) = Taken By, (c) = Cosigned By    Initials Name Provider Type    Rachel Beverly OTR Occupational Therapist                 The patient had a 3 month SOZO measurement which I reviewed today. The score is WNL, see in EMR. Bioimpedance spectroscopy helps identify the onset of lymphedema in an arm or leg before patients experience noticeable swelling. Research has shown that the early detection of lymphedema using L-Dex combined with treatment can reduce progression to chronic lymphedema by 95% in breast cancer patients. Whenever possible, patients are tested for baseline L-Dex score before cancer treatment begins and then are reassessed during regular follow-up visits using the SOZO device. Otherwise, this can be started postoperatively and continued during regular follow-up visits. If the patient's L-Dex score increases above normal levels, that is a sign that lymphedema is developing and a referral is made tooccupational therapy for further evaluation and early compression treatment. Lymphedema assessment with the SOZO L-Dex score is recommended to be done every 3 months for the first 3 years and then every 6 months for years 4 and 5 followed by annually afterwards.         OT Assessment/Plan     Row Name 05/16/22 8084          OT Assessment    Impairments Impaired lymphatic circulation  -RE     Assessment Comments Patient returns for bioimpedance reassessment.  Her last measurement was 3 months ago.  L-Dex value today is 2.0 which is WNL.She reports wearing her sleeve about 2 days a week and any time her arm feels tired or uncomfortable. Today we reviewed some new exercises to build some  strength and stability. I recommended follow up in 3 months.  -RE     Please refer to paper survey for additional self-reported information No  -RE     OT Diagnosis at risk for lymphedema  -RE     OT Rehab Potential Good  -RE     Patient/caregiver participated in establishment of treatment plan and goals Yes  -RE     Patient would benefit from skilled therapy intervention Yes  -RE            OT Plan    OT Frequency Other (comment)  -RE     Predicted Duration of Therapy Intervention (OT) bioimpedance every 3 months  -RE     Planned CPT's? OT THER PROC EA 15 MIN: 84016BR;OT SELF CARE/MGMT/TRAIN 15 MIN: 24071;OT MANUAL THERAPY EA 15 MIN: 59002;OT BIS XTRACELL FLUID ANALYSIS: 37275  -RE     OT Plan Comments Follow up in 3 months  -RE           User Key  (r) = Recorded By, (t) = Taken By, (c) = Cosigned By    Initials Name Provider Type    Rachel Beverly, OTR Occupational Therapist                       OT Goals     Row Name 05/16/22 1600          OT Short Term Goals    STG Date to Achieve 05/30/22  -RE     STG 1 Patient will demonstrate proper awareness of “What is Lymphedema?” and “Healthy Habits” for improved prevention, management, care of symptoms and ease of transition to self-care of condition.  -RE     STG 1 Progress Met  -RE     STG 2 Pt will demonstrate understanding of use of compression sleeve for edema prevention, exercise and air travel.  -RE     STG 2 Progress Met  -RE     STG 3 Patient independent and compliant with initial home exercise program focused on gentle AROM and stretching to improve AROM to pre-surgical level.  -RE     STG 3 Progress Met  -RE            Long Term Goals    LTG Date to Achieve 06/13/22  -RE     LTG 1 Patient will participate in bioimpedance scans every 3 months as a method of early detection of lymphedema to allow for early intervention.  -RE     LTG 1 Progress Ongoing;Met  -RE     LTG 2 Patient's bioimpedance score to remain below 6.5 for decreased risk of stage II lymphedema.   -RE     LTG 2 Progress Met;Ongoing  -RE            Time Calculation    OT Goal Re-Cert Due Date 08/16/22  -RE           User Key  (r) = Recorded By, (t) = Taken By, (c) = Cosigned By    Initials Name Provider Type    Rachel Beverly OTR Occupational Therapist                Therapy Education  Education Details: Discussed patient's current L-Dex value of 2.0 and recommended sleeve wear during air travel and UE exercise.  Educated on signs and symptoms that would indicate the need to return for medical assessment and then a return to therapy including swelling, heaviness, unusual muscle fatigue. Added to her HEP to increase strength. Access Code: [email or web address]  URL: [email or web address]  Date: 05/16/2022  Prepared by: Rachel Toney    Exercises  Standing Shoulder Single Arm PNF D2 Flexion with Resistance - 1 x daily - 7 x weekly - 3 sets - 10 reps  Shoulder External Rotation and Scapular Retraction - 1 x daily - 7 x weekly - 3 sets - 10 reps  Standing Shoulder Row with Anchored Resistance - 1 x daily - 7 x weekly - 3 sets - 10 reps  Given: HEP, Symptoms/condition management  Program: New, Reinforced  How Provided: Verbal, Demonstration, Written  Provided to: Patient  Level of Understanding: Teach back education performed  65042 - OT Self Care/Mgmt Minutes: 15                Time Calculation:   OT Start Time: 1320  OT Stop Time: 1345  OT Time Calculation (min): 25 min  Total Timed Code Minutes- OT: 15 minute(s)  Timed Charges  95224 - OT Self Care/Mgmt Minutes: 15  Untimed Charges  48163 - OT Bioimpedence Rx Minutes: 10  Total Minutes  Timed Charges Total Minutes: 15  Untimed Charges Total Minutes: 10   Total Minutes: 25     Therapy Charges for Today     Code Description Service Date Service Provider Modifiers Qty    01258284100 HC OT SELF CARE/MGMT/TRAIN EA 15 MIN 5/16/2022 Rachel Toney OTR GO 1    61999812766 HC OT BIS XTRACELL FLUID ANALYSIS 5/16/2022 Rachel Toney OTR GO 1                       Rachel Toney, OTR  5/16/2022

## 2022-05-20 LAB
DX PRELIMINARY: NORMAL
LAB AP CASE REPORT: NORMAL
LAB AP DIAGNOSIS COMMENT: NORMAL
LAB AP SPECIAL STAINS: NORMAL
PATH REPORT.ADDENDUM SPEC: NORMAL
PATH REPORT.FINAL DX SPEC: NORMAL
PATH REPORT.GROSS SPEC: NORMAL

## 2022-07-29 ENCOUNTER — APPOINTMENT (OUTPATIENT)
Dept: WOMENS IMAGING | Facility: HOSPITAL | Age: 62
End: 2022-07-29

## 2022-07-29 PROCEDURE — 77067 SCR MAMMO BI INCL CAD: CPT | Performed by: RADIOLOGY

## 2022-07-29 PROCEDURE — 77063 BREAST TOMOSYNTHESIS BI: CPT | Performed by: RADIOLOGY

## 2022-08-08 ENCOUNTER — OFFICE VISIT (OUTPATIENT)
Dept: SURGERY | Facility: CLINIC | Age: 62
End: 2022-08-08

## 2022-08-08 VITALS
DIASTOLIC BLOOD PRESSURE: 60 MMHG | SYSTOLIC BLOOD PRESSURE: 132 MMHG | WEIGHT: 141 LBS | HEART RATE: 78 BPM | OXYGEN SATURATION: 96 % | BODY MASS INDEX: 23.49 KG/M2 | HEIGHT: 65 IN

## 2022-08-08 DIAGNOSIS — Z17.1 MALIGNANT NEOPLASM OF UPPER-OUTER QUADRANT OF LEFT BREAST IN FEMALE, ESTROGEN RECEPTOR NEGATIVE: Primary | ICD-10-CM

## 2022-08-08 DIAGNOSIS — C50.412 MALIGNANT NEOPLASM OF UPPER-OUTER QUADRANT OF LEFT BREAST IN FEMALE, ESTROGEN RECEPTOR NEGATIVE: Primary | ICD-10-CM

## 2022-08-08 PROCEDURE — 99213 OFFICE O/P EST LOW 20 MIN: CPT | Performed by: NURSE PRACTITIONER

## 2022-08-08 NOTE — PROGRESS NOTES
[facility]     Referring Provider: Will Johnston II, MD     Chief complaint: breast cancer follow up     HPI:   8/20/21:  Seen by Dr Red  Ms. Laxmi Marcus is a 60 yo woman, seen at the request of Dr. Will Johnston, for a new diagnosis of left breast cancer. She has a past history of left breast cancer in 2006, pT1N1, intermediate grade, invasive ductal carcinoma, ER/NE positive, HER-2 negative. She underwent left lumpectomy & SLNB with completion axillary dissection, followed by reexcision for close margins. This was followed by AC x4 and 4 cycles of Taxol, then radiation. She was on tamoxifen for 2-1/2 years, then says she was switched to a different medication for a total of 5 years of endocrine therapy. Currently, I only have access to one note from the [facility] group in 2008. I have tried to get copies of the operative report and pathology report from [place], however these records are no longer held. The patient says that she thinks she had 9 lymph nodes removed.       She reports that she had a lot of issues with chemo, including severe bone pain requiring Dilaudid for months. Shortly after finishing all of her breast treatment, she had severe pancreatitis and shortly after that she became a brittle diabetic. She has a past history of CAD sp CABGx4 in 2017. Unfortunately it sounds like she had some sort of immediate stenosis, because she had a heart attack in early 2019 requiring stent placement x4. She is followed by cardiology and on Brilinta.     In May of this year, she noticed a painful left breast lump near the prior lumpectomy scar. She does not think it has changed at all since she first noticed. Her work-up is detailed in the oncologic history below. She denies any family history of breast or ovarian cancer.      10/29/21:  Seen by Dr Red  After her last visit, she underwent a PET scan which was negative for distant disease and her genetic testing was negative for mutation.  She started neoadjuvant therapy with carbo/Taxol/Keytruda on 9/7/21. Her second dose of Taxol was temporarily held because she developed Covid, however she received her seventh dose yesterday. She reports that the mass has gotten much smaller and it is now difficult for her to find.    12/10/21:  Seen by Dr Red  She completed her last dose of Taxol on 12/4/21. After her last visit, she saw Dr. Araya. Because of her diabetes and history of a CABG, he recommended delayed reconstruction and the patient is okay with this.    1/19/22:  Seen by Dr Red  She underwent left modified radical mastectomy on 1/4/22. Her pathology was sent out for a second opinion and the final report is still pending. She has been recovering well and has no complaints. She came into the office last week to have her breast drain removed. The axillary drain has had out <20 mL/day for the past few days.    8/8/22, Interval History:  She returns today for follow up with no breast concerns.  She still has not had her reconstruction, she is frustrated with waiting, has follow with Dr Araya in 11/22.  Her  passed away unexpectedly recently, she is teary today, misses him terribly.      Her last clinic visit with medical oncology was in 6/22:  Last adjvuant Keytruda.  Labs for toxicity- glucose improved    She was seen in OT/LE clinic in 5/22:  L-Dex value today is 2.0 which is WNL.She reports wearing her sleeve about 2 days a week and any time her arm feels tired or uncomfortable. Today we reviewed some new exercises to build some strength and stability. I recommended follow up in 3 months    Right screening mammogram with jaswant was completed on 7/29/22 at [facility], [facility].(see full report below)             Oncology/Hematology History Overview Note   06/05/2017, Bilateral Diagnostic MMG with Jaswant & Bilateral Breast US ([facility]):  MMG:  Scattered areas pf fibroglandular density.   1. There is a stable post-surgical scar with associated  trabecular thickening seen in the upper outer region of the left breast. Post-surgical scar is in an area of prior lumpectomy. Findings are consistent with post-surgical and post-radiation therapy changes.  2. The patient indicates recent onset of pain in the lateral and axillary tail region of the left breast. The symptomatic region is clearly marked and there is no suspicious finding in the region of clinical concern.  3. There is a stable small intramammary lymph node measuring 7 millimeters seen in the posterior one-third 1:30 o'clock region of the left breast located 8 centimeters from the nipple. There has been no significant change from the prior exam(s).  6. There is a stable area of asymmetric breast tissue seen in the superior region of the right breast.  US:  1. There is no evidence of any solid mass or abnormal cystic elements.  2. There is no evidence of any solid mass or abnormal cystic elements.  3. Ultrasound is suggestive of an oval small intramammary lymph node measuring 7 millimeters.  4. There is a small lymph node measuring 6 millimeters in the left axilla.  5. There is an oval small simple cyst with circumscribed margins measuring 4 millimeters seen in the right breast at 3 o'clock.  6. There is no evidence of any solid mass or abnormal cystic elements.  BI-RADS 2: Benign.    05/2021: Pt noticed a right breast lump.     Malignant neoplasm of upper-outer quadrant of left breast in female, estrogen receptor negative (HCC)   7/26/2021 Initial Diagnosis    Malignant neoplasm of upper-outer quadrant of left breast in female, estrogen receptor negative (CMS/HCC)     7/27/2021 Imaging    Bilateral Diagnostic MMG with Jaswant & Left Breast US ([facility]):  MMG:  Scattered areas of fibroglandular density.  1. There is a new oval mass measuring 20 mm with indistinct margins seen in the 1:30 o'clock region of the left breast located 4 centimeters from the nipple. This correlates to the palpable mass.   2. There is  a stable post-surgical scar seen in the posterior one-third region of the left breast at 2 o'clock. Post-surgical scar is in an area of prior lumpectomy. Findings are consistent with post-surgical and post-radiation therapy changes.   In the right breast, no suspicious masses, significant calcifications or other abnormalities are seen.  US:  1. Ultrasound demonstrates a new oval parallel solid mass with poorly defined margins measuring 16 x 14 x 15 mm seen in the 1:30 o'clock region of the left breast located 4 centimeters from the nipple. This correlates with the mammographic finding.  3. There is an oval lymph node measuring 9 x 6 x 8 mm in the left axilla. This has an abnormal appearance with little hilum.  BI-RADS 4B: Suspicious.     8/6/2021 Biopsy    Left Breast & Axilla, US-Guided Biopsies ([facility]):    1. Breast, Left 1:30 O'clock, Core Needle Biopsy:  Invasive mammary carcinoma, no special type (invasive ductal carcinoma), combined histologic grade 3 (tubule formation 3, nuclear pleomorphism 3, mitotic activity 2), 12 mm in greatest dimension, present in 5 of 15 cores.   Focal ductal carcinoma in situ (DCIS), high nuclear grade, solid pattern, with associated focal necrosis 1.5 mm in greatest dimension, present in 3 of 15 cores.   Microcalcifications associated with invasive carcinoma, DCIS, and benign mammary ducts/lobules.  -[place] clip.     ER negative (0%)  MI negative (0%)  Her2 negative (IHC 1+)  Ki-67 94.88%    2. Lymph Node, Left Axilla, Core Needle Biopsy:  Benign lymph node tissue with reactive follicular hyperplasia.   No definitive evidence of carcinoma (AE1/AE3).   No definitive evidence of lymphoma (CD3, CD20, CD21, and CD30).  -Hydromark clip. Concordant.     8/18/2021 Imaging    Bilateral Breast MRI ([facility]):  In the posterior one-third lateral aspect of the left breast centered at the 3-o'clock position on the order of 6.5 cm from the nipple there is a focal signal void that is seen within  the superior aspect of an irregular heterogenous masslike region that measures on the order of 2.4 cm in the superior inferior dimension, 2.5 cm in the anterior posterior dimension and 2.7 cm in the medial lateral dimension. Some of the internal character of the lesion demonstrates absence of enhancement. This represents the biopsy-proven site of malignancy with the mini  cork-shaped metallic clip in the superomedial portion of the lesion. Some areas of increased T1-weighted signal intensity are noted in the region and are consistent with areas of postbiopsy related hemorrhage.   There is postsurgical change seen in the left breast in this region associated with prior breast conservation therapy. No other areas of abnormal enhancement or morphology are seen within the left breast. I see no evidence for abnormal left breast skin, nipple or chest wall enhancement and there is no evidence for left axillary adenopathy. The patient is status post median sternotomy which limits the evaluation of the internal mammary chain for internal mammary adenopathy. Mild diffuse left breast skin thickening associated with prior breast conservation therapy is noted, but there is no abnormal enhancement of the skin.    There are no areas of abnormal enhancement or morphology in the right breast. Scattered stippled foci of variable enhancement are seen throughout the right breast, none of which appear dominant or clumped or associated with any suspicious mammographic features. I see no evidence for abnormal right breast skin, nipple or chest wall enhancement and there is no evidence for right axillary or internal mammary chain adenopathy. Visualization of the right internal mammary chain is not prohibited by the artifact from the patient's median sternotomy wires.  BI-RADS 6: Known malignancy.     8/20/2021 Genetic Testing    [employer] Common Hereditary Cancers Panel (47 genes):    Negative     9/3/2021 Imaging    PET CT ([facility]):  1. FDG  avid left upper outer breast mass compatible with primary breast malignancy.  2. No evidence of metastatic disease.  3. Subcutaneous infiltrative changes in the ventral lower abdomen with low-level FDG uptake which may represent injection sites, panniculitis, or prior port sites.  4. Air-fluid level within the urinary bladder which may represent cystitis in the absence of recent catheterization.     9/13/2021 - 9/13/2021 Chemotherapy    OP BREAST Pembrolizumab 200 mg / PACLItaxel / CARBOplatin AUC=5     1/4/2022 Surgery    Left modified radical mastectomy  Preliminary Diagnosis  1. Left Breast, Modified Radical Mastectomy S/P Neoadjuvant Chemotherapy (332 grams):                A. FIBROTIC TUMOR BED WITH FOCAL RESIDUAL ATYPICAL CELL GROUPS (SEE COMMENT).                B. Negative for lymphovascular space invasion.  C. Clip and biopsy site changes are present within tumor bed.    D. Marked treatment effect with associated reactive epithelial changes and squamous metaplasia,        consistent with prior procedure-related changes.    E. Atypical lobular hyperplasia.   F. Background breast parenchyma with intraductal papilloma involved by usual ductal hyperplasia,       fibroadenomatoid change, and columnar cell change.    G. Unremarkable skin and nipple.    H. Eleven lymph nodes, negative for carcinoma (0/11):                1. Clip and biopsy site changes present in a single lymph node.  2. All lymph nodes are negative for treatment effect.    3. Separate collection of nerves, suggestive of traumatic neuroma.      2. Left Breast, Additional Tissue Margin at 2:00, Re-excision:   A. Benign skeletal muscle with no significant histopathologic changes.      Final Diagnosis  CONSULTANT DIAGNOSIS:   LEFT BREAST, MODIFIED RADICAL MASTECTOMY STATUS POST NEOADJUVANT CHEMOTHERAPY:               A. NEGATIVE FOR RESIDUAL CARCINOMA (PATHOLOGIC COMPLETE RESPONSE) SEE COMMENT.     Comment:  [...] Within the biopsy site, a cluster of  atypical cells, some of which show squamoid change, is noted. We believe this cluster to most likely be the product of epithelial displacement due to the previous biopsy, rather than evidence of residual invasive carcinoma. The presence of p63 positivity reinforces this morphologic impression.     The above interpretation was rendered by Dr. Fran Cotton of [facility] in [place]. Refer to outside pathology consultation report () for additional details regarding this case.     Therefore, given the aforementioned findings along with eleven benign lymph nodes, this case is classified as a ypT0 N0.     7/29/2022 Imaging    [facility]  Scattered areas of fibroglandular density.  No suspicious masses, significant calcifications or other abnormalities are seen.  There is been no significant change from prior exam.  Impression:  There is no mammographic evidence of malignancy.  Screening mammogram 1 year is recommended.  BI-RADS Category 1         Review of Systems:  See interval history.       Medications:    Current Outpatient Medications:   •  ACCU-CHEK FASTCLIX LANCETS misc, TO CHECK 5 TIMES DAILY, Disp: 500 each, Rfl: 2  •  ACCU-CHEK GUIDE test strip, 1 each by Other route 5 (Five) Times a Day. Use as instructed, Disp: 450 each, Rfl: 1  •  acetaminophen (TYLENOL) 500 MG tablet, Take 1,000 mg by mouth Every 6 (Six) Hours As Needed for Mild Pain ., Disp: , Rfl:   •  Ascorbic Acid (VITAMIN C PO), Take 1 tablet by mouth Daily., Disp: , Rfl:   •  aspirin 81 MG tablet, Take 81 mg by mouth Daily., Disp: , Rfl:   •  Blood Glucose Monitoring Suppl (ACCU-CHEK GUIDE) w/Device kit, 1 each 5 (Five) Times a Day., Disp: 1 kit, Rfl: 1  •  cetirizine (ZyrTEC) 10 MG tablet, Take 10 mg by mouth daily., Disp: , Rfl:   •  cholecalciferol (Cholecalciferol) 25 MCG (1000 UT) tablet, Take 1,000 Units by mouth Daily., Disp: , Rfl:   •  gabapentin (NEURONTIN) 100  MG capsule, Take 1 capsule by mouth Every 8 (Eight) Hours for 14 days., Disp: 42 capsule, Rfl: 0  •  glucose blood (ACCU-CHEK SMARTVIEW) test strip, Use as instructed 5 TIMES DAILY, Disp: 450 each, Rfl: 2  •  Insulin Aspart, w/Niacinamide, (FIASP FLEXTOUCH SC), Inject  under the skin into the appropriate area as directed. 15-25 units 4 times daily, Disp: , Rfl:   •  Insulin Degludec (Tresiba FlexTouch) 200 UNIT/ML solution pen-injector pen injection, INJECT 26 UNITS INTO THE SKIN EVERY MORNING FOR 90 DAYS., Disp: , Rfl:   •  Insulin Pen Needle 32G X 6 MM misc, INJECT 1 EACH INTO THE SKIN 4 (FOUR) TIMES DAILY., Disp: , Rfl:   •  metoprolol succinate XL (TOPROL-XL) 50 MG 24 hr tablet, Take 25 mg by mouth Every Evening., Disp: , Rfl:   •  MULTIPLE VIT-MIN-CALCIUM-FA PO, Take 1 tablet by mouth Daily., Disp: , Rfl:   •  ranolazine (RANEXA) 500 MG 12 hr tablet, TAKE 1 TABLET BY MOUTH 2 (TWO) TIMES DAILY., Disp: , Rfl:   •  rosuvastatin (CRESTOR) 20 MG tablet, Take 20 mg by mouth Every Night., Disp: , Rfl:   •  ticagrelor (Brilinta) 60 MG tablet tablet, Take 60 mg by mouth 2 (Two) Times a Day., Disp: , Rfl:   •  zinc sulfate (ZINCATE) 220 (50 Zn) MG capsule, Take 220 mg by mouth Daily., Disp: , Rfl:       Allergies   Allergen Reactions   • Dulaglutide Nausea And Vomiting     Another name for trulicity   • Sulfa Antibiotics Other (See Comments)     Abdominal Pain and NauseA   • Uloric [Febuxostat] Other (See Comments)     Mouth sores   • Latex Itching       Family History   Problem Relation Age of Onset   • Heart disease Mother    • Coronary artery disease Mother    • Heart disease Brother    • Heart disease Maternal Uncle    • Prostate cancer Maternal Uncle    • Heart disease Maternal Grandfather    • Malig Hyperthermia Neg Hx        PHYSICAL EXAMINATION:   Vitals:    08/08/22 1327   BP: 132/60   Pulse: 78   SpO2: 96%      I reviewed physical exam, no changes noted    ECOG 0 - Asymptomatic  General: NAD, well  appearing  Psych: a&o x 3, normal mood and affect  Eyes: EOMI, no scleral icterus  ENMT: neck supple without masses or thyromegaly, mucus membranes moist  MSK: normal gait, normal ROM in bilateral shoulders  Lymph nodes: She still has prior left axillary scar, no axillary swelling  Breast: moderate size, grade 3 ptosis, sternal scar  Right: No visible abnormalities on inspection while seated, with arms raised or hands on hips. No masses, skin changesor nipple abnormalities.  PAC present right upper chest  Left: Sp mastectomy with well-healed incision. Flaps healthy.       Assessment:   1)   62 y.o. F with a diagnosis of left breast cancer: High grade, triple negative, invasive ductal carcinoma; clinical T2N0, anatomic stage IIA, prognostic stage IIB. She completed neoadjuvant carbo/Taxol/Keytruda on 12/4/21. She underwent left modified radical mastectomy on 1/4/22.    2)  She has a past history of left breast cancer in 2006, pT1N1, intermediate grade, invasive ductal carcinoma, ER/NM positive, HER-2 negative. She underwent left lumpectomy & SLNB with completion axillary dissection, followed by reexcision for close margins. This was followed by AC x4 and 4 cycles of Taxol, then radiation and endocrine therapy.     Discussion:  Imaging findings from 7/29/22 were reviewed, a copy was given.    I encouraged her to try to take care of herself during her time of grief and follow up with Dr Araya in 11/22 to discuss reconstruction.    We discussed her follow up which includes clinical breast exam every 6 months for 2 years, with mammogram annually then  mammogram and exam annually until 5 years from diagnosis.    Plan:  -Follow-up with OT/LE clinic.  - follow-up with Dr. Sandoval.  - follow-up with Dr. Araya for eventual delayed reconstruction.  - exam in 6 months.    Alethea Sandra, APRN      CC:  MD Hollis Brennan II, MD Vipul Panchal, MD Blakely Kute, MD

## 2022-08-10 ENCOUNTER — PREP FOR SURGERY (OUTPATIENT)
Dept: OTHER | Facility: HOSPITAL | Age: 62
End: 2022-08-10

## 2022-08-10 DIAGNOSIS — C50.412 MALIGNANT NEOPLASM OF UPPER-OUTER QUADRANT OF LEFT BREAST IN FEMALE, ESTROGEN RECEPTOR NEGATIVE: Primary | ICD-10-CM

## 2022-08-10 DIAGNOSIS — Z17.1 MALIGNANT NEOPLASM OF UPPER-OUTER QUADRANT OF LEFT BREAST IN FEMALE, ESTROGEN RECEPTOR NEGATIVE: Primary | ICD-10-CM

## 2022-08-11 ENCOUNTER — TELEPHONE (OUTPATIENT)
Dept: SURGERY | Facility: CLINIC | Age: 62
End: 2022-08-11

## 2022-08-11 NOTE — TELEPHONE ENCOUNTER
Called patient to schedule port removal. Unable to reach patient. I have left a voicemail to call me to schedule.

## 2022-08-12 ENCOUNTER — HOSPITAL ENCOUNTER (OUTPATIENT)
Facility: HOSPITAL | Age: 62
Setting detail: HOSPITAL OUTPATIENT SURGERY
End: 2022-08-12
Attending: SURGERY | Admitting: SURGERY

## 2022-08-18 ENCOUNTER — HOSPITAL ENCOUNTER (OUTPATIENT)
Dept: OCCUPATIONAL THERAPY | Facility: HOSPITAL | Age: 62
Setting detail: THERAPIES SERIES
Discharge: HOME | End: 2022-08-18

## 2022-08-18 DIAGNOSIS — Z91.89 AT RISK FOR LYMPHEDEMA: Primary | ICD-10-CM

## 2022-08-18 DIAGNOSIS — C50.412 MALIGNANT NEOPLASM OF UPPER-OUTER QUADRANT OF LEFT BREAST IN FEMALE, ESTROGEN RECEPTOR NEGATIVE: ICD-10-CM

## 2022-08-18 DIAGNOSIS — Z17.1 MALIGNANT NEOPLASM OF UPPER-OUTER QUADRANT OF LEFT BREAST IN FEMALE, ESTROGEN RECEPTOR NEGATIVE: ICD-10-CM

## 2022-08-18 PROCEDURE — 97535 SELF CARE MNGMENT TRAINING: CPT

## 2022-08-18 PROCEDURE — 93702 BIS XTRACELL FLUID ANALYSIS: CPT

## 2022-08-18 NOTE — THERAPY PROGRESS REPORT/RE-CERT
Outpatient Occupational Therapy Lymphedema Progress Note  [facility]     Patient Name: Laxmi Marcus  : 1960  MRN: [id number]  Today's Date: 2022      Visit Date: 2022  Patient and therapist both masked. Therapist with face shield and gloves.  Patient Active Problem List   Diagnosis   • Abnormal weight gain   • Uncontrolled type 2 diabetes mellitus   • Hypoglycemia due to endogenous hyperinsulinemia   • Hyperlipidemia   • Hypertension   • Diabetes mellitus type 2, uncontrolled, with complications   • Encounter for long-term (current) use of insulin ([facility])   • Type II diabetes mellitus with neurological manifestations, uncontrolled   • Uncontrolled type II diabetes mellitus with nephropathy   • Hyperinsulinism   • Hyperlipidemia associated with type 2 diabetes mellitus (HCC)   • Malignant neoplasm of upper-outer quadrant of left breast in female, estrogen receptor negative (HCC)   • Fitting and adjustment of vascular catheter        Past Medical History:   Diagnosis Date   • Awareness under anesthesia    • Breast cancer (HCC)     Left   • CKD (chronic kidney disease)     stage 3   • Coronary artery disease    • COVID-19 2021   • Diabetes mellitus type 2 with complications, uncontrolled    • Frequent UTI    • Gout    • Heart murmur    • History of cancer chemotherapy 2021    LAST DOSE   • History of pneumonia    • History of radiation therapy    • Hyperlipidemia    • Hypertension    • NSTEMI (non-ST elevated myocardial infarction) ([facility])    • Obesity    • Sinus congestion    • Slow to wake up after anesthesia    • Vitamin B12 deficiency         Past Surgical History:   Procedure Laterality Date   • APPENDECTOMY     • BREAST LUMPECTOMY     • BREAST LUMPECTOMY WITH SENTINEL NODE BIOPSY AND AXILLARY NODE DISSECTION     • CARDIAC CATHETERIZATION      new de nova Ostial Ramus 95% stenosis, severe multivessel diabetic coronary vasculopathy with small vessels, occluded 3 of 4  bypass conduits, LIMA to LAD patent, all 3 vein grafts are occluded, PTCA/EMMANUEL to ostial Ramus 95% stenosis, patent OM1 and proximal and mid RCA stents x 3 from 2/2019, preserved LVSF, normal LVEDP, no significant AS or MR     • CHOLECYSTECTOMY  2006   • CORONARY ARTERY BYPASS GRAFT  2017    x4 with LIMA to mLAD, SVG to diagonal, SVG to OM, SVG to distal PDA     • CORONARY STENT PLACEMENT  12/2019    Drug eluting stent placement--2.0 x 26 mm Resolute [place] EMMANUEL to ostial Ramus     • CORONARY STENT PLACEMENT  02/2019    Drug eluting stent--2.25 x 38 mm and 2.5 x 15 mm Resolute [place] EMMANUEL to proximal mid RCA, 2.0 x 26 mm Resolute [place] EMMANUEL to mid circumflex/proximal OM1   • DENTAL PROCEDURE     • HYSTERECTOMY  2009   • MASTECTOMY Left 1/4/2022    Procedure: LEFT MODIFIED RADICAL MASTECTOMY;  Surgeon: Indigo Red MD;  Location: [facility] OR;  Service: General;  Laterality: Left;   • TONSILLECTOMY  1961    age 1   • VENOUS ACCESS DEVICE (PORT) INSERTION Right 10/1/2021    Procedure: INSERTION VENOUS ACCESS DEVICE;  Surgeon: Indigo Red MD;  Location: [facility] OR [facility];  Service: General;  Laterality: Right;         Visit Dx:     ICD-10-CM ICD-9-CM   1. At risk for lymphedema  Z91.89 V49.89   2. Malignant neoplasm of upper-outer quadrant of left breast in female, estrogen receptor negative (HCC)  C50.412 174.4    Z17.1 V86.1            Lymphedema     Row Name 08/18/22 1700             Subjective Pain    Able to rate subjective pain? yes  -RE      Pre-Treatment Pain Level 0  -RE      Post-Treatment Pain Level 0  -RE              Subjective Comments    Subjective Comments Patient is very tearful today as she discusses the recent death of her . She says she really has not done any exercise or really anything lately.  -RE              L-Dex Bioimpedence Screening    L-Dex Measurement Extremity LUE  -RE      L-Dex Patient Position Standing  -RE      L-Dex UE Dominate Side Right  -RE      L-Dex UE At Risk Side  Left  -RE      L-Dex UE Pre Surgical Value No  -RE      L-Dex UE Score 2.6  -RE      L-Dex UE Baseline Score 2.5  -RE      L-Dex UE Value Change 0.1  -RE      L-Dex UE Comment WNL  -RE            User Key  (r) = Recorded By, (t) = Taken By, (c) = Cosigned By    Initials Name Provider Type    Rachel Beverly, OTR Occupational Therapist                        Therapy Education  Education Details: Discussed patient's current L-Dex value of 2.6 and recommended sleeve wear during air travel and UE exercise. She would like to start exercising at a gym. I recommended we start slow since she has done very little exercise for several months. I recommended walking which she said she could do with her son and granddaughter. Explained that this can help with increasing her fitness but also aid her as she continues to grieve.  Given: Symptoms/condition management, Other (comment)  Program: New, Reinforced  How Provided: Verbal  Provided to: Patient  Level of Understanding: Teach back education performed, Verbalized  28982 - OT Self Care/Mgmt Minutes: 44         OT Goals     Row Name 08/18/22 1800          OT Short Term Goals    STG 1 Patient will demonstrate proper awareness of “What is Lymphedema?” and “Healthy Habits” for improved prevention, management, care of symptoms and ease of transition to self-care of condition.  -RE     STG 1 Progress Met  -RE     STG 2 Pt will demonstrate understanding of use of compression sleeve for edema prevention, exercise and air travel.  -RE     STG 2 Progress Met  -RE     STG 3 Patient independent and compliant with initial home exercise program focused on gentle AROM and stretching to improve AROM to pre-surgical level.  -RE     STG 3 Progress Met  -RE            Long Term Goals    LTG Date to Achieve 09/18/22  -RE     LTG 1 Patient will participate in bioimpedance scans every 3 months as a method of early detection of lymphedema to allow for early intervention.  -RE     LTG 1 Progress  Ongoing;Met  -RE     LTG 2 Patient's bioimpedance score to remain below 6.5 for decreased risk of stage II lymphedema.  -RE     LTG 2 Progress Met;Ongoing  -RE     LTG 3 Patient independent and compliant with advanced home exercise program focused on UE strengthening and aerobic daily exercise.  -RE     LTG 3 Progress New  -RE            Time Calculation    OT Goal Re-Cert Due Date 11/18/22  -RE           User Key  (r) = Recorded By, (t) = Taken By, (c) = Cosigned By    Initials Name Provider Type    RE Rachel Toney, OTR Occupational Therapist                 OT Assessment/Plan     Row Name 08/18/22 5986          OT Assessment    Impairments Impaired lymphatic circulation  -RE     Assessment Comments Patient returns for bioimpedance reassessment.  Her last measurement was 3 months ago.  L-Dex value today is 2.6 which is WNL. She is grieving over her husbands death and has not been able to exercise. She plans to begin walking for now. I will follow up in 3 weeks and progress her exercise.  I recommended follow up for bioimpedance in 3 months.  -RE     Please refer to paper survey for additional self-reported information No  -RE     OT Diagnosis at risk for lymphedema  -RE     OT Rehab Potential Good  -RE     Patient/caregiver participated in establishment of treatment plan and goals Yes  -RE     Patient would benefit from skilled therapy intervention Yes  -RE            OT Plan    OT Frequency Other (comment)  -RE     Predicted Duration of Therapy Intervention (OT) 1 visit to progress HEP and follow for bioimpedance every 3 months  -RE     Planned CPT's? OT BIS XTRACELL FLUID ANALYSIS: 07709;OT SELF CARE/MGMT/TRAIN 15 MIN: 14298  -RE     Planned Therapy Interventions (Optional Details) home exercise program;patient/family education;other (see comments)  bioimpedance  -RE     OT Plan Comments follow up in3 weeks  -RE           User Key  (r) = Recorded By, (t) = Taken By, (c) = Cosigned By    Initials Name Provider  Type    RE Rachel Toney OTR Occupational Therapist              The patient had a 3 month SOZO measurement which I reviewed today. The score is WNL, see in EMR. Bioimpedance spectroscopy helps identify the onset of lymphedema in an arm or leg before patients experience noticeable swelling. Research has shown that the early detection of lymphedema using L-Dex combined with treatment can reduce progression to chronic lymphedema by 95% in breast cancer patients. Whenever possible, patients are tested for baseline L-Dex score before cancer treatment begins and then are reassessed during regular follow-up visits using the SOZO device. Otherwise, this can be started postoperatively and continued during regular follow-up visits. If the patient's L-Dex score increases above normal levels, that is a sign that lymphedema is developing and a referral is made to occupational therapy for further evaluation and early compression treatment. Lymphedema assessment with the SOZO L-Dex score is recommended to be done every 3 months for the first 3 years and then every 6 months for years 4 and 5 followed by annually afterwards.            Time Calculation:   OT Start Time: 1330  OT Stop Time: 1424  OT Time Calculation (min): 54 min  Total Timed Code Minutes- OT: 44 minute(s)  Timed Charges  09565 - OT Self Care/Mgmt Minutes: 44  Untimed Charges  47569 - OT Bioimpedence Rx Minutes: 10  Total Minutes  Timed Charges Total Minutes: 44  Untimed Charges Total Minutes: 10   Total Minutes: 54     Therapy Charges for Today     Code Description Service Date Service Provider Modifiers Qty    02015393944 HC OT SELF CARE/MGMT/TRAIN EA 15 MIN 8/18/2022 Rachel Toney OTR GO 3    21453989537 HC OT BIS XTRACELL FLUID ANALYSIS 8/18/2022 Rachel Toney OTR GO 1                    CATIE Schumacher  8/18/2022

## 2022-08-26 ENCOUNTER — PRE-ADMISSION TESTING (OUTPATIENT)
Dept: PREADMISSION TESTING | Facility: HOSPITAL | Age: 62
End: 2022-08-26

## 2022-08-26 VITALS
RESPIRATION RATE: 18 BRPM | WEIGHT: 146 LBS | BODY MASS INDEX: 24.32 KG/M2 | HEART RATE: 75 BPM | TEMPERATURE: 98.7 F | SYSTOLIC BLOOD PRESSURE: 132 MMHG | HEIGHT: 65 IN | OXYGEN SATURATION: 100 % | DIASTOLIC BLOOD PRESSURE: 72 MMHG

## 2022-08-26 LAB
ANION GAP SERPL CALCULATED.3IONS-SCNC: 12.1 MMOL/L (ref 5–15)
BASOPHILS # BLD AUTO: 0.07 10*3/MM3 (ref 0–0.2)
BASOPHILS NFR BLD AUTO: 0.9 % (ref 0–1.5)
BUN SERPL-MCNC: 23 MG/DL (ref 8–23)
BUN/CREAT SERPL: 15.8 (ref 7–25)
CALCIUM SPEC-SCNC: 10.1 MG/DL (ref 8.6–10.5)
CHLORIDE SERPL-SCNC: 104 MMOL/L (ref 98–107)
CO2 SERPL-SCNC: 23.9 MMOL/L (ref 22–29)
CREAT SERPL-MCNC: 1.46 MG/DL (ref 0.57–1)
DEPRECATED RDW RBC AUTO: 40.5 FL (ref 37–54)
EGFRCR SERPLBLD CKD-EPI 2021: 40 ML/MIN/1.73
EOSINOPHIL # BLD AUTO: 0.16 10*3/MM3 (ref 0–0.4)
EOSINOPHIL NFR BLD AUTO: 2.1 % (ref 0.3–6.2)
ERYTHROCYTE [DISTWIDTH] IN BLOOD BY AUTOMATED COUNT: 13 % (ref 12.3–15.4)
GLUCOSE SERPL-MCNC: 113 MG/DL (ref 65–99)
HCT VFR BLD AUTO: 33 % (ref 34–46.6)
HGB BLD-MCNC: 10.7 G/DL (ref 12–15.9)
IMM GRANULOCYTES # BLD AUTO: 0.04 10*3/MM3 (ref 0–0.05)
IMM GRANULOCYTES NFR BLD AUTO: 0.5 % (ref 0–0.5)
LYMPHOCYTES # BLD AUTO: 1.6 10*3/MM3 (ref 0.7–3.1)
LYMPHOCYTES NFR BLD AUTO: 20.8 % (ref 19.6–45.3)
MCH RBC QN AUTO: 28.1 PG (ref 26.6–33)
MCHC RBC AUTO-ENTMCNC: 32.4 G/DL (ref 31.5–35.7)
MCV RBC AUTO: 86.6 FL (ref 79–97)
MONOCYTES # BLD AUTO: 0.66 10*3/MM3 (ref 0.1–0.9)
MONOCYTES NFR BLD AUTO: 8.6 % (ref 5–12)
NEUTROPHILS NFR BLD AUTO: 5.18 10*3/MM3 (ref 1.7–7)
NEUTROPHILS NFR BLD AUTO: 67.1 % (ref 42.7–76)
NRBC BLD AUTO-RTO: 0 /100 WBC (ref 0–0.2)
PLATELET # BLD AUTO: 308 10*3/MM3 (ref 140–450)
PMV BLD AUTO: 9.1 FL (ref 6–12)
POTASSIUM SERPL-SCNC: 4.2 MMOL/L (ref 3.5–5.2)
RBC # BLD AUTO: 3.81 10*6/MM3 (ref 3.77–5.28)
SARS-COV-2 RNA RESP QL NAA+PROBE: DETECTED
SODIUM SERPL-SCNC: 140 MMOL/L (ref 136–145)
WBC NRBC COR # BLD: 7.71 10*3/MM3 (ref 3.4–10.8)

## 2022-08-26 PROCEDURE — C9803 HOPD COVID-19 SPEC COLLECT: HCPCS

## 2022-08-26 PROCEDURE — U0003 INFECTIOUS AGENT DETECTION BY NUCLEIC ACID (DNA OR RNA); SEVERE ACUTE RESPIRATORY SYNDROME CORONAVIRUS 2 (SARS-COV-2) (CORONAVIRUS DISEASE [COVID-19]), AMPLIFIED PROBE TECHNIQUE, MAKING USE OF HIGH THROUGHPUT TECHNOLOGIES AS DESCRIBED BY CMS-2020-01-R: HCPCS

## 2022-08-26 PROCEDURE — 80048 BASIC METABOLIC PNL TOTAL CA: CPT

## 2022-08-26 PROCEDURE — 36415 COLL VENOUS BLD VENIPUNCTURE: CPT

## 2022-08-26 PROCEDURE — 85025 COMPLETE CBC W/AUTO DIFF WBC: CPT

## 2022-08-26 RX ORDER — FEXOFENADINE HCL 180 MG/1
180 TABLET ORAL DAILY
COMMUNITY
Start: 2022-08-10 | End: 2023-02-06

## 2022-08-26 RX ORDER — OMEPRAZOLE 20 MG/1
20 CAPSULE, DELAYED RELEASE ORAL DAILY
COMMUNITY
End: 2023-02-06

## 2022-08-26 NOTE — DISCHARGE INSTRUCTIONS
Take the following medications the morning of surgery:  RANEXA AND OMEPRAZOLE    If you are on prescription narcotic pain medication to control your pain you may also take that medication the morning of surgery.    General Instructions:  Do not eat solid food after midnight the night before surgery.  You may drink clear liquids day of surgery but must stop at least one hour before your hospital arrival time. CLEAR LIQUIDS UNTIL 8:00 AM  It is beneficial for you to have a clear drink that contains carbohydrates the day of surgery.  We suggest a 12 to 20 ounce bottle of Gatorade or Powerade for non-diabetic patients or a 12 to 20 ounce bottle of G2 or Powerade Zero for diabetic patients. (Pediatric patients, are not advised to drink a 12 to 20 ounce carbohydrate drink)    Clear liquids are liquids you can see through.  Nothing red in color.     Plain water                               Sports drinks  Sodas                                   Gelatin (Jell-O)  Fruit juices without pulp such as white grape juice and apple juice  Popsicles that contain no fruit or yogurt  Tea or coffee (no cream or milk added)  Gatorade / Powerade  G2 / Powerade Zero    Infants may have breast milk up to four hours before surgery.  Infants drinking formula may drink formula up to six hours before surgery.   Patients who avoid smoking, chewing tobacco and alcohol for 4 weeks prior to surgery have a reduced risk of post-operative complications.  Quit smoking as many days before surgery as you can.  Do not smoke, use chewing tobacco or drink alcohol the day of surgery.   If applicable bring your C-PAP/ BI-PAP machine.  Bring any papers given to you in the doctor’s office.  Wear clean comfortable clothes.  Do not wear contact lenses, false eyelashes or make-up.  Bring a case for your glasses.   Bring crutches or walker if applicable.  Remove all piercings.  Leave jewelry and any other valuables at home.  Hair extensions with metal clips must  be removed prior to surgery.  The Pre-Admission Testing nurse will instruct you to bring medications if unable to obtain an accurate list in Pre-Admission Testing.        If you were given a blood bank ID arm band remember to bring it with you the day of surgery.    Preventing a Surgical Site Infection:  For 2 to 3 days before surgery, avoid shaving with a razor because the razor can irritate skin and make it easier to develop an infection.    Any areas of open skin can increase the risk of a post-operative wound infection by allowing bacteria to enter and travel throughout the body.  Notify your surgeon if you have any skin wounds / rashes even if it is not near the expected surgical site.  The area will need assessed to determine if surgery should be delayed until it is healed.  The night prior to surgery shower using a fresh bar of anti-bacterial soap (such as Dial) and clean washcloth.  Sleep in a clean bed with clean clothing.  Do not allow pets to sleep with you.  Shower on the morning of surgery using a fresh bar of anti-bacterial soap (such as Dial) and clean washcloth.  Dry with a clean towel and dress in clean clothing.  Ask your surgeon if you will be receiving antibiotics prior to surgery.  Make sure you, your family, and all healthcare providers clean their hands with soap and water or an alcohol based hand  before caring for you or your wound.    Day of surgery: 8/29/2022 ARRIVAL TIME 9:00 AM  Your arrival time is approximately two hours before your scheduled surgery time.  Upon arrival, a Pre-op nurse and Anesthesiologist will review your health history, obtain vital signs, and answer questions you may have.  The only belongings needed at this time will be a list of your home medications and if applicable your C-PAP/BI-PAP machine.  A Pre-op nurse will start an IV and you may receive medication in preparation for surgery, including something to help you relax.     Please be aware that surgery  does come with discomfort.  We want to make every effort to control your discomfort so please discuss any uncontrolled symptoms with your nurse.   Your doctor will most likely have prescribed pain medications.      If you are going home after surgery you will receive individualized written care instructions before being discharged.  A responsible adult must drive you to and from the hospital on the day of your surgery and stay with you for 24 hours.  Discharge prescriptions can be filled by the hospital pharmacy during regular pharmacy hours.  If you are having surgery late in the day/evening your prescription may be e-prescribed to your pharmacy.  Please verify your pharmacy hours or chose a 24 hour pharmacy to avoid not having access to your prescription because your pharmacy has closed for the day.    If you are staying overnight following surgery, you will be transported to your hospital room following the recovery period.  [facility] has all private rooms.    If you have any questions please call Pre-Admission Testing at [phone].  Deductibles and co-payments are collected on the day of service. Please be prepared to pay the required co-pay, deductible or deposit on the day of service as defined by your plan.    Call your surgeon immediately if you experience any of the following symptoms:  Sore Throat  Shortness of Breath or difficulty breathing  Cough  Chills  Body soreness or muscle pain  Headache  Fever  New loss of taste or smell  Do not arrive for your surgery ill.  Your procedure will need to be rescheduled to another time.  You will need to call your physician before the day of surgery to avoid any unnecessary exposure to hospital staff as well as other patients.

## 2022-08-29 ENCOUNTER — PREP FOR SURGERY (OUTPATIENT)
Dept: OTHER | Facility: HOSPITAL | Age: 62
End: 2022-08-29

## 2022-08-29 DIAGNOSIS — C50.412 MALIGNANT NEOPLASM OF UPPER-OUTER QUADRANT OF LEFT BREAST IN FEMALE, ESTROGEN RECEPTOR NEGATIVE: Primary | ICD-10-CM

## 2022-08-29 DIAGNOSIS — Z17.1 MALIGNANT NEOPLASM OF UPPER-OUTER QUADRANT OF LEFT BREAST IN FEMALE, ESTROGEN RECEPTOR NEGATIVE: Primary | ICD-10-CM

## 2022-08-29 NOTE — PERIOPERATIVE NURSING NOTE
Spoke with Dr Red re: covid positive result. She states the case had already been canceled and that the patient was aware

## 2022-09-08 ENCOUNTER — HOSPITAL ENCOUNTER (OUTPATIENT)
Dept: OCCUPATIONAL THERAPY | Facility: HOSPITAL | Age: 62
Setting detail: THERAPIES SERIES
Discharge: HOME | End: 2022-09-08

## 2022-09-08 DIAGNOSIS — Z91.89 AT RISK FOR LYMPHEDEMA: Primary | ICD-10-CM

## 2022-09-08 PROCEDURE — 97535 SELF CARE MNGMENT TRAINING: CPT

## 2022-09-08 PROCEDURE — 97110 THERAPEUTIC EXERCISES: CPT

## 2022-09-08 NOTE — THERAPY TREATMENT NOTE
Outpatient Occupational Therapy Lymphedema Treatment Note  [facility]     Patient Name: Laxmi Marcus  : 1960  MRN: [id number]  Today's Date: 2022      Visit Date: 2022    Patient Active Problem List   Diagnosis   • Abnormal weight gain   • Uncontrolled type 2 diabetes mellitus   • Hypoglycemia due to endogenous hyperinsulinemia   • Hyperlipidemia   • Hypertension   • Diabetes mellitus type 2, uncontrolled, with complications   • Encounter for long-term (current) use of insulin (HCC)   • Type II diabetes mellitus with neurological manifestations, uncontrolled   • Uncontrolled type II diabetes mellitus with nephropathy   • Hyperinsulinism   • Hyperlipidemia associated with type 2 diabetes mellitus (HCC)   • Malignant neoplasm of upper-outer quadrant of left breast in female, estrogen receptor negative (HCC)   • Fitting and adjustment of vascular catheter        Past Medical History:   Diagnosis Date   • Awareness under anesthesia    • Breast cancer (HCC)     Left   • CKD (chronic kidney disease)     stage 3   • Coronary artery disease    • COVID-19 2021   • Diabetes mellitus type 2 with complications, uncontrolled    • Frequent UTI    • GERD (gastroesophageal reflux disease)    • Gout    • Heart murmur    • History of cancer chemotherapy 2021    LAST DOSE   • History of pneumonia    • History of radiation therapy    • Hyperlipidemia    • Hypertension    • NSTEMI (non-ST elevated myocardial infarction) ([facility]) 2019   • Obesity    • Recent bereavement     SPOUSE 2022   • Sinus congestion    • Slow to wake up after anesthesia    • Vitamin B12 deficiency         Past Surgical History:   Procedure Laterality Date   • APPENDECTOMY     • BREAST LUMPECTOMY     • BREAST LUMPECTOMY WITH SENTINEL NODE BIOPSY AND AXILLARY NODE DISSECTION     • CARDIAC CATHETERIZATION  2019    new de nova Ostial Ramus 95% stenosis, severe multivessel diabetic coronary vasculopathy with small vessels,  occluded 3 of 4 bypass conduits, LIMA to LAD patent, all 3 vein grafts are occluded, PTCA/EMMANUEL to ostial Ramus 95% stenosis, patent OM1 and proximal and mid RCA stents x 3 from 2/2019, preserved LVSF, normal LVEDP, no significant AS or MR     • CHOLECYSTECTOMY  2006   • CORONARY ARTERY BYPASS GRAFT  2017    x4 with LIMA to mLAD, SVG to diagonal, SVG to OM, SVG to distal PDA     • CORONARY STENT PLACEMENT  12/2019    Drug eluting stent placement--2.0 x 26 mm Resolute [place] EMMANUEL to ostial Ramus     • CORONARY STENT PLACEMENT  02/2019    Drug eluting stent--2.25 x 38 mm and 2.5 x 15 mm Resolute [place] EMMANUEL to proximal mid RCA, 2.0 x 26 mm Resolute [place] EMMANUEL to mid circumflex/proximal OM1   • DENTAL PROCEDURE     • HYSTERECTOMY  2009   • MASTECTOMY Left 1/4/2022    Procedure: LEFT MODIFIED RADICAL MASTECTOMY;  Surgeon: Indigo Red MD;  Location: [facility] OR;  Service: General;  Laterality: Left;   • TONSILLECTOMY  1961    age 1   • VENOUS ACCESS DEVICE (PORT) INSERTION Right 10/1/2021    Procedure: INSERTION VENOUS ACCESS DEVICE;  Surgeon: Indigo Red MD;  Location: [facility] OR [facility];  Service: General;  Laterality: Right;         Visit Dx:      ICD-10-CM ICD-9-CM   1. At risk for lymphedema  Z91.89 V49.89        Lymphedema     Row Name 09/08/22 1700             Subjective Pain    Able to rate subjective pain? yes  -RE      Pre-Treatment Pain Level 0  -RE      Post-Treatment Pain Level 0  -RE              Subjective Comments    Subjective Comments Pt reports leg pain with any walking. Subsides with rest but reoccurs when walking resumes.  -RE            User Key  (r) = Recorded By, (t) = Taken By, (c) = Cosigned By    Initials Name Provider Type    RE Rachel Toney OTR Occupational Therapist                         OT Assessment/Plan     Row Name 09/08/22 5004          OT Assessment    Assessment Comments Pt with good understanding of HEP.  -RE            OT Plan    OT Plan Comments follow up for bio  -RE            User Key  (r) = Recorded By, (t) = Taken By, (c) = Cosigned By    Initials Name Provider Type    Rachel Beverly OTR Occupational Therapist                   OT Exercises     Row Name 09/08/22 1700             Exercise 1    Exercise Name 1 rowing  -RE      Equipment 1 Theraband  -RE      Resistance 1 Green  -RE      Reps 1 10  -RE              Exercise 2    Exercise Name 2 shoulder abd  -RE      Equipment 2 Theraband  -RE      Resistance 2 Red  -RE      Reps 2 10  -RE              Exercise 3    Exercise Name 3 PNF D2  -RE      Equipment 3 Theraband  -RE      Resistance 3 Red  -RE      Reps 3 10  -RE            User Key  (r) = Recorded By, (t) = Taken By, (c) = Cosigned By    Initials Name Provider Type    Rachel Beverly OTR Occupational Therapist                    Therapy Education  Education Details: Advised patient to folow up with her MD regarding her leg pain. She ahs an appointment next week. recommended also [employer] or meeting with a  at a local gym to learn how to use equipment. Do not pursue any LE exercise until she discusses leg pain during walking with MD  Given: HEP, Symptoms/condition management, Other (comment)  Program: New  How Provided: Verbal, Demonstration, Written  Provided to: Patient  Level of Understanding: Teach back education performed, Verbalized, Demonstrated  41591 - OT Self Care/Mgmt Minutes: 45                Time Calculation:   OT Start Time: 1015  OT Stop Time: 1115  OT Time Calculation (min): 60 min  Total Timed Code Minutes- OT: 60 minute(s)  Timed Charges  49893 - OT Therapeutic Exercise Minutes: 15  43391 - OT Self Care/Mgmt Minutes: 45  Total Minutes  Timed Charges Total Minutes: 60   Total Minutes: 60     Therapy Charges for Today     Code Description Service Date Service Provider Modifiers Qty    77151890813 HC OT THER PROC EA 15 MIN 9/8/2022 Rachel Toney OTR GO 1    [id number] HC OT SELF CARE/MGMT/TRAIN EA 15 MIN 9/8/2022 Rachel Toney OTR GO 3                       Rachel Toney, OTR  9/8/2022

## 2022-09-13 ENCOUNTER — ANESTHESIA EVENT (OUTPATIENT)
Dept: PERIOP | Facility: HOSPITAL | Age: 62
End: 2022-09-13

## 2022-09-13 ENCOUNTER — HOSPITAL ENCOUNTER (OUTPATIENT)
Facility: HOSPITAL | Age: 62
Setting detail: HOSPITAL OUTPATIENT SURGERY
Discharge: HOME | End: 2022-09-13
Attending: SURGERY | Admitting: SURGERY

## 2022-09-13 ENCOUNTER — ANESTHESIA (OUTPATIENT)
Dept: PERIOP | Facility: HOSPITAL | Age: 62
End: 2022-09-13

## 2022-09-13 VITALS
RESPIRATION RATE: 18 BRPM | SYSTOLIC BLOOD PRESSURE: 124 MMHG | HEART RATE: 58 BPM | HEIGHT: 65 IN | DIASTOLIC BLOOD PRESSURE: 54 MMHG | WEIGHT: 147.49 LBS | TEMPERATURE: 97.7 F | BODY MASS INDEX: 24.57 KG/M2 | OXYGEN SATURATION: 100 %

## 2022-09-13 LAB
GLUCOSE BLDC GLUCOMTR-MCNC: 171 MG/DL (ref 70–130)
GLUCOSE BLDC GLUCOMTR-MCNC: 236 MG/DL (ref 70–130)

## 2022-09-13 PROCEDURE — 82962 GLUCOSE BLOOD TEST: CPT

## 2022-09-13 PROCEDURE — 36590 REMOVAL TUNNELED CV CATH: CPT | Performed by: SURGERY

## 2022-09-13 PROCEDURE — 25010000002 FENTANYL CITRATE (PF) 50 MCG/ML SOLUTION: Performed by: NURSE ANESTHETIST, CERTIFIED REGISTERED

## 2022-09-13 PROCEDURE — 25010000002 ONDANSETRON PER 1 MG: Performed by: NURSE ANESTHETIST, CERTIFIED REGISTERED

## 2022-09-13 PROCEDURE — S0260 H&P FOR SURGERY: HCPCS | Performed by: SURGERY

## 2022-09-13 PROCEDURE — 25010000002 PROPOFOL 10 MG/ML EMULSION: Performed by: NURSE ANESTHETIST, CERTIFIED REGISTERED

## 2022-09-13 RX ORDER — MAGNESIUM HYDROXIDE 1200 MG/15ML
LIQUID ORAL AS NEEDED
Status: DISCONTINUED | OUTPATIENT
Start: 2022-09-13 | End: 2022-09-13 | Stop reason: HOSPADM

## 2022-09-13 RX ORDER — OXYCODONE AND ACETAMINOPHEN 7.5; 325 MG/1; MG/1
1 TABLET ORAL EVERY 4 HOURS PRN
Status: DISCONTINUED | OUTPATIENT
Start: 2022-09-13 | End: 2022-09-13 | Stop reason: HOSPADM

## 2022-09-13 RX ORDER — ONDANSETRON 2 MG/ML
4 INJECTION INTRAMUSCULAR; INTRAVENOUS ONCE AS NEEDED
Status: DISCONTINUED | OUTPATIENT
Start: 2022-09-13 | End: 2022-09-13 | Stop reason: HOSPADM

## 2022-09-13 RX ORDER — LIDOCAINE HYDROCHLORIDE 10 MG/ML
0.5 INJECTION, SOLUTION EPIDURAL; INFILTRATION; INTRACAUDAL; PERINEURAL ONCE AS NEEDED
Status: DISCONTINUED | OUTPATIENT
Start: 2022-09-13 | End: 2022-09-13 | Stop reason: HOSPADM

## 2022-09-13 RX ORDER — SODIUM CHLORIDE 0.9 % (FLUSH) 0.9 %
3-10 SYRINGE (ML) INJECTION AS NEEDED
Status: DISCONTINUED | OUTPATIENT
Start: 2022-09-13 | End: 2022-09-13 | Stop reason: HOSPADM

## 2022-09-13 RX ORDER — DIPHENHYDRAMINE HCL 25 MG
25 CAPSULE ORAL
Status: DISCONTINUED | OUTPATIENT
Start: 2022-09-13 | End: 2022-09-13 | Stop reason: HOSPADM

## 2022-09-13 RX ORDER — FAMOTIDINE 10 MG/ML
20 INJECTION, SOLUTION INTRAVENOUS ONCE
Status: COMPLETED | OUTPATIENT
Start: 2022-09-13 | End: 2022-09-13

## 2022-09-13 RX ORDER — FENTANYL CITRATE 50 UG/ML
INJECTION, SOLUTION INTRAMUSCULAR; INTRAVENOUS AS NEEDED
Status: DISCONTINUED | OUTPATIENT
Start: 2022-09-13 | End: 2022-09-13 | Stop reason: SURG

## 2022-09-13 RX ORDER — EPHEDRINE SULFATE 50 MG/ML
INJECTION, SOLUTION INTRAVENOUS AS NEEDED
Status: DISCONTINUED | OUTPATIENT
Start: 2022-09-13 | End: 2022-09-13 | Stop reason: SURG

## 2022-09-13 RX ORDER — SODIUM CHLORIDE, SODIUM LACTATE, POTASSIUM CHLORIDE, CALCIUM CHLORIDE 600; 310; 30; 20 MG/100ML; MG/100ML; MG/100ML; MG/100ML
9 INJECTION, SOLUTION INTRAVENOUS CONTINUOUS
Status: DISCONTINUED | OUTPATIENT
Start: 2022-09-13 | End: 2022-09-13 | Stop reason: HOSPADM

## 2022-09-13 RX ORDER — IBUPROFEN 600 MG/1
600 TABLET ORAL ONCE AS NEEDED
Status: DISCONTINUED | OUTPATIENT
Start: 2022-09-13 | End: 2022-09-13 | Stop reason: HOSPADM

## 2022-09-13 RX ORDER — LIDOCAINE HYDROCHLORIDE 20 MG/ML
INJECTION, SOLUTION INFILTRATION; PERINEURAL AS NEEDED
Status: DISCONTINUED | OUTPATIENT
Start: 2022-09-13 | End: 2022-09-13 | Stop reason: SURG

## 2022-09-13 RX ORDER — MIDAZOLAM HYDROCHLORIDE 1 MG/ML
1 INJECTION INTRAMUSCULAR; INTRAVENOUS
Status: DISCONTINUED | OUTPATIENT
Start: 2022-09-13 | End: 2022-09-13 | Stop reason: HOSPADM

## 2022-09-13 RX ORDER — HYDROCODONE BITARTRATE AND ACETAMINOPHEN 7.5; 325 MG/1; MG/1
1 TABLET ORAL ONCE AS NEEDED
Status: DISCONTINUED | OUTPATIENT
Start: 2022-09-13 | End: 2022-09-13 | Stop reason: HOSPADM

## 2022-09-13 RX ORDER — ONDANSETRON 2 MG/ML
INJECTION INTRAMUSCULAR; INTRAVENOUS AS NEEDED
Status: DISCONTINUED | OUTPATIENT
Start: 2022-09-13 | End: 2022-09-13 | Stop reason: SURG

## 2022-09-13 RX ORDER — DIPHENHYDRAMINE HYDROCHLORIDE 50 MG/ML
12.5 INJECTION INTRAMUSCULAR; INTRAVENOUS
Status: DISCONTINUED | OUTPATIENT
Start: 2022-09-13 | End: 2022-09-13 | Stop reason: HOSPADM

## 2022-09-13 RX ORDER — SODIUM CHLORIDE 0.9 % (FLUSH) 0.9 %
3 SYRINGE (ML) INJECTION EVERY 12 HOURS SCHEDULED
Status: DISCONTINUED | OUTPATIENT
Start: 2022-09-13 | End: 2022-09-13 | Stop reason: HOSPADM

## 2022-09-13 RX ORDER — HYDRALAZINE HYDROCHLORIDE 20 MG/ML
5 INJECTION INTRAMUSCULAR; INTRAVENOUS
Status: DISCONTINUED | OUTPATIENT
Start: 2022-09-13 | End: 2022-09-13 | Stop reason: HOSPADM

## 2022-09-13 RX ORDER — PROPOFOL 10 MG/ML
VIAL (ML) INTRAVENOUS CONTINUOUS PRN
Status: DISCONTINUED | OUTPATIENT
Start: 2022-09-13 | End: 2022-09-13 | Stop reason: SURG

## 2022-09-13 RX ORDER — FENTANYL CITRATE 50 UG/ML
50 INJECTION, SOLUTION INTRAMUSCULAR; INTRAVENOUS
Status: DISCONTINUED | OUTPATIENT
Start: 2022-09-13 | End: 2022-09-13 | Stop reason: HOSPADM

## 2022-09-13 RX ORDER — LABETALOL HYDROCHLORIDE 5 MG/ML
5 INJECTION, SOLUTION INTRAVENOUS
Status: DISCONTINUED | OUTPATIENT
Start: 2022-09-13 | End: 2022-09-13 | Stop reason: HOSPADM

## 2022-09-13 RX ORDER — NALOXONE HCL 0.4 MG/ML
0.2 VIAL (ML) INJECTION AS NEEDED
Status: DISCONTINUED | OUTPATIENT
Start: 2022-09-13 | End: 2022-09-13 | Stop reason: HOSPADM

## 2022-09-13 RX ORDER — EPHEDRINE SULFATE 50 MG/ML
5 INJECTION, SOLUTION INTRAVENOUS ONCE AS NEEDED
Status: DISCONTINUED | OUTPATIENT
Start: 2022-09-13 | End: 2022-09-13 | Stop reason: HOSPADM

## 2022-09-13 RX ADMIN — FAMOTIDINE 20 MG: 10 INJECTION, SOLUTION INTRAVENOUS at 07:02

## 2022-09-13 RX ADMIN — FENTANYL CITRATE 50 MCG: 50 INJECTION INTRAMUSCULAR; INTRAVENOUS at 08:04

## 2022-09-13 RX ADMIN — FENTANYL CITRATE 50 MCG: 50 INJECTION INTRAMUSCULAR; INTRAVENOUS at 08:12

## 2022-09-13 RX ADMIN — EPHEDRINE SULFATE 10 MG: 50 INJECTION INTRAVENOUS at 08:36

## 2022-09-13 RX ADMIN — ONDANSETRON 4 MG: 2 INJECTION INTRAMUSCULAR; INTRAVENOUS at 08:27

## 2022-09-13 RX ADMIN — LIDOCAINE HYDROCHLORIDE 60 MG: 20 INJECTION, SOLUTION INFILTRATION; PERINEURAL at 08:04

## 2022-09-13 RX ADMIN — PROPOFOL 100 MCG/KG/MIN: 10 INJECTION, EMULSION INTRAVENOUS at 08:06

## 2022-09-13 RX ADMIN — SODIUM CHLORIDE, POTASSIUM CHLORIDE, SODIUM LACTATE AND CALCIUM CHLORIDE 9 ML/HR: 600; 310; 30; 20 INJECTION, SOLUTION INTRAVENOUS at 07:02

## 2022-09-13 NOTE — H&P
BREAST SURGERY HISTORY & PHYSICAL         Chief complaint: Breast cancer sp completion of adjuvant therapy     HPI:   8/20/21:  Seen by Dr Red  Ms. Laxmi Marcus is a 62 yo woman, seen at the request of Dr. Will Johnston, for a new diagnosis of left breast cancer. She has a past history of left breast cancer in 2006, pT1N1, intermediate grade, invasive ductal carcinoma, ER/AZ positive, HER-2 negative. She underwent left lumpectomy & SLNB with completion axillary dissection, followed by reexcision for close margins. This was followed by AC x4 and 4 cycles of Taxol, then radiation. She was on tamoxifen for 2-1/2 years, then says she was switched to a different medication for a total of 5 years of endocrine therapy. Currently, I only have access to one note from the [facility] group in 2008. I have tried to get copies of the operative report and pathology report from [place], however these records are no longer held. The patient says that she thinks she had 9 lymph nodes removed.       She reports that she had a lot of issues with chemo, including severe bone pain requiring Dilaudid for months. Shortly after finishing all of her breast treatment, she had severe pancreatitis and shortly after that she became a brittle diabetic. She has a past history of CAD sp CABGx4 in 2017. Unfortunately it sounds like she had some sort of immediate stenosis, because she had a heart attack in early 2019 requiring stent placement x4. She is followed by cardiology and on Brilinta.     In May of this year, she noticed a painful left breast lump near the prior lumpectomy scar. She does not think it has changed at all since she first noticed. Her work-up is detailed in the oncologic history below. She denies any family history of breast or ovarian cancer.      10/29/21:  Seen by Dr Red  After her last visit, she underwent a PET scan which was negative for distant disease and her genetic testing was negative for mutation. She started  neoadjuvant therapy with carbo/Taxol/Keytruda on 9/7/21. Her second dose of Taxol was temporarily held because she developed Covid, however she received her seventh dose yesterday. She reports that the mass has gotten much smaller and it is now difficult for her to find.     12/10/21:  Seen by Dr Red  She completed her last dose of Taxol on 12/4/21. After her last visit, she saw Dr. Araya. Because of her diabetes and history of a CABG, he recommended delayed reconstruction and the patient is okay with this.     1/19/22:  Seen by Dr Red  She underwent left modified radical mastectomy on 1/4/22. Her pathology was sent out for a second opinion and the final report is still pending. She has been recovering well and has no complaints. She came into the office last week to have her breast drain removed. The axillary drain has had out <20 mL/day for the past few days.     8/8/22:  She returns today for follow up with no breast concerns.  She still has not had her reconstruction, she is frustrated with waiting, has follow with Dr Araya in 11/22.  Her  passed away unexpectedly recently, she is teary today, misses him terribly.       Her last clinic visit with medical oncology was in 6/22:  Last adjvuant Keytruda.  Labs for toxicity- glucose improved     She was seen in OT/LE clinic in 5/22:  L-Dex value today is 2.0 which is WNL.She reports wearing her sleeve about 2 days a week and any time her arm feels tired or uncomfortable. Today we reviewed some new exercises to build some strength and stability. I recommended follow up in 3 months     Right screening mammogram with jaswant was completed on 7/29/22 at [facility], [facility].(see full report below)     9/13/22, Interval History:  She presents today for port removal. She denies any new complaints.             Oncology/Hematology History Overview Note    06/05/2017, Bilateral Diagnostic MMG with Jaswant & Bilateral Breast US ([facility]):  MMG:  Scattered areas pf  fibroglandular density.   1. There is a stable post-surgical scar with associated trabecular thickening seen in the upper outer region of the left breast. Post-surgical scar is in an area of prior lumpectomy. Findings are consistent with post-surgical and post-radiation therapy changes.  2. The patient indicates recent onset of pain in the lateral and axillary tail region of the left breast. The symptomatic region is clearly marked and there is no suspicious finding in the region of clinical concern.  3. There is a stable small intramammary lymph node measuring 7 millimeters seen in the posterior one-third 1:30 o'clock region of the left breast located 8 centimeters from the nipple. There has been no significant change from the prior exam(s).  6. There is a stable area of asymmetric breast tissue seen in the superior region of the right breast.  US:  1. There is no evidence of any solid mass or abnormal cystic elements.  2. There is no evidence of any solid mass or abnormal cystic elements.  3. Ultrasound is suggestive of an oval small intramammary lymph node measuring 7 millimeters.  4. There is a small lymph node measuring 6 millimeters in the left axilla.  5. There is an oval small simple cyst with circumscribed margins measuring 4 millimeters seen in the right breast at 3 o'clock.  6. There is no evidence of any solid mass or abnormal cystic elements.  BI-RADS 2: Benign.     05/2021: Pt noticed a right breast lump.      Malignant neoplasm of upper-outer quadrant of left breast in female, estrogen receptor negative (HCC)    7/26/2021 Initial Diagnosis      Malignant neoplasm of upper-outer quadrant of left breast in female, estrogen receptor negative (CMS/HCC)       7/27/2021 Imaging      Bilateral Diagnostic MMG with Jaswant & Left Breast US ([facility]):  MMG:  Scattered areas of fibroglandular density.  1. There is a new oval mass measuring 20 mm with indistinct margins seen in the 1:30 o'clock region of the left breast  located 4 centimeters from the nipple. This correlates to the palpable mass.   2. There is a stable post-surgical scar seen in the posterior one-third region of the left breast at 2 o'clock. Post-surgical scar is in an area of prior lumpectomy. Findings are consistent with post-surgical and post-radiation therapy changes.   In the right breast, no suspicious masses, significant calcifications or other abnormalities are seen.  US:  1. Ultrasound demonstrates a new oval parallel solid mass with poorly defined margins measuring 16 x 14 x 15 mm seen in the 1:30 o'clock region of the left breast located 4 centimeters from the nipple. This correlates with the mammographic finding.  3. There is an oval lymph node measuring 9 x 6 x 8 mm in the left axilla. This has an abnormal appearance with little hilum.  BI-RADS 4B: Suspicious.       8/6/2021 Biopsy      Left Breast & Axilla, US-Guided Biopsies ([facility]):     1. Breast, Left 1:30 O'clock, Core Needle Biopsy:  Invasive mammary carcinoma, no special type (invasive ductal carcinoma), combined histologic grade 3 (tubule formation 3, nuclear pleomorphism 3, mitotic activity 2), 12 mm in greatest dimension, present in 5 of 15 cores.   Focal ductal carcinoma in situ (DCIS), high nuclear grade, solid pattern, with associated focal necrosis 1.5 mm in greatest dimension, present in 3 of 15 cores.   Microcalcifications associated with invasive carcinoma, DCIS, and benign mammary ducts/lobules.  -[place] clip.      ER negative (0%)  IL negative (0%)  Her2 negative (IHC 1+)  Ki-67 94.88%     2. Lymph Node, Left Axilla, Core Needle Biopsy:  Benign lymph node tissue with reactive follicular hyperplasia.   No definitive evidence of carcinoma (AE1/AE3).   No definitive evidence of lymphoma (CD3, CD20, CD21, and CD30).  -Hydromark clip. Concordant.       8/18/2021 Imaging      Bilateral Breast MRI ([facility]):  In the posterior one-third lateral aspect of the left breast centered at the  3-o'clock position on the order of 6.5 cm from the nipple there is a focal signal void that is seen within the superior aspect of an irregular heterogenous masslike region that measures on the order of 2.4 cm in the superior inferior dimension, 2.5 cm in the anterior posterior dimension and 2.7 cm in the medial lateral dimension. Some of the internal character of the lesion demonstrates absence of enhancement. This represents the biopsy-proven site of malignancy with the mini  cork-shaped metallic clip in the superomedial portion of the lesion. Some areas of increased T1-weighted signal intensity are noted in the region and are consistent with areas of postbiopsy related hemorrhage.   There is postsurgical change seen in the left breast in this region associated with prior breast conservation therapy. No other areas of abnormal enhancement or morphology are seen within the left breast. I see no evidence for abnormal left breast skin, nipple or chest wall enhancement and there is no evidence for left axillary adenopathy. The patient is status post median sternotomy which limits the evaluation of the internal mammary chain for internal mammary adenopathy. Mild diffuse left breast skin thickening associated with prior breast conservation therapy is noted, but there is no abnormal enhancement of the skin.    There are no areas of abnormal enhancement or morphology in the right breast. Scattered stippled foci of variable enhancement are seen throughout the right breast, none of which appear dominant or clumped or associated with any suspicious mammographic features. I see no evidence for abnormal right breast skin, nipple or chest wall enhancement and there is no evidence for right axillary or internal mammary chain adenopathy. Visualization of the right internal mammary chain is not prohibited by the artifact from the patient's median sternotomy wires.  BI-RADS 6: Known malignancy.       8/20/2021 Genetic Testing       [employer] Common Hereditary Cancers Panel (47 genes):     Negative       9/3/2021 Imaging      PET CT ([place]):  1. FDG avid left upper outer breast mass compatible with primary breast malignancy.  2. No evidence of metastatic disease.  3. Subcutaneous infiltrative changes in the ventral lower abdomen with low-level FDG uptake which may represent injection sites, panniculitis, or prior port sites.  4. Air-fluid level within the urinary bladder which may represent cystitis in the absence of recent catheterization.       9/13/2021 - 9/13/2021 Chemotherapy      OP BREAST Pembrolizumab 200 mg / PACLItaxel / CARBOplatin AUC=5       1/4/2022 Surgery      Left modified radical mastectomy  Preliminary Diagnosis  1. Left Breast, Modified Radical Mastectomy S/P Neoadjuvant Chemotherapy (332 grams):                A. FIBROTIC TUMOR BED WITH FOCAL RESIDUAL ATYPICAL CELL GROUPS (SEE COMMENT).                B. Negative for lymphovascular space invasion.  C. Clip and biopsy site changes are present within tumor bed.    D. Marked treatment effect with associated reactive epithelial changes and squamous metaplasia,        consistent with prior procedure-related changes.    E. Atypical lobular hyperplasia.   F. Background breast parenchyma with intraductal papilloma involved by usual ductal hyperplasia,       fibroadenomatoid change, and columnar cell change.    G. Unremarkable skin and nipple.    H. Eleven lymph nodes, negative for carcinoma (0/11):                1. Clip and biopsy site changes present in a single lymph node.  2. All lymph nodes are negative for treatment effect.    3. Separate collection of nerves, suggestive of traumatic neuroma.      2. Left Breast, Additional Tissue Margin at 2:00, Re-excision:   A. Benign skeletal muscle with no significant histopathologic changes.        Final Diagnosis  CONSULTANT DIAGNOSIS:   LEFT BREAST, MODIFIED RADICAL MASTECTOMY STATUS POST NEOADJUVANT CHEMOTHERAPY:               A.  NEGATIVE FOR RESIDUAL CARCINOMA (PATHOLOGIC COMPLETE RESPONSE) SEE COMMENT.     Comment:  [...] Within the biopsy site, a cluster of atypical cells, some of which show squamoid change, is noted. We believe this cluster to most likely be the product of epithelial displacement due to the previous biopsy, rather than evidence of residual invasive carcinoma. The presence of p63 positivity reinforces this morphologic impression.     The above interpretation was rendered by Dr. Fran Cotton of [facility] in [place]. Refer to outside pathology consultation report () for additional details regarding this case.     Therefore, given the aforementioned findings along with eleven benign lymph nodes, this case is classified as a ypT0 N0.       7/29/2022 Imaging      [facility]  Scattered areas of fibroglandular density.  No suspicious masses, significant calcifications or other abnormalities are seen.  There is been no significant change from prior exam.  Impression:  There is no mammographic evidence of malignancy.  Screening mammogram 1 year is recommended.  BI-RADS Category 1             Review of Systems:  See interval history.         Medications:  See chart.              Allergies   Allergen Reactions   • Dulaglutide Nausea And Vomiting       Another name for trulicity   • Sulfa Antibiotics Other (See Comments)       Abdominal Pain and NauseA   • Uloric [Febuxostat] Other (See Comments)       Mouth sores   • Latex Itching         Family History   Problem Relation Age of Onset   • Heart disease Mother     • Coronary artery disease Mother     • Heart disease Brother     • Heart disease Maternal Uncle     • Prostate cancer Maternal Uncle     • Heart disease Maternal Grandfather     • Malig Hyperthermia Neg Hx           PHYSICAL EXAMINATION:  /84 (BP Location: Right arm, Patient Position: Sitting)   Pulse 85   Temp 97.7 °F (36.5 °C)  "(Oral)   Resp 18   Ht 165.1 cm (65\")   Wt 66.9 kg (147 lb 7.8 oz)   LMP  (LMP Unknown)   SpO2 100%   BMI 24.54 kg/m²   ECOG 0 - Asymptomatic  General: NAD, well appearing  Psych: a&o x 3, normal mood and affect  Eyes: EOMI, no scleral icterus  ENMT: neck supple without masses or thyromegaly, mucus membranes moist  MSK: normal gait, normal ROM in bilateral shoulders  Lymph nodes: She still has prior left axillary scar, no axillary swelling  Breast: moderate size, grade 3 ptosis, sternal scar  Right: No visible abnormalities on inspection while seated, with arms raised or hands on hips. No masses, skin changesor nipple abnormalities.  PAC present right upper chest  Left: Sp mastectomy with well-healed incision. Flaps healthy.        Assessment:   1)   62 y.o. F with a diagnosis of left breast cancer: High grade, triple negative, invasive ductal carcinoma; clinical T2N0, anatomic stage IIA, prognostic stage IIB. She completed neoadjuvant carbo/Taxol/Keytruda on 12/4/21. She underwent left modified radical mastectomy on 1/4/22.     2)  She has a past history of left breast cancer in 2006, pT1N1, intermediate grade, invasive ductal carcinoma, ER/CA positive, HER-2 negative. She underwent left lumpectomy & SLNB with completion axillary dissection, followed by reexcision for close margins. This was followed by AC x4 and 4 cycles of Taxol, then radiation and endocrine therapy.        Plan:  -Port removal    Indigo Red MD  "

## 2022-09-13 NOTE — ANESTHESIA PREPROCEDURE EVALUATION
Anesthesia Evaluation     Patient summary reviewed and Nursing notes reviewed                Airway   Mallampati: II  TM distance: >3 FB  Neck ROM: full  Dental      Pulmonary - negative pulmonary ROS   Cardiovascular     ECG reviewed  Rhythm: regular  Rate: normal    (+) hypertension, valvular problems/murmurs, past MI , CAD, CABG, hyperlipidemia,       Neuro/Psych- negative ROS  GI/Hepatic/Renal/Endo    (+) morbid obesity, GERD,  renal disease, diabetes mellitus type 2 poorly controlled using insulin,     Musculoskeletal (-) negative ROS    Abdominal    Substance History - negative use     OB/GYN negative ob/gyn ROS         Other      history of cancer                    Anesthesia Plan    ASA 3     MAC     (I have reviewed the patient's history with the patient and the chart, including all pertinent laboratory results and imaging. I have explained the risks of anesthesia including but not limited to dental damage, corneal abrasion, nerve injury, MI, stroke, and death. Questions asked and answered. Anesthetic plan discussed with patient and team as indicated. Patient expressed understanding of the above.  )  intravenous induction     Anesthetic plan, risks, benefits, and alternatives have been provided, discussed and informed consent has been obtained with: patient.        CODE STATUS:

## 2022-09-13 NOTE — ANESTHESIA POSTPROCEDURE EVALUATION
Patient: Laxmi Marcus    Procedure Summary     Date: 09/13/22 Room / Location: [facility] OR [address] [place] [address] MAIN OR    Anesthesia Start: 0758 Anesthesia Stop: 0848    Procedure: REMOVAL VENOUS ACCESS DEVICE (Right ) Diagnosis:       Malignant neoplasm of upper-outer quadrant of left breast in female, estrogen receptor negative (HCC)      (Malignant neoplasm of upper-outer quadrant of left breast in female, estrogen receptor negative (HCC) [C50.412, Z17.1])    Surgeons: Indigo Red MD Provider: Israel Hermosillo MD    Anesthesia Type: MAC ASA Status: 3          Anesthesia Type: MAC    Vitals  Vitals Value Taken Time   /54 09/13/22 0930   Temp     Pulse 62 09/13/22 0934   Resp     SpO2 99 % 09/13/22 0934   Vitals shown include unvalidated device data.        Post Anesthesia Care and Evaluation    Patient location during evaluation: PACU  Patient participation: complete - patient participated  Level of consciousness: awake and alert  Pain management: adequate    Airway patency: patent  Anesthetic complications: No anesthetic complications    Cardiovascular status: acceptable  Respiratory status: acceptable  Hydration status: acceptable    Comments: ---------------------------               09/13/22                      0659         ---------------------------   BP:          170/84         Pulse:         85           Resp:          18           Temp:   36.5 °C (97.7 °F)   SpO2:         100%         ---------------------------

## 2022-09-13 NOTE — OP NOTE
Operative Report    Patient Name:  Laxmi Marcus  YOB: 1960    Date of Surgery:  9/13/2022    Pre-op Diagnosis:   Malignant neoplasm of upper-outer quadrant of left breast in female, estrogen receptor negative (HCC) [C50.412, Z17.1]       Post-Op Diagnosis:  Malignant neoplasm of upper-outer quadrant of left breast in female, estrogen receptor negative (HCC) [C50.412, Z17.1]    Procedure:  REMOVAL VENOUS ACCESS DEVICE      Staff:  Surgeon(s):  Indigo Red MD       Anesthesia: Monitored Anesthesia Care    Estimated Blood Loss: 0 mL    Implants:    Nothing was implanted during the procedure    Specimen:          None        Findings: Port and catheter were intact.     Complications: None     Indications: The patient is a 62 y.o. F with a diagnosis of left breast cancer: High grade, triple negative, invasive ductal carcinoma; clinical T2N0, anatomic stage IIA, prognostic stage IIB. She completed neoadjuvant carbo/Taxol/Keytruda on 12/4/21. She underwent left modified radical mastectomy on 1/4/22. She completed adjuvant Keytruda. She presents today for port removal. The risks and benefits were discussed preoperatively and she understood and agreed to proceed.     Description of Procedure:  The patient was identified in the preoperative area and brought to the operating room and placed supine on the table. Patient verification was performed and anesthesia was induced. The anterior chest and neck were prepped and draped in standard sterile fashion. A time out was performed and all were in agreement.    Local anesthesia was injected into the skin and subcutaneous tissue along the prior incision, towards the port pocket and towards the neck. The prior chest wall incision was excised. Cautery was used to divide down to the port and open the capsule around the port. The previously placed Prolene sutures were cut and removed. The port was then grasped with a clamp and lifted out of the wound. A 3-0  Vicryl pursestring suture was placed around the catheter tract, but not tied.    The patient was then placed in trendelenburg position. Port and catheter were removed while pressure was held at the neck insertion site and the pursestring stitch was simultaneously tied. Pressure was held for 5 minutes. The wound was irrigated and hemostasis was acheived with electrocautery. Additional local anesthetic was infiltrated into the wound. The incision was closed with interrupted 3-0 Vicryl deep dermal sutures and a running subcuticular 4-0 Monocryl suture and covered with Dermabond. Counts were correct at the end of the case. The patient was awakened from anesthesia and taken to the PACU in stable condition.    Indigo Red MD     Date: 9/13/2022  Time: 09:05 EDT

## 2022-11-17 ENCOUNTER — HOSPITAL ENCOUNTER (OUTPATIENT)
Dept: OCCUPATIONAL THERAPY | Facility: HOSPITAL | Age: 62
Setting detail: THERAPIES SERIES
Discharge: HOME | End: 2022-11-17

## 2022-11-17 DIAGNOSIS — C50.412 MALIGNANT NEOPLASM OF UPPER-OUTER QUADRANT OF LEFT BREAST IN FEMALE, ESTROGEN RECEPTOR NEGATIVE: ICD-10-CM

## 2022-11-17 DIAGNOSIS — Z91.89 AT RISK FOR LYMPHEDEMA: Primary | ICD-10-CM

## 2022-11-17 DIAGNOSIS — Z17.1 MALIGNANT NEOPLASM OF UPPER-OUTER QUADRANT OF LEFT BREAST IN FEMALE, ESTROGEN RECEPTOR NEGATIVE: ICD-10-CM

## 2022-11-17 PROCEDURE — 97535 SELF CARE MNGMENT TRAINING: CPT

## 2022-11-17 PROCEDURE — 93702 BIS XTRACELL FLUID ANALYSIS: CPT

## 2022-11-17 NOTE — THERAPY PROGRESS REPORT/RE-CERT
Outpatient Occupational Therapy Lymphedema Progress Note  [facility]     Patient Name: Laxmi Marcus  : 1960  MRN: [id number]  Today's Date: 2022      Visit Date: 2022    Patient Active Problem List   Diagnosis   • Abnormal weight gain   • Uncontrolled type 2 diabetes mellitus   • Hypoglycemia due to endogenous hyperinsulinemia   • Hyperlipidemia   • Hypertension   • Diabetes mellitus type 2, uncontrolled, with complications   • Encounter for long-term (current) use of insulin (HCC)   • Type II diabetes mellitus with neurological manifestations, uncontrolled   • Uncontrolled type II diabetes mellitus with nephropathy   • Hyperinsulinism   • Hyperlipidemia associated with type 2 diabetes mellitus (HCC)   • Malignant neoplasm of upper-outer quadrant of left breast in female, estrogen receptor negative (HCC)   • Fitting and adjustment of vascular catheter        Past Medical History:   Diagnosis Date   • Awareness under anesthesia    • Breast cancer (HCC)     Left   • CKD (chronic kidney disease)     stage 3   • Coronary artery disease    • COVID-19 2021   • Diabetes mellitus type 2 with complications, uncontrolled    • Frequent UTI    • GERD (gastroesophageal reflux disease)    • Gout    • Heart murmur    • History of cancer chemotherapy 2021    LAST DOSE   • History of pneumonia    • History of radiation therapy    • Hyperlipidemia    • Hypertension    • NSTEMI (non-ST elevated myocardial infarction) ([facility]) 2019   • Obesity    • Recent bereavement     SPOUSE 2022   • Sinus congestion    • Slow to wake up after anesthesia    • Vitamin B12 deficiency         Past Surgical History:   Procedure Laterality Date   • APPENDECTOMY     • BREAST LUMPECTOMY     • BREAST LUMPECTOMY WITH SENTINEL NODE BIOPSY AND AXILLARY NODE DISSECTION     • CARDIAC CATHETERIZATION  2019    new de nova Ostial Ramus 95% stenosis, severe multivessel diabetic coronary vasculopathy with small vessels,  occluded 3 of 4 bypass conduits, LIMA to LAD patent, all 3 vein grafts are occluded, PTCA/EMMANUEL to ostial Ramus 95% stenosis, patent OM1 and proximal and mid RCA stents x 3 from 2/2019, preserved LVSF, normal LVEDP, no significant AS or MR     • CHOLECYSTECTOMY  2006   • CORONARY ARTERY BYPASS GRAFT  2017    x4 with LIMA to mLAD, SVG to diagonal, SVG to OM, SVG to distal PDA     • CORONARY STENT PLACEMENT  12/2019    Drug eluting stent placement--2.0 x 26 mm Resolute [place] EMMANUEL to ostial Ramus     • CORONARY STENT PLACEMENT  02/2019    Drug eluting stent--2.25 x 38 mm and 2.5 x 15 mm Resolute [place] EMMANUEL to proximal mid RCA, 2.0 x 26 mm Resolute [place] EMMANUEL to mid circumflex/proximal OM1   • DENTAL PROCEDURE     • HYSTERECTOMY  2009   • MASTECTOMY Left 1/4/2022    Procedure: LEFT MODIFIED RADICAL MASTECTOMY;  Surgeon: Indigo Red MD;  Location: [facility];  Service: General;  Laterality: Left;   • TONSILLECTOMY  1961    age 1   • VENOUS ACCESS DEVICE (PORT) INSERTION Right 10/1/2021    Procedure: INSERTION VENOUS ACCESS DEVICE;  Surgeon: Indigo Red MD;  Location: [facility] [place], operated by [facility];  Service: General;  Laterality: Right;   • VENOUS ACCESS DEVICE (PORT) REMOVAL Right 9/13/2022    Procedure: REMOVAL VENOUS ACCESS DEVICE;  Surgeon: Indigo Red MD;  Location: [facility] OR;  Service: General;  Laterality: Right;         Visit Dx:      ICD-10-CM ICD-9-CM   1. At risk for lymphedema  Z91.89 V49.89   2. Malignant neoplasm of upper-outer quadrant of left breast in female, estrogen receptor negative (HCC)  C50.412 174.4    Z17.1 V86.1        Lymphedema     Row Name 11/17/22 1100             Subjective Pain    Able to rate subjective pain? yes  -RE      Pre-Treatment Pain Level 0  -RE      Post-Treatment Pain Level 0  -RE         Subjective Comments    Subjective Comments Still having some leg pain with exercise. R hand is painful.  -RE         L-Dex Bioimpedence Screening    L-Dex Measurement Extremity LUE   -RE      L-Dex Patient Position Standing  -RE      L-Dex UE Dominate Side Right  -RE      L-Dex UE At Risk Side Left  -RE      L-Dex UE Pre Surgical Value No  -RE      L-Dex UE Score 2  -RE      L-Dex UE Baseline Score 2.5  -RE      L-Dex UE Value Change -0.5  -RE      L-Dex UE Comment WNL  -RE            User Key  (r) = Recorded By, (t) = Taken By, (c) = Cosigned By    Initials Name Provider Type    Rachel eBverly OTR Occupational Therapist                         OT Assessment/Plan     Row Name 11/17/22 1729          OT Assessment    Impairments Impaired lymphatic circulation  -RE     Assessment Comments Patient returns for bioimpedance reassessment.  Her last measurement was 3 months ago.  L-Dex value today is 2.0 which is WNL and consistent with her previous measurements. She has no new c/o. Today we reviewed the importance of exercise.  -RE     Please refer to paper survey for additional self-reported information No  -RE     OT Diagnosis At risk for lymphedema  -RE     OT Rehab Potential Good  -RE     Patient would benefit from skilled therapy intervention Yes  -RE        OT Plan    OT Frequency Other (comment)  -RE     Predicted Duration of Therapy Intervention (OT) bioimpedance every 3 months  -RE     Planned CPT's? OT SELF CARE/MGMT/TRAIN 15 MIN: 75270;OT BIS XTRACELL FLUID ANALYSIS: 24720  -RE     OT Plan Comments follow up in 3 months  -RE           User Key  (r) = Recorded By, (t) = Taken By, (c) = Cosigned By    Initials Name Provider Type    Rachel Beverly OTR Occupational Therapist                 The patient had a 3 month SOZO measurement which I reviewed today. The score is WNL, see in EMR. Bioimpedance spectroscopy helps identify the onset of lymphedema in an arm or leg before patients experience noticeable swelling. Research has shown that the early detection of lymphedema using L-Dex combined with treatment can reduce progression to chronic lymphedema by 95% in breast cancer patients.  Whenever possible, patients are tested for baseline L-Dex score before cancer treatment begins and then are reassessed during regular follow-up visits using the SOZO device. Otherwise, this can be started postoperatively and continued during regular follow-up visits. If the patient's L-Dex score increases above normal levels, that is a sign that lymphedema is developing and a referral is made to occupational therapy for further evaluation and early compression treatment. Lymphedema assessment with the SOZO L-Dex score is recommended to be done every 3 months for the first 3 years and then every 6 months for years 4 and 5 followed by annually afterwards.       OT Goals     Row Name 11/17/22 1700          OT Short Term Goals    STG 1 Patient will demonstrate proper awareness of “What is Lymphedema?” and “Healthy Habits” for improved prevention, management, care of symptoms and ease of transition to self-care of condition.  -RE     STG 1 Progress Met  -RE     STG 2 Pt will demonstrate understanding of use of compression sleeve for edema prevention, exercise and air travel.  -RE     STG 2 Progress Met  -RE     STG 3 Patient independent and compliant with initial home exercise program focused on gentle AROM and stretching to improve AROM to pre-surgical level.  -RE     STG 3 Progress Met  -RE        Long Term Goals    LTG Date to Achieve 02/17/23  -RE     LTG 1 Patient will participate in bioimpedance scans every 3 months as a method of early detection of lymphedema to allow for early intervention.  -RE     LTG 1 Progress Ongoing;Met  -RE     LTG 2 Patient's bioimpedance score to remain below 6.5 for decreased risk of stage II lymphedema.  -RE     LTG 2 Progress Met;Ongoing  -RE     LTG 3 Patient independent and compliant with advanced home exercise program focused on UE strengthening and aerobic daily exercise.  -RE     LTG 3 Progress Partially Met;Ongoing  -RE        Time Calculation    OT Goal Re-Cert Due Date  02/17/23  -ROBERT           User Key  (r) = Recorded By, (t) = Taken By, (c) = Cosigned By    Initials Name Provider Type    Rachel Beverly OTR Occupational Therapist                Therapy Education  Education Details: Discussed patient's current L-Dex value of 2.0. Encouraged daily exercise since no issues noted by MD in relation to her leg pain. She is having some arthritis pain and a trigger finger. I recommended she get a referral for a hand therapist  Given: Symptoms/condition management, Other (comment)  Program: Reinforced  How Provided: Verbal  Provided to: Patient  Level of Understanding: Teach back education performed, Verbalized  45471 - OT Self Care/Mgmt Minutes: 15                Time Calculation:   OT Start Time: 1140  OT Stop Time: 1205  OT Time Calculation (min): 25 min  Total Timed Code Minutes- OT: 15 minute(s)  Timed Charges  36650 - OT Self Care/Mgmt Minutes: 15  Untimed Charges  82121 - OT Bioimpedence Rx Minutes: 10  Total Minutes  Timed Charges Total Minutes: 15  Untimed Charges Total Minutes: 10   Total Minutes: 25     Therapy Charges for Today     Code Description Service Date Service Provider Modifiers Qty    40366253372 HC OT SELF CARE/MGMT/TRAIN EA 15 MIN 11/17/2022 Rachel Toney OTR GO 1    [id number] HC OT BIS XTRACELL FLUID ANALYSIS 11/17/2022 Rachel Toney OTR GO 1                      CATIE Schumacher  11/17/2022

## 2023-02-06 ENCOUNTER — TELEPHONE (OUTPATIENT)
Dept: SURGERY | Facility: CLINIC | Age: 63
End: 2023-02-06
Payer: COMMERCIAL

## 2023-02-06 ENCOUNTER — OFFICE VISIT (OUTPATIENT)
Dept: SURGERY | Facility: CLINIC | Age: 63
End: 2023-02-06
Payer: MEDICARE

## 2023-02-06 VITALS
BODY MASS INDEX: 24.49 KG/M2 | WEIGHT: 147 LBS | DIASTOLIC BLOOD PRESSURE: 60 MMHG | HEIGHT: 65 IN | SYSTOLIC BLOOD PRESSURE: 112 MMHG

## 2023-02-06 DIAGNOSIS — Z17.1 MALIGNANT NEOPLASM OF UPPER-OUTER QUADRANT OF LEFT BREAST IN FEMALE, ESTROGEN RECEPTOR NEGATIVE: Primary | ICD-10-CM

## 2023-02-06 DIAGNOSIS — C50.412 MALIGNANT NEOPLASM OF UPPER-OUTER QUADRANT OF LEFT BREAST IN FEMALE, ESTROGEN RECEPTOR NEGATIVE: Primary | ICD-10-CM

## 2023-02-06 DIAGNOSIS — Z90.12 HISTORY OF LEFT MASTECTOMY: ICD-10-CM

## 2023-02-06 PROCEDURE — 99214 OFFICE O/P EST MOD 30 MIN: CPT | Performed by: NURSE PRACTITIONER

## 2023-02-06 RX ORDER — METOPROLOL SUCCINATE 25 MG/1
12.5 TABLET, EXTENDED RELEASE ORAL NIGHTLY
COMMUNITY
Start: 2022-11-21

## 2023-02-06 RX ORDER — ASCORBIC ACID 250 MG
500 TABLET ORAL DAILY
COMMUNITY

## 2023-02-06 NOTE — PROGRESS NOTES
[facility]     Referring Provider: Will Johnston II, MD     Chief complaint: breast cancer follow up     HPI:   8/20/21:  Seen by Dr Red  Ms. Laxmi Marcus is a 60 yo woman, seen at the request of Dr. Will Johnston, for a new diagnosis of left breast cancer. She has a past history of left breast cancer in 2006, pT1N1, intermediate grade, invasive ductal carcinoma, ER/NV positive, HER-2 negative. She underwent left lumpectomy & SLNB with completion axillary dissection, followed by reexcision for close margins. This was followed by AC x4 and 4 cycles of Taxol, then radiation. She was on tamoxifen for 2-1/2 years, then says she was switched to a different medication for a total of 5 years of endocrine therapy. Currently, I only have access to one note from the [facility] group in 2008. I have tried to get copies of the operative report and pathology report from [place], however these records are no longer held. The patient says that she thinks she had 9 lymph nodes removed.       She reports that she had a lot of issues with chemo, including severe bone pain requiring Dilaudid for months. Shortly after finishing all of her breast treatment, she had severe pancreatitis and shortly after that she became a brittle diabetic. She has a past history of CAD sp CABGx4 in 2017. Unfortunately it sounds like she had some sort of immediate stenosis, because she had a heart attack in early 2019 requiring stent placement x4. She is followed by cardiology and on Brilinta.     In May of this year, she noticed a painful left breast lump near the prior lumpectomy scar. She does not think it has changed at all since she first noticed. Her work-up is detailed in the oncologic history below. She denies any family history of breast or ovarian cancer.      10/29/21:  Seen by Dr Red  After her last visit, she underwent a PET scan which was negative for distant disease and her genetic testing was negative for mutation.  She started neoadjuvant therapy with carbo/Taxol/Keytruda on 9/7/21. Her second dose of Taxol was temporarily held because she developed Covid, however she received her seventh dose yesterday. She reports that the mass has gotten much smaller and it is now difficult for her to find.    12/10/21:  Seen by Dr Red  She completed her last dose of Taxol on 12/4/21. After her last visit, she saw Dr. Araya. Because of her diabetes and history of a CABG, he recommended delayed reconstruction and the patient is okay with this.    1/19/22:  Seen by Dr Red  She underwent left modified radical mastectomy on 1/4/22. Her pathology was sent out for a second opinion and the final report is still pending. She has been recovering well and has no complaints. She came into the office last week to have her breast drain removed. The axillary drain has had out <20 mL/day for the past few days.    8/8/22:  She returns today for follow up with no breast concerns.  She still has not had her reconstruction, she is frustrated with waiting, has follow with Dr Araya in 11/22.  Her  passed away unexpectedly recently, she is teary today, misses him terribly.      Her last clinic visit with medical oncology was in 6/22:  Last adjvuant Keytruda.  Labs for toxicity- glucose improved    She was seen in OT/LE clinic in 5/22:  L-Dex value today is 2.0 which is WNL.She reports wearing her sleeve about 2 days a week and any time her arm feels tired or uncomfortable. Today we reviewed some new exercises to build some strength and stability. I recommended follow up in 3 months    Right screening mammogram with rich was completed on 7/29/22 at [facility], [facility] 1.(see full report below)    2/6/23, Interval History:  She returns today for follow up with no breast concerns, no reconstruction left breast by Dr Araya.  He wants to wait for A1C to be at 6 before surgery.  She has not reached that point yet but will follow up in the near  future.  PAC removed in 9/22 by Dr Red.    Her last OT/LE clinic visit was in 11/22:   L-Dex value today is 2.0 which is WNL and consistent with her previous measurements. She has no new c/o.    She was last seen by Dr Sandoval in 11/22:  here to discuss starting zometa, education provided, treatment plan placed and signed - she'll discuss with her dentist and we'll tentatively plan for 3 months.         Oncology/Hematology History Overview Note   06/05/2017, Bilateral Diagnostic MMG with Jas[facility] & Bilateral Breast US ([facility]):  MMG:  Scattered areas pf fibroglandular density.   1. There is a stable post-surgical scar with associated trabecular thickening seen in the upper outer region of the left breast. Post-surgical scar is in an area of prior lumpectomy. Findings are consistent with post-surgical and post-radiation therapy changes.  2. The patient indicates recent onset of pain in the lateral and axillary tail region of the left breast. The symptomatic region is clearly marked and there is no suspicious finding in the region of clinical concern.  3. There is a stable small intramammary lymph node measuring 7 millimeters seen in the posterior one-third 1:30 o'clock region of the left breast located 8 centimeters from the nipple. There has been no significant change from the prior exam(s).  6. There is a stable area of asymmetric breast tissue seen in the superior region of the right breast.  US:  1. There is no evidence of any solid mass or abnormal cystic elements.  2. There is no evidence of any solid mass or abnormal cystic elements.  3. Ultrasound is suggestive of an oval small intramammary lymph node measuring 7 millimeters.  4. There is a small lymph node measuring 6 millimeters in the left axilla.  5. There is an oval small simple cyst with circumscribed margins measuring 4 millimeters seen in the right breast at 3 o'clock.  6. There is no evidence of any solid mass or abnormal cystic elements.  BI-RADS 2:  Benign.    05/2021: Pt noticed a right breast lump.     Malignant neoplasm of upper-outer quadrant of left breast in female, estrogen receptor negative (HCC)   7/26/2021 Initial Diagnosis    Malignant neoplasm of upper-outer quadrant of left breast in female, estrogen receptor negative (CMS/HCC)     7/27/2021 Imaging    Bilateral Diagnostic MMG with Jaswant & Left Breast US ([facility]):  MMG:  Scattered areas of fibroglandular density.  1. There is a new oval mass measuring 20 mm with indistinct margins seen in the 1:30 o'clock region of the left breast located 4 centimeters from the nipple. This correlates to the palpable mass.   2. There is a stable post-surgical scar seen in the posterior one-third region of the left breast at 2 o'clock. Post-surgical scar is in an area of prior lumpectomy. Findings are consistent with post-surgical and post-radiation therapy changes.   In the right breast, no suspicious masses, significant calcifications or other abnormalities are seen.  US:  1. Ultrasound demonstrates a new oval parallel solid mass with poorly defined margins measuring 16 x 14 x 15 mm seen in the 1:30 o'clock region of the left breast located 4 centimeters from the nipple. This correlates with the mammographic finding.  3. There is an oval lymph node measuring 9 x 6 x 8 mm in the left axilla. This has an abnormal appearance with little hilum.  BI-RADS 4B: Suspicious.     8/6/2021 Biopsy    Left Breast & Axilla, US-Guided Biopsies ([facility]):    1. Breast, Left 1:30 O'clock, Core Needle Biopsy:  Invasive mammary carcinoma, no special type (invasive ductal carcinoma), combined histologic grade 3 (tubule formation 3, nuclear pleomorphism 3, mitotic activity 2), 12 mm in greatest dimension, present in 5 of 15 cores.   Focal ductal carcinoma in situ (DCIS), high nuclear grade, solid pattern, with associated focal necrosis 1.5 mm in greatest dimension, present in 3 of 15 cores.   Microcalcifications associated with invasive  carcinoma, DCIS, and benign mammary ducts/lobules.  -[place] clip.     ER negative (0%)  VT negative (0%)  Her2 negative (IHC 1+)  Ki-67 94.88%    2. Lymph Node, Left Axilla, Core Needle Biopsy:  Benign lymph node tissue with reactive follicular hyperplasia.   No definitive evidence of carcinoma (AE1/AE3).   No definitive evidence of lymphoma (CD3, CD20, CD21, and CD30).  -Hydromark clip. Concordant.     8/18/2021 Imaging    Bilateral Breast MRI ([facility]):  In the posterior one-third lateral aspect of the left breast centered at the 3-o'clock position on the order of 6.5 cm from the nipple there is a focal signal void that is seen within the superior aspect of an irregular heterogenous masslike region that measures on the order of 2.4 cm in the superior inferior dimension, 2.5 cm in the anterior posterior dimension and 2.7 cm in the medial lateral dimension. Some of the internal character of the lesion demonstrates absence of enhancement. This represents the biopsy-proven site of malignancy with the mini  cork-shaped metallic clip in the superomedial portion of the lesion. Some areas of increased T1-weighted signal intensity are noted in the region and are consistent with areas of postbiopsy related hemorrhage.   There is postsurgical change seen in the left breast in this region associated with prior breast conservation therapy. No other areas of abnormal enhancement or morphology are seen within the left breast. I see no evidence for abnormal left breast skin, nipple or chest wall enhancement and there is no evidence for left axillary adenopathy. The patient is status post median sternotomy which limits the evaluation of the internal mammary chain for internal mammary adenopathy. Mild diffuse left breast skin thickening associated with prior breast conservation therapy is noted, but there is no abnormal enhancement of the skin.    There are no areas of abnormal enhancement or morphology in the right breast.  Scattered stippled foci of variable enhancement are seen throughout the right breast, none of which appear dominant or clumped or associated with any suspicious mammographic features. I see no evidence for abnormal right breast skin, nipple or chest wall enhancement and there is no evidence for right axillary or internal mammary chain adenopathy. Visualization of the right internal mammary chain is not prohibited by the artifact from the patient's median sternotomy wires.  BI-RADS 6: Known malignancy.     8/20/2021 Genetic Testing    [employer] Common Hereditary Cancers Panel (47 genes):    Negative     9/3/2021 Imaging    PET CT ([place]):  1. FDG avid left upper outer breast mass compatible with primary breast malignancy.  2. No evidence of metastatic disease.  3. Subcutaneous infiltrative changes in the ventral lower abdomen with low-level FDG uptake which may represent injection sites, panniculitis, or prior port sites.  4. Air-fluid level within the urinary bladder which may represent cystitis in the absence of recent catheterization.     9/13/2021 - 9/13/2021 Chemotherapy    OP BREAST Pembrolizumab 200 mg / PACLItaxel / CARBOplatin AUC=5     1/4/2022 Surgery    Left modified radical mastectomy  Preliminary Diagnosis  1. Left Breast, Modified Radical Mastectomy S/P Neoadjuvant Chemotherapy (332 grams):                A. FIBROTIC TUMOR BED WITH FOCAL RESIDUAL ATYPICAL CELL GROUPS (SEE COMMENT).                B. Negative for lymphovascular space invasion.  C. Clip and biopsy site changes are present within tumor bed.    D. Marked treatment effect with associated reactive epithelial changes and squamous metaplasia,        consistent with prior procedure-related changes.    E. Atypical lobular hyperplasia.   F. Background breast parenchyma with intraductal papilloma involved by usual ductal hyperplasia,       fibroadenomatoid change, and columnar cell change.    G. Unremarkable skin and nipple.    H. Eleven lymph  nodes, negative for carcinoma (0/11):                1. Clip and biopsy site changes present in a single lymph node.  2. All lymph nodes are negative for treatment effect.    3. Separate collection of nerves, suggestive of traumatic neuroma.      2. Left Breast, Additional Tissue Margin at 2:00, Re-excision:   A. Benign skeletal muscle with no significant histopathologic changes.      Final Diagnosis  CONSULTANT DIAGNOSIS:   LEFT BREAST, MODIFIED RADICAL MASTECTOMY STATUS POST NEOADJUVANT CHEMOTHERAPY:               A. NEGATIVE FOR RESIDUAL CARCINOMA (PATHOLOGIC COMPLETE RESPONSE) SEE COMMENT.     Comment:  [...] Within the biopsy site, a cluster of atypical cells, some of which show squamoid change, is noted. We believe this cluster to most likely be the product of epithelial displacement due to the previous biopsy, rather than evidence of residual invasive carcinoma. The presence of p63 positivity reinforces this morphologic impression.     The above interpretation was rendered by Dr. Fran Cotton of [facility] in [place]. Refer to outside pathology consultation report () for additional details regarding this case.     Therefore, given the aforementioned findings along with eleven benign lymph nodes, this case is classified as a ypT0 N0.     7/29/2022 Imaging    [facility]  Scattered areas of fibroglandular density.  No suspicious masses, significant calcifications or other abnormalities are seen.  There is been no significant change from prior exam.  Impression:  There is no mammographic evidence of malignancy.  Screening mammogram 1 year is recommended.  BI-RADS Category 1         Review of Systems:  See interval history.       Medications:    Current Outpatient Medications:   •  ACCU-CHEK FASTCLIX LANCETS misc, TO CHECK 5 TIMES DAILY, Disp: 500 each, Rfl: 2  •  acetaminophen (TYLENOL) 500 MG tablet, Take 1,000 mg by mouth Every 6  "(Six) Hours As Needed for Mild Pain ., Disp: , Rfl:   •  ascorbic acid (VITAMIN C) 250 MG tablet, Take 500 mg by mouth Daily., Disp: , Rfl:   •  glucose blood (ACCU-CHEK SMARTVIEW) test strip, Use as instructed 5 TIMES DAILY, Disp: 450 each, Rfl: 2  •  Insulin Aspart, w/Niacinamide, (FIASP FLEXTOUCH SC), Inject  under the skin into the appropriate area as directed. 15-25 units 4 times daily, Disp: , Rfl:   •  Insulin Degludec (Tresiba FlexTouch) 200 UNIT/ML solution pen-injector pen injection, INJECT 26 UNITS INTO THE SKIN EVERY MORNING FOR 90 DAYS., Disp: , Rfl:   •  Insulin Pen Needle 32G X 6 MM misc, INJECT 1 EACH INTO THE SKIN 4 (FOUR) TIMES DAILY., Disp: , Rfl:   •  metoprolol succinate XL (TOPROL-XL) 25 MG 24 hr tablet, Take 12.5 mg by mouth Every Night., Disp: , Rfl:   •  MULTIPLE VIT-MIN-CALCIUM-FA PO, Take 1 tablet by mouth Daily. HOLD PRIOR TO SURG, Disp: , Rfl:   •  ranolazine (RANEXA) 500 MG 12 hr tablet, TAKE 1 TABLET BY MOUTH 2 (TWO) TIMES DAILY., Disp: , Rfl:   •  rosuvastatin (CRESTOR) 20 MG tablet, Take 20 mg by mouth Every Night., Disp: , Rfl:   •  ticagrelor (BRILINTA) 60 MG tablet tablet, Take 60 mg by mouth 2 (Two) Times a Day., Disp: , Rfl:       Allergies   Allergen Reactions   • Dulaglutide Nausea And Vomiting     Another name for trulicity   • Sulfa Antibiotics Other (See Comments)     Abdominal Pain and NauseA   • Uloric [Febuxostat] Other (See Comments)     Mouth sores   • Latex Itching       Family History   Problem Relation Age of Onset   • Heart disease Mother    • Coronary artery disease Mother    • Heart disease Brother    • Heart disease Maternal Uncle    • Prostate cancer Maternal Uncle    • Heart disease Maternal Grandfather    • Malig Hyperthermia Neg Hx        PHYSICAL EXAMINATION:   Vitals:    02/06/23 1125   BP: 112/60    /60 (BP Location: Right arm)   Ht 165.1 cm (65\")   Wt 66.7 kg (147 lb)   LMP  (LMP Unknown)   BMI 24.46 kg/m²     I reviewed physical exam, no " changes noted    ECOG 0 - Asymptomatic  General: NAD, well appearing  Psych: a&o x 3, normal mood and affect  Eyes: EOMI, no scleral icterus  ENMT: neck supple without masses or thyromegaly, mucus membranes moist  MSK: normal gait, normal ROM in bilateral shoulders  Lymph nodes: She still has prior left axillary scar, no axillary swelling  Breast: moderate size, grade 3 ptosis, sternal scar  Right: No visible abnormalities on inspection while seated, with arms raised or hands on hips. No masses, skin changesor nipple abnormalities.    Left: Sp mastectomy with well-healed incision. Flaps healthy.       Assessment:   1)   63 y.o. F with a diagnosis of left breast cancer 8/21: High grade, triple negative, invasive ductal carcinoma; clinical T2N0, anatomic stage IIA, prognostic stage IIB. She completed neoadjuvant carbo/Taxol/Keytruda on 12/4/21. She underwent left modified radical mastectomy on 1/4/22.    2)  She has a past history of left breast cancer in 2006, pT1N1, intermediate grade, invasive ductal carcinoma, ER/FL positive, HER-2 negative. She underwent left lumpectomy & SLNB with completion axillary dissection, followed by reexcision for close margins. This was followed by AC x4 and 4 cycles of Taxol, then radiation and endocrine therapy.     Discussion:    I encouraged her to try to take care of herself during her time of grief and follow up with Dr Araya in the near future to discuss reconstruction given her weight loss.    We discussed her follow up which includes clinical breast exam every 6 months for 2 years, with mammogram annually then  mammogram and exam annually until 5 years from diagnosis.    A prescription for prosthesis and bras since she is s/p left mastectomy without reconstruction.    Plan:  -Follow-up with OT/LE clinic.  - follow-up with Dr. Sandoval.  - follow-up with Dr. Araya for eventual delayed reconstruction.  - a prescription was given for prosthesis and bras for  left mastectomy  without reconstruction  - right screening mammogram with tomosynthesis in 6 months at [facility] followed by exam.    Alethea Sandra, APRN      CC:  MD Hollis Brennan II, MD Vipul Panchal, MD Blakely Kute, MD

## 2023-02-20 ENCOUNTER — HOSPITAL ENCOUNTER (OUTPATIENT)
Dept: OCCUPATIONAL THERAPY | Facility: HOSPITAL | Age: 63
Setting detail: THERAPIES SERIES
Discharge: HOME | End: 2023-02-20
Payer: MEDICARE

## 2023-02-20 DIAGNOSIS — Z17.1 MALIGNANT NEOPLASM OF UPPER-OUTER QUADRANT OF LEFT BREAST IN FEMALE, ESTROGEN RECEPTOR NEGATIVE: ICD-10-CM

## 2023-02-20 DIAGNOSIS — C50.412 MALIGNANT NEOPLASM OF UPPER-OUTER QUADRANT OF LEFT BREAST IN FEMALE, ESTROGEN RECEPTOR NEGATIVE: ICD-10-CM

## 2023-02-20 DIAGNOSIS — Z91.89 AT RISK FOR LYMPHEDEMA: Primary | ICD-10-CM

## 2023-02-20 PROCEDURE — 93702 BIS XTRACELL FLUID ANALYSIS: CPT

## 2023-02-20 PROCEDURE — 97535 SELF CARE MNGMENT TRAINING: CPT

## 2023-02-20 NOTE — THERAPY PROGRESS REPORT/RE-CERT
Outpatient Occupational Therapy Lymphedema Progress Note  [facility]     Patient Name: Laxmi Marcus  : 1960  MRN: [id number]  Today's Date: 2023      Visit Date: 2023    Patient Active Problem List   Diagnosis   • Abnormal weight gain   • Uncontrolled type 2 diabetes mellitus   • Hypoglycemia due to endogenous hyperinsulinemia   • Hyperlipidemia   • Hypertension   • Diabetes mellitus type 2, uncontrolled, with complications   • Encounter for long-term (current) use of insulin (HCC)   • Type II diabetes mellitus with neurological manifestations, uncontrolled   • Uncontrolled type II diabetes mellitus with nephropathy   • Hyperinsulinism   • Hyperlipidemia associated with type 2 diabetes mellitus (HCC)   • Malignant neoplasm of upper-outer quadrant of left breast in female, estrogen receptor negative (HCC)   • Fitting and adjustment of vascular catheter        Past Medical History:   Diagnosis Date   • Awareness under anesthesia    • Breast cancer (HCC)     Left   • CKD (chronic kidney disease)     stage 3   • Coronary artery disease    • COVID-19 2021   • Diabetes mellitus type 2 with complications, uncontrolled    • Frequent UTI    • GERD (gastroesophageal reflux disease)    • Gout    • Heart murmur    • History of cancer chemotherapy 2021    LAST DOSE   • History of pneumonia    • History of radiation therapy    • Hyperlipidemia    • Hypertension    • NSTEMI (non-ST elevated myocardial infarction) ([facility]) 2019   • Obesity    • Recent bereavement     SPOUSE 2022   • Sinus congestion    • Slow to wake up after anesthesia    • Vitamin B12 deficiency         Past Surgical History:   Procedure Laterality Date   • APPENDECTOMY     • BREAST LUMPECTOMY     • BREAST LUMPECTOMY WITH SENTINEL NODE BIOPSY AND AXILLARY NODE DISSECTION     • CARDIAC CATHETERIZATION  2019    new de nova Ostial Ramus 95% stenosis, severe multivessel diabetic coronary vasculopathy with small vessels,  occluded 3 of 4 bypass conduits, LIMA to LAD patent, all 3 vein grafts are occluded, PTCA/EMMANUEL to ostial Ramus 95% stenosis, patent OM1 and proximal and mid RCA stents x 3 from 2/2019, preserved LVSF, normal LVEDP, no significant AS or MR     • CHOLECYSTECTOMY  2006   • CORONARY ARTERY BYPASS GRAFT  2017    x4 with LIMA to mLAD, SVG to diagonal, SVG to OM, SVG to distal PDA     • CORONARY STENT PLACEMENT  12/2019    Drug eluting stent placement--2.0 x 26 mm Resolute [place] EMMANUEL to ostial Ramus     • CORONARY STENT PLACEMENT  02/2019    Drug eluting stent--2.25 x 38 mm and 2.5 x 15 mm Resolute [place] EMMANUEL to proximal mid RCA, 2.0 x 26 mm Resolute [place] EMMANUEL to mid circumflex/proximal OM1   • DENTAL PROCEDURE     • HYSTERECTOMY  2009   • MASTECTOMY Left 1/4/2022    Procedure: LEFT MODIFIED RADICAL MASTECTOMY;  Surgeon: Indigo Red MD;  Location: [facility];  Service: General;  Laterality: Left;   • TONSILLECTOMY  1961    age 1   • VENOUS ACCESS DEVICE (PORT) INSERTION Right 10/1/2021    Procedure: INSERTION VENOUS ACCESS DEVICE;  Surgeon: Indigo Red MD;  Location: [facility];  Service: General;  Laterality: Right;   • VENOUS ACCESS DEVICE (PORT) REMOVAL Right 9/13/2022    Procedure: REMOVAL VENOUS ACCESS DEVICE;  Surgeon: Indigo Red MD;  Location: [facility] OR;  Service: General;  Laterality: Right;         Visit Dx:     ICD-10-CM ICD-9-CM   1. At risk for lymphedema  Z91.89 V49.89   2. Malignant neoplasm of upper-outer quadrant of left breast in female, estrogen receptor negative (HCC)  C50.412 174.4    Z17.1 V86.1            Lymphedema     Row Name 02/20/23 1100             Subjective Pain    Able to rate subjective pain? yes  -RE      Pre-Treatment Pain Level 0  -RE      Post-Treatment Pain Level 0  -RE         Subjective Comments    Subjective Comments No new c/o. Will ave FERNANDA's assessed due to her c/o leg pain with exercise.  -RE         L-Dex Bioimpedence Screening    L-Dex  Measurement Extremity LUE  -RE      L-Dex Patient Position Standing  -RE      L-Dex UE Dominate Side Right  -RE      L-Dex UE At Risk Side Left  -RE      L-Dex UE Pre Surgical Value No  -RE      L-Dex UE Score 3.4  -RE      L-Dex UE Baseline Score 2.5  -RE      L-Dex UE Value Change 0.9  -RE      L-Dex UE Comment WNL  -RE            User Key  (r) = Recorded By, (t) = Taken By, (c) = Cosigned By    Initials Name Provider Type    Rachel Beverly OTR Occupational Therapist               The patient had a 3 month SOZO measurement which I reviewed today. The score is WNL, see in EMR. Bioimpedance spectroscopy helps identify the onset of lymphedema in an arm or leg before patients experience noticeable swelling. Research has shown that the early detection of lymphedema using L-Dex combined with treatment can reduce progression to chronic lymphedema by 95% in breast cancer patients. Whenever possible, patients are tested for baseline L-Dex score before cancer treatment begins and then are reassessed during regular follow-up visits using the SOZO device. Otherwise, this can be started postoperatively and continued during regular follow-up visits. If the patient's L-Dex score increases above normal levels, that is a sign that lymphedema is developing and a referral is made to occupational therapy for further evaluation and early compression treatment. Lymphedema assessment with the SOZO L-Dex score is recommended to be done every 3 months for the first 3 years and then every 6 months for years 4 and 5 followed by annually afterwards.          Therapy Education  Education Details: Discussed patient's current L-Dex value of 3.4 and recommended sleeve wear during air travel and heavy UE exercise. She is feeling a little depressed over her 's death and her ongoing cancer treatment. We discussed her feelings, and she is open to talking with someone. She was referred to behavioral oncology through [facility] in the past, so  Shaunna contacted their Nurse Navigator, Josi, who will follow up with the patient. We also discussed options for breast forms/swim forms as she is planning a [place] trip.  She may also pursue reconstruction.  Given: Symptoms/condition management, Other (comment)  Program: New, Reinforced  How Provided: Verbal  Provided to: Patient  Level of Understanding: Teach back education performed, Verbalized  24954 - OT Self Care/Mgmt Minutes: 35         OT Goals     Row Name 02/20/23 1500          OT Short Term Goals    STG 1 Patient will demonstrate proper awareness of “What is Lymphedema?” and “Healthy Habits” for improved prevention, management, care of symptoms and ease of transition to self-care of condition.  -RE     STG 1 Progress Met  -RE     STG 2 Pt will demonstrate understanding of use of compression sleeve for edema prevention, exercise and air travel.  -RE     STG 2 Progress Met  -RE     STG 3 Patient independent and compliant with initial home exercise program focused on gentle AROM and stretching to improve AROM to pre-surgical level.  -RE     STG 3 Progress Met  -RE        Long Term Goals    LTG Date to Achieve 05/20/23  -RE     LTG 1 Patient will participate in bioimpedance scans every 3 months as a method of early detection of lymphedema to allow for early intervention.  -RE     LTG 1 Progress Ongoing;Met  -RE     LTG 2 Patient's bioimpedance score to remain below 6.5 for decreased risk of stage II lymphedema.  -RE     LTG 2 Progress Met;Ongoing  -RE     LTG 3 Patient independent and compliant with advanced home exercise program focused on UE strengthening and aerobic daily exercise.  -RE     LTG 3 Progress Partially Met;Ongoing  -RE     LTG 3 Progress Comments Has been realy busy and has not done her arm exercises much. She tries to walk but has leg pain.  -RE        Time Calculation    OT Goal Re-Cert Due Date 05/20/23  -RE           User Key  (r) = Recorded By, (t) = Taken By, (c) = Cosigned By     Initials Name Provider Type    Rachel Beverly OTR Occupational Therapist                 OT Assessment/Plan     Row Name 02/20/23 1530          OT Assessment    Impairments Impaired lymphatic circulation  -RE     Assessment Comments Patient returns for bioimpedance reassessment.  Her last measurement was 3 months ago.  L-Dex value today is 3.4 which is WNL and consistent with her previous measurements.   She reports that she will have her FERNANDA's measured to assess her leg pain with exercise. This pain is limiting her ability to exercise regularly. Today we reviewed sleeve use for travel. I recommended follow up in 3 months.  -RE     OT Diagnosis at risk for lymphedema  -RE     OT Rehab Potential Good  -RE     Patient would benefit from skilled therapy intervention Yes  -RE        OT Plan    OT Frequency Other (comment)  -RE     Predicted Duration of Therapy Intervention (OT) bioimpedance every 3 months  -RE     Planned CPT's? OT SELF CARE/MGMT/TRAIN 15 MIN: 23598;OT BIS XTRACELL FLUID ANALYSIS: 40511  -RE     Planned Therapy Interventions (Optional Details) home exercise program;patient/family education;other (see comments)  bioimpedance  -RE     OT Plan Comments Follow up in 3 months  -RE           User Key  (r) = Recorded By, (t) = Taken By, (c) = Cosigned By    Initials Name Provider Type    Rachel Beverly OTR Occupational Therapist                          Time Calculation:   OT Start Time: 1125  OT Stop Time: 1210  OT Time Calculation (min): 45 min  Total Timed Code Minutes- OT: 35 minute(s)  Timed Charges  67224 - OT Self Care/Mgmt Minutes: 35  Untimed Charges  51787 - OT Bioimpedence Rx Minutes: 10  Total Minutes  Timed Charges Total Minutes: 35  Untimed Charges Total Minutes: 10   Total Minutes: 45     Therapy Charges for Today     Code Description Service Date Service Provider Modifiers Qty    44754743787 HC OT SELF CARE/MGMT/TRAIN EA 15 MIN 2/20/2023 Rachel Toney OTR GO 2    51248663319 HC OT BIS  XTRACELL FLUID ANALYSIS 2/20/2023 Rachel Toney, OTR GO 1                    CATIE Schumacher  2/20/2023

## 2023-05-23 ENCOUNTER — HOSPITAL ENCOUNTER (OUTPATIENT)
Dept: OCCUPATIONAL THERAPY | Facility: HOSPITAL | Age: 63
Setting detail: THERAPIES SERIES
Discharge: HOME | End: 2023-05-23
Payer: MEDICARE

## 2023-05-23 DIAGNOSIS — Z17.1 MALIGNANT NEOPLASM OF UPPER-OUTER QUADRANT OF LEFT BREAST IN FEMALE, ESTROGEN RECEPTOR NEGATIVE: ICD-10-CM

## 2023-05-23 DIAGNOSIS — C50.412 MALIGNANT NEOPLASM OF UPPER-OUTER QUADRANT OF LEFT BREAST IN FEMALE, ESTROGEN RECEPTOR NEGATIVE: ICD-10-CM

## 2023-05-23 DIAGNOSIS — Z91.89 AT RISK FOR LYMPHEDEMA: Primary | ICD-10-CM

## 2023-05-23 PROCEDURE — 97535 SELF CARE MNGMENT TRAINING: CPT

## 2023-05-23 PROCEDURE — 93702 BIS XTRACELL FLUID ANALYSIS: CPT

## 2023-05-23 NOTE — THERAPY PROGRESS REPORT/RE-CERT
Outpatient Occupational Therapy Lymphedema Progress Note  [facility]     Patient Name: Laxmi Marcus  : 1960  MRN: [id number]  Today's Date: 2023      Visit Date: 2023    Patient Active Problem List   Diagnosis   • Abnormal weight gain   • Uncontrolled type 2 diabetes mellitus   • Hypoglycemia due to endogenous hyperinsulinemia   • Hyperlipidemia   • Hypertension   • Diabetes mellitus type 2, uncontrolled, with complications   • Encounter for long-term (current) use of insulin   • Type II diabetes mellitus with neurological manifestations, uncontrolled   • Uncontrolled type II diabetes mellitus with nephropathy   • Hyperinsulinism   • Hyperlipidemia associated with type 2 diabetes mellitus   • Malignant neoplasm of upper-outer quadrant of left breast in female, estrogen receptor negative   • Fitting and adjustment of vascular catheter        Past Medical History:   Diagnosis Date   • Awareness under anesthesia    • Breast cancer     Left   • CKD (chronic kidney disease)     stage 3   • Coronary artery disease    • COVID-19 2021   • Diabetes mellitus type 2 with complications, uncontrolled    • Frequent UTI    • GERD (gastroesophageal reflux disease)    • Gout    • Heart murmur    • History of cancer chemotherapy 2021    LAST DOSE   • History of pneumonia    • History of radiation therapy    • Hyperlipidemia    • Hypertension    • NSTEMI (non-ST elevated myocardial infarction) 2019   • Obesity    • Recent bereavement     SPOUSE 2022   • Sinus congestion    • Slow to wake up after anesthesia    • Vitamin B12 deficiency         Past Surgical History:   Procedure Laterality Date   • APPENDECTOMY     • BREAST LUMPECTOMY     • BREAST LUMPECTOMY WITH SENTINEL NODE BIOPSY AND AXILLARY NODE DISSECTION     • CARDIAC CATHETERIZATION      new de nova Ostial Ramus 95% stenosis, severe multivessel diabetic coronary vasculopathy with small vessels, occluded 3 of 4 bypass conduits,  [place] to LAD patent, all 3 vein grafts are occluded, PTCA/EMMANUEL to ostial Ramus 95% stenosis, patent OM1 and proximal and mid RCA stents x 3 from 2/2019, preserved LVSF, normal LVEDP, no significant AS or MR     • CHOLECYSTECTOMY  2006   • CORONARY ARTERY BYPASS GRAFT  2017    x4 with LIMA to mLAD, SVG to diagonal, SVG to OM, SVG to distal PDA     • CORONARY STENT PLACEMENT  12/2019    Drug eluting stent placement--2.0 x 26 mm Resolute [place] EMMANUEL to ostial Ramus     • CORONARY STENT PLACEMENT  02/2019    Drug eluting stent--2.25 x 38 mm and 2.5 x 15 mm Resolute [place] EMMANUEL to proximal mid RCA, 2.0 x 26 mm Resolute [place] EMMANUEL to mid circumflex/proximal OM1   • DENTAL PROCEDURE     • HYSTERECTOMY  2009   • MASTECTOMY Left 1/4/2022    Procedure: LEFT MODIFIED RADICAL MASTECTOMY;  Surgeon: Indigo Red MD;  Location: [facility];  Service: General;  Laterality: Left;   • TONSILLECTOMY  1961    age 1   • VENOUS ACCESS DEVICE (PORT) INSERTION Right 10/1/2021    Procedure: INSERTION VENOUS ACCESS DEVICE;  Surgeon: Indigo Red MD;  Location: [facility];  Service: General;  Laterality: Right;   • VENOUS ACCESS DEVICE (PORT) REMOVAL Right 9/13/2022    Procedure: REMOVAL VENOUS ACCESS DEVICE;  Surgeon: Indigo Red MD;  Location: [facility] OR;  Service: General;  Laterality: Right;         Visit Dx:      ICD-10-CM ICD-9-CM   1. At risk for lymphedema  Z91.89 V49.89   2. Malignant neoplasm of upper-outer quadrant of left breast in female, estrogen receptor negative  C50.412 174.4    Z17.1 V86.1        Lymphedema     Row Name 05/23/23 1300             Subjective Pain    Able to rate subjective pain? yes  -RE      Pre-Treatment Pain Level 0  -RE      Post-Treatment Pain Level 0  -RE         Subjective Comments    Subjective Comments Patient notes that she needs to have a stent placed for PAD.  She plans to schedule this after her vacation the summer.  She also reports that she has a neighbor who is a   and they have been able to meet together and discuss their grief.  She reports this has been very helpful to her.  She is consistently wearing her compression sleeve for gardening and other heavy tasks.  -RE         L-Dex Bioimpedence Screening    L-Dex Measurement Extremity LUE  -RE      L-Dex Patient Position Standing  -RE      L-Dex UE Dominate Side Right  -RE      L-Dex UE At Risk Side Left  -RE      L-Dex UE Pre Surgical Value No  -RE      L-Dex UE Score 2.8  -RE      L-Dex UE Baseline Score 2.5  -RE      L-Dex UE Value Change 0.3  -RE      L-Dex UE Comment WNL  -RE            User Key  (r) = Recorded By, (t) = Taken By, (c) = Cosigned By    Initials Name Provider Type    Rachel Beverly OTR Occupational Therapist                         OT Assessment/Plan     Row Name 05/23/23 1635          OT Assessment    Impairments Impaired lymphatic circulation  -RE     Assessment Comments Patient returns for bioimpedance reassessment.  Her last measurement was 3 months ago.  L-Dex value today is 2.8 which is within normal limits and consistent with her previous measurements.  She does continue to complain of arthritis and a trigger finger.  I did recommend that she seek out a certified hand therapist or hand surgeon to address these issues.  Currently,  she is able to work in her garden and is using this as her exercise program.  Her affect appears brighter today.  She has connected with a neighbor who also lost a spouse and they have been providing support to each other.  I did review with lymphedema risk factors with her today.  She expressed some concern about lymphedema as it relates to a possible reconstruction surgery.  I encouraged her to discuss this with her plastic surgeon.  I recommended follow-up for bioimpedance in 3 months.  -RE     OT Diagnosis At risk for lymphedema  -RE     OT Rehab Potential Good  -RE     Patient/caregiver participated in establishment of treatment plan and goals Yes  -RE      Patient would benefit from skilled therapy intervention Yes  -RE        OT Plan    OT Frequency Other (comment)  -RE     Predicted Duration of Therapy Intervention (OT) Bioimpedance every 3 months  -RE     Planned CPT's? OT SELF CARE/MGMT/TRAIN 15 MIN: 33583;OT BIS XTRACELL FLUID ANALYSIS: 34964  -RE     Planned Therapy Interventions (Optional Details) patient/family education;other (see comments);home exercise program  Bioimpedance  -RE     OT Plan Comments Follow-up in 3 months  -RE           User Key  (r) = Recorded By, (t) = Taken By, (c) = Cosigned By    Initials Name Provider Type    RE Rachel Toney, OTR Occupational Therapist                       OT Goals     Row Name 05/23/23 1300          OT Short Term Goals    STG 1 Patient will demonstrate proper awareness of “What is Lymphedema?” and “Healthy Habits” for improved prevention, management, care of symptoms and ease of transition to self-care of condition.  -RE     STG 1 Progress Met  -RE     STG 2 Pt will demonstrate understanding of use of compression sleeve for edema prevention, exercise and air travel.  -RE     STG 2 Progress Met  -RE     STG 3 Patient independent and compliant with initial home exercise program focused on gentle AROM and stretching to improve AROM to pre-surgical level.  -RE     STG 3 Progress Met  -RE        Long Term Goals    LTG Date to Achieve 08/23/23  -RE     LTG 1 Patient will participate in bioimpedance scans every 3 months as a method of early detection of lymphedema to allow for early intervention.  -RE     LTG 1 Progress Ongoing;Met  -RE     LTG 2 Patient's bioimpedance score to remain below 6.5 for decreased risk of stage II lymphedema.  -RE     LTG 2 Progress Met;Ongoing  -RE     LTG 3 Patient independent and compliant with advanced home exercise program focused on UE strengthening and aerobic daily exercise.  -RE     LTG 3 Progress Partially Met;Ongoing  -RE        Time Calculation    OT Goal Re-Cert Due Date 08/23/23   -RE           User Key  (r) = Recorded By, (t) = Taken By, (c) = Cosigned By    Initials Name Provider Type    Rachel Beverly OTR Occupational Therapist                Therapy Education  Education Details: Discussed patient's current L-Dex value of 2.8 and recommended continued sleeve wear during air travel and heavier chores and upper extremity exercise.  Patient is concerned about lymphedema risk with a possible reconstruction surgery.  I encouraged her to discuss this with her plastic surgeon.  I also explained risk factors for lymphedema.  Her diabetes and previous radiation are risk factors.  However, her L-Dex values have been very consistent.  This indicates she currently has good lymphatic function.  Given: Symptoms/condition management, Other (comment)  Program: Reinforced  How Provided: Verbal  Provided to: Patient  Level of Understanding: Teach back education performed, Verbalized  84457 - OT Self Care/Mgmt Minutes: 35                Time Calculation:   OT Start Time: 1300  OT Stop Time: 1345  OT Time Calculation (min): 45 min  Total Timed Code Minutes- OT: 35 minute(s)  Timed Charges  15630 - OT Self Care/Mgmt Minutes: 35  Untimed Charges  27585 - OT Bioimpedence Rx Minutes: 10  Total Minutes  Timed Charges Total Minutes: 35  Untimed Charges Total Minutes: 10   Total Minutes: 45     Therapy Charges for Today     Code Description Service Date Service Provider Modifiers Qty    41454508673 HC OT SELF CARE/MGMT/TRAIN EA 15 MIN 5/23/2023 Rachel Toney OTR GO 2    [id number] HC OT BIS XTRACELL FLUID ANALYSIS 5/23/2023 Rachel Toney OTR GO 1                      CATIE Schumacher  5/23/2023

## 2023-08-14 ENCOUNTER — APPOINTMENT (OUTPATIENT)
Dept: WOMENS IMAGING | Facility: HOSPITAL | Age: 63
End: 2023-08-14
Payer: MEDICARE

## 2023-08-14 PROCEDURE — 77063 BREAST TOMOSYNTHESIS BI: CPT | Performed by: RADIOLOGY

## 2023-08-14 PROCEDURE — 77067 SCR MAMMO BI INCL CAD: CPT | Performed by: RADIOLOGY

## 2023-08-22 ENCOUNTER — HOSPITAL ENCOUNTER (OUTPATIENT)
Dept: OCCUPATIONAL THERAPY | Facility: HOSPITAL | Age: 63
Discharge: HOME | End: 2023-08-22
Admitting: SURGERY
Payer: MEDICARE

## 2023-08-22 DIAGNOSIS — Z91.89 AT RISK FOR LYMPHEDEMA: Primary | ICD-10-CM

## 2023-08-22 DIAGNOSIS — C50.412 MALIGNANT NEOPLASM OF UPPER-OUTER QUADRANT OF LEFT BREAST IN FEMALE, ESTROGEN RECEPTOR NEGATIVE: ICD-10-CM

## 2023-08-22 DIAGNOSIS — Z17.1 MALIGNANT NEOPLASM OF UPPER-OUTER QUADRANT OF LEFT BREAST IN FEMALE, ESTROGEN RECEPTOR NEGATIVE: ICD-10-CM

## 2023-08-22 PROCEDURE — 97535 SELF CARE MNGMENT TRAINING: CPT

## 2023-08-22 PROCEDURE — 93702 BIS XTRACELL FLUID ANALYSIS: CPT

## 2023-08-22 NOTE — THERAPY PROGRESS REPORT/RE-CERT
Outpatient Occupational Therapy Lymphedema Progress Note  [facility]     Patient Name: Laxmi Marcus  : 1960  MRN: [id number]  Today's Date: 2023      Visit Date: 2023    Patient Active Problem List   Diagnosis    Abnormal weight gain    Uncontrolled type 2 diabetes mellitus    Hypoglycemia due to endogenous hyperinsulinemia    Hyperlipidemia    Hypertension    Diabetes mellitus type 2, uncontrolled, with complications    Encounter for long-term (current) use of insulin    Type II diabetes mellitus with neurological manifestations, uncontrolled    Uncontrolled type II diabetes mellitus with nephropathy    Hyperinsulinism    Hyperlipidemia associated with type 2 diabetes mellitus    Malignant neoplasm of upper-outer quadrant of left breast in female, estrogen receptor negative    Fitting and adjustment of vascular catheter        Past Medical History:   Diagnosis Date    Awareness under anesthesia     Breast cancer     Left    CKD (chronic kidney disease)     stage 3    Coronary artery disease     COVID-19 2021    Diabetes mellitus type 2 with complications, uncontrolled     Frequent UTI     GERD (gastroesophageal reflux disease)     Gout     Heart murmur     History of cancer chemotherapy 2021    LAST DOSE    History of pneumonia     History of radiation therapy     Hyperlipidemia     Hypertension     NSTEMI (non-ST elevated myocardial infarction) 2019    Obesity     Recent bereavement     SPOUSE 2022    Sinus congestion     Slow to wake up after anesthesia     Vitamin B12 deficiency         Past Surgical History:   Procedure Laterality Date    APPENDECTOMY      BREAST LUMPECTOMY  2006    BREAST LUMPECTOMY WITH SENTINEL NODE BIOPSY AND AXILLARY NODE DISSECTION      CARDIAC CATHETERIZATION      new de nova Ostial Ramus 95% stenosis, severe multivessel diabetic coronary vasculopathy with small vessels, occluded 3 of 4 bypass conduits, LIMA to LAD patent, all 3 vein grafts  are occluded, PTCA/EMMANUEL to ostial Ramus 95% stenosis, patent OM1 and proximal and mid RCA stents x 3 from 2/2019, preserved LVSF, normal LVEDP, no significant AS or MR      CHOLECYSTECTOMY  2006    CORONARY ARTERY BYPASS GRAFT  2017    x4 with LIMA to mLAD, SVG to diagonal, SVG to OM, SVG to distal PDA      CORONARY STENT PLACEMENT  12/2019    Drug eluting stent placement--2.0 x 26 mm Resolute Pierre EMMANUEL to ostial Ramus      CORONARY STENT PLACEMENT  02/2019    Drug eluting stent--2.25 x 38 mm and 2.5 x 15 mm Resolute [place] EMMANUEL to proximal mid RCA, 2.0 x 26 mm Resolute Pierre EMMANUEL to mid circumflex/proximal OM1    DENTAL PROCEDURE      HYSTERECTOMY  2009    MASTECTOMY Left 1/4/2022    Procedure: LEFT MODIFIED RADICAL MASTECTOMY;  Surgeon: Indigo Red MD;  Location: [facility];  Service: General;  Laterality: Left;    TONSILLECTOMY  1961    age 1    VENOUS ACCESS DEVICE (PORT) INSERTION Right 10/1/2021    Procedure: INSERTION VENOUS ACCESS DEVICE;  Surgeon: Indigo Red MD;  Location: [facility];  Service: General;  Laterality: Right;    VENOUS ACCESS DEVICE (PORT) REMOVAL Right 9/13/2022    Procedure: REMOVAL VENOUS ACCESS DEVICE;  Surgeon: Indigo Red MD;  Location: [facility];  Service: General;  Laterality: Right;         Visit Dx:      ICD-10-CM ICD-9-CM   1. At risk for lymphedema  Z91.89 V49.89   2. Malignant neoplasm of upper-outer quadrant of left breast in female, estrogen receptor negative  C50.412 174.4    Z17.1 V86.1        Lymphedema       Row Name 08/22/23 1300             Subjective Pain    Able to rate subjective pain? yes  -RE      Pre-Treatment Pain Level 0  -RE      Post-Treatment Pain Level 0  -RE         Lymphedema Assessment    Lymphedema Classification LUE:  -RE      Lymphedema Cancer Related Sx left;simple mastectomy;sentinel node biopsy;axillary dissection  Patient thinks she had 9 lymph nodes removed in 2006.  -RE      Lymphedema Surgery Comments 1/4/2022 left  modified radical mastectomy 0 of 11 lymph nodes positive.  -RE      Chemo Received yes  -RE      Radiation Therapy Received yes  -RE         L-Dex Bioimpedence Screening    L-Dex Measurement Extremity LUE  -RE      L-Dex Patient Position Standing  -RE      L-Dex UE Dominate Side Right  -RE      L-Dex UE At Risk Side Left  -RE      L-Dex UE Pre Surgical Value No  -RE      L-Dex UE Score 1.2  -RE      L-Dex UE Baseline Score 2.5  -RE      L-Dex UE Value Change -1.3  -RE      L-Dex UE Comment WNL  -RE                User Key  (r) = Recorded By, (t) = Taken By, (c) = Cosigned By      Initials Name Provider Type    Rachel Beverly OTR Occupational Therapist                  The patient had a 3 month SOZO measurement which I reviewed today. The score is WNL, see in EMR. Bioimpedance spectroscopy helps identify the onset of lymphedema in an arm or leg before patients experience noticeable swelling. Research has shown that the early detection of lymphedema using L-Dex combined with treatment can reduce progression to chronic lymphedema by 95% in breast cancer patients. Whenever possible, patients are tested for baseline L-Dex score before cancer treatment begins and then are reassessed during regular follow-up visits using the SOZO device. Otherwise, this can be started postoperatively and continued during regular follow-up visits. If the patient's L-Dex score increases above normal levels, that is a sign that lymphedema is developing and a referral is made to occupational therapy for further evaluation and early compression treatment. Lymphedema assessment with the SOZO L-Dex score is recommended to be done every 3 months for the first 3 years and then every 6 months for years 4 and 5 followed by annually afterwards.           OT Assessment/Plan       Row Name 08/22/23 6035          OT Assessment    Impairments Impaired lymphatic circulation  -RE     Assessment Comments Patient returns for bioimpedance reassessment.  Her  "last measurement was 3 months ago.  L-Dex value today is 1.2 which is within normal limits and consistent with her preop baseline.  She has no new complaints.  She is planning to have arterial stents in her leg soon.  She is also following up regarding reconstructive breast surgery.  We discussed exercise precautions regarding lymphedema.  I recommended that she use her compression sleeve if she begins to work out with a .  We discussed signs and symptoms that would indicate the need to return for therapy as well.  I recommended follow-up in 3 months or sooner if she has any change or increase in symptoms.  -RE     OT Diagnosis At risk for lymphedema  -RE     OT Rehab Potential Good  -RE     Patient/caregiver participated in establishment of treatment plan and goals Yes  -RE     Patient would benefit from skilled therapy intervention Yes  -RE        OT Plan    OT Frequency Other (comment)  -RE     Predicted Duration of Therapy Intervention (OT) Bioimpedance every 3 months  -RE     Planned CPT's? OT SELF CARE/MGMT/TRAIN 15 MIN: 43097;OT BIS XTRACELL FLUID ANALYSIS: 53154  -RE     OT Plan Comments Follow-up in 3 months or sooner if patient has worsening symptoms.  -RE               User Key  (r) = Recorded By, (t) = Taken By, (c) = Cosigned By      Initials Name Provider Type    RE Rachel Toney, OTR Occupational Therapist                           OT Goals       Row Name 08/22/23 1300          OT Short Term Goals    STG 1 Patient will demonstrate proper awareness of "What is Lymphedema?" and "Healthy Habits" for improved prevention, management, care of symptoms and ease of transition to self-care of condition.  -RE     STG 1 Progress Met  -RE     STG 2 Pt will demonstrate understanding of use of compression sleeve for edema prevention, exercise and air travel.  -RE     STG 2 Progress Met  -RE     STG 3 Patient independent and compliant with initial home exercise program focused on gentle AROM and stretching to " improve AROM to pre-surgical level.  -RE     STG 3 Progress Met  -RE        Long Term Goals    LTG Date to Achieve 11/22/23  -RE     LTG 1 Patient will participate in bioimpedance scans every 3 months as a method of early detection of lymphedema to allow for early intervention.  -RE     LTG 1 Progress Ongoing;Met  -RE     LTG 2 Patient's bioimpedance score to remain below 6.5 for decreased risk of stage II lymphedema.  -RE     LTG 2 Progress Met;Ongoing  -RE     LTG 3 Patient independent and compliant with advanced home exercise program focused on UE strengthening and aerobic daily exercise.  -RE     LTG 3 Progress Goal Revised  -RE     LTG 3 Progress Comments Patient will be working a  and prefers to exercise with a .  Goal discontinued.  -RE        Time Calculation    OT Goal Re-Cert Due Date 11/22/23  -RE               User Key  (r) = Recorded By, (t) = Taken By, (c) = Cosigned By      Initials Name Provider Type    Rachel Beverly OTR Occupational Therapist                    Therapy Education  Education Details: Discussed patient's current L-Dex value of 1.2 and recommended sleeve wear during heavy upper extremity exercise heavy housework and air travel.  I educated her on the signs and symptoms that would indicate the need to return for medical assessment and then to return to therapy including swelling, heaviness, or unusual muscle fatigue.  I also provided her with the National lymphedema networks position paper on exercise.  I gave her a copy and an extra copy for her .  I recommended slow progression with upper extremity exercise.  She may use weights but should progress to them slowly.  Given: Symptoms/condition management, Other (comment)  Program: New, Reinforced, Progressed  How Provided: Verbal, Written  Provided to: Patient  Level of Understanding: Teach back education performed, Verbalized  12379 - OT Self Care/Mgmt Minutes: 25                Time Calculation:   OT  Start Time: 1300  OT Stop Time: 1335  OT Time Calculation (min): 35 min  Total Timed Code Minutes- OT: 25 minute(s)  Timed Charges  58880 - OT Self Care/Mgmt Minutes: 25  Untimed Charges  79873 - OT Bioimpedence Rx Minutes: 10  Total Minutes  Timed Charges Total Minutes: 25  Untimed Charges Total Minutes: 10   Total Minutes: 35     Therapy Charges for Today       Code Description Service Date Service Provider Modifiers Qty    89659104130 HC OT SELF CARE/MGMT/TRAIN EA 15 MIN 8/22/2023 Rachel Toney OTR GO 2    [id number]  OT BIS XTRACELL FLUID ANALYSIS 8/22/2023 Rachel Toney OTR GO 1                        CATIE Schumacher  8/22/2023

## 2023-08-23 ENCOUNTER — TELEPHONE (OUTPATIENT)
Dept: SURGERY | Facility: CLINIC | Age: 63
End: 2023-08-23
Payer: COMMERCIAL

## 2023-08-23 ENCOUNTER — OFFICE VISIT (OUTPATIENT)
Dept: SURGERY | Facility: CLINIC | Age: 63
End: 2023-08-23
Payer: MEDICARE

## 2023-08-23 VITALS
WEIGHT: 135 LBS | HEIGHT: 65 IN | DIASTOLIC BLOOD PRESSURE: 70 MMHG | BODY MASS INDEX: 22.49 KG/M2 | SYSTOLIC BLOOD PRESSURE: 102 MMHG

## 2023-08-23 DIAGNOSIS — Z12.31 ENCOUNTER FOR SCREENING MAMMOGRAM FOR MALIGNANT NEOPLASM OF BREAST: ICD-10-CM

## 2023-08-23 DIAGNOSIS — Z17.1 MALIGNANT NEOPLASM OF UPPER-OUTER QUADRANT OF LEFT BREAST IN FEMALE, ESTROGEN RECEPTOR NEGATIVE: Primary | ICD-10-CM

## 2023-08-23 DIAGNOSIS — C50.412 MALIGNANT NEOPLASM OF UPPER-OUTER QUADRANT OF LEFT BREAST IN FEMALE, ESTROGEN RECEPTOR NEGATIVE: Primary | ICD-10-CM

## 2023-08-23 RX ORDER — CLOPIDOGREL BISULFATE 75 MG/1
1 TABLET ORAL DAILY
COMMUNITY
Start: 2023-05-13

## 2023-08-23 RX ORDER — COLLAGEN, HYDROLYSATE (BOVINE) 100 %
POWDER (GRAM) MISCELLANEOUS
COMMUNITY

## 2023-08-23 NOTE — PROGRESS NOTES
[facility]     Referring Provider: Will Johnston II, MD     Chief complaint: breast cancer follow up     HPI:   8/20/21:  Seen by Dr Red  Ms. Laxmi Marcus is a 62 yo woman, seen at the request of Dr. Will Johnston, for a new diagnosis of left breast cancer. She has a past history of left breast cancer in 2006, pT1N1, intermediate grade, invasive ductal carcinoma, ER/RI positive, HER-2 negative. She underwent left lumpectomy & SLNB with completion axillary dissection, followed by reexcision for close margins. This was followed by AC x4 and 4 cycles of Taxol, then radiation. She was on tamoxifen for 2-1/2 years, then says she was switched to a different medication for a total of 5 years of endocrine therapy. Currently, I only have access to one note from the [facility] group in 2008. I have tried to get copies of the operative report and pathology report from [place], however these records are no longer held. The patient says that she thinks she had 9 lymph nodes removed.       She reports that she had a lot of issues with chemo, including severe bone pain requiring Dilaudid for months. Shortly after finishing all of her breast treatment, she had severe pancreatitis and shortly after that she became a brittle diabetic. She has a past history of CAD sp CABGx4 in 2017. Unfortunately it sounds like she had some sort of immediate stenosis, because she had a heart attack in early 2019 requiring stent placement x4. She is followed by cardiology and on Brilinta.     In May of this year, she noticed a painful left breast lump near the prior lumpectomy scar. She does not think it has changed at all since she first noticed. Her work-up is detailed in the oncologic history below. She denies any family history of breast or ovarian cancer.      10/29/21:  Seen by Dr Red  After her last visit, she underwent a PET scan which was negative for distant disease and her genetic testing was negative for mutation.  She started neoadjuvant therapy with carbo/Taxol/Keytruda on 9/7/21. Her second dose of Taxol was temporarily held because she developed Covid, however she received her seventh dose yesterday. She reports that the mass has gotten much smaller and it is now difficult for her to find.    12/10/21:  Seen by Dr Red  She completed her last dose of Taxol on 12/4/21. After her last visit, she saw Dr. Araya. Because of her diabetes and history of a CABG, he recommended delayed reconstruction and the patient is okay with this.    1/19/22:  Seen by Dr Red  She underwent left modified radical mastectomy on 1/4/22. Her pathology was sent out for a second opinion and the final report is still pending. She has been recovering well and has no complaints. She came into the office last week to have her breast drain removed. The axillary drain has had out <20 mL/day for the past few days.    8/8/22:  She returns today for follow up with no breast concerns.  She still has not had her reconstruction, she is frustrated with waiting, has follow with Dr Araya in 11/22.  Her  passed away unexpectedly recently, she is teary today, misses him terribly.      Her last clinic visit with medical oncology was in 6/22:  Last adjvuant Keytruda.  Labs for toxicity- glucose improved    She was seen in OT/LE clinic in 5/22:  L-Dex value today is 2.0 which is WNL.She reports wearing her sleeve about 2 days a week and any time her arm feels tired or uncomfortable. Today we reviewed some new exercises to build some strength and stability. I recommended follow up in 3 months    Right screening mammogram with rich was completed on 7/29/22 at [facility], BiRads 1.(see full report below)    2/6/23:  She returns today for follow up with no breast concerns, no reconstruction left breast by Dr Araya.  He wants to wait for A1C to be at 6 before surgery.  She has not reached that point yet but will follow up in the near future.  PAC removed in  9/22 by Dr Red.    Her last OT/LE clinic visit was in 11/22:   L-Dex value today is 2.0 which is WNL and consistent with her previous measurements. She has no new c/o.    She was last seen by Dr Sandoval in 11/22:  here to discuss starting zometa, education provided, treatment plan placed and signed - she'll discuss with her dentist and we'll tentatively plan for 3 months.     8/23/23, Interval History:  She returns today for follow up with no breast concerns.  She is ready for reconstruction, will follow up with Dr Araya to discuss.  She is adjusting to life without her  after his passing one year ago.    She was seen in OT/LE clinic on 8/22/23:  L-Dex value today is 1.2 which is within normal limits and consistent with her preop baseline. She has no new complaints I recommended follow-up in 3 months or sooner if she has any change or increase in symptoms.     She was seen by Dr Sandoval on 8/9/23: ongoing adjuvant Zometa. Remains without evidence of disease clinically.     Right screening mammogram on 8/14/23 was stable, BiRads 1.  (see full report below)          Oncology/Hematology History Overview Note   06/05/2017, Bilateral Diagnostic MMG with Jaswant & Bilateral Breast US ([facility]):  MMG:  Scattered areas pf fibroglandular density.   1. There is a stable post-surgical scar with associated trabecular thickening seen in the upper outer region of the left breast. Post-surgical scar is in an area of prior lumpectomy. Findings are consistent with post-surgical and post-radiation therapy changes.  2. The patient indicates recent onset of pain in the lateral and axillary tail region of the left breast. The symptomatic region is clearly marked and there is no suspicious finding in the region of clinical concern.  3. There is a stable small intramammary lymph node measuring 7 millimeters seen in the posterior one-third 1:30 o'clock region of the left breast located 8 centimeters from the nipple. There has been no  significant change from the prior exam(s).  6. There is a stable area of asymmetric breast tissue seen in the superior region of the right breast.  US:  1. There is no evidence of any solid mass or abnormal cystic elements.  2. There is no evidence of any solid mass or abnormal cystic elements.  3. Ultrasound is suggestive of an oval small intramammary lymph node measuring 7 millimeters.  4. There is a small lymph node measuring 6 millimeters in the left axilla.  5. There is an oval small simple cyst with circumscribed margins measuring 4 millimeters seen in the right breast at 3 o'clock.  6. There is no evidence of any solid mass or abnormal cystic elements.  BI-RADS 2: Benign.    05/2021: Pt noticed a right breast lump.     Malignant neoplasm of upper-outer quadrant of left breast in female, estrogen receptor negative   7/26/2021 Initial Diagnosis    Malignant neoplasm of upper-outer quadrant of left breast in female, estrogen receptor negative (CMS/HCC)     7/27/2021 Imaging    Bilateral Diagnostic MMG with Jaswant & Left Breast US ([facility]):  MMG:  Scattered areas of fibroglandular density.  1. There is a new oval mass measuring 20 mm with indistinct margins seen in the 1:30 o'clock region of the left breast located 4 centimeters from the nipple. This correlates to the palpable mass.   2. There is a stable post-surgical scar seen in the posterior one-third region of the left breast at 2 o'clock. Post-surgical scar is in an area of prior lumpectomy. Findings are consistent with post-surgical and post-radiation therapy changes.   In the right breast, no suspicious masses, significant calcifications or other abnormalities are seen.  US:  1. Ultrasound demonstrates a new oval parallel solid mass with poorly defined margins measuring 16 x 14 x 15 mm seen in the 1:30 o'clock region of the left breast located 4 centimeters from the nipple. This correlates with the mammographic finding.  3. There is an oval lymph node  measuring 9 x 6 x 8 mm in the left axilla. This has an abnormal appearance with little hilum.  BI-RADS 4B: Suspicious.     8/6/2021 Biopsy    Left Breast & Axilla, US-Guided Biopsies ([facility]):    1. Breast, Left 1:30 O'clock, Core Needle Biopsy:  Invasive mammary carcinoma, no special type (invasive ductal carcinoma), combined histologic grade 3 (tubule formation 3, nuclear pleomorphism 3, mitotic activity 2), 12 mm in greatest dimension, present in 5 of 15 cores.   Focal ductal carcinoma in situ (DCIS), high nuclear grade, solid pattern, with associated focal necrosis 1.5 mm in greatest dimension, present in 3 of 15 cores.   Microcalcifications associated with invasive carcinoma, DCIS, and benign mammary ducts/lobules.  -Minicork clip.     ER negative (0%)  PA negative (0%)  Her2 negative (IHC 1+)  Ki-67 94.88%    2. Lymph Node, Left Axilla, Core Needle Biopsy:  Benign lymph node tissue with reactive follicular hyperplasia.   No definitive evidence of carcinoma (AE1/AE3).   No definitive evidence of lymphoma (CD3, CD20, CD21, and CD30).  -Hydromark clip. Concordant.     8/18/2021 Imaging    Bilateral Breast [facility] ([facility]):  In the posterior one-third lateral aspect of the left breast centered at the 3-o'clock position on the order of 6.5 cm from the nipple there is a focal signal void that is seen within the superior aspect of an irregular heterogenous masslike region that measures on the order of 2.4 cm in the superior inferior dimension, 2.5 cm in the anterior posterior dimension and 2.7 cm in the medial lateral dimension. Some of the internal character of the lesion demonstrates absence of enhancement. This represents the biopsy-proven site of malignancy with the mini  cork-shaped metallic clip in the superomedial portion of the lesion. Some areas of increased T1-weighted signal intensity are noted in the region and are consistent with areas of postbiopsy related hemorrhage.   There is postsurgical change seen in the  left breast in this region associated with prior breast conservation therapy. No other areas of abnormal enhancement or morphology are seen within the left breast. I see no evidence for abnormal left breast skin, nipple or chest wall enhancement and there is no evidence for left axillary adenopathy. The patient is status post median sternotomy which limits the evaluation of the internal mammary chain for internal mammary adenopathy. Mild diffuse left breast skin thickening associated with prior breast conservation therapy is noted, but there is no abnormal enhancement of the skin.    There are no areas of abnormal enhancement or morphology in the right breast. Scattered stippled foci of variable enhancement are seen throughout the right breast, none of which appear dominant or clumped or associated with any suspicious mammographic features. I see no evidence for abnormal right breast skin, nipple or chest wall enhancement and there is no evidence for right axillary or internal mammary chain adenopathy. Visualization of the right internal mammary chain is not prohibited by the artifact from the patient's median sternotomy wires.  BI-RADS 6: Known malignancy.     8/20/2021 Genetic Testing    [employer] Common Hereditary Cancers Panel (47 genes):    Negative     9/3/2021 Imaging    PET CT ([place]):  1. FDG avid left upper outer breast mass compatible with primary breast malignancy.  2. No evidence of metastatic disease.  3. Subcutaneous infiltrative changes in the ventral lower abdomen with low-level FDG uptake which may represent injection sites, panniculitis, or prior port sites.  4. Air-fluid level within the urinary bladder which may represent cystitis in the absence of recent catheterization.     9/13/2021 - 9/13/2021 Chemotherapy    OP BREAST Pembrolizumab 200 mg / PACLItaxel / CARBOplatin AUC=5       1/4/2022 Surgery    Left modified radical mastectomy  Preliminary Diagnosis  1. Left Breast, Modified Radical  Mastectomy S/P Neoadjuvant Chemotherapy (332 grams):                A. FIBROTIC TUMOR BED WITH FOCAL RESIDUAL ATYPICAL CELL GROUPS (SEE COMMENT).                B. Negative for lymphovascular space invasion.  C. Clip and biopsy site changes are present within tumor bed.    D. Marked treatment effect with associated reactive epithelial changes and squamous metaplasia,        consistent with prior procedure-related changes.    E. Atypical lobular hyperplasia.   F. Background breast parenchyma with intraductal papilloma involved by usual ductal hyperplasia,       fibroadenomatoid change, and columnar cell change.    G. Unremarkable skin and nipple.    H. Eleven lymph nodes, negative for carcinoma (0/11):                1. Clip and biopsy site changes present in a single lymph node.  2. All lymph nodes are negative for treatment effect.    3. Separate collection of nerves, suggestive of traumatic neuroma.      2. Left Breast, Additional Tissue Margin at 2:00, Re-excision:   A. Benign skeletal muscle with no significant histopathologic changes.      Final Diagnosis  CONSULTANT DIAGNOSIS:   LEFT BREAST, MODIFIED RADICAL MASTECTOMY STATUS POST NEOADJUVANT CHEMOTHERAPY:               A. NEGATIVE FOR RESIDUAL CARCINOMA (PATHOLOGIC COMPLETE RESPONSE) SEE COMMENT.     Comment:  [...] Within the biopsy site, a cluster of atypical cells, some of which show squamoid change, is noted. We believe this cluster to most likely be the product of epithelial displacement due to the previous biopsy, rather than evidence of residual invasive carcinoma. The presence of p63 positivity reinforces this morphologic impression.     The above interpretation was rendered by Dr. Fran Cotton of [facility] in [place]. Refer to outside pathology consultation report () for additional details regarding this case.     Therefore, given the aforementioned findings along with eleven benign lymph nodes, this case is classified as a ypT0  N0.     7/29/2022 Imaging    [facility]  Scattered areas of fibroglandular density.  No suspicious masses, significant calcifications or other abnormalities are seen.  There is been no significant change from prior exam.  Impression:  There is no mammographic evidence of malignancy.  Screening mammogram 1 year is recommended.  BI-RADS Category 1     8/14/2023 Imaging    Right screening mammogram with tomosynthesis that [facility]  There are scattered areas of fibroglandular density.  No suspicious masses, significant calcifications or other abnormalities are seen.  Impression:  There is no mammographic evidence of malignancy.  Screening mammogram 1 year is recommended.  BI-RADS Category 1         Review of Systems:  See interval history.       Medications:    Current Outpatient Medications:     clopidogrel (PLAVIX) 75 MG tablet, Take 1 tablet by mouth Daily., Disp: , Rfl:     ACCU-CHEK FASTCLIX LANCETS misc, TO CHECK 5 TIMES DAILY, Disp: 500 each, Rfl: 2    ascorbic acid (VITAMIN C) 250 MG tablet, Take 500 mg by mouth Daily., Disp: , Rfl:     BIOTIN PO, Take  by mouth., Disp: , Rfl:     Collagen Hydrolysate powder, Use., Disp: , Rfl:     glucose blood (ACCU-CHEK SMARTVIEW) test strip, Use as instructed 5 TIMES DAILY, Disp: 450 each, Rfl: 2    Insulin Aspart, w/Niacinamide, (FIASP FLEXTOUCH SC), Inject  under the skin into the appropriate area as directed. 15-25 units 4 times daily, Disp: , Rfl:     Insulin Degludec (Tresiba FlexTouch) 200 UNIT/ML solution pen-injector pen injection, INJECT 26 UNITS INTO THE SKIN EVERY MORNING FOR 90 DAYS., Disp: , Rfl:     Insulin Pen Needle 32G X 6 MM misc, INJECT 1 EACH INTO THE SKIN 4 (FOUR) TIMES DAILY., Disp: , Rfl:     metoprolol succinate XL (TOPROL-XL) 25 MG 24 hr tablet, Take 12.5 mg by mouth Every Night., Disp: , Rfl:     ranolazine (RANEXA) 500 MG 12 hr tablet, TAKE 1 TABLET BY MOUTH 2 (TWO) TIMES DAILY., Disp: ,  "Rfl:     rosuvastatin (CRESTOR) 20 MG tablet, Take 20 mg by mouth Every Night., Disp: , Rfl:       Allergies   Allergen Reactions    Dulaglutide Nausea And Vomiting     Another name for trulicity    Sulfa Antibiotics Other (See Comments)     Abdominal Pain and NauseA    Uloric [Febuxostat] Other (See Comments)     Mouth sores    Latex Itching       Family History   Problem Relation Age of Onset    Heart disease Mother     Coronary artery disease Mother     Heart disease Brother     Heart disease Maternal Uncle     Prostate cancer Maternal Uncle     Heart disease Maternal Grandfather     Malig Hyperthermia Neg Hx        PHYSICAL EXAMINATION:   Vitals:    08/23/23 1113   BP: 102/70      /70 (BP Location: Right arm)   Ht 165.1 cm (65\")   Wt 61.2 kg (135 lb)   LMP  (LMP Unknown)   BMI 22.47 kg/mý     I reviewed physical exam, no changes noted    ECOG 0 - Asymptomatic  General: NAD, well appearing  Psych: a&o x 3, normal mood and affect  Eyes: EOMI, no scleral icterus  ENMT: neck supple without masses or thyromegaly, mucus membranes moist  MSK: normal gait, normal ROM in bilateral shoulders  Lymph nodes: She still has prior left axillary scar, no axillary swelling  Breast: moderate size, grade 3 ptosis, sternal scar  Right: No visible abnormalities on inspection while seated, with arms raised or hands on hips. No masses, skin changesor nipple abnormalities.    Left: Sp mastectomy with well-healed incision. Flaps healthy.       Assessment:   1)   63 y.o. F with a diagnosis of left breast cancer 8/21: High grade, triple negative, invasive ductal carcinoma; clinical T2N0, anatomic stage IIA, prognostic stage IIB. She completed neoadjuvant carbo/Taxol/Keytruda on 12/4/21. She underwent left modified radical mastectomy on 1/4/22.    2)  She has a past history of left breast cancer in 2006, pT1N1, intermediate grade, invasive ductal carcinoma, ER/MO positive, HER-2 negative. She underwent left lumpectomy & SLNB with " completion axillary dissection, followed by reexcision for close margins. This was followed by AC x4 and 4 cycles of Taxol, then radiation and endocrine therapy.     Discussion:    I encouraged her to try to take care of herself and follow up with Dr Araya in the near future to discuss reconstruction.    We discussed her follow up which includes clinical breast exam every 6 months for 2 years, with mammogram annually then  mammogram and exam annually until 5 years from diagnosis.    Plan:  -Follow-up with OT/LE clinic.  - follow-up with Dr. Sandoval.  - follow-up with Dr. Araya for eventual delayed reconstruction.  - right screening mammogram with tomosynthesis in 12 months at [facility] followed by exam.    Alethea Sandra, APRN      CC:  MD Hollis Brennan II, MD Vipul Panchal, MD Blakely Kute, MD

## 2023-08-23 NOTE — LETTER
August 23, 2023     Hollis Dawkins MD  [address] [facility] [address]    Patient: Laxmi Marcus   YOB: 1960   Date of Visit: 8/23/2023     Dear Hollis Dawkins MD:       Thank you for referring Laxmi Marcus to me for evaluation. Below are the relevant portions of my assessment and plan of care.    If you have questions, please do not hesitate to call me. I look forward to following Laxmi along with you.         Sincerely,        Alethea Sandra, APRN        CC: MD Bradley Watson MD Arthur J. McLaughlin II, MD Egger, Sharon, APRN  08/23/23 1136  Sign when Signing Visit  [facility]     Referring Provider: Will Johnston II, MD     Chief complaint: breast cancer follow up     HPI:   8/20/21:  Seen by Dr Red  MsBashir Marcus is a 60 yo woman, seen at the request of Dr. Will Johnston, for a new diagnosis of left breast cancer. She has a past history of left breast cancer in 2006, pT1N1, intermediate grade, invasive ductal carcinoma, ER/AR positive, HER-2 negative. She underwent left lumpectomy & SLNB with completion axillary dissection, followed by reexcision for close margins. This was followed by AC x4 and 4 cycles of Taxol, then radiation. She was on tamoxifen for 2-1/2 years, then says she was switched to a different medication for a total of 5 years of endocrine therapy. Currently, I only have access to one note from the [facility] group in 2008. I have tried to get copies of the operative report and pathology report from [place], however these records are no longer held. The patient says that she thinks she had 9 lymph nodes removed.       She reports that she had a lot of issues with chemo, including severe bone pain requiring Dilaudid for months. Shortly after finishing all of her breast treatment, she had severe pancreatitis and shortly after that she became a brittle diabetic. She has a past history of CAD sp CABGx4 in 2017. Unfortunately  it sounds like she had some sort of immediate stenosis, because she had a heart attack in early 2019 requiring stent placement x4. She is followed by cardiology and on Brilinta.     In May of this year, she noticed a painful left breast lump near the prior lumpectomy scar. She does not think it has changed at all since she first noticed. Her work-up is detailed in the oncologic history below. She denies any family history of breast or ovarian cancer.      10/29/21:  Seen by Dr Red  After her last visit, she underwent a PET scan which was negative for distant disease and her genetic testing was negative for mutation. She started neoadjuvant therapy with carbo/Taxol/Keytruda on 9/7/21. Her second dose of Taxol was temporarily held because she developed Covid, however she received her seventh dose yesterday. She reports that the mass has gotten much smaller and it is now difficult for her to find.    12/10/21:  Seen by Dr Red  She completed her last dose of Taxol on 12/4/21. After her last visit, she saw Dr. Araya. Because of her diabetes and history of a CABG, he recommended delayed reconstruction and the patient is okay with this.    1/19/22:  Seen by Dr Red  She underwent left modified radical mastectomy on 1/4/22. Her pathology was sent out for a second opinion and the final report is still pending. She has been recovering well and has no complaints. She came into the office last week to have her breast drain removed. The axillary drain has had out <20 mL/day for the past few days.    8/8/22:  She returns today for follow up with no breast concerns.  She still has not had her reconstruction, she is frustrated with waiting, has follow with Dr Araya in 11/22.  Her  passed away unexpectedly recently, she is teary today, misses him terribly.      Her last clinic visit with medical oncology was in 6/22:  Last adjvuant Keytruda.  Labs for toxicity- glucose improved    She was seen in OT/LE clinic  in 5/22:  L-Dex value today is 2.0 which is WNL.She reports wearing her sleeve about 2 days a week and any time her arm feels tired or uncomfortable. Today we reviewed some new exercises to build some strength and stability. I recommended follow up in 3 months    Right screening mammogram with jaswant was completed on 7/29/22 at [facility], BiRads 1.(see full report below)    2/6/23:  She returns today for follow up with no breast concerns, no reconstruction left breast by Dr Araya.  He wants to wait for A1C to be at 6 before surgery.  She has not reached that point yet but will follow up in the near future.  PAC removed in 9/22 by Dr Red.    Her last OT/LE clinic visit was in 11/22:   L-Dex value today is 2.0 which is WNL and consistent with her previous measurements. She has no new c/o.    She was last seen by Dr Sandoval in 11/22:  here to discuss starting zometa, education provided, treatment plan placed and signed - she'll discuss with her dentist and we'll tentatively plan for 3 months.     8/23/23, Interval History:  She returns today for follow up with no breast concerns.  She is ready for reconstruction, will follow up with Dr Araya to discuss.  She is adjusting to life without her  after his passing one year ago.    She was seen in OT/LE clinic on 8/22/23:  L-Dex value today is 1.2 which is within normal limits and consistent with her preop baseline. She has no new complaints I recommended follow-up in 3 months or sooner if she has any change or increase in symptoms.     She was seen by Dr Sandoval on 8/9/23: ongoing adjuvant Zometa. Remains without evidence of disease clinically.     Right screening mammogram on 8/14/23 was stable, BiRads 1.  (see full report below)          Oncology/Hematology History Overview Note   06/05/2017, Bilateral Diagnostic MMG with Jaswant & Bilateral Breast US ([facility]):  MMG:  Scattered areas pf fibroglandular density.   1. There is a stable post-surgical scar with associated  trabecular thickening seen in the upper outer region of the left breast. Post-surgical scar is in an area of prior lumpectomy. Findings are consistent with post-surgical and post-radiation therapy changes.  2. The patient indicates recent onset of pain in the lateral and axillary tail region of the left breast. The symptomatic region is clearly marked and there is no suspicious finding in the region of clinical concern.  3. There is a stable small intramammary lymph node measuring 7 millimeters seen in the posterior one-third 1:30 o'clock region of the left breast located 8 centimeters from the nipple. There has been no significant change from the prior exam(s).  6. There is a stable area of asymmetric breast tissue seen in the superior region of the right breast.  US:  1. There is no evidence of any solid mass or abnormal cystic elements.  2. There is no evidence of any solid mass or abnormal cystic elements.  3. Ultrasound is suggestive of an oval small intramammary lymph node measuring 7 millimeters.  4. There is a small lymph node measuring 6 millimeters in the left axilla.  5. There is an oval small simple cyst with circumscribed margins measuring 4 millimeters seen in the right breast at 3 o'clock.  6. There is no evidence of any solid mass or abnormal cystic elements.  BI-RADS 2: Benign.    05/2021: Pt noticed a right breast lump.     Malignant neoplasm of upper-outer quadrant of left breast in female, estrogen receptor negative   7/26/2021 Initial Diagnosis    Malignant neoplasm of upper-outer quadrant of left breast in female, estrogen receptor negative (CMS/HCC)     7/27/2021 Imaging    Bilateral Diagnostic MMG with Jaswant & Left Breast US ([facility]):  MMG:  Scattered areas of fibroglandular density.  1. There is a new oval mass measuring 20 mm with indistinct margins seen in the 1:30 o'clock region of the left breast located 4 centimeters from the nipple. This correlates to the palpable mass.   2. There is a  stable post-surgical scar seen in the posterior one-third region of the left breast at 2 o'clock. Post-surgical scar is in an area of prior lumpectomy. Findings are consistent with post-surgical and post-radiation therapy changes.   In the right breast, no suspicious masses, significant calcifications or other abnormalities are seen.  US:  1. Ultrasound demonstrates a new oval parallel solid mass with poorly defined margins measuring 16 x 14 x 15 mm seen in the 1:30 o'clock region of the left breast located 4 centimeters from the nipple. This correlates with the mammographic finding.  3. There is an oval lymph node measuring 9 x 6 x 8 mm in the left axilla. This has an abnormal appearance with little hilum.  BI-RADS 4B: Suspicious.     8/6/2021 Biopsy    Left Breast & Axilla, US-Guided Biopsies ([facility]):    1. Breast, Left 1:30 O'clock, Core Needle Biopsy:  Invasive mammary carcinoma, no special type (invasive ductal carcinoma), combined histologic grade 3 (tubule formation 3, nuclear pleomorphism 3, mitotic activity 2), 12 mm in greatest dimension, present in 5 of 15 cores.   Focal ductal carcinoma in situ (DCIS), high nuclear grade, solid pattern, with associated focal necrosis 1.5 mm in greatest dimension, present in 3 of 15 cores.   Microcalcifications associated with invasive carcinoma, DCIS, and benign mammary ducts/lobules.  -[place] clip.     ER negative (0%)  OR negative (0%)  Her2 negative (IHC 1+)  Ki-67 94.88%    2. Lymph Node, Left Axilla, Core Needle Biopsy:  Benign lymph node tissue with reactive follicular hyperplasia.   No definitive evidence of carcinoma (AE1/AE3).   No definitive evidence of lymphoma (CD3, CD20, CD21, and CD30).  -Hydromark clip. Concordant.     8/18/2021 Imaging    Bilateral Breast MRI ([facility]):  In the posterior one-third lateral aspect of the left breast centered at the 3-o'clock position on the order of 6.5 cm from the nipple there is a focal signal void that is seen within the  superior aspect of an irregular heterogenous masslike region that measures on the order of 2.4 cm in the superior inferior dimension, 2.5 cm in the anterior posterior dimension and 2.7 cm in the medial lateral dimension. Some of the internal character of the lesion demonstrates absence of enhancement. This represents the biopsy-proven site of malignancy with the mini  cork-shaped metallic clip in the superomedial portion of the lesion. Some areas of increased T1-weighted signal intensity are noted in the region and are consistent with areas of postbiopsy related hemorrhage.   There is postsurgical change seen in the left breast in this region associated with prior breast conservation therapy. No other areas of abnormal enhancement or morphology are seen within the left breast. I see no evidence for abnormal left breast skin, nipple or chest wall enhancement and there is no evidence for left axillary adenopathy. The patient is status post median sternotomy which limits the evaluation of the internal mammary chain for internal mammary adenopathy. Mild diffuse left breast skin thickening associated with prior breast conservation therapy is noted, but there is no abnormal enhancement of the skin.    There are no areas of abnormal enhancement or morphology in the right breast. Scattered stippled foci of variable enhancement are seen throughout the right breast, none of which appear dominant or clumped or associated with any suspicious mammographic features. I see no evidence for abnormal right breast skin, nipple or chest wall enhancement and there is no evidence for right axillary or internal mammary chain adenopathy. Visualization of the right internal mammary chain is not prohibited by the artifact from the patient's median sternotomy wires.  BI-RADS 6: Known malignancy.     8/20/2021 Genetic Testing    [employer] Common Hereditary Cancers Panel (47 genes):    Negative     9/3/2021 Imaging    PET CT ([place]):  1. FDG  avid left upper outer breast mass compatible with primary breast malignancy.  2. No evidence of metastatic disease.  3. Subcutaneous infiltrative changes in the ventral lower abdomen with low-level FDG uptake which may represent injection sites, panniculitis, or prior port sites.  4. Air-fluid level within the urinary bladder which may represent cystitis in the absence of recent catheterization.     9/13/2021 - 9/13/2021 Chemotherapy    OP BREAST Pembrolizumab 200 mg / PACLItaxel / CARBOplatin AUC=5       1/4/2022 Surgery    Left modified radical mastectomy  Preliminary Diagnosis  1. Left Breast, Modified Radical Mastectomy S/P Neoadjuvant Chemotherapy (332 grams):                A. FIBROTIC TUMOR BED WITH FOCAL RESIDUAL ATYPICAL CELL GROUPS (SEE COMMENT).                B. Negative for lymphovascular space invasion.  C. Clip and biopsy site changes are present within tumor bed.    D. Marked treatment effect with associated reactive epithelial changes and squamous metaplasia,        consistent with prior procedure-related changes.    E. Atypical lobular hyperplasia.   F. Background breast parenchyma with intraductal papilloma involved by usual ductal hyperplasia,       fibroadenomatoid change, and columnar cell change.    G. Unremarkable skin and nipple.    H. Eleven lymph nodes, negative for carcinoma (0/11):                1. Clip and biopsy site changes present in a single lymph node.  2. All lymph nodes are negative for treatment effect.    3. Separate collection of nerves, suggestive of traumatic neuroma.      2. Left Breast, Additional Tissue Margin at 2:00, Re-excision:   A. Benign skeletal muscle with no significant histopathologic changes.      Final Diagnosis  CONSULTANT DIAGNOSIS:   LEFT BREAST, MODIFIED RADICAL MASTECTOMY STATUS POST NEOADJUVANT CHEMOTHERAPY:               A. NEGATIVE FOR RESIDUAL CARCINOMA (PATHOLOGIC COMPLETE RESPONSE) SEE COMMENT.     Comment:  [...] Within the biopsy site, a cluster  of atypical cells, some of which show squamoid change, is noted. We believe this cluster to most likely be the product of epithelial displacement due to the previous biopsy, rather than evidence of residual invasive carcinoma. The presence of p63 positivity reinforces this morphologic impression.     The above interpretation was rendered by Dr. Fran Cotton of [facility] in [place]. Refer to outside pathology consultation report () for additional details regarding this case.     Therefore, given the aforementioned findings along with eleven benign lymph nodes, this case is classified as a ypT0 N0.     7/29/2022 Imaging    [facility]  Scattered areas of fibroglandular density.  No suspicious masses, significant calcifications or other abnormalities are seen.  There is been no significant change from prior exam.  Impression:  There is no mammographic evidence of malignancy.  Screening mammogram 1 year is recommended.  BI-RADS Category 1     8/14/2023 Imaging    Right screening mammogram with tomosynthesis that [facility]  There are scattered areas of fibroglandular density.  No suspicious masses, significant calcifications or other abnormalities are seen.  Impression:  There is no mammographic evidence of malignancy.  Screening mammogram 1 year is recommended.  BI-RADS Category 1         Review of Systems:  See interval history.       Medications:    Current Outpatient Medications:     clopidogrel (PLAVIX) 75 MG tablet, Take 1 tablet by mouth Daily., Disp: , Rfl:     ACCU-CHEK FASTCLIX LANCETS misc, TO CHECK 5 TIMES DAILY, Disp: 500 each, Rfl: 2    ascorbic acid (VITAMIN C) 250 MG tablet, Take 500 mg by mouth Daily., Disp: , Rfl:     BIOTIN PO, Take  by mouth., Disp: , Rfl:     Collagen Hydrolysate powder, Use., Disp: , Rfl:     glucose blood (ACCU-CHEK SMARTVIEW) test strip, Use as instructed 5 TIMES DAILY, Disp: 450 each, Rfl: 2     "Insulin Aspart, w/Niacinamide, (FIASP FLEXTOUCH SC), Inject  under the skin into the appropriate area as directed. 15-25 units 4 times daily, Disp: , Rfl:     Insulin Degludec (Tresiba FlexTouch) 200 UNIT/ML solution pen-injector pen injection, INJECT 26 UNITS INTO THE SKIN EVERY MORNING FOR 90 DAYS., Disp: , Rfl:     Insulin Pen Needle 32G X 6 MM misc, INJECT 1 EACH INTO THE SKIN 4 (FOUR) TIMES DAILY., Disp: , Rfl:     metoprolol succinate XL (TOPROL-XL) 25 MG 24 hr tablet, Take 12.5 mg by mouth Every Night., Disp: , Rfl:     ranolazine (RANEXA) 500 MG 12 hr tablet, TAKE 1 TABLET BY MOUTH 2 (TWO) TIMES DAILY., Disp: , Rfl:     rosuvastatin (CRESTOR) 20 MG tablet, Take 20 mg by mouth Every Night., Disp: , Rfl:       Allergies   Allergen Reactions    Dulaglutide Nausea And Vomiting     Another name for trulicity    Sulfa Antibiotics Other (See Comments)     Abdominal Pain and NauseA    Uloric [Febuxostat] Other (See Comments)     Mouth sores    Latex Itching       Family History   Problem Relation Age of Onset    Heart disease Mother     Coronary artery disease Mother     Heart disease Brother     Heart disease Maternal Uncle     Prostate cancer Maternal Uncle     Heart disease Maternal Grandfather     Malig Hyperthermia Neg Hx        PHYSICAL EXAMINATION:   Vitals:    08/23/23 1113   BP: 102/70      /70 (BP Location: Right arm)   Ht 165.1 cm (65\")   Wt 61.2 kg (135 lb)   LMP  (LMP Unknown)   BMI 22.47 kg/mý     I reviewed physical exam, no changes noted    ECOG 0 - Asymptomatic  General: NAD, well appearing  Psych: a&o x 3, normal mood and affect  Eyes: EOMI, no scleral icterus  ENMT: neck supple without masses or thyromegaly, mucus membranes moist  MSK: normal gait, normal ROM in bilateral shoulders  Lymph nodes: She still has prior left axillary scar, no axillary swelling  Breast: moderate size, grade 3 ptosis, sternal scar  Right: No visible abnormalities on inspection while seated, with " arms raised or hands on hips. No masses, skin changesor nipple abnormalities.    Left: Sp mastectomy with well-healed incision. Flaps healthy.       Assessment:   1)   63 y.o. F with a diagnosis of left breast cancer 8/21: High grade, triple negative, invasive ductal carcinoma; clinical T2N0, anatomic stage IIA, prognostic stage IIB. She completed neoadjuvant carbo/Taxol/Keytruda on 12/4/21. She underwent left modified radical mastectomy on 1/4/22.    2)  She has a past history of left breast cancer in 2006, pT1N1, intermediate grade, invasive ductal carcinoma, ER/VT positive, HER-2 negative. She underwent left lumpectomy & SLNB with completion axillary dissection, followed by reexcision for close margins. This was followed by AC x4 and 4 cycles of Taxol, then radiation and endocrine therapy.     Discussion:    I encouraged her to try to take care of herself and follow up with Dr Araya in the near future to discuss reconstruction.    We discussed her follow up which includes clinical breast exam every 6 months for 2 years, with mammogram annually then  mammogram and exam annually until 5 years from diagnosis.    Plan:  -Follow-up with OT/LE clinic.  - follow-up with Dr. Sandoval.  - follow-up with Dr. Araya for eventual delayed reconstruction.  - right screening mammogram with tomosynthesis in 12 months at [facility] followed by exam.    NINA Cruz      CC:  MD Hollis Brennan II, MD Vipul Panchal, MD Blakely Kute, MD

## 2023-11-20 ENCOUNTER — HOSPITAL ENCOUNTER (OUTPATIENT)
Dept: OCCUPATIONAL THERAPY | Facility: HOSPITAL | Age: 63
Setting detail: THERAPIES SERIES
Discharge: HOME | End: 2023-11-20
Payer: MEDICARE

## 2023-11-20 DIAGNOSIS — Z91.89 AT RISK FOR LYMPHEDEMA: Primary | ICD-10-CM

## 2023-11-20 DIAGNOSIS — C50.412 MALIGNANT NEOPLASM OF UPPER-OUTER QUADRANT OF LEFT BREAST IN FEMALE, ESTROGEN RECEPTOR NEGATIVE: ICD-10-CM

## 2023-11-20 DIAGNOSIS — Z17.1 MALIGNANT NEOPLASM OF UPPER-OUTER QUADRANT OF LEFT BREAST IN FEMALE, ESTROGEN RECEPTOR NEGATIVE: ICD-10-CM

## 2023-11-20 PROCEDURE — 97535 SELF CARE MNGMENT TRAINING: CPT

## 2023-11-20 PROCEDURE — 93702 BIS XTRACELL FLUID ANALYSIS: CPT

## 2023-11-20 NOTE — THERAPY PROGRESS REPORT/RE-CERT
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    Hyperinsulinism    Hyperlipidemia associated with type 2 diabetes mellitus    Malignant neoplasm of upper-outer quadrant of left breast in female, estrogen receptor negative    Fitting and adjustment of vascular catheter        Past Medical History:   Diagnosis Date    Awareness under anesthesia     Breast cancer     Left    CKD (chronic kidney disease)     stage 3    Coronary artery disease     COVID-19 2021    Diabetes mellitus type 2 with complications, uncontrolled     Frequent UTI     GERD (gastroesophageal reflux disease)     Gout     Heart murmur     History of cancer chemotherapy 2021    LAST DOSE    History of pneumonia     History of radiation therapy     Hyperlipidemia     Hypertension     NSTEMI (non-ST elevated myocardial infarction) 2019    Obesity     Recent bereavement     SPOUSE 2022    Sinus congestion     Slow to wake up after anesthesia     Vitamin B12 deficiency         Past Surgical History:   Procedure Laterality Date    APPENDECTOMY      BREAST LUMPECTOMY  2006    BREAST LUMPECTOMY WITH SENTINEL NODE BIOPSY AND AXILLARY NODE DISSECTION      CARDIAC CATHETERIZATION  2019    new de nova Ostial Ramus 95% stenosis, severe multivessel diabetic coronary vasculopathy with small vessels, occluded 3 of 4 bypass conduits, LIMA to LAD patent, all 3 vein grafts  are occluded, PTCA/EMMANUEL to ostial Ramus 95% stenosis, patent OM1 and proximal and mid RCA stents x 3 from 2/2019, preserved LVSF, normal LVEDP, no significant AS or MR      CHOLECYSTECTOMY  2006    CORONARY ARTERY BYPASS GRAFT  2017    x4 with LIMA to mLAD, SVG to diagonal, SVG to OM, SVG to distal PDA      CORONARY STENT PLACEMENT  12/2019    Drug eluting stent placement--2.0 x 26 mm Resolute [place] EMMANUEL to ostial Ramus      CORONARY STENT PLACEMENT  02/2019    Drug eluting stent--2.25 x 38 mm and 2.5 x 15 mm Resolute [place] EMMANUEL to proximal mid RCA, 2.0 x 26 mm Resolute [place] EMMANUEL to mid circumflex/proximal OM1    DENTAL PROCEDURE      HYSTERECTOMY  2009    MASTECTOMY Left 1/4/2022    Procedure: LEFT MODIFIED RADICAL MASTECTOMY;  Surgeon: Indigo Red MD;  Location: [facility];  Service: General;  Laterality: Left;    TONSILLECTOMY  1961    age 1    VENOUS ACCESS DEVICE (PORT) INSERTION Right 10/1/2021    Procedure: INSERTION VENOUS ACCESS DEVICE;  Surgeon: Indigo Red MD;  Location: [facility];  Service: General;  Laterality: Right;    VENOUS ACCESS DEVICE (PORT) REMOVAL Right 9/13/2022    Procedure: REMOVAL VENOUS ACCESS DEVICE;  Surgeon: Indigo Red MD;  Location: [facility];  Service: General;  Laterality: Right;         Visit Dx:      ICD-10-CM ICD-9-CM   1. At risk for lymphedema  Z91.89 V49.89   2. Malignant neoplasm of upper-outer quadrant of left breast in female, estrogen receptor negative  C50.412 174.4    Z17.1 V86.1        Lymphedema       Row Name 11/20/23 1300             Subjective Pain    Able to rate subjective pain? yes  -RE      Pre-Treatment Pain Level 0  -RE      Post-Treatment Pain Level 0  -RE         L-Dex Bioimpedence Screening    L-Dex Measurement Extremity LUE  -RE      L-Dex Patient Position Standing  -RE      L-Dex UE Dominate Side Right  -RE      L-Dex UE At Risk Side Left  -RE      L-Dex UE Pre Surgical Value No  -RE      L-Dex UE Score 0.7  -RE       L-Dex UE Baseline Score 2.5  -RE      L-Dex UE Value Change -1.8  -RE      L-Dex UE Comment WNL  -RE                User Key  (r) = Recorded By, (t) = Taken By, (c) = Cosigned By      Initials Name Provider Type    Rachel Beverly OTR Occupational Therapist                    The patient had a 3-month SOZO measurement which I reviewed today. The score is within normal limits  see scanned to EMR. Bioimpedance spectroscopy helps identify the   onset of lymphedema in an arm or leg before patients experience noticeable swelling. Research has   shown that 92% of patients with early detection of lymphedema using L-Dex combined with   intervention do not progress to chronic lymphedema through three years. Additionally, as of March 2023, the NCCN Guidelines® for Survivorship recommend proactive screening for lymphedema using   bioimpedance spectroscopy. Whenever possible, patients are tested for baseline L-Dex score before   cancer treatment begins and then are reassessed during regular follow-up visits using the SOZO device.   Otherwise, this can be started postoperatively and continued during regular follow-up visits. If the   patient’s L-Dex score increases above normal levels, that is a sign that lymphedema is developing and a   referral is made to occupational therapy for further evaluation and early compression treatment.   Lymphedema assessment with the SOZO L-Dex score is recommended to be done every 3 months for   the first 3 years and then every 6 months for years 4 and 5 followed by annually afterwards         OT Assessment/Plan       Row Name 11/20/23 7072          OT Assessment    Impairments Impaired lymphatic circulation  -RE     Assessment Comments Patient returns for bioimpedance reassessment.  Her last measurement was 3 months ago.  L-Dex value today is 0.7 which is within normal limits and consistent with her baseline.  She has no new complaints.  Today we reviewed signs and symptoms of lymphedema.  I  recommended follow-up in 3 months.  -RE     OT Diagnosis At risk for lymphedema  -RE     OT Rehab Potential Good  -RE     Patient/caregiver participated in establishment of treatment plan and goals Yes  -RE     Patient would benefit from skilled therapy intervention Yes  -RE        OT Plan    OT Frequency Other (comment)  -RE     Predicted Duration of Therapy Intervention (OT) Bioimpedance every 3 months  -RE     Planned CPT's? OT SELF CARE/MGMT/TRAIN 15 MIN: 92271;OT BIS XTRACELL FLUID ANALYSIS: 36106  -RE     OT Plan Comments Follow-up in 3 months  -RE               User Key  (r) = Recorded By, (t) = Taken By, (c) = Cosigned By      Initials Name Provider Type    RE Rachel Toney, OTR Occupational Therapist                           OT Goals       Row Name 11/20/23 1300          OT Short Term Goals    STG 1 Patient will demonstrate proper awareness of “What is Lymphedema?” and “Healthy Habits” for improved prevention, management, care of symptoms and ease of transition to self-care of condition.  -RE     STG 1 Progress Met  -RE     STG 2 Pt will demonstrate understanding of use of compression sleeve for edema prevention, exercise and air travel.  -RE     STG 2 Progress Met  -RE     STG 3 Patient independent and compliant with initial home exercise program focused on gentle AROM and stretching to improve AROM to pre-surgical level.  -RE     STG 3 Progress Met  -RE        Long Term Goals    LTG Date to Achieve 02/20/24  -RE     LTG 1 Patient will participate in bioimpedance scans every 3 months as a method of early detection of lymphedema to allow for early intervention.  -RE     LTG 1 Progress Ongoing;Met  -RE     LTG 2 Patient's bioimpedance score to remain below 6.5 for decreased risk of stage II lymphedema.  -RE     LTG 2 Progress Met;Ongoing  -RE     LTG 3 Patient independent and compliant with advanced home exercise program focused on UE strengthening and aerobic daily exercise.  -RE     LTG 3 Progress  Partially Met  -RE     LTG 3 Progress Comments Is walking regularly but is limited by muscle cramps in her left leg.Is doing some UE ex's but not as regularly as the walking.  -RE        Time Calculation    OT Goal Re-Cert Due Date 02/20/24  -RE               User Key  (r) = Recorded By, (t) = Taken By, (c) = Cosigned By      Initials Name Provider Type    RE Rachel Toney OTR Occupational Therapist                    Therapy Education  Education Details: Discussed patient's current L-Dex value of 0.7 and recommended sleeve wear during air travel and upper extremity exercise as well as heavy household activities.  I reviewed the signs and symptoms that would indicate the need to return for medical assessment and then to return to therapy including swelling, heaviness, or unusual muscle fatigue.  Given: Symptoms/condition management  Program: Reinforced  How Provided: Verbal  Provided to: Patient  Level of Understanding: Teach back education performed, Verbalized  55551 - OT Self Care/Mgmt Minutes: 20                Time Calculation:   OT Start Time: 1300  OT Stop Time: 1330  OT Time Calculation (min): 30 min  Total Timed Code Minutes- OT: 20 minute(s)  Timed Charges  81942 - OT Self Care/Mgmt Minutes: 20  Untimed Charges  57361 - OT Bioimpedence Rx Minutes: 10  Total Minutes  Timed Charges Total Minutes: 20  Untimed Charges Total Minutes: 10   Total Minutes: 30     Therapy Charges for Today       Code Description Service Date Service Provider Modifiers Qty    62278308938 HC OT SELF CARE/MGMT/TRAIN EA 15 MIN 11/20/2023 Rachel Toney OTR GO 1    34775257432 HC OT BIS XTRACELL FLUID ANALYSIS 11/20/2023 Rachel Toney OTR GO 1          Dictated utilizing Dragon dictation:  Much of this encounter note is an electronic transcription/translation of spoken language to printed text. The electronic translation of spoken language may permit erroneous, or at times, nonsensical words or phrases to be inadvertently transcribed;  Although I have reviewed the note for such errors, some may still exist.              Rachel Toney, OTR  11/20/2023

## 2024-02-19 ENCOUNTER — HOSPITAL ENCOUNTER (OUTPATIENT)
Dept: OCCUPATIONAL THERAPY | Facility: HOSPITAL | Age: 64
Setting detail: THERAPIES SERIES
Discharge: HOME | End: 2024-02-19
Payer: MEDICARE

## 2024-02-19 DIAGNOSIS — Z91.89 AT RISK FOR LYMPHEDEMA: Primary | ICD-10-CM

## 2024-02-19 DIAGNOSIS — Z17.1 MALIGNANT NEOPLASM OF UPPER-OUTER QUADRANT OF LEFT BREAST IN FEMALE, ESTROGEN RECEPTOR NEGATIVE: ICD-10-CM

## 2024-02-19 DIAGNOSIS — C50.412 MALIGNANT NEOPLASM OF UPPER-OUTER QUADRANT OF LEFT BREAST IN FEMALE, ESTROGEN RECEPTOR NEGATIVE: ICD-10-CM

## 2024-02-19 PROCEDURE — 93702 BIS XTRACELL FLUID ANALYSIS: CPT

## 2024-02-19 PROCEDURE — 97535 SELF CARE MNGMENT TRAINING: CPT

## 2024-02-19 NOTE — THERAPY PROGRESS REPORT/RE-CERT
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    Hyperinsulinism    Hyperlipidemia associated with type 2 diabetes mellitus    Malignant neoplasm of upper-outer quadrant of left breast in female, estrogen receptor negative    Fitting and adjustment of vascular catheter        Past Medical History:   Diagnosis Date    Awareness under anesthesia     Breast cancer     Left    CKD (chronic kidney disease)     stage 3    Coronary artery disease     COVID-19 2021    Diabetes mellitus type 2 with complications, uncontrolled     Frequent UTI     GERD (gastroesophageal reflux disease)     Gout     Heart murmur     History of cancer chemotherapy 2021    LAST DOSE    History of pneumonia     History of radiation therapy     Hyperlipidemia     Hypertension     NSTEMI (non-ST elevated myocardial infarction) 2019    Obesity     Recent bereavement     SPOUSE 2022    Sinus congestion     Slow to wake up after anesthesia     Vitamin B12 deficiency         Past Surgical History:   Procedure Laterality Date    APPENDECTOMY      BREAST LUMPECTOMY  2006    BREAST LUMPECTOMY WITH SENTINEL NODE BIOPSY AND AXILLARY NODE DISSECTION      CARDIAC CATHETERIZATION      new de nova Ostial Ramus 95% stenosis, severe multivessel diabetic coronary vasculopathy with small vessels, occluded 3 of 4 bypass conduits, LIMA to LAD patent, all 3 vein grafts  are occluded, PTCA/EMMANUEL to ostial Ramus 95% stenosis, patent OM1 and proximal and mid RCA stents x 3 from 2/2019, preserved LVSF, normal LVEDP, no significant AS or MR      CHOLECYSTECTOMY  2006    CORONARY ARTERY BYPASS GRAFT  2017    x4 with LIMA to mLAD, SVG to diagonal, SVG to OM, SVG to distal PDA      CORONARY STENT PLACEMENT  12/2019    Drug eluting stent placement--2.0 x 26 mm Resolute Pierre EMMANUEL to ostial Ramus      CORONARY STENT PLACEMENT  02/2019    Drug eluting stent--2.25 x 38 mm and 2.5 x 15 mm Resolute [place] EMMANUEL to proximal mid RCA, 2.0 x 26 mm Resolute Pierre EMMANUEL to mid circumflex/proximal OM1    DENTAL PROCEDURE      HYSTERECTOMY  2009    MASTECTOMY Left 1/4/2022    Procedure: LEFT MODIFIED RADICAL MASTECTOMY;  Surgeon: Indigo Red MD;  Location: [facility];  Service: General;  Laterality: Left;    TONSILLECTOMY  1961    age 1    VENOUS ACCESS DEVICE (PORT) INSERTION Right 10/1/2021    Procedure: INSERTION VENOUS ACCESS DEVICE;  Surgeon: Indigo Red MD;  Location: [facility];  Service: General;  Laterality: Right;    VENOUS ACCESS DEVICE (PORT) REMOVAL Right 9/13/2022    Procedure: REMOVAL VENOUS ACCESS DEVICE;  Surgeon: Indigo Red MD;  Location: [facility];  Service: General;  Laterality: Right;         Visit Dx:      ICD-10-CM ICD-9-CM   1. At risk for lymphedema  Z91.89 V49.89   2. Malignant neoplasm of upper-outer quadrant of left breast in female, estrogen receptor negative  C50.412 174.4    Z17.1 V86.1        Lymphedema       Row Name 02/19/24 1300             Subjective Pain    Able to rate subjective pain? yes  -RE      Pre-Treatment Pain Level 0  -RE      Post-Treatment Pain Level 0  -RE         L-Dex Bioimpedence Screening    L-Dex Measurement Extremity LUE  -RE      L-Dex Patient Position Standing  -RE      L-Dex UE Dominate Side Right  -RE      L-Dex UE At Risk Side Left  -RE      L-Dex UE Pre Surgical Value No  -RE      L-Dex UE Score -4.5  -RE       L-Dex UE Baseline Score 2.5  -RE      L-Dex UE Value Change -7  -RE      L-Dex UE Comment WNL  -RE                User Key  (r) = Recorded By, (t) = Taken By, (c) = Cosigned By      Initials Name Provider Type    Rachel Beverly, OTR Occupational Therapist                  The patient had a 3-month SOZO measurement which I reviewed today. The score is WNL  see scanned to EMR. Bioimpedance spectroscopy helps identify the   onset of lymphedema in an arm or leg before patients experience noticeable swelling. Research has   shown that 92% of patients with early detection of lymphedema using L-Dex combined with   intervention do not progress to chronic lymphedema through three years. Additionally, as of March 2023, the NCCN Guidelines® for Survivorship recommend proactive screening for lymphedema using   bioimpedance spectroscopy. Whenever possible, patients are tested for baseline L-Dex score before   cancer treatment begins and then are reassessed during regular follow-up visits using the SOZO device.   Otherwise, this can be started postoperatively and continued during regular follow-up visits. If the   patient’s L-Dex score increases above normal levels, that is a sign that lymphedema is developing and a   referral is made to occupational therapy for further evaluation and early compression treatment.   Lymphedema assessment with the SOZO L-Dex score is recommended to be done every 3 months for   the first 3 years and then every 6 months for years 4 and 5 followed by annually afterwards           OT Assessment/Plan       Row Name 02/19/24 1336          OT Assessment    Impairments Impaired lymphatic circulation  -RE     Assessment Comments Patient returns for bioimpedance reassessment.  Her last measurement was 3 months ago.  L-Dex value today is -4.5 which is within normal limits and significantly below her baseline.  She has no new complaints.  Today we reviewed signs and symptoms of lymphedem and I encouraged  her to begin an exercise program.  I recommended follow-up in 3 months.  -RE     OT Diagnosis At risk for lymphedema  -RE     OT Rehab Potential Good  -RE     Patient/caregiver participated in establishment of treatment plan and goals Yes  -RE     Patient would benefit from skilled therapy intervention Yes  -RE        OT Plan    OT Frequency Other (comment)  -RE     Predicted Duration of Therapy Intervention (OT) Bioimpedance every 3 months  -RE     Planned CPT's? OT SELF CARE/MGMT/TRAIN 15 MIN: 96847;OT BIS XTRACELL FLUID ANALYSIS: 74555  -RE     OT Plan Comments Follow-up in 3 months  -RE               User Key  (r) = Recorded By, (t) = Taken By, (c) = Cosigned By      Initials Name Provider Type    RE Rachel Toney, OTR Occupational Therapist                           OT Goals       Row Name 02/19/24 1300          OT Short Term Goals    STG 1 Patient will demonstrate proper awareness of “What is Lymphedema?” and “Healthy Habits” for improved prevention, management, care of symptoms and ease of transition to self-care of condition.  -RE     STG 1 Progress Met  -RE     STG 2 Pt will demonstrate understanding of use of compression sleeve for edema prevention, exercise and air travel.  -RE     STG 2 Progress Met  -RE     STG 3 Patient independent and compliant with initial home exercise program focused on gentle AROM and stretching to improve AROM to pre-surgical level.  -RE     STG 3 Progress Met  -RE        Long Term Goals    LTG Date to Achieve 05/19/24  -RE     LTG 1 Patient will participate in bioimpedance scans every 3 months as a method of early detection of lymphedema to allow for early intervention.  -RE     LTG 1 Progress Ongoing;Met  -RE     LTG 2 Patient's bioimpedance score to remain below 6.5 for decreased risk of stage II lymphedema.  -RE     LTG 2 Progress Met;Ongoing  -RE     LTG 3 Patient independent and compliant with advanced home exercise program focused on UE strengthening and aerobic daily  exercise.  -RE     LTG 3 Progress Partially Met  -RE     LTG 3 Progress Comments Not currently exercising.  -RE        Time Calculation    OT Goal Re-Cert Due Date 05/19/24  -RE               User Key  (r) = Recorded By, (t) = Taken By, (c) = Cosigned By      Initials Name Provider Type    Rachel Beverly OTR Occupational Therapist                    Therapy Education  Education Details: Discussed patient's current L-Dex value of -4.5 and recommended sleeve wear during air travel and upper extremity exercise as well as heavy household activities.  I reviewed the signs and symptoms that would indicate the need to return for medical assessment and then to return to therapy including swelling, heaviness, or unusual muscle fatigue.  We also reviewed the importance of exercise for lymphedema prevention, decreased risk of cancer recurrence, and decrease risk of heart disease.  Given: Symptoms/condition management, HEP  Program: Reinforced  How Provided: Verbal  Provided to: Patient  Level of Understanding: Teach back education performed, Verbalized  13918 - OT Self Care/Mgmt Minutes: 10                Time Calculation:   OT Start Time: 1330  OT Stop Time: 1350  OT Time Calculation (min): 20 min  Total Timed Code Minutes- OT: 10 minute(s)  Timed Charges  77876 - OT Self Care/Mgmt Minutes: 10  Untimed Charges  47540 - OT Bioimpedence Rx Minutes: 10  Total Minutes  Timed Charges Total Minutes: 10  Untimed Charges Total Minutes: 10   Total Minutes: 20     Therapy Charges for Today       Code Description Service Date Service Provider Modifiers Qty    42380734308 HC OT SELF CARE/MGMT/TRAIN EA 15 MIN 2/19/2024 Racehl Toney OTR GO 1    11873389530 HC OT BIS XTRACELL FLUID ANALYSIS 2/19/2024 Rachel Toney OTR GO 1          Dictated utilizing Dragon dictation:  Much of this encounter note is an electronic transcription/translation of spoken language to printed text. The electronic translation of spoken language may permit  erroneous, or at times, nonsensical words or phrases to be inadvertently transcribed; Although I have reviewed the note for such errors, some may still exist.              Rachel Toney, OTR  2/19/2024

## 2024-05-16 ENCOUNTER — HOSPITAL ENCOUNTER (OUTPATIENT)
Dept: OCCUPATIONAL THERAPY | Facility: HOSPITAL | Age: 64
Discharge: HOME | End: 2024-05-16
Admitting: FAMILY MEDICINE
Payer: MEDICARE

## 2024-05-16 DIAGNOSIS — Z91.89 AT RISK FOR LYMPHEDEMA: Primary | ICD-10-CM

## 2024-05-16 DIAGNOSIS — Z17.1 MALIGNANT NEOPLASM OF UPPER-OUTER QUADRANT OF LEFT BREAST IN FEMALE, ESTROGEN RECEPTOR NEGATIVE: ICD-10-CM

## 2024-05-16 DIAGNOSIS — C50.412 MALIGNANT NEOPLASM OF UPPER-OUTER QUADRANT OF LEFT BREAST IN FEMALE, ESTROGEN RECEPTOR NEGATIVE: ICD-10-CM

## 2024-05-16 PROCEDURE — 93702 BIS XTRACELL FLUID ANALYSIS: CPT

## 2024-05-16 PROCEDURE — 97535 SELF CARE MNGMENT TRAINING: CPT

## 2024-05-16 NOTE — THERAPY PROGRESS REPORT/RE-CERT
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    Hyperinsulinism    Hyperlipidemia associated with type 2 diabetes mellitus    Malignant neoplasm of upper-outer quadrant of left breast in female, estrogen receptor negative    Fitting and adjustment of vascular catheter        Past Medical History:   Diagnosis Date    Awareness under anesthesia     Breast cancer     Left    CKD (chronic kidney disease)     stage 3    Coronary artery disease     COVID-19 2021    Diabetes mellitus type 2 with complications, uncontrolled     Frequent UTI     GERD (gastroesophageal reflux disease)     Gout     Heart murmur     History of cancer chemotherapy 2021    LAST DOSE    History of pneumonia     History of radiation therapy     Hyperlipidemia     Hypertension     NSTEMI (non-ST elevated myocardial infarction) 2019    Obesity     Recent bereavement     SPOUSE 2022    Sinus congestion     Slow to wake up after anesthesia     Vitamin B12 deficiency         Past Surgical History:   Procedure Laterality Date    APPENDECTOMY      BREAST LUMPECTOMY  2006    BREAST LUMPECTOMY WITH SENTINEL NODE BIOPSY AND AXILLARY NODE DISSECTION      CARDIAC CATHETERIZATION  2019    new de nova Ostial Ramus 95% stenosis, severe multivessel diabetic coronary vasculopathy with small vessels, occluded 3 of 4 bypass conduits, LIMA to LAD patent, all 3 vein grafts  are occluded, PTCA/EMMANUEL to ostial Ramus 95% stenosis, patent OM1 and proximal and mid RCA stents x 3 from 2/2019, preserved LVSF, normal LVEDP, no significant AS or MR      CHOLECYSTECTOMY  2006    CORONARY ARTERY BYPASS GRAFT  2017    x4 with LIMA to mLAD, SVG to diagonal, SVG to OM, SVG to distal PDA      CORONARY STENT PLACEMENT  12/2019    Drug eluting stent placement--2.0 x 26 mm Resolute Pierre EMMANUEL to ostial Ramus      CORONARY STENT PLACEMENT  02/2019    Drug eluting stent--2.25 x 38 mm and 2.5 x 15 mm Resolute [place] EMMANUEL to proximal mid RCA, 2.0 x 26 mm Resolute Pierre EMMANUEL to mid circumflex/proximal OM1    DENTAL PROCEDURE      HYSTERECTOMY  2009    MASTECTOMY Left 1/4/2022    Procedure: LEFT MODIFIED RADICAL MASTECTOMY;  Surgeon: Indigo Red MD;  Location: [facility];  Service: General;  Laterality: Left;    TONSILLECTOMY  1961    age 1    VENOUS ACCESS DEVICE (PORT) INSERTION Right 10/1/2021    Procedure: INSERTION VENOUS ACCESS DEVICE;  Surgeon: Indigo Red MD;  Location: [facility];  Service: General;  Laterality: Right;    VENOUS ACCESS DEVICE (PORT) REMOVAL Right 9/13/2022    Procedure: REMOVAL VENOUS ACCESS DEVICE;  Surgeon: Indigo Red MD;  Location: [facility];  Service: General;  Laterality: Right;         Visit Dx:      ICD-10-CM ICD-9-CM   1. At risk for lymphedema  Z91.89 V49.89   2. Malignant neoplasm of upper-outer quadrant of left breast in female, estrogen receptor negative  C50.412 174.4    Z17.1 V86.1        Lymphedema       Row Name 05/16/24 1100             Subjective Pain    Able to rate subjective pain? yes  -RE      Pre-Treatment Pain Level 0  -RE      Post-Treatment Pain Level 0  -RE         L-Dex Bioimpedence Screening    L-Dex Measurement Extremity LUE  -RE      L-Dex Patient Position Standing  -RE      L-Dex UE Dominate Side Right  -RE      L-Dex UE At Risk Side Left  -RE      L-Dex UE Pre Surgical Value No  -RE      L-Dex UE Score -1.3  -RE       L-Dex UE Baseline Score 2.5  -RE      L-Dex UE Value Change -3.8  -RE      L-Dex UE Comment WNL  -RE                User Key  (r) = Recorded By, (t) = Taken By, (c) = Cosigned By      Initials Name Provider Type    Rachel Beverly OTR Occupational Therapist                  The patient had a 3-month SOZO measurement which I reviewed today. The score is WNL  see scanned to EMR. Bioimpedance spectroscopy helps identify the   onset of lymphedema in an arm or leg before patients experience noticeable swelling. Research has   shown that 92% of patients with early detection of lymphedema using L-Dex combined with   intervention do not progress to chronic lymphedema through three years. Additionally, as of March 2023, the NCCN Guidelines® for Survivorship recommend proactive screening for lymphedema using   bioimpedance spectroscopy. Whenever possible, patients are tested for baseline L-Dex score before   cancer treatment begins and then are reassessed during regular follow-up visits using the SOZO device.   Otherwise, this can be started postoperatively and continued during regular follow-up visits. If the   patient’s L-Dex score increases above normal levels, that is a sign that lymphedema is developing and a   referral is made to occupational therapy for further evaluation and early compression treatment.   Lymphedema assessment with the SOZO L-Dex score is recommended to be done every 3 months for   the first 3 years and then every 6 months for years 4 and 5 followed by annually afterwards           OT Assessment/Plan       Row Name 05/16/24 1244          OT Assessment    Impairments Impaired lymphatic circulation  -RE     Assessment Comments Patient returns for bioimpedance reassessment.  Her last measurement was 3 months ago.  L-Dex value today is -1.3 which is within normal limits and below her baseline.  She has no new complaints.  Today we reviewed signs and symptoms of lymphedema.  I recommended follow-up in  3 months.  -RE     OT Diagnosis At risk for lymphedema  -RE     OT Rehab Potential Good  -RE     Patient/caregiver participated in establishment of treatment plan and goals Yes  -RE     Patient would benefit from skilled therapy intervention Yes  -RE        OT Plan    OT Frequency Other (comment)  -RE     Predicted Duration of Therapy Intervention (OT) Bioimpedance every 3 months  -RE     Planned CPT's? OT SELF CARE/MGMT/TRAIN 15 MIN: 13350;OT BIS XTRACELL FLUID ANALYSIS: 80102  -RE     OT Plan Comments Follow-up in 3 months  -RE               User Key  (r) = Recorded By, (t) = Taken By, (c) = Cosigned By      Initials Name Provider Type    RE Rachel Toney, OTR Occupational Therapist                           OT Goals       Row Name 05/16/24 1200          OT Short Term Goals    STG 1 Patient will demonstrate proper awareness of “What is Lymphedema?” and “Healthy Habits” for improved prevention, management, care of symptoms and ease of transition to self-care of condition.  -RE     STG 1 Progress Met  -RE     STG 2 Pt will demonstrate understanding of use of compression sleeve for edema prevention, exercise and air travel.  -RE     STG 2 Progress Met  -RE     STG 3 Patient independent and compliant with initial home exercise program focused on gentle AROM and stretching to improve AROM to pre-surgical level.  -RE     STG 3 Progress Met  -RE        Long Term Goals    LTG Date to Achieve 08/16/24  -RE     LTG 1 Patient will participate in bioimpedance scans every 3 months as a method of early detection of lymphedema to allow for early intervention.  -RE     LTG 1 Progress Ongoing;Met  -RE     LTG 2 Patient's bioimpedance score to remain below 6.5 for decreased risk of stage II lymphedema.  -RE     LTG 2 Progress Met;Ongoing  -RE     LTG 3 Patient independent and compliant with advanced home exercise program focused on UE strengthening and aerobic daily exercise.  -RE     LTG 3 Progress Met  -RE     LTG 3 Progress  Comments Patient is exercising at the gym.  -RE        Time Calculation    OT Goal Re-Cert Due Date 08/16/24  -RE               User Key  (r) = Recorded By, (t) = Taken By, (c) = Cosigned By      Initials Name Provider Type    RE Rachel Toney OTR Occupational Therapist                    Therapy Education  Education Details: Discussed patient's current L-Dex value of -1.3 and recommended sleeve wear during air travel and upper extremity exercise as well as heavy household activities.  I reviewed the signs and symptoms that would indicate the need to return for medical assessment and then to return to therapy including swelling, heaviness, or unusual muscle fatigue.  We also discussed patient's current skeletal muscle mass as measured by bioimpedance.  Patient's current skeletal muscle mass is above the mean.  Given: Symptoms/condition management  Program: Reinforced  How Provided: Verbal  Provided to: Patient  Level of Understanding: Teach back education performed, Verbalized  90357 - OT Self Care/Mgmt Minutes: 10                Time Calculation:   OT Start Time: 1130  OT Stop Time: 1147  OT Time Calculation (min): 17 min  Total Timed Code Minutes- OT: 10 minute(s)  Timed Charges  68511 - OT Self Care/Mgmt Minutes: 10  Untimed Charges  15992 - OT Bioimpedence Rx Minutes: 7  Total Minutes  Timed Charges Total Minutes: 10  Untimed Charges Total Minutes: 7   Total Minutes: 17     Therapy Charges for Today       Code Description Service Date Service Provider Modifiers Qty    66300235148 HC OT SELF CARE/MGMT/TRAIN EA 15 MIN 5/16/2024 Rachel Toney OTR GO 1    28068939331 HC OT BIS XTRACELL FLUID ANALYSIS 5/16/2024 Rachel Toney OTR GO 1            Dictated utilizing Dragon dictation:  Much of this encounter note is an electronic transcription/translation of spoken language to printed text. The electronic translation of spoken language may permit erroneous, or at times, nonsensical words or phrases to be inadvertently  transcribed; Although I have reviewed the note for such errors, some may still exist.            Rachel Toney, OTR  5/16/2024

## 2024-06-22 ENCOUNTER — APPOINTMENT (OUTPATIENT)
Dept: GENERAL RADIOLOGY | Facility: HOSPITAL | Age: 64
End: 2024-06-22
Payer: MEDICARE

## 2024-06-22 ENCOUNTER — APPOINTMENT (OUTPATIENT)
Dept: GENERAL RADIOLOGY | Facility: HOSPITAL | Age: 64
End: 2024-06-22
Payer: COMMERCIAL

## 2024-06-22 ENCOUNTER — HOSPITAL ENCOUNTER (INPATIENT)
Facility: HOSPITAL | Age: 64
LOS: 5 days | Discharge: HOME | End: 2024-06-28
Attending: EMERGENCY MEDICINE | Admitting: HOSPITALIST
Payer: MEDICARE

## 2024-06-22 DIAGNOSIS — L97.519 DIABETIC ULCER OF TOE OF RIGHT FOOT ASSOCIATED WITH TYPE 2 DIABETES MELLITUS, UNSPECIFIED ULCER STAGE: Primary | ICD-10-CM

## 2024-06-22 DIAGNOSIS — L97.519 DIABETIC ULCER OF RIGHT GREAT TOE: ICD-10-CM

## 2024-06-22 DIAGNOSIS — E11.621 DIABETIC ULCER OF TOE OF RIGHT FOOT ASSOCIATED WITH TYPE 2 DIABETES MELLITUS, UNSPECIFIED ULCER STAGE: Primary | ICD-10-CM

## 2024-06-22 DIAGNOSIS — L03.125 ACUTE LYMPHANGITIS OF RIGHT LOWER EXTREMITY: ICD-10-CM

## 2024-06-22 DIAGNOSIS — E11.621 DIABETIC ULCER OF RIGHT GREAT TOE: ICD-10-CM

## 2024-06-22 LAB
ALBUMIN SERPL-MCNC: 4 G/DL (ref 3.5–5.2)
ALBUMIN/GLOB SERPL: 1.5 G/DL
ALP SERPL-CCNC: 72 U/L (ref 39–117)
ALT SERPL W P-5'-P-CCNC: 17 U/L (ref 1–33)
ANION GAP SERPL CALCULATED.3IONS-SCNC: 11.5 MMOL/L (ref 5–15)
AST SERPL-CCNC: 12 U/L (ref 1–32)
BASOPHILS # BLD AUTO: 0.07 10*3/MM3 (ref 0–0.2)
BASOPHILS NFR BLD AUTO: 0.5 % (ref 0–1.5)
BILIRUB SERPL-MCNC: 0.4 MG/DL (ref 0–1.2)
BUN SERPL-MCNC: 36 MG/DL (ref 8–23)
BUN/CREAT SERPL: 23.7 (ref 7–25)
CALCIUM SPEC-SCNC: 9.6 MG/DL (ref 8.6–10.5)
CHLORIDE SERPL-SCNC: 104 MMOL/L (ref 98–107)
CO2 SERPL-SCNC: 23.5 MMOL/L (ref 22–29)
CREAT SERPL-MCNC: 1.52 MG/DL (ref 0.57–1)
CRP SERPL-MCNC: 3.64 MG/DL (ref 0–0.5)
D-LACTATE SERPL-SCNC: 1.2 MMOL/L (ref 0.5–2)
DEPRECATED RDW RBC AUTO: 41.7 FL (ref 37–54)
EGFRCR SERPLBLD CKD-EPI 2021: 37.7 ML/MIN/1.73
EOSINOPHIL # BLD AUTO: 0.32 10*3/MM3 (ref 0–0.4)
EOSINOPHIL NFR BLD AUTO: 2.4 % (ref 0.3–6.2)
ERYTHROCYTE [DISTWIDTH] IN BLOOD BY AUTOMATED COUNT: 12.3 % (ref 12.3–15.4)
ERYTHROCYTE [SEDIMENTATION RATE] IN BLOOD: 25 MM/HR (ref 0–30)
GLOBULIN UR ELPH-MCNC: 2.7 GM/DL
GLUCOSE SERPL-MCNC: 165 MG/DL (ref 65–99)
HCT VFR BLD AUTO: 36.1 % (ref 34–46.6)
HGB BLD-MCNC: 12 G/DL (ref 12–15.9)
IMM GRANULOCYTES # BLD AUTO: 0.04 10*3/MM3 (ref 0–0.05)
IMM GRANULOCYTES NFR BLD AUTO: 0.3 % (ref 0–0.5)
LYMPHOCYTES # BLD AUTO: 1.05 10*3/MM3 (ref 0.7–3.1)
LYMPHOCYTES NFR BLD AUTO: 7.8 % (ref 19.6–45.3)
MCH RBC QN AUTO: 30.5 PG (ref 26.6–33)
MCHC RBC AUTO-ENTMCNC: 33.2 G/DL (ref 31.5–35.7)
MCV RBC AUTO: 91.9 FL (ref 79–97)
MONOCYTES # BLD AUTO: 1.21 10*3/MM3 (ref 0.1–0.9)
MONOCYTES NFR BLD AUTO: 8.9 % (ref 5–12)
NEUTROPHILS NFR BLD AUTO: 10.83 10*3/MM3 (ref 1.7–7)
NEUTROPHILS NFR BLD AUTO: 80.1 % (ref 42.7–76)
NRBC BLD AUTO-RTO: 0 /100 WBC (ref 0–0.2)
PLATELET # BLD AUTO: 255 10*3/MM3 (ref 140–450)
PMV BLD AUTO: 9 FL (ref 6–12)
POTASSIUM SERPL-SCNC: 4 MMOL/L (ref 3.5–5.2)
PROT SERPL-MCNC: 6.7 G/DL (ref 6–8.5)
RBC # BLD AUTO: 3.93 10*6/MM3 (ref 3.77–5.28)
SODIUM SERPL-SCNC: 139 MMOL/L (ref 136–145)
WBC NRBC COR # BLD AUTO: 13.52 10*3/MM3 (ref 3.4–10.8)

## 2024-06-22 PROCEDURE — 86140 C-REACTIVE PROTEIN: CPT | Performed by: PHYSICIAN ASSISTANT

## 2024-06-22 PROCEDURE — 25810000003 SODIUM CHLORIDE 0.9 % SOLUTION 250 ML FLEX CONT: Performed by: PHYSICIAN ASSISTANT

## 2024-06-22 PROCEDURE — G0378 HOSPITAL OBSERVATION PER HR: HCPCS

## 2024-06-22 PROCEDURE — 87040 BLOOD CULTURE FOR BACTERIA: CPT | Performed by: PHYSICIAN ASSISTANT

## 2024-06-22 PROCEDURE — 99285 EMERGENCY DEPT VISIT HI MDM: CPT

## 2024-06-22 PROCEDURE — 85025 COMPLETE CBC W/AUTO DIFF WBC: CPT | Performed by: PHYSICIAN ASSISTANT

## 2024-06-22 PROCEDURE — 25010000002 VANCOMYCIN HCL 1.25 G RECONSTITUTED SOLUTION 1 EACH VIAL: Performed by: PHYSICIAN ASSISTANT

## 2024-06-22 PROCEDURE — 85652 RBC SED RATE AUTOMATED: CPT | Performed by: PHYSICIAN ASSISTANT

## 2024-06-22 PROCEDURE — 83605 ASSAY OF LACTIC ACID: CPT | Performed by: PHYSICIAN ASSISTANT

## 2024-06-22 PROCEDURE — 36415 COLL VENOUS BLD VENIPUNCTURE: CPT

## 2024-06-22 PROCEDURE — 80053 COMPREHEN METABOLIC PANEL: CPT | Performed by: PHYSICIAN ASSISTANT

## 2024-06-22 PROCEDURE — 73630 X-RAY EXAM OF FOOT: CPT

## 2024-06-22 PROCEDURE — 25010000002 PIPERACILLIN SOD-TAZOBACTAM PER 1 G: Performed by: PHYSICIAN ASSISTANT

## 2024-06-22 RX ADMIN — SODIUM CHLORIDE 1250 MG: 9 INJECTION, SOLUTION INTRAVENOUS at 22:58

## 2024-06-22 RX ADMIN — PIPERACILLIN AND TAZOBACTAM 3.38 G: 3; .375 INJECTION, POWDER, FOR SOLUTION INTRAVENOUS at 22:05

## 2024-06-22 NOTE — ED NOTES
"Pt arrives ambulatory to triage desk via PV.    Pt states \"I had my diabetic Dr look at my foot 2 weeks ago and he scraped off some skin on my right big toe and it was pretty painful. But today its a lot worse and noticed that there was redness that has gone half way up my leg. I went to [facility] today they gave me a rocephin shot, and an oral antibiotic doxycycline but told me to come to the ER if it want any better by tonight.\"    "

## 2024-06-23 LAB
ANION GAP SERPL CALCULATED.3IONS-SCNC: 10 MMOL/L (ref 5–15)
BASOPHILS # BLD AUTO: 0.08 10*3/MM3 (ref 0–0.2)
BASOPHILS NFR BLD AUTO: 0.7 % (ref 0–1.5)
BUN SERPL-MCNC: 26 MG/DL (ref 8–23)
BUN/CREAT SERPL: 21.8 (ref 7–25)
CALCIUM SPEC-SCNC: 8.7 MG/DL (ref 8.6–10.5)
CHLORIDE SERPL-SCNC: 107 MMOL/L (ref 98–107)
CO2 SERPL-SCNC: 21 MMOL/L (ref 22–29)
CREAT SERPL-MCNC: 1.19 MG/DL (ref 0.57–1)
DEPRECATED RDW RBC AUTO: 39.2 FL (ref 37–54)
EGFRCR SERPLBLD CKD-EPI 2021: 50.5 ML/MIN/1.73
EOSINOPHIL # BLD AUTO: 0.3 10*3/MM3 (ref 0–0.4)
EOSINOPHIL NFR BLD AUTO: 2.8 % (ref 0.3–6.2)
ERYTHROCYTE [DISTWIDTH] IN BLOOD BY AUTOMATED COUNT: 12 % (ref 12.3–15.4)
GLUCOSE BLDC GLUCOMTR-MCNC: 197 MG/DL (ref 70–130)
GLUCOSE BLDC GLUCOMTR-MCNC: 202 MG/DL (ref 70–130)
GLUCOSE BLDC GLUCOMTR-MCNC: 238 MG/DL (ref 70–130)
GLUCOSE BLDC GLUCOMTR-MCNC: 253 MG/DL (ref 70–130)
GLUCOSE SERPL-MCNC: 155 MG/DL (ref 65–99)
HCT VFR BLD AUTO: 35.2 % (ref 34–46.6)
HGB BLD-MCNC: 11.5 G/DL (ref 12–15.9)
IMM GRANULOCYTES # BLD AUTO: 0.05 10*3/MM3 (ref 0–0.05)
IMM GRANULOCYTES NFR BLD AUTO: 0.5 % (ref 0–0.5)
LYMPHOCYTES # BLD AUTO: 1 10*3/MM3 (ref 0.7–3.1)
LYMPHOCYTES NFR BLD AUTO: 9.4 % (ref 19.6–45.3)
MCH RBC QN AUTO: 29.6 PG (ref 26.6–33)
MCHC RBC AUTO-ENTMCNC: 32.7 G/DL (ref 31.5–35.7)
MCV RBC AUTO: 90.5 FL (ref 79–97)
MONOCYTES # BLD AUTO: 0.87 10*3/MM3 (ref 0.1–0.9)
MONOCYTES NFR BLD AUTO: 8.1 % (ref 5–12)
MRSA DNA SPEC QL NAA+PROBE: NORMAL
NEUTROPHILS NFR BLD AUTO: 78.5 % (ref 42.7–76)
NEUTROPHILS NFR BLD AUTO: 8.39 10*3/MM3 (ref 1.7–7)
NRBC BLD AUTO-RTO: 0 /100 WBC (ref 0–0.2)
NT-PROBNP SERPL-MCNC: 1236 PG/ML (ref 0–900)
PLATELET # BLD AUTO: 253 10*3/MM3 (ref 140–450)
PMV BLD AUTO: 9.1 FL (ref 6–12)
POTASSIUM SERPL-SCNC: 3.8 MMOL/L (ref 3.5–5.2)
RBC # BLD AUTO: 3.89 10*6/MM3 (ref 3.77–5.28)
SODIUM SERPL-SCNC: 138 MMOL/L (ref 136–145)
VANCOMYCIN SERPL-MCNC: 9.9 MCG/ML (ref 5–40)
WBC NRBC COR # BLD AUTO: 10.69 10*3/MM3 (ref 3.4–10.8)

## 2024-06-23 PROCEDURE — 25010000002 VANCOMYCIN 1 G RECONSTITUTED SOLUTION 1 EACH VIAL: Performed by: HOSPITALIST

## 2024-06-23 PROCEDURE — 63710000001 INSULIN LISPRO (HUMAN) PER 5 UNITS: Performed by: HOSPITALIST

## 2024-06-23 PROCEDURE — 25810000003 SODIUM CHLORIDE 0.9 % SOLUTION: Performed by: HOSPITALIST

## 2024-06-23 PROCEDURE — 80202 ASSAY OF VANCOMYCIN: CPT | Performed by: HOSPITALIST

## 2024-06-23 PROCEDURE — 85025 COMPLETE CBC W/AUTO DIFF WBC: CPT | Performed by: HOSPITALIST

## 2024-06-23 PROCEDURE — 87641 MR-STAPH DNA AMP PROBE: CPT | Performed by: HOSPITALIST

## 2024-06-23 PROCEDURE — 82948 REAGENT STRIP/BLOOD GLUCOSE: CPT

## 2024-06-23 PROCEDURE — 25810000003 SODIUM CHLORIDE 0.9 % SOLUTION 250 ML FLEX CONT: Performed by: HOSPITALIST

## 2024-06-23 PROCEDURE — 80048 BASIC METABOLIC PNL TOTAL CA: CPT | Performed by: HOSPITALIST

## 2024-06-23 PROCEDURE — 63710000001 INSULIN GLARGINE PER 5 UNITS: Performed by: HOSPITALIST

## 2024-06-23 PROCEDURE — 25010000002 PIPERACILLIN SOD-TAZOBACTAM PER 1 G: Performed by: HOSPITALIST

## 2024-06-23 PROCEDURE — 83880 ASSAY OF NATRIURETIC PEPTIDE: CPT | Performed by: HOSPITALIST

## 2024-06-23 RX ORDER — CLOPIDOGREL BISULFATE 75 MG/1
75 TABLET ORAL DAILY
Status: DISCONTINUED | OUTPATIENT
Start: 2024-06-23 | End: 2024-06-28 | Stop reason: HOSPADM

## 2024-06-23 RX ORDER — INSULIN LISPRO 100 [IU]/ML
3 INJECTION, SOLUTION INTRAVENOUS; SUBCUTANEOUS
Status: DISCONTINUED | OUTPATIENT
Start: 2024-06-23 | End: 2024-06-24

## 2024-06-23 RX ORDER — RANOLAZINE 500 MG/1
500 TABLET, EXTENDED RELEASE ORAL EVERY 12 HOURS SCHEDULED
Status: DISCONTINUED | OUTPATIENT
Start: 2024-06-23 | End: 2024-06-28 | Stop reason: HOSPADM

## 2024-06-23 RX ORDER — ONDANSETRON 4 MG/1
4 TABLET, ORALLY DISINTEGRATING ORAL EVERY 6 HOURS PRN
Status: DISCONTINUED | OUTPATIENT
Start: 2024-06-23 | End: 2024-06-28

## 2024-06-23 RX ORDER — DEXTROSE MONOHYDRATE 25 G/50ML
25 INJECTION, SOLUTION INTRAVENOUS
Status: DISCONTINUED | OUTPATIENT
Start: 2024-06-23 | End: 2024-06-28

## 2024-06-23 RX ORDER — METOPROLOL SUCCINATE 25 MG/1
12.5 TABLET, EXTENDED RELEASE ORAL
Status: DISCONTINUED | OUTPATIENT
Start: 2024-06-23 | End: 2024-06-28 | Stop reason: HOSPADM

## 2024-06-23 RX ORDER — NICOTINE POLACRILEX 4 MG
15 LOZENGE BUCCAL
Status: DISCONTINUED | OUTPATIENT
Start: 2024-06-23 | End: 2024-06-28

## 2024-06-23 RX ORDER — INSULIN LISPRO 100 [IU]/ML
2-7 INJECTION, SOLUTION INTRAVENOUS; SUBCUTANEOUS
Status: DISCONTINUED | OUTPATIENT
Start: 2024-06-23 | End: 2024-06-28 | Stop reason: HOSPADM

## 2024-06-23 RX ORDER — SODIUM CHLORIDE 9 MG/ML
50 INJECTION, SOLUTION INTRAVENOUS CONTINUOUS
Status: DISCONTINUED | OUTPATIENT
Start: 2024-06-23 | End: 2024-06-25

## 2024-06-23 RX ORDER — ACETAMINOPHEN 325 MG/1
650 TABLET ORAL EVERY 4 HOURS PRN
Status: DISCONTINUED | OUTPATIENT
Start: 2024-06-23 | End: 2024-06-28

## 2024-06-23 RX ORDER — ASCORBIC ACID 500 MG
500 TABLET ORAL DAILY
Status: DISCONTINUED | OUTPATIENT
Start: 2024-06-23 | End: 2024-06-28 | Stop reason: HOSPADM

## 2024-06-23 RX ORDER — PANTOPRAZOLE SODIUM 40 MG/1
40 TABLET, DELAYED RELEASE ORAL
Status: DISCONTINUED | OUTPATIENT
Start: 2024-06-23 | End: 2024-06-28 | Stop reason: HOSPADM

## 2024-06-23 RX ORDER — IBUPROFEN 600 MG/1
1 TABLET ORAL
Status: DISCONTINUED | OUTPATIENT
Start: 2024-06-23 | End: 2024-06-28

## 2024-06-23 RX ORDER — ROSUVASTATIN CALCIUM 10 MG/1
10 TABLET, COATED ORAL NIGHTLY
Status: DISCONTINUED | OUTPATIENT
Start: 2024-06-23 | End: 2024-06-28 | Stop reason: HOSPADM

## 2024-06-23 RX ORDER — INSULIN LISPRO 100 [IU]/ML
5 INJECTION, SOLUTION INTRAVENOUS; SUBCUTANEOUS
Status: DISCONTINUED | OUTPATIENT
Start: 2024-06-23 | End: 2024-06-23

## 2024-06-23 RX ADMIN — SODIUM CHLORIDE 75 ML/HR: 9 INJECTION, SOLUTION INTRAVENOUS at 01:09

## 2024-06-23 RX ADMIN — INSULIN LISPRO 4 UNITS: 100 INJECTION, SOLUTION INTRAVENOUS; SUBCUTANEOUS at 12:16

## 2024-06-23 RX ADMIN — INSULIN LISPRO 3 UNITS: 100 INJECTION, SOLUTION INTRAVENOUS; SUBCUTANEOUS at 08:05

## 2024-06-23 RX ADMIN — RANOLAZINE 500 MG: 500 TABLET, FILM COATED, EXTENDED RELEASE ORAL at 01:42

## 2024-06-23 RX ADMIN — INSULIN LISPRO 3 UNITS: 100 INJECTION, SOLUTION INTRAVENOUS; SUBCUTANEOUS at 17:28

## 2024-06-23 RX ADMIN — OXYCODONE HYDROCHLORIDE AND ACETAMINOPHEN 500 MG: 500 TABLET ORAL at 12:16

## 2024-06-23 RX ADMIN — VANCOMYCIN HYDROCHLORIDE 1000 MG: 1 INJECTION, POWDER, LYOPHILIZED, FOR SOLUTION INTRAVENOUS at 14:19

## 2024-06-23 RX ADMIN — ROSUVASTATIN CALCIUM 10 MG: 10 TABLET, FILM COATED ORAL at 21:42

## 2024-06-23 RX ADMIN — PIPERACILLIN AND TAZOBACTAM 3.38 G: 3; .375 INJECTION, POWDER, FOR SOLUTION INTRAVENOUS at 21:42

## 2024-06-23 RX ADMIN — PIPERACILLIN AND TAZOBACTAM 3.38 G: 3; .375 INJECTION, POWDER, FOR SOLUTION INTRAVENOUS at 05:11

## 2024-06-23 RX ADMIN — ROSUVASTATIN CALCIUM 10 MG: 10 TABLET, FILM COATED ORAL at 01:41

## 2024-06-23 RX ADMIN — INSULIN LISPRO 2 UNITS: 100 INJECTION, SOLUTION INTRAVENOUS; SUBCUTANEOUS at 21:54

## 2024-06-23 RX ADMIN — PIPERACILLIN AND TAZOBACTAM 3.38 G: 3; .375 INJECTION, POWDER, FOR SOLUTION INTRAVENOUS at 16:07

## 2024-06-23 RX ADMIN — RANOLAZINE 500 MG: 500 TABLET, FILM COATED, EXTENDED RELEASE ORAL at 09:55

## 2024-06-23 RX ADMIN — CLOPIDOGREL BISULFATE 75 MG: 75 TABLET, FILM COATED ORAL at 08:06

## 2024-06-23 RX ADMIN — INSULIN LISPRO 3 UNITS: 100 INJECTION, SOLUTION INTRAVENOUS; SUBCUTANEOUS at 17:29

## 2024-06-23 RX ADMIN — INSULIN GLARGINE 10 UNITS: 100 INJECTION, SOLUTION SUBCUTANEOUS at 21:54

## 2024-06-23 RX ADMIN — RANOLAZINE 500 MG: 500 TABLET, FILM COATED, EXTENDED RELEASE ORAL at 21:42

## 2024-06-23 RX ADMIN — SODIUM CHLORIDE 75 ML/HR: 9 INJECTION, SOLUTION INTRAVENOUS at 16:11

## 2024-06-23 RX ADMIN — METOPROLOL SUCCINATE 12.5 MG: 25 TABLET, EXTENDED RELEASE ORAL at 08:05

## 2024-06-23 NOTE — ED PROVIDER NOTES
"MD ATTESTATION NOTE    SHARED VISIT: This visit was performed by BOTH a physician and an APC. The substantive portion of the medical decision making was performed by this attesting physician who made or approved the management plan and takes responsibility for patient management. All studies in the APC note (if performed) were independently interpreted by me.     The DAINA and I have discussed this patient's history, physical exam, and treatment plan.  I have reviewed the documentation and affirm the documentation and agree with the treatment and plan.  The attached note describes my personal findings.      Independent Historians: Patient    A complete HPI/ROS/PMH/PSH/SH/FH are unobtainable due to: None    Chronic or social conditions impacting patient care (social determinants of health): None    Laxmi Marcus is a 64 y.o. female who presents to the ED c/o acute right lower extremity wound that has gotten worse.  Patient had a wound on the plantar surface of her right great toe for a couple of weeks.  She saw her podiatrist to \"scraped the skin\" and started her on antibiotic.  Despite these measures over the last couple days she noticed increased redness and swelling of her entire right toe which then began to migrate up her right midfoot and today she noted her right ankle and lower leg to be erythematous and swollen as well.  Patient denies any calf pain, shortness of breath, fevers, chills, vomiting.  Patient went to urgent care where she got a shot of Rocephin and prescribed an oral antibiotic but was encouraged to come to the emergency department for IV antibiotics.  Patient has never had any complicated infections requiring toe or foot amputations in the past.          On exam:  GENERAL: Pleasant cooperative and conversant female, alert, no acute distress  SKIN: Warm, dry, erythema to the right lower leg with warmth and swelling, right great toe with edema and erythema as well as some violaceous discoloration " at the base of the right great toe with plantar wound/ulceration with no obvious purulent drainage  HENT: Normocephalic, atraumatic  RESPIRATORY: Relaxed breathing  MUSCULOSKELETAL: See above for skin exam of right lower extremity, 2+ DP and PT pulses right lower extremity  NEURO: alert, moves all extremities, follows commands                                                             Labs  Recent Results (from the past 24 hour(s))   Comprehensive Metabolic Panel    Collection Time: 06/22/24  8:43 PM    Specimen: Blood   Result Value Ref Range    Glucose 165 (H) 65 - 99 mg/dL    BUN 36 (H) 8 - 23 mg/dL    Creatinine 1.52 (H) 0.57 - 1.00 mg/dL    Sodium 139 136 - 145 mmol/L    Potassium 4.0 3.5 - 5.2 mmol/L    Chloride 104 98 - 107 mmol/L    CO2 23.5 22.0 - 29.0 mmol/L    Calcium 9.6 8.6 - 10.5 mg/dL    Total Protein 6.7 6.0 - 8.5 g/dL    Albumin 4.0 3.5 - 5.2 g/dL    ALT (SGPT) 17 1 - 33 U/L    AST (SGOT) 12 1 - 32 U/L    Alkaline Phosphatase 72 39 - 117 U/L    Total Bilirubin 0.4 0.0 - 1.2 mg/dL    Globulin 2.7 gm/dL    A/G Ratio 1.5 g/dL    BUN/Creatinine Ratio 23.7 7.0 - 25.0    Anion Gap 11.5 5.0 - 15.0 mmol/L    eGFR 37.7 (L) >60.0 mL/min/1.73   Sedimentation Rate    Collection Time: 06/22/24  8:43 PM    Specimen: Blood   Result Value Ref Range    Sed Rate 25 0 - 30 mm/hr   C-reactive Protein    Collection Time: 06/22/24  8:43 PM    Specimen: Blood   Result Value Ref Range    C-Reactive Protein 3.64 (H) 0.00 - 0.50 mg/dL   Lactic Acid, Plasma    Collection Time: 06/22/24  8:43 PM    Specimen: Blood   Result Value Ref Range    Lactate 1.2 0.5 - 2.0 mmol/L   CBC Auto Differential    Collection Time: 06/22/24  8:43 PM    Specimen: Blood   Result Value Ref Range    WBC 13.52 (H) 3.40 - 10.80 10*3/mm3    RBC 3.93 3.77 - 5.28 10*6/mm3    Hemoglobin 12.0 12.0 - 15.9 g/dL    Hematocrit 36.1 34.0 - 46.6 %    MCV 91.9 79.0 - 97.0 fL    MCH 30.5 26.6 - 33.0 pg    MCHC 33.2 31.5 - 35.7 g/dL    RDW 12.3 12.3 - 15.4 %     RDW-SD 41.7 37.0 - 54.0 fl    MPV 9.0 6.0 - 12.0 fL    Platelets 255 140 - 450 10*3/mm3    Neutrophil % 80.1 (H) 42.7 - 76.0 %    Lymphocyte % 7.8 (L) 19.6 - 45.3 %    Monocyte % 8.9 5.0 - 12.0 %    Eosinophil % 2.4 0.3 - 6.2 %    Basophil % 0.5 0.0 - 1.5 %    Immature Grans % 0.3 0.0 - 0.5 %    Neutrophils, Absolute 10.83 (H) 1.70 - 7.00 10*3/mm3    Lymphocytes, Absolute 1.05 0.70 - 3.10 10*3/mm3    Monocytes, Absolute 1.21 (H) 0.10 - 0.90 10*3/mm3    Eosinophils, Absolute 0.32 0.00 - 0.40 10*3/mm3    Basophils, Absolute 0.07 0.00 - 0.20 10*3/mm3    Immature Grans, Absolute 0.04 0.00 - 0.05 10*3/mm3    nRBC 0.0 0.0 - 0.2 /100 WBC       Radiology  XR Foot 3+ View Right    Result Date: 6/22/2024  3 VIEWS RIGHT FOOT  HISTORY: Wound to the right great toe  COMPARISON: None available.  FINDINGS: No acute fracture or subluxation of the right foot is seen. No aggressive osseous abnormalities are seen. No radiopaque foreign body is identified. Vascular calcifications are noted.      No acute osseous abnormality seen.  This report was finalized on 6/22/2024 9:50 PM by Dr. Carmelina Gutiérrez M.D on Workstation: [id number]       Medical Decision Making:  ED Course as of 06/22/24 2354   Sat Jun 22, 2024 2103 WBC(!): 13.52 [DC]   2103 Hemoglobin: 12.0 [DC]   2103 Sed Rate: 25 [DC]   2106 Lactate: 1.2 [DC]   2111 C-Reactive Protein(!): 3.64 [DC]   2111 Glucose(!): 165 [DC]   2158 XR Foot 3+ View Right  Radiology study independently interpreted by me and my findings are no fracture.   [DC]   2205 Discussed with ALFONSO Frias, who will admit patient. [DC]      ED Course User Index  [DC] Sarah Meza PA       MDM: This patient presents with lower extremity erythema, warmth, and swelling concerning for cellulitis. Patient does have sensitivity/pain to light touch around the erythematous area and to her toe. No evidence of fluctuance concerning for abscess noted. Low concern for osteomyelitis or DVT. No immune compromise,  bullae, pain out of proportion, or rapid progression concerning for necrotizing fasciitis. Calf is soft and no concern for compartment syndrome.    Procedures:  Procedures        PPE: I followed hospital protocols for proper PPE based on patient presentation including use of N95 mask for suspected infectious respiratory conditions.  Proper hand hygiene was performed both before and after the patient encounter.          Diagnosis  Final diagnoses:   Diabetic ulcer of toe of right foot associated with type 2 diabetes mellitus, unspecified ulcer stage   Acute lymphangitis of right lower extremity       Note Disclaimer: At [facility], we believe that sharing information builds trust and better relationships. You are receiving this note because you recently visited [facility]. It is possible you will see health information before a provider has talked with you about it. This kind of information can be easy to misunderstand. To help you fully understand what it means for your health, we urge you to discuss this note with your provider.       Marge Bush MD  06/23/24 7120

## 2024-06-23 NOTE — PROGRESS NOTES
"[facility] Clinical Pharmacy Services: Vancomycin Monitoring Note    Laxmi Marcus is a 64 y.o. female who is on day 1 of pharmacy to dose vancomycin for Skin and Soft Tissue.    Previous Vancomycin Dose:   1250 mg IV once (loading dose)  Updated Cultures and Sensitivities: 6/22: Blood cx pending 2/2    Results from last 7 days   Lab Units 06/23/24  1000   VANCOMYCIN RM mcg/mL 9.90     Vitals/Labs  Ht: 165.1 cm (65\"); Wt: 64.4 kg (141 lb 15.6 oz)   Temp Readings from Last 1 Encounters:   06/23/24 97.9 °F (36.6 °C) (Oral)     Estimated Creatinine Clearance: 47.3 mL/min (A) (by C-G formula based on SCr of 1.19 mg/dL (H)).        Results from last 7 days   Lab Units 06/23/24  0517 06/22/24  2043   CREATININE mg/dL 1.19* 1.52*   WBC 10*3/mm3 10.69 13.52*     Assessment/Plan    Current Vancomycin Dose: 1000 mg IV every  24  hours; provides a predicted  mg/L.hr   Next Level Date and Time: Vanc Trough on 6/25 at 1200  We will continue to monitor patient changes and renal function     Thank you for involving pharmacy in this patient's care. Please contact pharmacy with any questions or concerns.       Ayaz Herron, [facility]  Clinical Pharmacist          "

## 2024-06-23 NOTE — ED PROVIDER NOTES
EMERGENCY DEPARTMENT ENCOUNTER      PCP: Hollis Dawkins MD  Patient Care Team:  Hollis Dawkins MD as PCP - Indigo Palafox MD as Referring Physician (Breast Surgery)  Afsaneh Sandoval MD as Consulting Physician (Hematology and Oncology)  Bradley Ogden MD as Consulting Physician (Cardiology)   Independent Historians: Patient    HPI:  Chief Complaint: Wound check   A complete HPI/ROS/PMH/PSH/SH/FH are unobtainable due to: None    Chronic or social conditions impacting patient care (social determinants of health): None    Context: Laxmi Marcus is a 64 y.o. female with history of type 2 diabetes, CAD who presents to the ED c/o acute wound check to right great toe.  Patient states she was seen by her physician a couple weeks ago.  She states he scraped some skin off the wound to the right great toe and placed antibiotic.  She states she has had this done several times previously and typically improves the wound however she has had worsening of the wound and today noticed increased redness from her foot up to her right lower leg.  She does report chills a couple days ago but no known fevers.  She went to urgent care today and she was given a shot of Rocephin and prescribed an oral antibiotic but recommended to come to the ER if she was not having any improvement.  She reports glucose has been elevated recently.  No proximal leg swelling or calf pain.  No chest pain or shortness of breath.    Review of prior external notes and/or external test results outside of this encounter: Reviewed urgent care encounter today 6/22/24 with NINA Forde, for foot infection. Give 1 g IM Rocephin and prescription for doxycycline.      PAST MEDICAL HISTORY  Active Ambulatory Problems     Diagnosis Date Noted    Abnormal weight gain 02/03/2016    Uncontrolled type 2 diabetes mellitus 02/03/2016    Hypoglycemia due to endogenous hyperinsulinemia 02/03/2016    Hyperlipidemia 02/03/2016    Hypertension  02/03/2016    Diabetes mellitus type 2, uncontrolled, with complications 02/12/2016    Encounter for long-term (current) use of insulin 02/12/2016    Type II diabetes mellitus with neurological manifestations, uncontrolled 02/12/2016    Uncontrolled type II diabetes mellitus with nephropathy 02/12/2016    Hyperinsulinism 02/20/2017    Hyperlipidemia associated with type 2 diabetes mellitus 11/26/2018    Malignant neoplasm of upper-outer quadrant of left breast in female, estrogen receptor negative 08/20/2021    Fitting and adjustment of vascular catheter 08/30/2021     Resolved Ambulatory Problems     Diagnosis Date Noted    No Resolved Ambulatory Problems     Past Medical History:   Diagnosis Date    Awareness under anesthesia     Breast cancer 2021    CKD (chronic kidney disease)     Coronary artery disease     COVID-19 09/05/2021    Diabetes mellitus type 2 with complications, uncontrolled     Frequent UTI     GERD (gastroesophageal reflux disease)     Gout     Heart murmur     History of cancer chemotherapy 12/02/2021    History of pneumonia     History of radiation therapy     NSTEMI (non-ST elevated myocardial infarction) 2019    Obesity     Recent bereavement     Sinus congestion     Slow to wake up after anesthesia     Vitamin B12 deficiency        The patient qualifies to receive the vaccine, but they have not yet received it.    PAST SURGICAL HISTORY  Past Surgical History:   Procedure Laterality Date    APPENDECTOMY      BREAST LUMPECTOMY  2006    BREAST LUMPECTOMY WITH SENTINEL NODE BIOPSY AND AXILLARY NODE DISSECTION      CARDIAC CATHETERIZATION  2019    new de nova Ostial Ramus 95% stenosis, severe multivessel diabetic coronary vasculopathy with small vessels, occluded 3 of 4 bypass conduits, LIMA to LAD patent, all 3 vein grafts are occluded, PTCA/EMMANUEL to ostial Ramus 95% stenosis, patent OM1 and proximal and mid RCA stents x 3 from 2/2019, preserved LVSF, normal LVEDP, no significant AS or MR       CHOLECYSTECTOMY  2006    CORONARY ARTERY BYPASS GRAFT  2017    x4 with LIMA to mLAD, SVG to diagonal, SVG to OM, SVG to distal PDA      CORONARY STENT PLACEMENT  12/2019    Drug eluting stent placement--2.0 x 26 mm Resolute [place] EMMANUEL to ostial Ramus      CORONARY STENT PLACEMENT  02/2019    Drug eluting stent--2.25 x 38 mm and 2.5 x 15 mm Resolute Pierre EMMANUEL to proximal mid RCA, 2.0 x 26 mm Resolute Pierre EMMANUEL to mid circumflex/proximal OM1    DENTAL PROCEDURE      HYSTERECTOMY  2009    MASTECTOMY Left 1/4/2022    Procedure: LEFT MODIFIED RADICAL MASTECTOMY;  Surgeon: Indigo Red MD;  Location: [facility] MAIN OR;  Service: General;  Laterality: Left;    TONSILLECTOMY  1961    age 1    VENOUS ACCESS DEVICE (PORT) INSERTION Right 10/1/2021    Procedure: INSERTION VENOUS ACCESS DEVICE;  Surgeon: Indigo Red MD;  Location: [facility] OR [facility] [place];  Service: General;  Laterality: Right;    VENOUS ACCESS DEVICE (PORT) REMOVAL Right 9/13/2022    Procedure: REMOVAL VENOUS ACCESS DEVICE;  Surgeon: Indigo Red MD;  Location: [facility] MAIN OR;  Service: General;  Laterality: Right;         FAMILY HISTORY  Family History   Problem Relation Age of Onset    Heart disease Mother     Coronary artery disease Mother     Heart disease Brother     Heart disease Maternal Uncle     Prostate cancer Maternal Uncle     Heart disease Maternal Grandfather     Malig Hyperthermia Neg Hx          SOCIAL HISTORY  Social History     Socioeconomic History    Marital status:      Spouse name: Noel    Number of children: 3   Tobacco Use    Smoking status: Never    Smokeless tobacco: Never   Vaping Use    Vaping status: Never Used   Substance and Sexual Activity    Alcohol use: Yes     Comment: VERY RARE    Drug use: Never    Sexual activity: Defer         ALLERGIES  Dulaglutide, Sulfa antibiotics, Uloric [febuxostat], and Latex        REVIEW OF SYSTEMS  Review of Systems   Constitutional:  Positive for chills. Negative for fever.    Respiratory:  Negative for shortness of breath.    Cardiovascular:  Negative for chest pain and leg swelling.   Skin:  Positive for color change (erythema RLE) and wound (R great toe).        All systems reviewed and negative except for those discussed in HPI.       PHYSICAL EXAM    I have reviewed the triage vital signs and nursing notes.    ED Triage Vitals [06/22/24 1948]   Temp Heart Rate Resp BP SpO2   97.7 °F (36.5 °C) 79 16 164/61 100 %      Temp src Heart Rate Source Patient Position BP Location FiO2 (%)   Tympanic Monitor Sitting Right arm --       Physical Exam  GENERAL: alert, no acute distress  SKIN: Warm, dry, ulcerative wound to plantar aspect of R great toe  HENT: Normocephalic, atraumatic  EYES: no scleral icterus  CV: regular rhythm, regular rate  RESPIRATORY: normal effort, lungs clear  ABDOMEN: soft, nontender, nondistended  MUSCULOSKELETAL: no deformity, erythema extending from R great toe proximally to the mid R lower leg, streaking to the R foot  NEURO: alert, moves all extremities, follows commands          LAB RESULTS  Recent Results (from the past 24 hour(s))   Comprehensive Metabolic Panel    Collection Time: 06/22/24  8:43 PM    Specimen: Blood   Result Value Ref Range    Glucose 165 (H) 65 - 99 mg/dL    BUN 36 (H) 8 - 23 mg/dL    Creatinine 1.52 (H) 0.57 - 1.00 mg/dL    Sodium 139 136 - 145 mmol/L    Potassium 4.0 3.5 - 5.2 mmol/L    Chloride 104 98 - 107 mmol/L    CO2 23.5 22.0 - 29.0 mmol/L    Calcium 9.6 8.6 - 10.5 mg/dL    Total Protein 6.7 6.0 - 8.5 g/dL    Albumin 4.0 3.5 - 5.2 g/dL    ALT (SGPT) 17 1 - 33 U/L    AST (SGOT) 12 1 - 32 U/L    Alkaline Phosphatase 72 39 - 117 U/L    Total Bilirubin 0.4 0.0 - 1.2 mg/dL    Globulin 2.7 gm/dL    A/G Ratio 1.5 g/dL    BUN/Creatinine Ratio 23.7 7.0 - 25.0    Anion Gap 11.5 5.0 - 15.0 mmol/L    eGFR 37.7 (L) >60.0 mL/min/1.73   Sedimentation Rate    Collection Time: 06/22/24  8:43 PM    Specimen: Blood   Result Value Ref Range    Sed Rate  25 0 - 30 mm/hr   C-reactive Protein    Collection Time: 06/22/24  8:43 PM    Specimen: Blood   Result Value Ref Range    C-Reactive Protein 3.64 (H) 0.00 - 0.50 mg/dL   Lactic Acid, Plasma    Collection Time: 06/22/24  8:43 PM    Specimen: Blood   Result Value Ref Range    Lactate 1.2 0.5 - 2.0 mmol/L   CBC Auto Differential    Collection Time: 06/22/24  8:43 PM    Specimen: Blood   Result Value Ref Range    WBC 13.52 (H) 3.40 - 10.80 10*3/mm3    RBC 3.93 3.77 - 5.28 10*6/mm3    Hemoglobin 12.0 12.0 - 15.9 g/dL    Hematocrit 36.1 34.0 - 46.6 %    MCV 91.9 79.0 - 97.0 fL    MCH 30.5 26.6 - 33.0 pg    MCHC 33.2 31.5 - 35.7 g/dL    RDW 12.3 12.3 - 15.4 %    RDW-SD 41.7 37.0 - 54.0 fl    MPV 9.0 6.0 - 12.0 fL    Platelets 255 140 - 450 10*3/mm3    Neutrophil % 80.1 (H) 42.7 - 76.0 %    Lymphocyte % 7.8 (L) 19.6 - 45.3 %    Monocyte % 8.9 5.0 - 12.0 %    Eosinophil % 2.4 0.3 - 6.2 %    Basophil % 0.5 0.0 - 1.5 %    Immature Grans % 0.3 0.0 - 0.5 %    Neutrophils, Absolute 10.83 (H) 1.70 - 7.00 10*3/mm3    Lymphocytes, Absolute 1.05 0.70 - 3.10 10*3/mm3    Monocytes, Absolute 1.21 (H) 0.10 - 0.90 10*3/mm3    Eosinophils, Absolute 0.32 0.00 - 0.40 10*3/mm3    Basophils, Absolute 0.07 0.00 - 0.20 10*3/mm3    Immature Grans, Absolute 0.04 0.00 - 0.05 10*3/mm3    nRBC 0.0 0.0 - 0.2 /100 WBC       Ordered the above labs and independently reviewed and interpreted the results.        RADIOLOGY  XR Foot 3+ View Right    Result Date: 6/22/2024  3 VIEWS RIGHT FOOT  HISTORY: Wound to the right great toe  COMPARISON: None available.  FINDINGS: No acute fracture or subluxation of the right foot is seen. No aggressive osseous abnormalities are seen. No radiopaque foreign body is identified. Vascular calcifications are noted.      No acute osseous abnormality seen.  This report was finalized on 6/22/2024 9:50 PM by Dr. Carmelina Gutiérrez M.D on Workstation: [id number]       I ordered the above noted radiological studies. Independently  reviewed and interpreted by me.  See dictation for official radiology interpretation.      PROCEDURES    Procedures      MEDICATIONS GIVEN IN ER    Medications   Vancomycin HCl 1,250 mg in sodium chloride 0.9 % 250 mL VTB (1,250 mg Intravenous New Bag 6/22/24 2258)   piperacillin-tazobactam (ZOSYN) 3.375 g IVPB in 100 mL NS MBP (CD) (0 g Intravenous Stopped 6/22/24 2300)         PROGRESS, DATA ANALYSIS, CONSULTS, AND MEDICAL DECISION MAKING    All labs have been independently reviewed and interpreted by me.  All radiology studies have been independently reviewed and interpreted by me and discussed with radiologist dictating the report.   EKG's independently reviewed and interpreted by me.  Discussion below represents my analysis of pertinent findings related to patient's condition, differential diagnosis, treatment plan and final disposition.    Differential diagnosis: cellulitis, lymphangitis, osteomyelitis, abscess    ED Course as of 06/22/24 2313   Sat Jun 22, 2024 2103 WBC(!): 13.52 [DC]   2103 Hemoglobin: 12.0 [DC]   2103 Sed Rate: 25 [DC]   2106 Lactate: 1.2 [DC]   2111 C-Reactive Protein(!): 3.64 [DC]   2111 Glucose(!): 165 [DC]   2158 XR Foot 3+ View Right  Radiology study independently interpreted by me and my findings are no fracture.   [DC]   2205 Discussed with ALFONSO Frias, who will admit patient. [DC]      ED Course User Index  [DC] Sarah Meza PA             AS OF 23:13 EDT VITALS:    BP - 159/69  HR - 68  TEMP - 97.7 °F (36.5 °C) (Tympanic)  O2 SATS - 100%        DIAGNOSIS  Final diagnoses:   Diabetic ulcer of toe of right foot associated with type 2 diabetes mellitus, unspecified ulcer stage   Acute lymphangitis of right lower extremity         DISPOSITION  ED Disposition       ED Disposition   Decision to Admit    Condition   --    Comment   Level of Care: Med/Surg [1]   Diagnosis: Diabetic ulcer of right great toe [256944]   Admitting Physician: MARCIO VILLA [[id number]]   Attending Physician:  MARCIO VILLA [[id number]]                    Note Disclaimer: At [facility], we believe that sharing information builds trust and better relationships. You are receiving this note because you recently visited [facility]. It is possible you will see health information before a provider has talked with you about it. This kind of information can be easy to misunderstand. To help you fully understand what it means for your health, we urge you to discuss this note with your provider.         Sarah Meza PA  06/22/24 6115

## 2024-06-23 NOTE — PROGRESS NOTES
"[facility] Clinical Pharmacy Services: Vancomycin Pharmacokinetic Initial Consult Note    Laxmi Marcus is a 64 y.o. female who is on day 1 of pharmacy to dose vancomycin.    Indication: Skin and Soft Tissue  Consulting Provider: Dr Richmond Monet  Planned Duration of Therapy: 5 days  Loading Dose Ordered or Given: 1250 mg on 6/22/24 at 2200  Target: Dose by Levels  Pertinent Vanc Dosing History:    Other Antimicrobials: Zosyn    Vitals/Labs  Ht: 165.1 cm (65\"); Wt: 64.4 kg (141 lb 15.6 oz)  Temp Readings from Last 1 Encounters:   06/23/24 97.4 °F (36.3 °C) (Oral)    Estimated Creatinine Clearance: 37 mL/min (A) (by C-G formula based on SCr of 1.52 mg/dL (H)).        Results from last 7 days   Lab Units 06/22/24  2043   CREATININE mg/dL 1.52*   WBC 10*3/mm3 13.52*     Assessment/Plan:    Vancomycin Dose:   Pharmacy will intermittently pulse dose Vancomycin based on levels.   Vanc Random has been ordered for 6/23/24 at 1000     Pharmacy will follow patient's kidney function and will adjust doses and obtain levels as necessary. Thank you for involving pharmacy in this patient's care. Please contact pharmacy with any questions or concerns.                           Babak Chaney, [facility]  Clinical Pharmacist    "

## 2024-06-23 NOTE — PLAN OF CARE
Goal Outcome Evaluation:  Plan of Care Reviewed With: patient        Progress: no change  Outcome Evaluation: pt with redness to right lower leg, right great toe red and swollen and tender, area outlined with perminent marker, IV abx given as ordered, ivf infusing, pt not eating much - not much of an appetite, blood sugars monitored and insulin given as ordered, meds given whole in applesauce without difficulty, pt not wearing scds, MRSA nasal swab sent to lab, MRI foot ordered-  screening sheet sent to MRI department (pt states she has heart stents and also an intravascular stent/balloon to right leg), alert and oriented, voiding well, stated she had a loose bm, vss and afebrile

## 2024-06-23 NOTE — H&P
History and physical    Primary care physician      Chief complaint  Right lower extremity redness pain and swelling    History of present illness  64-year-old white female with history of insulin-dependent diabetes mellitus hypertension hyperlipidemia and coronary artery disease who also have right big toe diabetic wound with ulcer which she follow-up with the podiatrist presented to [facility] emergency with worsening wound with drainage and swelling of the right big toe and also noticed redness swelling and pain in the right lower extremity.  Patient workup in ER revealed right lower extremity cellulitis and also infected wound with ulcer with probable abscess and need to rule out osteomyelitis admitted for management.  Patient complained of chills off and on but denies any fall trauma injury or any fever.  Patient has no chest pain shortness of breath palpitation either.    PAST MEDICAL HISTORY   Awareness under anesthesia      Breast cancer 2021    CKD (chronic kidney disease)      Coronary artery disease      COVID-19 09/05/2021    Diabetes mellitus type 2 with complications, uncontrolled      Frequent UTI      GERD (gastroesophageal reflux disease)      Gout      Heart murmur      History of cancer chemotherapy 12/02/2021    History of pneumonia      History of radiation therapy      NSTEMI (non-ST elevated myocardial infarction) 2019    Obesity      Recent bereavement      Sinus congestion      Slow to wake up after anesthesia      Vitamin B12 deficiency        PAST SURGICAL HISTORY              Procedure Laterality Date    APPENDECTOMY        BREAST LUMPECTOMY   2006    BREAST LUMPECTOMY WITH SENTINEL NODE BIOPSY AND AXILLARY NODE DISSECTION        CARDIAC CATHETERIZATION   2019     new de nova Ostial Ramus 95% stenosis, severe multivessel diabetic coronary vasculopathy with small vessels, occluded 3 of 4 bypass conduits, LIMA to LAD patent, all 3 vein grafts are occluded, PTCA/EMMANUEL to  ostial Ramus 95% stenosis, patent OM1 and proximal and mid RCA stents x 3 from 2/2019, preserved LVSF, normal LVEDP, no significant AS or MR      CHOLECYSTECTOMY   2006    CORONARY ARTERY BYPASS GRAFT   2017     x4 with LIMA to mLAD, SVG to diagonal, SVG to OM, SVG to distal PDA      CORONARY STENT PLACEMENT   12/2019     Drug eluting stent placement--2.0 x 26 mm Resolute Pierre EMMANUEL to ostial Ramus      CORONARY STENT PLACEMENT   02/2019     Drug eluting stent--2.25 x 38 mm and 2.5 x 15 mm Resolute Pierre EMMANUEL to proximal mid RCA, 2.0 x 26 mm Resolute Pierre EMMANUEL to mid circumflex/proximal OM1    DENTAL PROCEDURE        HYSTERECTOMY   2009    MASTECTOMY Left 1/4/2022     Procedure: LEFT MODIFIED RADICAL MASTECTOMY;  Surgeon: Indigo Red MD;  Location: [facility];  Service: General;  Laterality: Left;    TONSILLECTOMY   1961     age 1    VENOUS ACCESS DEVICE (PORT) INSERTION Right 10/1/2021     Procedure: INSERTION VENOUS ACCESS DEVICE;  Surgeon: Indigo Red MD;  Location: [facility];  Service: General;  Laterality: Right;    VENOUS ACCESS DEVICE (PORT) REMOVAL Right 9/13/2022     Procedure: REMOVAL VENOUS ACCESS DEVICE;  Surgeon: Indigo Red MD;  Location: [facility];  Service: General;  Laterality: Right;         FAMILY HISTORY           Problem Relation Age of Onset    Heart disease Mother      Coronary artery disease Mother      Heart disease Brother      Heart disease Maternal Uncle      Prostate cancer Maternal Uncle      Heart disease Maternal Grandfather      Malig Hyperthermia Neg Hx        SOCIAL HISTORY                 Socioeconomic History    Marital status:        Spouse name: Noel    Number of children: 3   Tobacco Use    Smoking status: Never    Smokeless tobacco: Never   Vaping Use    Vaping status: Never Used   Substance and Sexual Activity    Alcohol use: Yes       Comment: VERY RARE    Drug use: Never    Sexual activity: Defer         ALLERGIES  Dulaglutide,  "Sulfa antibiotics, Uloric [febuxostat], and Latex  Home medications reviewed     REVIEW OF SYSTEMS  Constitutional:  Positive for chills. Negative for fever.   Respiratory:  Negative for shortness of breath.    Cardiovascular:  Negative for chest pain and leg swelling.   Skin:  Positive for color change (erythema RLE) and wound (R great toe).      PHYSICAL EXAM   Blood pressure 143/57, pulse 70, temperature 97.9 °F (36.6 °C), temperature source Oral, resp. rate 18, height 165.1 cm (65\"), weight 64.4 kg (141 lb 15.6 oz), SpO2 100%, not currently breastfeeding.    GENERAL: Awake and alert, no acute distress  SKIN: Warm, dry, ulcerative wound to plantar aspect of R great toe  HENT: Normocephalic, atraumatic  EYES: no scleral icterus  CV: regular rhythm, regular rate  RESPIRATORY: normal effort, lungs clear  ABDOMEN: soft, nontender, nondistended bowel sounds positive  MUSCULOSKELETAL: no deformity, erythema extending from R great toe proximally to the mid R lower leg, streaking to the R foot  NEURO: alert, moves all extremities, follows commands     LAB RESULTS  Lab Results (last 24 hours)       Procedure Component Value Units Date/Time    POC Glucose Once [[id number]]  (Abnormal) Collected: 06/23/24 1126    Specimen: Blood Updated: 06/23/24 1128     Glucose 253 mg/dL     Vancomycin, Random [[id number]]  (Normal) Collected: 06/23/24 1000    Specimen: Blood Updated: 06/23/24 1110     Vancomycin Random 9.90 mcg/mL     Narrative:      Therapeutic Ranges for Vancomycin    Vancomycin Random   5.0-40.0 mcg/mL  Vancomycin Trough   5.0-20.0 mcg/mL  Vancomycin Peak     20.0-40.0 mcg/mL    POC Glucose Once [[id number]]  (Abnormal) Collected: 06/23/24 0640    Specimen: Blood Updated: 06/23/24 0641     Glucose 238 mg/dL     Basic Metabolic Panel [[id number]]  (Abnormal) Collected: 06/23/24 0517    Specimen: Blood Updated: 06/23/24 0623     Glucose 155 mg/dL      BUN 26 mg/dL      Creatinine 1.19 mg/dL      Sodium 138 mmol/L      " Potassium 3.8 mmol/L      Chloride 107 mmol/L      CO2 21.0 mmol/L      Calcium 8.7 mg/dL      BUN/Creatinine Ratio 21.8     Anion Gap 10.0 mmol/L      eGFR 50.5 mL/min/1.73     Narrative:      GFR Normal >60  Chronic Kidney Disease <60  Kidney Failure <15      CBC & Differential [[id number]]  (Abnormal) Collected: 06/23/24 0517    Specimen: Blood Updated: 06/23/24 0556    Narrative:      The following orders were created for panel order CBC & Differential.  Procedure                               Abnormality         Status                     ---------                               -----------         ------                     CBC Auto Differential[[id number]]        Abnormal            Final result                 Please view results for these tests on the individual orders.    CBC Auto Differential [[id number]]  (Abnormal) Collected: 06/23/24 0517    Specimen: Blood Updated: 06/23/24 0556     WBC 10.69 10*3/mm3      RBC 3.89 10*6/mm3      Hemoglobin 11.5 g/dL      Hematocrit 35.2 %      MCV 90.5 fL      MCH 29.6 pg      MCHC 32.7 g/dL      RDW 12.0 %      RDW-SD 39.2 fl      MPV 9.1 fL      Platelets 253 10*3/mm3      Neutrophil % 78.5 %      Lymphocyte % 9.4 %      Monocyte % 8.1 %      Eosinophil % 2.8 %      Basophil % 0.7 %      Immature Grans % 0.5 %      Neutrophils, Absolute 8.39 10*3/mm3      Lymphocytes, Absolute 1.00 10*3/mm3      Monocytes, Absolute 0.87 10*3/mm3      Eosinophils, Absolute 0.30 10*3/mm3      Basophils, Absolute 0.08 10*3/mm3      Immature Grans, Absolute 0.05 10*3/mm3      nRBC 0.0 /100 WBC     Blood Culture - Blood, Arm, Right [[id number]] Collected: 06/22/24 2149    Specimen: Blood from Arm, Right Updated: 06/22/24 2205    Blood Culture - Blood, Arm, Right [[id number]] Collected: 06/22/24 2200    Specimen: Blood from Arm, Right Updated: 06/22/24 2204    Comprehensive Metabolic Panel [[id number]]  (Abnormal) Collected: 06/22/24 2043    Specimen: Blood Updated: 06/22/24 2110     Glucose 165  mg/dL      BUN 36 mg/dL      Creatinine 1.52 mg/dL      Sodium 139 mmol/L      Potassium 4.0 mmol/L      Chloride 104 mmol/L      CO2 23.5 mmol/L      Calcium 9.6 mg/dL      Total Protein 6.7 g/dL      Albumin 4.0 g/dL      ALT (SGPT) 17 U/L      AST (SGOT) 12 U/L      Alkaline Phosphatase 72 U/L      Total Bilirubin 0.4 mg/dL      Globulin 2.7 gm/dL      A/G Ratio 1.5 g/dL      BUN/Creatinine Ratio 23.7     Anion Gap 11.5 mmol/L      eGFR 37.7 mL/min/1.73     Narrative:      GFR Normal >60  Chronic Kidney Disease <60  Kidney Failure <15      C-reactive Protein [[id number]]  (Abnormal) Collected: 06/22/24 2043    Specimen: Blood Updated: 06/22/24 2109     C-Reactive Protein 3.64 mg/dL     Lactic Acid, Plasma [[id number]]  (Normal) Collected: 06/22/24 2043    Specimen: Blood Updated: 06/22/24 2106     Lactate 1.2 mmol/L     Sedimentation Rate [[id number]]  (Normal) Collected: 06/22/24 2043    Specimen: Blood Updated: 06/22/24 2058     Sed Rate 25 mm/hr     CBC & Differential [[id number]]  (Abnormal) Collected: 06/22/24 2043    Specimen: Blood Updated: 06/22/24 2052    Narrative:      The following orders were created for panel order CBC & Differential.  Procedure                               Abnormality         Status                     ---------                               -----------         ------                     CBC Auto Differential[[id number]]        Abnormal            Final result                 Please view results for these tests on the individual orders.    CBC Auto Differential [[id number]]  (Abnormal) Collected: 06/22/24 2043    Specimen: Blood Updated: 06/22/24 2052     WBC 13.52 10*3/mm3      RBC 3.93 10*6/mm3      Hemoglobin 12.0 g/dL      Hematocrit 36.1 %      MCV 91.9 fL      MCH 30.5 pg      MCHC 33.2 g/dL      RDW 12.3 %      RDW-SD 41.7 fl      MPV 9.0 fL      Platelets 255 10*3/mm3      Neutrophil % 80.1 %      Lymphocyte % 7.8 %      Monocyte % 8.9 %      Eosinophil % 2.4 %      Basophil %  0.5 %      Immature Grans % 0.3 %      Neutrophils, Absolute 10.83 10*3/mm3      Lymphocytes, Absolute 1.05 10*3/mm3      Monocytes, Absolute 1.21 10*3/mm3      Eosinophils, Absolute 0.32 10*3/mm3      Basophils, Absolute 0.07 10*3/mm3      Immature Grans, Absolute 0.04 10*3/mm3      nRBC 0.0 /100 WBC           Imaging Results (Last 24 Hours)       Procedure Component Value Units Date/Time    XR Foot 3+ View Right [[id number]] Collected: 06/22/24 2148     Updated: 06/22/24 2153    Narrative:      3 VIEWS RIGHT FOOT     HISTORY: Wound to the right great toe     COMPARISON: None available.     FINDINGS:  No acute fracture or subluxation of the right foot is seen. No  aggressive osseous abnormalities are seen. No radiopaque foreign body is  identified. Vascular calcifications are noted.       Impression:      No acute osseous abnormality seen.     This report was finalized on 6/22/2024 9:50 PM by Dr. Carmelina Gutiérrez M.D on Workstation: [id number]               Current Facility-Administered Medications:     ascorbic acid (VITAMIN C) tablet 500 mg, 500 mg, Oral, Daily, Richmond Monet MD, 500 mg at 06/23/24 1216    clopidogrel (PLAVIX) tablet 75 mg, 75 mg, Oral, Daily, Richmond Monet MD, 75 mg at 06/23/24 0806    dextrose (D50W) (25 g/50 mL) IV injection 25 g, 25 g, Intravenous, Q15 Min PRN, Richmond Monet MD    dextrose (GLUTOSE) oral gel 15 g, 15 g, Oral, Q15 Min PRN, Richmond Monet MD    glucagon (GLUCAGEN) injection 1 mg, 1 mg, Intramuscular, Q15 Min PRN, Richmond Monet MD    insulin glargine (LANTUS, SEMGLEE) injection 10 Units, 10 Units, Subcutaneous, Nightly, Richmond Monet MD    insulin lispro (HUMALOG/ADMELOG) injection 2-7 Units, 2-7 Units, Subcutaneous, 4x Daily AC & at Bedtime, Richmond Monet MD, 4 Units at 06/23/24 1216    metoprolol succinate XL (TOPROL-XL) 24 hr tablet 12.5 mg, 12.5 mg, Oral, Q24H, Richmond Monet MD, 12.5 mg at 06/23/24 0805    pantoprazole (PROTONIX) EC tablet 40 mg, 40 mg, Oral, Q AM, Chiki  MD Marcio    Pharmacy to dose vancomycin, , Does not apply, Continuous PRN, Marcio Monet MD    piperacillin-tazobactam (ZOSYN) 3.375 g IVPB in 100 mL NS MBP (CD), 3.375 g, Intravenous, Q8H, Marcio Monet MD, Stopped at 06/23/24 0915    ranolazine (RANEXA) 12 hr tablet 500 mg, 500 mg, Oral, Q12H, Marcio Monet MD, 500 mg at 06/23/24 0955    rosuvastatin (CRESTOR) tablet 10 mg, 10 mg, Oral, Nightly, Marcio Monet MD, 10 mg at 06/23/24 0141    sodium chloride 0.9 % infusion, 75 mL/hr, Intravenous, Continuous, Marcio Moent MD, Last Rate: 75 mL/hr at 06/23/24 0806, 75 mL/hr at 06/23/24 0806    vancomycin (VANCOCIN) 1,000 mg in sodium chloride 0.9 % 250 mL IVPB-VTB, 1,000 mg, Intravenous, Q24H, Marcio Monet MD     ASSESSMENT  Right lower extremity cellulitis  Right big toe diabetic infected wound with probable abscess and rule out osteomyelitis  Insulin-dependent diabetic mellitus  Hypertension  Hyperlipidemia  Coronary artery disease  Gastroesophageal reflux disease    PLAN  Admit  IVF   Broad-spectrum IV antibiotics after obtaining cultures  Check MRI of the right foot  Infectious disease consult  Wound care nurse to follow patient  Continue home medications  Stress ulcer DVT prophylaxis  Supportive care  Patient is full code  Discussed with nursing staff  Follow closely further recommendation current hospital course    MARCIO MONET MD

## 2024-06-23 NOTE — PLAN OF CARE
Goal Outcome Evaluation:  Plan of Care Reviewed With: patient           Outcome Evaluation: Alert and oriented. Pleasant. Wound @ posterior right big toe, dry, hard calloused with redness and swelling extending to lower leg, cleaned with NS, covered with kerlix. Up with standby assist, voided freely. IV fluids started. On IV Zosyn and  IV vancomycin. SCD sleeve to left leg.  Glucose monitoring device, freestyle 3 @ RU, BS to monitor , on ISS. Pills taken whole with applesauce. Made comfortable.

## 2024-06-23 NOTE — PROGRESS NOTES
[facility] Clinical Pharmacy Services: Piperacillin-Tazobactam Consult     Pt Name: Laxmi Marcus   : 1960     Indication: Skin and Soft Tissue     Relevant clinical data and objective history reviewed:          Past Medical History:   Diagnosis Date    Awareness under anesthesia      Breast cancer      Left    CKD (chronic kidney disease)       stage 3    Coronary artery disease      COVID-19 2021    Diabetes mellitus type 2 with complications, uncontrolled      Frequent UTI      GERD (gastroesophageal reflux disease)      Gout      Heart murmur      History of cancer chemotherapy 2021     LAST DOSE    History of pneumonia      History of radiation therapy      Hyperlipidemia      Hypertension      NSTEMI (non-ST elevated myocardial infarction)     Obesity      Recent bereavement       SPOUSE 2022    Sinus congestion      Slow to wake up after anesthesia      Vitamin B12 deficiency              Creatinine   Date Value Ref Range Status   2024 1.52 (H) 0.57 - 1.00 mg/dL Final   2023 1.03 (H) 0.55 - 1.02 mg/dL Final   10/03/2022 1.46 (H) 0.55 - 1.02 mg/dL Final   2022 1.46 (H) 0.57 - 1.00 mg/dL Final   2022 1.25 (H) 0.55 - 1.02 mg/dL Final            BUN   Date Value Ref Range Status   2024 36 (H) 8 - 23 mg/dL Final   2023 19 10 - 20 mg/dL Final      Estm CrCl = 36 ml/min if the patient is at least 5 foot tall.            Lab Results   Component Value Date     WBC 13.52 (H) 2024          Temp Readings from Last 3 Encounters:   24 97.4 °F (36.3 °C) (Oral)   22 97.7 °F (36.5 °C) (Oral)   22 98.7 °F (37.1 °C) (Oral)      Assessment/Plan  Estimated CrCl >20 mL/min at this time; BMI ~27.7 kg/m2  Will start piperacillin-tazobactam 3.375 g IV every 8 hours      Pharmacy will continue to follow daily while on piperacillin-tazobactam and adjust as needed. Thank you for this consult.     Babak Chaney [facility]  Clinical Pharmacist

## 2024-06-23 NOTE — ED NOTES
"Nursing report ED to floor  Laxmi Marcus  64 y.o.  female    HPI :  HPI (Adult)  Stated Reason for Visit: Sore to the right great toe for two weeks, redness spreading up the leg noted this morning.  History Obtained From: patient  Duration (Weeks): 2  Medications/Treatments Prior to Arrival: none    Chief Complaint  Chief Complaint   Patient presents with    Wound Check     Pt is diabetic       Admitting doctor:   Richmond Monet MD    Admitting diagnosis:   The primary encounter diagnosis was Diabetic ulcer of toe of right foot associated with type 2 diabetes mellitus, unspecified ulcer stage. A diagnosis of Acute lymphangitis of right lower extremity was also pertinent to this visit.    Code status:   Current Code Status       Date Active Code Status Order ID Comments User Context       Prior            Allergies:   Dulaglutide, Sulfa antibiotics, Uloric [febuxostat], and Latex    Isolation:   No active isolations    Intake and Output  No intake or output data in the 24 hours ending 06/22/24 2215    Weight:       06/22/24 1948   Weight: 61.2 kg (135 lb)       Most recent vitals:   Vitals:    06/22/24 1948   BP: 164/61   BP Location: Right arm   Patient Position: Sitting   Pulse: 79   Resp: 16   Temp: 97.7 °F (36.5 °C)   TempSrc: Tympanic   SpO2: 100%   Weight: 61.2 kg (135 lb)   Height: 165.1 cm (65\")       Active LDAs/IV Access:   Lines, Drains & Airways       Active LDAs       Name Placement date Placement time Site Days    Peripheral IV 06/22/24 2047 Right Antecubital 06/22/24 2047  Antecubital  less than 1    Closed/Suction Drain 2 Left; Superior; Inferior Breast Bulb 19 Fr. 01/04/22  1347  Breast  900                    Labs (abnormal labs have a star):   Labs Reviewed   COMPREHENSIVE METABOLIC PANEL - Abnormal; Notable for the following components:       Result Value    Glucose 165 (*)     BUN 36 (*)     Creatinine 1.52 (*)     eGFR 37.7 (*)     All other components within normal limits    Narrative:     " GFR Normal >60  Chronic Kidney Disease <60  Kidney Failure <15     C-REACTIVE PROTEIN - Abnormal; Notable for the following components:    C-Reactive Protein 3.64 (*)     All other components within normal limits   CBC WITH AUTO DIFFERENTIAL - Abnormal; Notable for the following components:    WBC 13.52 (*)     Neutrophil % 80.1 (*)     Lymphocyte % 7.8 (*)     Neutrophils, Absolute 10.83 (*)     Monocytes, Absolute 1.21 (*)     All other components within normal limits   SEDIMENTATION RATE - Normal   LACTIC ACID, PLASMA - Normal   BLOOD CULTURE   BLOOD CULTURE   CBC AND DIFFERENTIAL    Narrative:     The following orders were created for panel order CBC & Differential.  Procedure                               Abnormality         Status                     ---------                               -----------         ------                     CBC Auto Differential[[id number]]        Abnormal            Final result                 Please view results for these tests on the individual orders.       EKG:   No orders to display       Meds given in ED:   Medications   piperacillin-tazobactam (ZOSYN) 3.375 g IVPB in 100 mL NS MBP (CD) (3.375 g Intravenous New Bag 6/22/24 2205)   Vancomycin HCl 1,250 mg in sodium chloride 0.9 % 250 mL VTB (has no administration in time range)       Imaging results:  XR Foot 3+ View Right    Result Date: 6/22/2024  No acute osseous abnormality seen.  This report was finalized on 6/22/2024 9:50 PM by Dr. Carmelina Gutiérrez M.D on Workstation: [id number]       Ambulatory status:   - up ad edd    Social issues:   Social History     Socioeconomic History    Marital status:      Spouse name: Noel    Number of children: 3   Tobacco Use    Smoking status: Never    Smokeless tobacco: Never   Vaping Use    Vaping status: Never Used   Substance and Sexual Activity    Alcohol use: Yes     Comment: VERY RARE    Drug use: Never    Sexual activity: Defer       Peripheral  Neurovascular  Peripheral Neurovascular (Adult)  Peripheral Neurovascular WDL: .WDL except, neurovascular assessment lower, pulse assessment  Pulse Assessment: dorsalis pedis  LLE Neurovascular Assessment  Temperature LLE: cool  Color LLE: no discoloration  Sensation LLE: no tenderness  RLE Neurovascular Assessment  Temperature RLE: warm  Color RLE: red  Sensation RLE: tenderness present    Neuro Cognitive  Neuro Cognitive (Adult)  Cognitive/Neuro/Behavioral WDL: WDL    Learning  Learning Assessment (Adult)  Learning Readiness and Ability: no barriers identified  Education Provided  Person Taught: patient  Teaching Method: verbal instruction  Teaching Focus: symptom/problem overview, diagnostic test, medical device/equipment use, risk factors/triggers  Education Outcome Evaluation: acceptance expressed, verbalizes understanding    Respiratory  Respiratory WDL  Respiratory WDL: WDL    Abdominal Pain       Pain Assessments  Pain (Adult)  (0-10) Pain Rating: Rest: 9  Pain Location: foot  Pain Side/Orientation: right  Pain Description: constant, throbbing    NIH Stroke Scale       Nargis Lim RN  06/22/24 22:15 EDT

## 2024-06-24 LAB
ALBUMIN SERPL-MCNC: 3.2 G/DL (ref 3.5–5.2)
ALBUMIN/GLOB SERPL: 1.2 G/DL
ALP SERPL-CCNC: 69 U/L (ref 39–117)
ALT SERPL W P-5'-P-CCNC: 13 U/L (ref 1–33)
ANION GAP SERPL CALCULATED.3IONS-SCNC: 10.7 MMOL/L (ref 5–15)
AST SERPL-CCNC: 11 U/L (ref 1–32)
BASOPHILS # BLD AUTO: 0.06 10*3/MM3 (ref 0–0.2)
BASOPHILS NFR BLD AUTO: 0.6 % (ref 0–1.5)
BILIRUB SERPL-MCNC: 0.4 MG/DL (ref 0–1.2)
BUN SERPL-MCNC: 14 MG/DL (ref 8–23)
BUN/CREAT SERPL: 12.4 (ref 7–25)
CALCIUM SPEC-SCNC: 8.2 MG/DL (ref 8.6–10.5)
CHLORIDE SERPL-SCNC: 110 MMOL/L (ref 98–107)
CHOLEST SERPL-MCNC: 138 MG/DL (ref 0–200)
CO2 SERPL-SCNC: 19.3 MMOL/L (ref 22–29)
CREAT SERPL-MCNC: 1.13 MG/DL (ref 0.57–1)
DEPRECATED RDW RBC AUTO: 38.7 FL (ref 37–54)
EGFRCR SERPLBLD CKD-EPI 2021: 53.8 ML/MIN/1.73
EOSINOPHIL # BLD AUTO: 0.35 10*3/MM3 (ref 0–0.4)
EOSINOPHIL NFR BLD AUTO: 3.2 % (ref 0.3–6.2)
ERYTHROCYTE [DISTWIDTH] IN BLOOD BY AUTOMATED COUNT: 11.9 % (ref 12.3–15.4)
GLOBULIN UR ELPH-MCNC: 2.6 GM/DL
GLUCOSE BLDC GLUCOMTR-MCNC: 199 MG/DL (ref 70–130)
GLUCOSE BLDC GLUCOMTR-MCNC: 202 MG/DL (ref 70–130)
GLUCOSE BLDC GLUCOMTR-MCNC: 220 MG/DL (ref 70–130)
GLUCOSE BLDC GLUCOMTR-MCNC: 243 MG/DL (ref 70–130)
GLUCOSE SERPL-MCNC: 233 MG/DL (ref 65–99)
HBA1C MFR BLD: 7.2 % (ref 4.8–5.6)
HCT VFR BLD AUTO: 32 % (ref 34–46.6)
HDLC SERPL-MCNC: 45 MG/DL (ref 40–60)
HGB BLD-MCNC: 10.3 G/DL (ref 12–15.9)
IMM GRANULOCYTES # BLD AUTO: 0.06 10*3/MM3 (ref 0–0.05)
IMM GRANULOCYTES NFR BLD AUTO: 0.6 % (ref 0–0.5)
LDLC SERPL CALC-MCNC: 65 MG/DL (ref 0–100)
LDLC/HDLC SERPL: 1.34 {RATIO}
LYMPHOCYTES # BLD AUTO: 1.21 10*3/MM3 (ref 0.7–3.1)
LYMPHOCYTES NFR BLD AUTO: 11.1 % (ref 19.6–45.3)
MCH RBC QN AUTO: 29.2 PG (ref 26.6–33)
MCHC RBC AUTO-ENTMCNC: 32.2 G/DL (ref 31.5–35.7)
MCV RBC AUTO: 90.7 FL (ref 79–97)
MONOCYTES # BLD AUTO: 0.94 10*3/MM3 (ref 0.1–0.9)
MONOCYTES NFR BLD AUTO: 8.6 % (ref 5–12)
NEUTROPHILS NFR BLD AUTO: 75.9 % (ref 42.7–76)
NEUTROPHILS NFR BLD AUTO: 8.28 10*3/MM3 (ref 1.7–7)
NRBC BLD AUTO-RTO: 0 /100 WBC (ref 0–0.2)
PLATELET # BLD AUTO: 251 10*3/MM3 (ref 140–450)
PMV BLD AUTO: 9.1 FL (ref 6–12)
POTASSIUM SERPL-SCNC: 3.9 MMOL/L (ref 3.5–5.2)
PROT SERPL-MCNC: 5.8 G/DL (ref 6–8.5)
RBC # BLD AUTO: 3.53 10*6/MM3 (ref 3.77–5.28)
SODIUM SERPL-SCNC: 140 MMOL/L (ref 136–145)
TRIGL SERPL-MCNC: 164 MG/DL (ref 0–150)
TSH SERPL DL<=0.05 MIU/L-ACNC: 1.06 UIU/ML (ref 0.27–4.2)
VLDLC SERPL-MCNC: 28 MG/DL (ref 5–40)
WBC NRBC COR # BLD AUTO: 10.9 10*3/MM3 (ref 3.4–10.8)

## 2024-06-24 PROCEDURE — 25810000003 SODIUM CHLORIDE 0.9 % SOLUTION: Performed by: HOSPITALIST

## 2024-06-24 PROCEDURE — 84443 ASSAY THYROID STIM HORMONE: CPT | Performed by: HOSPITALIST

## 2024-06-24 PROCEDURE — 85025 COMPLETE CBC W/AUTO DIFF WBC: CPT | Performed by: HOSPITALIST

## 2024-06-24 PROCEDURE — 80053 COMPREHEN METABOLIC PANEL: CPT | Performed by: HOSPITALIST

## 2024-06-24 PROCEDURE — 82948 REAGENT STRIP/BLOOD GLUCOSE: CPT

## 2024-06-24 PROCEDURE — 80061 LIPID PANEL: CPT | Performed by: HOSPITALIST

## 2024-06-24 PROCEDURE — 63710000001 INSULIN LISPRO (HUMAN) PER 5 UNITS: Performed by: HOSPITALIST

## 2024-06-24 PROCEDURE — 83036 HEMOGLOBIN GLYCOSYLATED A1C: CPT | Performed by: HOSPITALIST

## 2024-06-24 PROCEDURE — 63710000001 INSULIN GLARGINE PER 5 UNITS: Performed by: HOSPITALIST

## 2024-06-24 PROCEDURE — 25010000002 PIPERACILLIN SOD-TAZOBACTAM PER 1 G: Performed by: HOSPITALIST

## 2024-06-24 RX ORDER — INSULIN LISPRO 100 [IU]/ML
5 INJECTION, SOLUTION INTRAVENOUS; SUBCUTANEOUS
Status: DISCONTINUED | OUTPATIENT
Start: 2024-06-24 | End: 2024-06-28 | Stop reason: HOSPADM

## 2024-06-24 RX ADMIN — PIPERACILLIN AND TAZOBACTAM 3.38 G: 3; .375 INJECTION, POWDER, FOR SOLUTION INTRAVENOUS at 05:41

## 2024-06-24 RX ADMIN — ROSUVASTATIN CALCIUM 10 MG: 10 TABLET, FILM COATED ORAL at 21:40

## 2024-06-24 RX ADMIN — ACETAMINOPHEN 325MG 650 MG: 325 TABLET ORAL at 17:02

## 2024-06-24 RX ADMIN — PIPERACILLIN AND TAZOBACTAM 3.38 G: 3; .375 INJECTION, POWDER, FOR SOLUTION INTRAVENOUS at 13:58

## 2024-06-24 RX ADMIN — METOPROLOL SUCCINATE 12.5 MG: 25 TABLET, EXTENDED RELEASE ORAL at 08:32

## 2024-06-24 RX ADMIN — RANOLAZINE 500 MG: 500 TABLET, FILM COATED, EXTENDED RELEASE ORAL at 21:40

## 2024-06-24 RX ADMIN — INSULIN LISPRO 2 UNITS: 100 INJECTION, SOLUTION INTRAVENOUS; SUBCUTANEOUS at 21:42

## 2024-06-24 RX ADMIN — ACETAMINOPHEN 325MG 650 MG: 325 TABLET ORAL at 05:40

## 2024-06-24 RX ADMIN — INSULIN LISPRO 5 UNITS: 100 INJECTION, SOLUTION INTRAVENOUS; SUBCUTANEOUS at 17:04

## 2024-06-24 RX ADMIN — ACETAMINOPHEN 325MG 650 MG: 325 TABLET ORAL at 21:42

## 2024-06-24 RX ADMIN — ACETAMINOPHEN 325MG 650 MG: 325 TABLET ORAL at 12:09

## 2024-06-24 RX ADMIN — OXYCODONE HYDROCHLORIDE AND ACETAMINOPHEN 500 MG: 500 TABLET ORAL at 08:32

## 2024-06-24 RX ADMIN — CLOPIDOGREL BISULFATE 75 MG: 75 TABLET, FILM COATED ORAL at 08:32

## 2024-06-24 RX ADMIN — INSULIN GLARGINE 15 UNITS: 100 INJECTION, SOLUTION SUBCUTANEOUS at 21:41

## 2024-06-24 RX ADMIN — INSULIN LISPRO 3 UNITS: 100 INJECTION, SOLUTION INTRAVENOUS; SUBCUTANEOUS at 12:09

## 2024-06-24 RX ADMIN — PIPERACILLIN AND TAZOBACTAM 3.38 G: 3; .375 INJECTION, POWDER, FOR SOLUTION INTRAVENOUS at 21:40

## 2024-06-24 RX ADMIN — INSULIN LISPRO 3 UNITS: 100 INJECTION, SOLUTION INTRAVENOUS; SUBCUTANEOUS at 08:31

## 2024-06-24 RX ADMIN — SODIUM CHLORIDE 50 ML/HR: 9 INJECTION, SOLUTION INTRAVENOUS at 21:55

## 2024-06-24 RX ADMIN — INSULIN LISPRO 3 UNITS: 100 INJECTION, SOLUTION INTRAVENOUS; SUBCUTANEOUS at 12:00

## 2024-06-24 RX ADMIN — SODIUM CHLORIDE 75 ML/HR: 9 INJECTION, SOLUTION INTRAVENOUS at 05:40

## 2024-06-24 RX ADMIN — ACETAMINOPHEN 325MG 650 MG: 325 TABLET ORAL at 00:28

## 2024-06-24 RX ADMIN — RANOLAZINE 500 MG: 500 TABLET, FILM COATED, EXTENDED RELEASE ORAL at 08:32

## 2024-06-24 RX ADMIN — INSULIN LISPRO 3 UNITS: 100 INJECTION, SOLUTION INTRAVENOUS; SUBCUTANEOUS at 17:01

## 2024-06-24 RX ADMIN — INSULIN LISPRO 3 UNITS: 100 INJECTION, SOLUTION INTRAVENOUS; SUBCUTANEOUS at 08:36

## 2024-06-24 NOTE — CONSULTS
CONSULT NOTE    Infectious Diseases - Jose Roberto Carbone MD  [facility]       Patient Identification:  Name: Laxmi Marcus  Age: 64 y.o.  Sex: female  :  1960  MRN: [id number]             Date of Consultation: 2024      Primary Care Physician: Hollis Dawkins MD                               Requesting Physician: Dr. Monet  Reason for Consultation: Cellulitis    History of presenting illness: Patient is a 64-year-old female who has complicated past medical history remarkable for insulin-dependent diabetes, hyperlipidemia, coronary artery disease, hypertension has been dealing with chronic right big toe diabetic ulcer for the last 4 months with periodic debridement of the callus by the podiatrist developed worsening of the wound with increasing drainage and pain with associated redness of the right leg along with swelling.  Patient presented with these symptoms to the emergency room on 2024 and was found to have elevated white blood cell count with normal lactic acid level and plain x-ray showing no significant acute osseous abnormalities.  Patient has been started empirically on vancomycin and Zosyn and infectious disease services consulted.  Impression:  This presentation and above context is consistent with:  1-cellulitis of the right leg due to chronic right great toe diabetic wound  2-right great toe diabetic ulcer due to infected callus with risk of underlying abscess  3-insulin-dependent diabetes  4-chronic kidney disease with acute kidney injury improving  5-negative MRSA screen  6-other diagnosis per primary team.    Recommendations/Discussions:  At this juncture since MRSA screen is negative would recommend discontinuation of vancomycin, follow-up on blood cultures and keep a close eye on renal function.  To complete the workup would recommend MRI of the right foot to rule out soft tissue abscess and associated contiguous focus bone and joint involvement which if found  could effect duration of antibiotic therapy as well as potentially may require adjunct surgical intervention.  Management of other issues per primary team.  If there is no concern for contiguous focus osteomyelitis or septic arthritis then patient will require 7 to 10 days of antibiotic treatment for soft tissue infection guided by the culture results and response to treatment.  Potential side effects of antibiotic therapy suggest nausea vomiting diarrhea rash risk of C. difficile and yeast infection worsening renal function cytopenias fever alteration endogenous jennifer and selection resistant pathogens discussed with the patient.  Patient understands the need for antibiotic therapy and wants to proceed with antibiotic treatment.  Thank you Dr. Monet very much for letting me be the part of your patient care please see above impression and recommendations        Past Medical History:  Past Medical History:   Diagnosis Date    Awareness under anesthesia     Breast cancer 2021    Left    CKD (chronic kidney disease)     stage 3    Coronary artery disease     COVID-19 09/05/2021    Diabetes mellitus type 2 with complications, uncontrolled     Frequent UTI     GERD (gastroesophageal reflux disease)     Gout     Heart murmur     History of cancer chemotherapy 12/02/2021    LAST DOSE    History of pneumonia     History of radiation therapy     Hyperlipidemia     Hypertension     NSTEMI (non-ST elevated myocardial infarction) 2019    Obesity     Recent bereavement     SPOUSE 7/2022    Sinus congestion     Slow to wake up after anesthesia     Vitamin B12 deficiency      Past Surgical History:  Past Surgical History:   Procedure Laterality Date    APPENDECTOMY      BREAST LUMPECTOMY  2006    BREAST LUMPECTOMY WITH SENTINEL NODE BIOPSY AND AXILLARY NODE DISSECTION      CARDIAC CATHETERIZATION  2019    new de nova Ostial Ramus 95% stenosis, severe multivessel diabetic coronary vasculopathy with small vessels, occluded 3 of 4  bypass conduits, LIMA to LAD patent, all 3 vein grafts are occluded, PTCA/EMMANUEL to ostial Ramus 95% stenosis, patent OM1 and proximal and mid RCA stents x 3 from 2/2019, preserved LVSF, normal LVEDP, no significant AS or MR      CHOLECYSTECTOMY  2006    CORONARY ARTERY BYPASS GRAFT  2017    x4 with LIMA to mLAD, SVG to diagonal, SVG to OM, SVG to distal PDA      CORONARY STENT PLACEMENT  12/2019    Drug eluting stent placement--2.0 x 26 mm Resolute Pierre EMMANUEL to ostial Ramus      CORONARY STENT PLACEMENT  02/2019    Drug eluting stent--2.25 x 38 mm and 2.5 x 15 mm Resolute Pierre EMMANUEL to proximal mid RCA, 2.0 x 26 mm Resolute Pierre EMMANUEL to mid circumflex/proximal OM1    DENTAL PROCEDURE      HYSTERECTOMY  2009    MASTECTOMY Left 1/4/2022    Procedure: LEFT MODIFIED RADICAL MASTECTOMY;  Surgeon: Indigo Red MD;  Location: [facility];  Service: General;  Laterality: Left;    TONSILLECTOMY  1961    age 1    VENOUS ACCESS DEVICE (PORT) INSERTION Right 10/1/2021    Procedure: INSERTION VENOUS ACCESS DEVICE;  Surgeon: Indigo Red MD;  Location: [facility];  Service: General;  Laterality: Right;    VENOUS ACCESS DEVICE (PORT) REMOVAL Right 9/13/2022    Procedure: REMOVAL VENOUS ACCESS DEVICE;  Surgeon: Indigo Red MD;  Location: [facility] OR;  Service: General;  Laterality: Right;      Home Meds:  Medications Prior to Admission   Medication Sig Dispense Refill Last Dose    ACCU-CHEK FASTCLIX LANCETS misc TO CHECK 5 TIMES DAILY 500 each 2     ascorbic acid (VITAMIN C) 250 MG tablet Take 2 tablets by mouth Daily.       BIOTIN PO Take 1 tablet by mouth Daily.       clopidogrel (PLAVIX) 75 MG tablet Take 1 tablet by mouth Daily.       Collagen Hydrolysate powder Use 1 Sachet Daily.       glucose blood (ACCU-CHEK SMARTVIEW) test strip Use as instructed 5 TIMES DAILY 450 each 2     Insulin Aspart, w/Niacinamide, (FIASP FLEXTOUCH SC) Inject 15 Units under the skin into the appropriate area as directed 4  (Four) Times a Day. 15-25 units 4 times daily sliding scale       Insulin Degludec (Tresiba FlexTouch) 200 UNIT/ML solution pen-injector pen injection 34 Units Daily.       metoprolol succinate XL (TOPROL-XL) 25 MG 24 hr tablet Take 0.5 tablets by mouth Every Night.       ranolazine (RANEXA) 500 MG 12 hr tablet 1 tablet 2 (Two) Times a Day.       rosuvastatin (CRESTOR) 20 MG tablet Take 1 tablet by mouth Every Night.       Insulin Pen Needle 32G X 6 MM misc INJECT 1 EACH INTO THE SKIN 4 (FOUR) TIMES DAILY.        Current Meds:     Current Facility-Administered Medications:     ascorbic acid (VITAMIN C) tablet 500 mg, 500 mg, Oral, Daily, Richmond Monet MD, 500 mg at 06/23/24 1216    clopidogrel (PLAVIX) tablet 75 mg, 75 mg, Oral, Daily, Richmond Monet MD, 75 mg at 06/23/24 0806    dextrose (D50W) (25 g/50 mL) IV injection 25 g, 25 g, Intravenous, Q15 Min PRN, Richmond Monet MD    dextrose (GLUTOSE) oral gel 15 g, 15 g, Oral, Q15 Min PRN, Richmond Monet MD    glucagon (GLUCAGEN) injection 1 mg, 1 mg, Intramuscular, Q15 Min PRN, Richmond Monet MD    insulin glargine (LANTUS, SEMGLEE) injection 10 Units, 10 Units, Subcutaneous, Nightly, Richmond Monet MD    insulin lispro (HUMALOG/ADMELOG) injection 2-7 Units, 2-7 Units, Subcutaneous, 4x Daily AC & at Bedtime, Richmond Monet MD, 3 Units at 06/23/24 1729    insulin lispro (HUMALOG/ADMELOG) injection 3 Units, 3 Units, Subcutaneous, TID With Meals, Richmond Monet MD, 3 Units at 06/23/24 1728    metoprolol succinate XL (TOPROL-XL) 24 hr tablet 12.5 mg, 12.5 mg, Oral, Q24H, Richmond Monet MD, 12.5 mg at 06/23/24 0805    pantoprazole (PROTONIX) EC tablet 40 mg, 40 mg, Oral, Q AM, Richmond Monet MD    Pharmacy to dose vancomycin, , Does not apply, Continuous PRN, Richmond Monet MD    piperacillin-tazobactam (ZOSYN) 3.375 g IVPB in 100 mL NS MBP (CD), 3.375 g, Intravenous, Q8H, Richmond Monet MD, Last Rate: 0 mL/hr at 06/23/24 0915, 3.375 g at 06/23/24 1607    ranolazine (RANEXA) 12 hr  "tablet 500 mg, 500 mg, Oral, Q12H, Richmond Monet MD, 500 mg at 06/23/24 0955    rosuvastatin (CRESTOR) tablet 10 mg, 10 mg, Oral, Nightly, Richmond Monet MD, 10 mg at 06/23/24 0141    sodium chloride 0.9 % infusion, 75 mL/hr, Intravenous, Continuous, Richmond Monet MD, Last Rate: 75 mL/hr at 06/23/24 1611, 75 mL/hr at 06/23/24 1611    vancomycin (VANCOCIN) 1,000 mg in sodium chloride 0.9 % 250 mL IVPB-VTB, 1,000 mg, Intravenous, Q24H, Richmond Monet MD, Last Rate: 250 mL/hr at 06/23/24 1419, 1,000 mg at 06/23/24 1419  Allergies:  Allergies   Allergen Reactions    Dulaglutide Nausea And Vomiting     Another name for trulicity    Sulfa Antibiotics Other (See Comments)     Abdominal Pain and NauseA    Uloric [Febuxostat] Other (See Comments)     Mouth sores    Latex Itching     Social History:   Social History     Tobacco Use    Smoking status: Never    Smokeless tobacco: Never   Substance Use Topics    Alcohol use: Yes     Comment: VERY RARE      Family History:  Family History   Problem Relation Age of Onset    Heart disease Mother     Coronary artery disease Mother     Heart disease Brother     Heart disease Maternal Uncle     Prostate cancer Maternal Uncle     Heart disease Maternal Grandfather     Malig Hyperthermia Neg Hx           Review of Systems  See history of present illness and past medical history.    Overall feeling better  At the time admission review system was recorded as follows:  Constitutional:  Positive for chills. Negative for fever.   Respiratory:  Negative for shortness of breath.    Cardiovascular:  Negative for chest pain and leg swelling.   Skin:  Positive for color change (erythema RLE) and wound (R great toe).   Vitals:   /53 (BP Location: Right arm, Patient Position: Sitting)   Pulse 66   Temp 98.2 °F (36.8 °C) (Oral)   Resp 16   Ht 165.1 cm (65\")   Wt 64.4 kg (141 lb 15.6 oz)   LMP  (LMP Unknown)   SpO2 100%   BMI 23.63 kg/m²   I/O:   Intake/Output Summary (Last 24 hours) at " 6/23/2024 2018  Last data filed at 6/23/2024 1610  Gross per 24 hour   Intake 1391 ml   Output 500 ml   Net 891 ml     Exam:  Patient is examined using the personal protective equipment as per guidelines from infection control for this particular patient as enacted.  Hand washing was performed before and after patient interaction.  General Appearance:    Alert, cooperative, no distress, appears stated age   Head:    Normocephalic, without obvious abnormality, atraumatic   Eyes:    PERRL, conjunctivae/corneas clear, EOM's intact, both eyes   Ears:    Normal external ear canals, both ears   Nose:   Nares normal, septum midline, mucosa normal, no drainage    or sinus tenderness   Throat:   Lips, tongue, gums normal; oral mucosa pink and moist   Neck: Supple and no adenopathy   Back:     Symmetric, no curvature, ROM normal, no CVA tenderness   Lungs:     Clear to auscultation bilaterally, respirations unlabored   Chest Wall:    No tenderness or deformity    Heart:  S1-S2 regular   Abdomen:   Soft nontender   Extremities:      Pulses:   Pulses palpable in all extremities; symmetric all extremities   Skin: Cellulitic changes and chronic wound on the plantar aspect of the proximal phalanx of the great toe   Neurologic: Alert and oriented and grossly nonfocal exam       Data Review:    I reviewed the patient's new clinical results.  Results from last 7 days   Lab Units 06/23/24  0517 06/22/24  2043   WBC 10*3/mm3 10.69 13.52*   HEMOGLOBIN g/dL 11.5* 12.0   PLATELETS 10*3/mm3 253 255     Results from last 7 days   Lab Units 06/23/24  0517 06/22/24  2043   SODIUM mmol/L 138 139   POTASSIUM mmol/L 3.8 4.0   CHLORIDE mmol/L 107 104   CO2 mmol/L 21.0* 23.5   BUN mg/dL 26* 36*   CREATININE mg/dL 1.19* 1.52*   CALCIUM mg/dL 8.7 9.6   GLUCOSE mg/dL 155* 165*     Microbiology Results (last 10 days)       Procedure Component Value - Date/Time    MRSA Screen, PCR (Inpatient) - Swab, Nares [[id number]]  (Normal) Collected: 06/23/24 3458     Lab Status: Final result Specimen: Swab from Nares Updated: 06/23/24 1717     MRSA PCR No MRSA Detected    Narrative:      The negative predictive value of this diagnostic test is high and should only be used to consider de-escalating anti-MRSA therapy. A positive result may indicate colonization with MRSA and must be correlated clinically.              Assessment:  Active Hospital Problems    Diagnosis  POA    **Diabetic ulcer of right great toe [E11.621, L97.519]  Yes      Resolved Hospital Problems   No resolved problems to display.         Plan:  See above  Jose Roberto Roman MD   6/23/2024  20:18 EDT    Parts of this note may be an electronic transcription/translation of spoken language to printed text using the Dragon dictation system.

## 2024-06-24 NOTE — CONSULTS
"Diabetes Education  Assessment/Teaching    Patient Name:  Laxmi Marcus  YOB: 1960  MRN: [id number]  Admit Date:  6/22/2024      Assessment Date:  6/24/2024  Flowsheet Row Most Recent Value   General Information     Referral From: MD order   Height 165.1 cm (65\")   Height Method Stated   Weight 64.4 kg (141 lb 15.6 oz)   Weight Method Standing scale   Education Preferences    Barriers to Learning other (comment)  [None noted.  Consultation via phone.]       Flowsheet Row Most Recent Value   DM Education Needs    Reducing Risks A1C testing  [A1c 7.2%]   Discharge Plan Home, Follow-up with PCP   Patient Response Other (comment)  [Pt states no DM educational needs at this time.  States she has been working on getting her A1c down.   Noted A1c down from 7.9%]     Electronically signed by:  Alverto Mckay RN, [facility] [place] [facility] [place]  06/24/24 12:48 EDT  "

## 2024-06-24 NOTE — PLAN OF CARE
Goal Outcome Evaluation:  VSS, right leg reddened, right foot with dark purple reddness, areas marked, medicated for pain with tylenol q 4 hrs, up with assist, voiding w/o difficulty, sliding scale coverage for elevated blood glucose, still trying to get information on heart & leg stents for MRI, falls protocol  Plan of Care Reviewed With: patient

## 2024-06-24 NOTE — PROGRESS NOTES
"Daily progress note    Primary care physician      Subjective  Awake and alert and feeling better than yesterday with no new complaints and family at bedside.  Patient wants to go home as soon as possible.    History of present illness  64-year-old white female with history of insulin-dependent diabetes mellitus hypertension hyperlipidemia and coronary artery disease who also have right big toe diabetic wound with ulcer which she follow-up with the podiatrist presented to [facility] [place] emergency with worsening wound with drainage and swelling of the right big toe and also noticed redness swelling and pain in the right lower extremity.  Patient workup in ER revealed right lower extremity cellulitis and also infected wound with ulcer with probable abscess and need to rule out osteomyelitis admitted for management.  Patient complained of chills off and on but denies any fall trauma injury or any fever.  Patient has no chest pain shortness of breath palpitation either.     REVIEW OF SYSTEMS  Constitutional:  Positive for chills. Negative for fever.   Respiratory:  Negative for shortness of breath.    Cardiovascular:  Negative for chest pain and leg swelling.   Skin:  Positive for color change (erythema RLE) and wound (R great toe).      PHYSICAL EXAM   Blood pressure 112/54, pulse 65, temperature 97.3 °F (36.3 °C), temperature source Oral, resp. rate 18, height 165.1 cm (65\"), weight 64.4 kg (141 lb 15.6 oz), SpO2 99%, not currently breastfeeding.    GENERAL: Awake and alert, no acute distress  SKIN: Warm, dry, ulcerative wound to plantar aspect of R great toe  HENT: Normocephalic, atraumatic  EYES: no scleral icterus  CV: regular rhythm, regular rate  RESPIRATORY: normal effort, lungs clear  ABDOMEN: soft, nontender, nondistended bowel sounds positive  MUSCULOSKELETAL: no deformity, erythema extending from R great toe proximally to the mid R lower leg, streaking to the R foot  NEURO: alert, moves all " extremities, follows commands     LAB RESULTS  Lab Results (last 24 hours)       Procedure Component Value Units Date/Time    POC Glucose Once [[id number]]  (Abnormal) Collected: 06/24/24 1120    Specimen: Blood Updated: 06/24/24 1123     Glucose 220 mg/dL     POC Glucose Once [[id number]]  (Abnormal) Collected: 06/24/24 0612    Specimen: Blood Updated: 06/24/24 0614     Glucose 243 mg/dL     TSH [[id number]]  (Normal) Collected: 06/24/24 0441    Specimen: Blood Updated: 06/24/24 0546     TSH 1.060 uIU/mL     Comprehensive Metabolic Panel [[id number]]  (Abnormal) Collected: 06/24/24 0441    Specimen: Blood Updated: 06/24/24 0540     Glucose 233 mg/dL      BUN 14 mg/dL      Creatinine 1.13 mg/dL      Sodium 140 mmol/L      Potassium 3.9 mmol/L      Chloride 110 mmol/L      CO2 19.3 mmol/L      Calcium 8.2 mg/dL      Total Protein 5.8 g/dL      Albumin 3.2 g/dL      ALT (SGPT) 13 U/L      AST (SGOT) 11 U/L      Alkaline Phosphatase 69 U/L      Total Bilirubin 0.4 mg/dL      Globulin 2.6 gm/dL      A/G Ratio 1.2 g/dL      BUN/Creatinine Ratio 12.4     Anion Gap 10.7 mmol/L      eGFR 53.8 mL/min/1.73     Narrative:      GFR Normal >60  Chronic Kidney Disease <60  Kidney Failure <15      Lipid Panel [[id number]]  (Abnormal) Collected: 06/24/24 0441    Specimen: Blood Updated: 06/24/24 0540     Total Cholesterol 138 mg/dL      Triglycerides 164 mg/dL      HDL Cholesterol 45 mg/dL      LDL Cholesterol  65 mg/dL      VLDL Cholesterol 28 mg/dL      LDL/HDL Ratio 1.34    Narrative:      Cholesterol Reference Ranges  (U.S. Department of Health and Human Services ATP III Classifications)    Desirable          <200 mg/dL  Borderline High    200-239 mg/dL  High Risk          >240 mg/dL      Triglyceride Reference Ranges  (U.S. Department of Health and Human Services ATP III Classifications)    Normal           <150 mg/dL  Borderline High  150-199 mg/dL  High             200-499 mg/dL  Very High        >500 mg/dL    HDL Reference  Ranges  (U.S. Department of Health and Human Services ATP III Classifications)    Low     <40 mg/dl (major risk factor for CHD)  High    >60 mg/dl ('negative' risk factor for CHD)        LDL Reference Ranges  (U.S. Department of Health and Human Services ATP III Classifications)    Optimal          <100 mg/dL  Near Optimal     100-129 mg/dL  Borderline High  130-159 mg/dL  High             160-189 mg/dL  Very High        >189 mg/dL    Hemoglobin A1c [[id number]]  (Abnormal) Collected: 06/24/24 0441    Specimen: Blood Updated: 06/24/24 0533     Hemoglobin A1C 7.20 %     Narrative:      Hemoglobin A1C Ranges:    Increased Risk for Diabetes  5.7% to 6.4%  Diabetes                     >= 6.5%  Diabetic Goal                < 7.0%    CBC & Differential [[id number]]  (Abnormal) Collected: 06/24/24 0441    Specimen: Blood Updated: 06/24/24 0522    Narrative:      The following orders were created for panel order CBC & Differential.  Procedure                               Abnormality         Status                     ---------                               -----------         ------                     CBC Auto Differential[[id number]]        Abnormal            Final result                 Please view results for these tests on the individual orders.    CBC Auto Differential [[id number]]  (Abnormal) Collected: 06/24/24 0441    Specimen: Blood Updated: 06/24/24 0522     WBC 10.90 10*3/mm3      RBC 3.53 10*6/mm3      Hemoglobin 10.3 g/dL      Hematocrit 32.0 %      MCV 90.7 fL      MCH 29.2 pg      MCHC 32.2 g/dL      RDW 11.9 %      RDW-SD 38.7 fl      MPV 9.1 fL      Platelets 251 10*3/mm3      Neutrophil % 75.9 %      Lymphocyte % 11.1 %      Monocyte % 8.6 %      Eosinophil % 3.2 %      Basophil % 0.6 %      Immature Grans % 0.6 %      Neutrophils, Absolute 8.28 10*3/mm3      Lymphocytes, Absolute 1.21 10*3/mm3      Monocytes, Absolute 0.94 10*3/mm3      Eosinophils, Absolute 0.35 10*3/mm3      Basophils, Absolute 0.06  10*3/mm3      Immature Grans, Absolute 0.06 10*3/mm3      nRBC 0.0 /100 WBC     Blood Culture - Blood, Arm, Right [[id number]]  (Normal) Collected: 06/22/24 2149    Specimen: Blood from Arm, Right Updated: 06/23/24 2215     Blood Culture No growth at 24 hours    Blood Culture - Blood, Arm, Right [[id number]]  (Normal) Collected: 06/22/24 2200    Specimen: Blood from Arm, Right Updated: 06/23/24 2215     Blood Culture No growth at 24 hours    POC Glucose Once [[id number]]  (Abnormal) Collected: 06/23/24 2150    Specimen: Blood Updated: 06/23/24 2152     Glucose 197 mg/dL     MRSA Screen, PCR (Inpatient) - Swab, Nares [[id number]]  (Normal) Collected: 06/23/24 1427    Specimen: Swab from Nares Updated: 06/23/24 1717     MRSA PCR No MRSA Detected    Narrative:      The negative predictive value of this diagnostic test is high and should only be used to consider de-escalating anti-MRSA therapy. A positive result may indicate colonization with MRSA and must be correlated clinically.    BNP [[id number]]  (Abnormal) Collected: 06/23/24 1000    Specimen: Blood Updated: 06/23/24 1624     proBNP 1,236.0 pg/mL     Narrative:      This assay is used as an aid in the diagnosis of individuals suspected of having heart failure. It can be used as an aid in the diagnosis of acute decompensated heart failure (ADHF) in patients presenting with signs and symptoms of ADHF to the emergency department (ED). In addition, NT-proBNP of <300 pg/mL indicates ADHF is not likely.    Age Range Result Interpretation  NT-proBNP Concentration (pg/mL:      <50             Positive            >450                   Gray                 300-450                    Negative             <300    50-75           Positive            >900                  Gray                300-900                  Negative            <300      >75             Positive            >1800                  Gray                300-1800                  Negative            <300    POC  Glucose Once [[id number]]  (Abnormal) Collected: 06/23/24 1618    Specimen: Blood Updated: 06/23/24 1620     Glucose 202 mg/dL           Imaging Results (Last 24 Hours)       ** No results found for the last 24 hours. **            Current Facility-Administered Medications:     acetaminophen (TYLENOL) tablet 650 mg, 650 mg, Oral, Q4H PRN, Richmond Monet MD, 650 mg at 06/24/24 1209    ascorbic acid (VITAMIN C) tablet 500 mg, 500 mg, Oral, Daily, Richmond Monet MD, 500 mg at 06/24/24 0832    clopidogrel (PLAVIX) tablet 75 mg, 75 mg, Oral, Daily, Richmond Monet MD, 75 mg at 06/24/24 0832    dextrose (D50W) (25 g/50 mL) IV injection 25 g, 25 g, Intravenous, Q15 Min PRN, Richmond Monet MD    dextrose (GLUTOSE) oral gel 15 g, 15 g, Oral, Q15 Min PRN, Richmond Monet MD    glucagon (GLUCAGEN) injection 1 mg, 1 mg, Intramuscular, Q15 Min PRN, Richmond Monet MD    insulin glargine (LANTUS, SEMGLEE) injection 10 Units, 10 Units, Subcutaneous, Nightly, Richmond Monet MD, 10 Units at 06/23/24 2154    insulin lispro (HUMALOG/ADMELOG) injection 2-7 Units, 2-7 Units, Subcutaneous, 4x Daily AC & at Bedtime, Richmond Monet MD, 3 Units at 06/24/24 1209    insulin lispro (HUMALOG/ADMELOG) injection 3 Units, 3 Units, Subcutaneous, TID With Meals, Richmond Monet MD, 3 Units at 06/24/24 1200    metoprolol succinate XL (TOPROL-XL) 24 hr tablet 12.5 mg, 12.5 mg, Oral, Q24H, Richmond Monet MD, 12.5 mg at 06/24/24 0832    ondansetron ODT (ZOFRAN-ODT) disintegrating tablet 4 mg, 4 mg, Translingual, Q6H PRN, Richmond Monet MD    pantoprazole (PROTONIX) EC tablet 40 mg, 40 mg, Oral, Q AM, Richmond Monet MD    piperacillin-tazobactam (ZOSYN) 3.375 g IVPB in 100 mL NS MBP (CD), 3.375 g, Intravenous, Q8H, Richmond Monet MD, Last Rate: 0 mL/hr at 06/23/24 0915, 3.375 g at 06/24/24 1358    ranolazine (RANEXA) 12 hr tablet 500 mg, 500 mg, Oral, Q12H, Richmond Monet MD, 500 mg at 06/24/24 0832    rosuvastatin (CRESTOR) tablet 10 mg, 10 mg, Oral, Nightly, Chiki  MD Marcio, 10 mg at 06/23/24 2142    sodium chloride 0.9 % infusion, 75 mL/hr, Intravenous, Continuous, Marcio Monet MD, Last Rate: 75 mL/hr at 06/24/24 0540, 75 mL/hr at 06/24/24 0540     ASSESSMENT  Right lower extremity cellulitis  Right big toe diabetic infected wound with probable abscess and rule out osteomyelitis  Insulin-dependent diabetic mellitus  Hypertension  Hyperlipidemia  Coronary artery disease  Gastroesophageal reflux disease    PLAN  CPM  Continue IVF   Continue IV antibiotics   Check MRI of the right foot  Infectious disease consult appreciated  Wound care nurse to follow patient  Continue home medications  Stress ulcer DVT prophylaxis  Supportive care  Discussed with nursing staff and family  Follow closely further recommendation current hospital course    MARCIO MONET MD    Copied text in this note has been reviewed and is accurate as of 06/24/24

## 2024-06-24 NOTE — PLAN OF CARE
Goal Outcome Evaluation:  Plan of Care Reviewed With: patient        Progress: no change  Outcome Evaluation: right great toe up to shin very deeply dark red/sl swollen/warm, iv kvo for abx, vdg, tylenol for pain, waiting for info from [facility] re heart stent to allow mri to be done, unable to use scd, accuchecks with insulin coverage

## 2024-06-24 NOTE — PROGRESS NOTES
"  Infectious Diseases Progress Note    Jose Roberto Roman MD     [facility]  [place]: 1 day  Patient Identification:  Name: Laxmi Marcus  Age: 64 y.o.  Sex: female  :  1960  MRN: [id number]         Primary Care Physician: Hollis Dawkins MD        Subjective: Denies any fever and chills.  Has done all the paperwork for MRI but still has not gotten it done.  Interval History: See consultation note.    Objective:    Scheduled Meds:ascorbic acid, 500 mg, Oral, Daily  clopidogrel, 75 mg, Oral, Daily  insulin glargine, 10 Units, Subcutaneous, Nightly  insulin lispro, 2-7 Units, Subcutaneous, 4x Daily AC & at Bedtime  insulin lispro, 3 Units, Subcutaneous, TID With Meals  metoprolol succinate XL, 12.5 mg, Oral, Q24H  pantoprazole, 40 mg, Oral, Q AM  piperacillin-tazobactam, 3.375 g, Intravenous, Q8H  ranolazine, 500 mg, Oral, Q12H  rosuvastatin, 10 mg, Oral, Nightly      Continuous Infusions:sodium chloride, 75 mL/hr, Last Rate: 75 mL/hr (24 0540)        Vital signs in last 24 hours:  Temp:  [97 °F (36.1 °C)-99.1 °F (37.3 °C)] 97.5 °F (36.4 °C)  Heart Rate:  [59-81] 59  Resp:  [16] 16  BP: (118-164)/(50-67) 118/50    Intake/Output:    Intake/Output Summary (Last 24 hours) at 2024 1047  Last data filed at 2024 0900  Gross per 24 hour   Intake 3120 ml   Output --   Net 3120 ml       Exam:  /50 (BP Location: Right arm, Patient Position: Sitting)   Pulse 59   Temp 97.5 °F (36.4 °C) (Oral)   Resp 16   Ht 165.1 cm (65\")   Wt 64.4 kg (141 lb 15.6 oz)   LMP  (LMP Unknown)   SpO2 100%   BMI 23.63 kg/m²   Patient is examined using the personal protective equipment as per guidelines from infection control for this particular patient as enacted.  Hand washing was performed before and after patient interaction.  General Appearance:  Awake interactive in no acute distress                          Head:    Normocephalic, without obvious abnormality, atraumatic                           " Eyes:    PERRL, conjunctivae/corneas clear, EOM's intact, both eyes                         Throat:   Lips, tongue, gums normal; oral mucosa pink and moist                           Neck:   Supple, symmetrical, trachea midline, no JVD                         Lungs:    Clear to auscultation bilaterally, respirations unlabored                 Chest Wall:    No tenderness or deformity                          Heart:  S1-S2 regular                  Abdomen:   Soft nontender                   Extremities:                         Pulses:   Pulses palpable in all extremities                            Skin: Erythema on the right leg and around the great toe noted                  Neurologic: Alert and oriented x 3       Data Review:    I reviewed the patient's new clinical results.  Results from last 7 days   Lab Units 06/24/24  0441 06/23/24  0517 06/22/24  2043   WBC 10*3/mm3 10.90* 10.69 13.52*   HEMOGLOBIN g/dL 10.3* 11.5* 12.0   PLATELETS 10*3/mm3 251 253 255     Results from last 7 days   Lab Units 06/24/24 0441 06/23/24  0517 06/22/24  2043   SODIUM mmol/L 140 138 139   POTASSIUM mmol/L 3.9 3.8 4.0   CHLORIDE mmol/L 110* 107 104   CO2 mmol/L 19.3* 21.0* 23.5   BUN mg/dL 14 26* 36*   CREATININE mg/dL 1.13* 1.19* 1.52*   CALCIUM mg/dL 8.2* 8.7 9.6   GLUCOSE mg/dL 233* 155* 165*     Microbiology Results (last 10 days)       Procedure Component Value - Date/Time    MRSA Screen, PCR (Inpatient) - Swab, Nares [[id number]]  (Normal) Collected: 06/23/24 1427    Lab Status: Final result Specimen: Swab from Nares Updated: 06/23/24 1717     MRSA PCR No MRSA Detected    Narrative:      The negative predictive value of this diagnostic test is high and should only be used to consider de-escalating anti-MRSA therapy. A positive result may indicate colonization with MRSA and must be correlated clinically.    Blood Culture - Blood, Arm, Right [[id number]]  (Normal) Collected: 06/22/24 2200    Lab Status: Preliminary result Specimen:  Blood from Arm, Right Updated: 06/23/24 2215     Blood Culture No growth at 24 hours    Blood Culture - Blood, Arm, Right [[id number]]  (Normal) Collected: 06/22/24 2149    Lab Status: Preliminary result Specimen: Blood from Arm, Right Updated: 06/23/24 2215     Blood Culture No growth at 24 hours          XR Foot 3+ View Right    Result Date: 6/22/2024  No acute osseous abnormality seen.  This report was finalized on 6/22/2024 9:50 PM by Dr. Carmelina Gutiérrez M.D on Workstation: [id number]           Assessment:    Diabetic ulcer of right great toe  1-cellulitis of the right leg due to chronic right great toe diabetic wound  2-right great toe diabetic ulcer due to infected callus with risk of underlying abscess  3-insulin-dependent diabetes  4-chronic kidney disease with acute kidney injury improving  5-negative MRSA screen  6-other diagnosis per primary team.     Recommendations/Discussions:  See my discussion and recommendation on 6/23/2024  Continue present antibiotic therapy and follow-up on MRI.    May benefit from podiatry evaluation.    Jose Roberto Roman MD  6/24/2024  10:47 EDT    Parts of this note may be an electronic transcription/translation of spoken language to printed text using the Dragon dictation system.

## 2024-06-24 NOTE — PROGRESS NOTES
Continued Stay Note  [facility]     Patient Name: Laxmi Marcus  MRN: [id number]  Today's Date: 6/24/2024    Admit Date: 6/22/2024    Plan: Home (FU for poss IV Abx at DC)   Discharge Plan       Row Name 06/24/24 1320       Plan    Plan Home (FU for poss IV Abx at DC)    Plan Comments Introduced self and role of CCP. Patient confirmed DC plan is to return to home. Stated she is independent with ADL's and uses no DMEs. Stated her family will assist as needed and will provide transportation at DC. Denies any needs/equipment.                   Discharge Codes    No documentation.                       Sara Walker, RN

## 2024-06-25 ENCOUNTER — APPOINTMENT (OUTPATIENT)
Dept: MRI IMAGING | Facility: HOSPITAL | Age: 64
End: 2024-06-25
Payer: MEDICARE

## 2024-06-25 ENCOUNTER — APPOINTMENT (OUTPATIENT)
Dept: CARDIOLOGY | Facility: HOSPITAL | Age: 64
End: 2024-06-25
Payer: MEDICARE

## 2024-06-25 LAB
ANION GAP SERPL CALCULATED.3IONS-SCNC: 9.3 MMOL/L (ref 5–15)
BASOPHILS # BLD AUTO: 0.05 10*3/MM3 (ref 0–0.2)
BASOPHILS NFR BLD AUTO: 0.7 % (ref 0–1.5)
BH CV LOWER ARTERIAL LEFT ABI RATIO: 0.92
BH CV LOWER ARTERIAL LEFT CALF RATIO: 1.04
BH CV LOWER ARTERIAL LEFT DORSALIS PEDIS SYS MAX: 145
BH CV LOWER ARTERIAL LEFT GREAT TOE SYS MAX: 102
BH CV LOWER ARTERIAL LEFT POPLITEAL SYS MAX: 164
BH CV LOWER ARTERIAL LEFT POST TIBIAL SYS MAX: 146
BH CV LOWER ARTERIAL LEFT TBI RATIO: 0.65
BH CV LOWER ARTERIAL RIGHT ABI RATIO: 0.79
BH CV LOWER ARTERIAL RIGHT CALF RATIO: 0.98
BH CV LOWER ARTERIAL RIGHT DORSALIS PEDIS SYS MAX: 125
BH CV LOWER ARTERIAL RIGHT GREAT TOE SYS MAX: 67
BH CV LOWER ARTERIAL RIGHT POPLITEAL SYS MAX: 155
BH CV LOWER ARTERIAL RIGHT POST TIBIAL SYS MAX: 115
BH CV LOWER ARTERIAL RIGHT TBI RATIO: 0.42
BUN SERPL-MCNC: 9 MG/DL (ref 8–23)
BUN/CREAT SERPL: 9.9 (ref 7–25)
CALCIUM SPEC-SCNC: 6.6 MG/DL (ref 8.6–10.5)
CHLORIDE SERPL-SCNC: 119 MMOL/L (ref 98–107)
CO2 SERPL-SCNC: 17.7 MMOL/L (ref 22–29)
CREAT SERPL-MCNC: 0.91 MG/DL (ref 0.57–1)
DEPRECATED RDW RBC AUTO: 40.2 FL (ref 37–54)
EGFRCR SERPLBLD CKD-EPI 2021: 69.7 ML/MIN/1.73
EOSINOPHIL # BLD AUTO: 0.29 10*3/MM3 (ref 0–0.4)
EOSINOPHIL NFR BLD AUTO: 4.1 % (ref 0.3–6.2)
ERYTHROCYTE [DISTWIDTH] IN BLOOD BY AUTOMATED COUNT: 12.1 % (ref 12.3–15.4)
GLUCOSE BLDC GLUCOMTR-MCNC: 117 MG/DL (ref 70–130)
GLUCOSE BLDC GLUCOMTR-MCNC: 137 MG/DL (ref 70–130)
GLUCOSE BLDC GLUCOMTR-MCNC: 218 MG/DL (ref 70–130)
GLUCOSE BLDC GLUCOMTR-MCNC: 310 MG/DL (ref 70–130)
GLUCOSE SERPL-MCNC: 108 MG/DL (ref 65–99)
HCT VFR BLD AUTO: 26.6 % (ref 34–46.6)
HGB BLD-MCNC: 8.5 G/DL (ref 12–15.9)
IMM GRANULOCYTES # BLD AUTO: 0.04 10*3/MM3 (ref 0–0.05)
IMM GRANULOCYTES NFR BLD AUTO: 0.6 % (ref 0–0.5)
LYMPHOCYTES # BLD AUTO: 1.32 10*3/MM3 (ref 0.7–3.1)
LYMPHOCYTES NFR BLD AUTO: 18.8 % (ref 19.6–45.3)
MCH RBC QN AUTO: 29.2 PG (ref 26.6–33)
MCHC RBC AUTO-ENTMCNC: 32 G/DL (ref 31.5–35.7)
MCV RBC AUTO: 91.4 FL (ref 79–97)
MONOCYTES # BLD AUTO: 0.57 10*3/MM3 (ref 0.1–0.9)
MONOCYTES NFR BLD AUTO: 8.1 % (ref 5–12)
NEUTROPHILS NFR BLD AUTO: 4.75 10*3/MM3 (ref 1.7–7)
NEUTROPHILS NFR BLD AUTO: 67.7 % (ref 42.7–76)
NRBC BLD AUTO-RTO: 0 /100 WBC (ref 0–0.2)
PLATELET # BLD AUTO: 212 10*3/MM3 (ref 140–450)
PMV BLD AUTO: 9.3 FL (ref 6–12)
POTASSIUM SERPL-SCNC: 3.3 MMOL/L (ref 3.5–5.2)
RBC # BLD AUTO: 2.91 10*6/MM3 (ref 3.77–5.28)
SODIUM SERPL-SCNC: 146 MMOL/L (ref 136–145)
UPPER ARTERIAL RIGHT ARM BRACHIAL SYS MAX: 158
WBC NRBC COR # BLD AUTO: 7.02 10*3/MM3 (ref 3.4–10.8)

## 2024-06-25 PROCEDURE — 63710000001 INSULIN GLARGINE PER 5 UNITS: Performed by: HOSPITALIST

## 2024-06-25 PROCEDURE — 73718 MRI LOWER EXTREMITY W/O DYE: CPT

## 2024-06-25 PROCEDURE — 80048 BASIC METABOLIC PNL TOTAL CA: CPT | Performed by: HOSPITALIST

## 2024-06-25 PROCEDURE — 85025 COMPLETE CBC W/AUTO DIFF WBC: CPT | Performed by: HOSPITALIST

## 2024-06-25 PROCEDURE — 93923 UPR/LXTR ART STDY 3+ LVLS: CPT | Performed by: SURGERY

## 2024-06-25 PROCEDURE — 82948 REAGENT STRIP/BLOOD GLUCOSE: CPT

## 2024-06-25 PROCEDURE — 93923 UPR/LXTR ART STDY 3+ LVLS: CPT

## 2024-06-25 PROCEDURE — 63710000001 INSULIN LISPRO (HUMAN) PER 5 UNITS: Performed by: HOSPITALIST

## 2024-06-25 PROCEDURE — 99222 1ST HOSP IP/OBS MODERATE 55: CPT | Performed by: SURGERY

## 2024-06-25 PROCEDURE — 25010000002 PIPERACILLIN SOD-TAZOBACTAM PER 1 G: Performed by: HOSPITALIST

## 2024-06-25 RX ORDER — POTASSIUM CHLORIDE 750 MG/1
10 TABLET, FILM COATED, EXTENDED RELEASE ORAL DAILY
Status: DISCONTINUED | OUTPATIENT
Start: 2024-06-25 | End: 2024-06-25

## 2024-06-25 RX ORDER — POTASSIUM CHLORIDE 750 MG/1
40 TABLET, FILM COATED, EXTENDED RELEASE ORAL ONCE
Status: COMPLETED | OUTPATIENT
Start: 2024-06-25 | End: 2024-06-25

## 2024-06-25 RX ORDER — SODIUM CHLORIDE 450 MG/100ML
75 INJECTION, SOLUTION INTRAVENOUS CONTINUOUS
Status: DISCONTINUED | OUTPATIENT
Start: 2024-06-25 | End: 2024-06-26

## 2024-06-25 RX ADMIN — PIPERACILLIN AND TAZOBACTAM 3.38 G: 3; .375 INJECTION, POWDER, FOR SOLUTION INTRAVENOUS at 13:45

## 2024-06-25 RX ADMIN — INSULIN GLARGINE 15 UNITS: 100 INJECTION, SOLUTION SUBCUTANEOUS at 22:38

## 2024-06-25 RX ADMIN — PIPERACILLIN AND TAZOBACTAM 3.38 G: 3; .375 INJECTION, POWDER, FOR SOLUTION INTRAVENOUS at 22:37

## 2024-06-25 RX ADMIN — ROSUVASTATIN CALCIUM 10 MG: 10 TABLET, FILM COATED ORAL at 22:36

## 2024-06-25 RX ADMIN — INSULIN LISPRO 5 UNITS: 100 INJECTION, SOLUTION INTRAVENOUS; SUBCUTANEOUS at 17:37

## 2024-06-25 RX ADMIN — RANOLAZINE 500 MG: 500 TABLET, FILM COATED, EXTENDED RELEASE ORAL at 22:36

## 2024-06-25 RX ADMIN — POTASSIUM CHLORIDE 40 MEQ: 750 TABLET, EXTENDED RELEASE ORAL at 17:37

## 2024-06-25 RX ADMIN — SODIUM CHLORIDE 75 ML/HR: 4.5 INJECTION, SOLUTION INTRAVENOUS at 17:37

## 2024-06-25 RX ADMIN — INSULIN LISPRO 5 UNITS: 100 INJECTION, SOLUTION INTRAVENOUS; SUBCUTANEOUS at 12:04

## 2024-06-25 RX ADMIN — RANOLAZINE 500 MG: 500 TABLET, FILM COATED, EXTENDED RELEASE ORAL at 08:09

## 2024-06-25 RX ADMIN — ACETAMINOPHEN 325MG 650 MG: 325 TABLET ORAL at 08:09

## 2024-06-25 RX ADMIN — INSULIN LISPRO 3 UNITS: 100 INJECTION, SOLUTION INTRAVENOUS; SUBCUTANEOUS at 12:05

## 2024-06-25 RX ADMIN — INSULIN LISPRO 5 UNITS: 100 INJECTION, SOLUTION INTRAVENOUS; SUBCUTANEOUS at 22:37

## 2024-06-25 RX ADMIN — OXYCODONE HYDROCHLORIDE AND ACETAMINOPHEN 500 MG: 500 TABLET ORAL at 08:09

## 2024-06-25 RX ADMIN — INSULIN LISPRO 5 UNITS: 100 INJECTION, SOLUTION INTRAVENOUS; SUBCUTANEOUS at 08:09

## 2024-06-25 RX ADMIN — ACETAMINOPHEN 325MG 650 MG: 325 TABLET ORAL at 04:17

## 2024-06-25 RX ADMIN — PIPERACILLIN AND TAZOBACTAM 3.38 G: 3; .375 INJECTION, POWDER, FOR SOLUTION INTRAVENOUS at 06:02

## 2024-06-25 RX ADMIN — CLOPIDOGREL BISULFATE 75 MG: 75 TABLET, FILM COATED ORAL at 08:09

## 2024-06-25 RX ADMIN — ACETAMINOPHEN 325MG 650 MG: 325 TABLET ORAL at 22:41

## 2024-06-25 RX ADMIN — ACETAMINOPHEN 325MG 650 MG: 325 TABLET ORAL at 17:47

## 2024-06-25 NOTE — PROGRESS NOTES
"Daily progress note    Primary care physician      Subjective  Doing same with no new complaints    History of present illness  64-year-old white female with history of insulin-dependent diabetes mellitus hypertension hyperlipidemia and coronary artery disease who also have right big toe diabetic wound with ulcer which she follow-up with the podiatrist presented to [facility] emergency with worsening wound with drainage and swelling of the right big toe and also noticed redness swelling and pain in the right lower extremity.  Patient workup in ER revealed right lower extremity cellulitis and also infected wound with ulcer with probable abscess and need to rule out osteomyelitis admitted for management.  Patient complained of chills off and on but denies any fall trauma injury or any fever.  Patient has no chest pain shortness of breath palpitation either.     REVIEW OF SYSTEMS  Unremarkable     PHYSICAL EXAM   Blood pressure 135/53, pulse 60, temperature 97.3 °F (36.3 °C), temperature source Oral, resp. rate 18, height 165.1 cm (65\"), weight 64.4 kg (141 lb 15.6 oz), SpO2 99%, not currently breastfeeding.    GENERAL: Awake and alert, no acute distress  SKIN: Warm, dry, ulcerative wound to plantar aspect of R great toe  HENT: Normocephalic, atraumatic  EYES: no scleral icterus  CV: regular rhythm, regular rate  RESPIRATORY: normal effort, lungs clear  ABDOMEN: soft, nontender, nondistended bowel sounds positive  MUSCULOSKELETAL: no deformity, erythema extending from R great toe proximally to the mid R lower leg, streaking to the R foot  NEURO: alert, moves all extremities, follows commands     LAB RESULTS  Lab Results (last 24 hours)       Procedure Component Value Units Date/Time    POC Glucose Once [[id number]]  (Abnormal) Collected: 06/25/24 1116    Specimen: Blood Updated: 06/25/24 1119     Glucose 218 mg/dL     Basic Metabolic Panel [[id number]]  (Abnormal) Collected: 06/25/24 0452    Specimen: " Blood Updated: 06/25/24 0552     Glucose 108 mg/dL      BUN 9 mg/dL      Creatinine 0.91 mg/dL      Sodium 146 mmol/L      Potassium 3.3 mmol/L      Chloride 119 mmol/L      CO2 17.7 mmol/L      Calcium 6.6 mg/dL      BUN/Creatinine Ratio 9.9     Anion Gap 9.3 mmol/L      eGFR 69.7 mL/min/1.73     Narrative:      GFR Normal >60  Chronic Kidney Disease <60  Kidney Failure <15      CBC & Differential [[id number]]  (Abnormal) Collected: 06/25/24 0452    Specimen: Blood Updated: 06/25/24 0523    Narrative:      The following orders were created for panel order CBC & Differential.  Procedure                               Abnormality         Status                     ---------                               -----------         ------                     CBC Auto Differential[[id number]]        Abnormal            Final result                 Please view results for these tests on the individual orders.    CBC Auto Differential [[id number]]  (Abnormal) Collected: 06/25/24 0452    Specimen: Blood Updated: 06/25/24 0523     WBC 7.02 10*3/mm3      RBC 2.91 10*6/mm3      Hemoglobin 8.5 g/dL      Hematocrit 26.6 %      MCV 91.4 fL      MCH 29.2 pg      MCHC 32.0 g/dL      RDW 12.1 %      RDW-SD 40.2 fl      MPV 9.3 fL      Platelets 212 10*3/mm3      Neutrophil % 67.7 %      Lymphocyte % 18.8 %      Monocyte % 8.1 %      Eosinophil % 4.1 %      Basophil % 0.7 %      Immature Grans % 0.6 %      Neutrophils, Absolute 4.75 10*3/mm3      Lymphocytes, Absolute 1.32 10*3/mm3      Monocytes, Absolute 0.57 10*3/mm3      Eosinophils, Absolute 0.29 10*3/mm3      Basophils, Absolute 0.05 10*3/mm3      Immature Grans, Absolute 0.04 10*3/mm3      nRBC 0.0 /100 WBC     POC Glucose Once [[id number]]  (Normal) Collected: 06/25/24 0420    Specimen: Blood Updated: 06/25/24 0422     Glucose 117 mg/dL     Blood Culture - Blood, Arm, Right [[id number]]  (Normal) Collected: 06/22/24 2149    Specimen: Blood from Arm, Right Updated: 06/24/24 2215     Blood  Culture No growth at 2 days    Blood Culture - Blood, Arm, Right [[id number]]  (Normal) Collected: 06/22/24 2200    Specimen: Blood from Arm, Right Updated: 06/24/24 2215     Blood Culture No growth at 2 days    POC Glucose Once [[id number]]  (Abnormal) Collected: 06/24/24 2056    Specimen: Blood Updated: 06/24/24 2057     Glucose 199 mg/dL     POC Glucose Once [[id number]]  (Abnormal) Collected: 06/24/24 1606    Specimen: Blood Updated: 06/24/24 1607     Glucose 202 mg/dL           Imaging Results (Last 24 Hours)       ** No results found for the last 24 hours. **            Current Facility-Administered Medications:     acetaminophen (TYLENOL) tablet 650 mg, 650 mg, Oral, Q4H PRN, Richmond Monet MD, 650 mg at 06/25/24 0809    ascorbic acid (VITAMIN C) tablet 500 mg, 500 mg, Oral, Daily, Richmond Monet MD, 500 mg at 06/25/24 0809    clopidogrel (PLAVIX) tablet 75 mg, 75 mg, Oral, Daily, Richmond Monet MD, 75 mg at 06/25/24 0809    dextrose (D50W) (25 g/50 mL) IV injection 25 g, 25 g, Intravenous, Q15 Min PRN, Richmond Monet MD    dextrose (GLUTOSE) oral gel 15 g, 15 g, Oral, Q15 Min PRN, Richmond Moent MD    glucagon (GLUCAGEN) injection 1 mg, 1 mg, Intramuscular, Q15 Min PRN, Richmond Monet MD    insulin glargine (LANTUS, SEMGLEE) injection 15 Units, 15 Units, Subcutaneous, Nightly, Richmond Monet MD, 15 Units at 06/24/24 2141    insulin lispro (HUMALOG/ADMELOG) injection 2-7 Units, 2-7 Units, Subcutaneous, 4x Daily AC & at Bedtime, Richmond Monet MD, 3 Units at 06/25/24 1205    insulin lispro (HUMALOG/ADMELOG) injection 5 Units, 5 Units, Subcutaneous, TID With Meals, Richmond Monet MD, 5 Units at 06/25/24 1204    metoprolol succinate XL (TOPROL-XL) 24 hr tablet 12.5 mg, 12.5 mg, Oral, Q24H, Richmond Monet MD, 12.5 mg at 06/24/24 0832    ondansetron ODT (ZOFRAN-ODT) disintegrating tablet 4 mg, 4 mg, Translingual, Q6H PRN, Richmond Monet MD    pantoprazole (PROTONIX) EC tablet 40 mg, 40 mg, Oral, Q AM, Richmond Monet MD     piperacillin-tazobactam (ZOSYN) 3.375 g IVPB in 100 mL NS MBP (CD), 3.375 g, Intravenous, Q8H, Marcio Monet MD, Last Rate: 0 mL/hr at 06/23/24 0915, 3.375 g at 06/25/24 1345    ranolazine (RANEXA) 12 hr tablet 500 mg, 500 mg, Oral, Q12H, Marcio Monet MD, 500 mg at 06/25/24 0809    rosuvastatin (CRESTOR) tablet 10 mg, 10 mg, Oral, Nightly, Marcio Monet MD, 10 mg at 06/24/24 2140    sodium chloride 0.9 % infusion, 50 mL/hr, Intravenous, Continuous, Marcio Monet MD, Last Rate: 50 mL/hr at 06/24/24 2155, 50 mL/hr at 06/24/24 2155     ASSESSMENT  Right lower extremity cellulitis  Right big toe diabetic infected wound with probable abscess and rule out osteomyelitis  Insulin-dependent diabetic mellitus  Hypertension  Hyperlipidemia  Coronary artery disease  Gastroesophageal reflux disease    PLAN  CPM  Continue IVF   Continue IV antibiotics   MRI of the right foot pending  Vascular surgery consult and check FERNANDA right lower extremity  Infectious disease to follow patient  Continue home medications  Stress ulcer DVT prophylaxis  Supportive care  Discussed with nursing staff and family  Follow closely further recommendation current hospital course    MARCIO MONET MD    Copied text in this note has been reviewed and is accurate as of 06/25/24

## 2024-06-25 NOTE — PLAN OF CARE
Goal Outcome Evaluation:           Progress: improving  Outcome Evaluation: VSS. K+ 3.3, will get a one time dose of 40mEq of Potassium chloride PO. IVF switched to 1/2 NS @ 100. Pt still waiting on MRI. Pt did get doppler of R lower extremity done. Vascular surgery consult placed. IV antibiotics as ordered. Redness to R shin has decreased per pt. ACHS with SSI. Up ad edd. Voiding freely. No sticks to LUE. Pt may discharge tomorrow pending MRI results.

## 2024-06-25 NOTE — PLAN OF CARE
Goal Outcome Evaluation:  Plan of Care Reviewed With: patient        Progress: improving  Outcome Evaluation: Plan is to discharge today after MRI is complete. She had vasc and heart procedures and records had to be obtained. Info has been faxed to MRI. Her right great toe and lower leg with reddened/purplish discoloration. Pt states great toe is painful. Medicated with tylenol. Afebrile. VS stable. Continues on pipercillin. She takes her meds whole with applesauce. She is up ad edd. We are monitoring blood sugars. Pt is on plavix--unable to use scds

## 2024-06-25 NOTE — PROGRESS NOTES
"  Infectious Diseases Progress Note    Jose Roberto Roman MD     [facility]  [place]: 2 days  Patient Identification:  Name: Laxmi Marcus  Age: 64 y.o.  Sex: female  :  1960  MRN: [id number]         Primary Care Physician: Hollis Dawkins MD        Subjective: Doing about the same.  Interval History: See consultation note.    Objective:    Scheduled Meds:ascorbic acid, 500 mg, Oral, Daily  clopidogrel, 75 mg, Oral, Daily  insulin glargine, 15 Units, Subcutaneous, Nightly  insulin lispro, 2-7 Units, Subcutaneous, 4x Daily AC & at Bedtime  insulin lispro, 5 Units, Subcutaneous, TID With Meals  metoprolol succinate XL, 12.5 mg, Oral, Q24H  pantoprazole, 40 mg, Oral, Q AM  piperacillin-tazobactam, 3.375 g, Intravenous, Q8H  ranolazine, 500 mg, Oral, Q12H  rosuvastatin, 10 mg, Oral, Nightly      Continuous Infusions:sodium chloride, 50 mL/hr, Last Rate: 50 mL/hr (24)        Vital signs in last 24 hours:  Temp:  [97.3 °F (36.3 °C)-97.9 °F (36.6 °C)] 97.5 °F (36.4 °C)  Heart Rate:  [58-65] 58  Resp:  [18] 18  BP: (112-140)/(52-57) 124/57    Intake/Output:    Intake/Output Summary (Last 24 hours) at 2024 0918  Last data filed at 2024 0602  Gross per 24 hour   Intake 1253.33 ml   Output --   Net 1253.33 ml       Exam:  /57 (BP Location: Right arm, Patient Position: Lying)   Pulse 58   Temp 97.5 °F (36.4 °C) (Oral)   Resp 18   Ht 165.1 cm (65\")   Wt 64.4 kg (141 lb 15.6 oz)   LMP  (LMP Unknown)   SpO2 99%   BMI 23.63 kg/m²   Patient is examined using the personal protective equipment as per guidelines from infection control for this particular patient as enacted.  Hand washing was performed before and after patient interaction.  General Appearance:  Awake interactive in no acute distress                          Head:    Normocephalic, without obvious abnormality, atraumatic                           Eyes:    PERRL, conjunctivae/corneas clear, EOM's intact, both eyes        "                  Throat:   Lips, tongue, gums normal; oral mucosa pink and moist                           Neck:   Supple, symmetrical, trachea midline, no JVD                         Lungs:    Clear to auscultation bilaterally, respirations unlabored                 Chest Wall:    No tenderness or deformity                          Heart:  S1-S2 regular                  Abdomen:   Soft nontender                   Extremities:                         Pulses:   Pulses palpable in all extremities                            Skin: Erythema on the right leg and around the great toe noted                  Neurologic: Alert and oriented x 3       Data Review:    I reviewed the patient's new clinical results.  Results from last 7 days   Lab Units 06/25/24  0452 06/24/24 0441 06/23/24  0517 06/22/24  2043   WBC 10*3/mm3 7.02 10.90* 10.69 13.52*   HEMOGLOBIN g/dL 8.5* 10.3* 11.5* 12.0   PLATELETS 10*3/mm3 212 251 253 255     Results from last 7 days   Lab Units 06/25/24  0452 06/24/24 0441 06/23/24 0517 06/22/24  2043   SODIUM mmol/L 146* 140 138 139   POTASSIUM mmol/L 3.3* 3.9 3.8 4.0   CHLORIDE mmol/L 119* 110* 107 104   CO2 mmol/L 17.7* 19.3* 21.0* 23.5   BUN mg/dL 9 14 26* 36*   CREATININE mg/dL 0.91 1.13* 1.19* 1.52*   CALCIUM mg/dL 6.6* 8.2* 8.7 9.6   GLUCOSE mg/dL 108* 233* 155* 165*     Microbiology Results (last 10 days)       Procedure Component Value - Date/Time    MRSA Screen, PCR (Inpatient) - Swab, Nares [[id number]]  (Normal) Collected: 06/23/24 1427    Lab Status: Final result Specimen: Swab from Nares Updated: 06/23/24 1717     MRSA PCR No MRSA Detected    Narrative:      The negative predictive value of this diagnostic test is high and should only be used to consider de-escalating anti-MRSA therapy. A positive result may indicate colonization with MRSA and must be correlated clinically.    Blood Culture - Blood, Arm, Right [[id number]]  (Normal) Collected: 06/22/24 2200    Lab Status: Preliminary result  Specimen: Blood from Arm, Right Updated: 06/24/24 2215     Blood Culture No growth at 2 days    Blood Culture - Blood, Arm, Right [[id number]]  (Normal) Collected: 06/22/24 2149    Lab Status: Preliminary result Specimen: Blood from Arm, Right Updated: 06/24/24 2215     Blood Culture No growth at 2 days          XR Foot 3+ View Right    Result Date: 6/22/2024  No acute osseous abnormality seen.  This report was finalized on 6/22/2024 9:50 PM by Dr. Carmelina Gutiérrez M.D on Workstation: [id number]           Assessment:    Diabetic ulcer of right great toe  1-cellulitis of the right leg due to chronic right great toe diabetic wound  2-right great toe diabetic ulcer due to infected callus with risk of underlying abscess  3-insulin-dependent diabetes  4-chronic kidney disease with acute kidney injury improving  5-negative MRSA screen  6-other diagnosis per primary team.     Recommendations/Discussions:  See my discussion and recommendation on 6/23/2024  Continue present antibiotic therapy and follow-up on MRI.    May benefit from podiatry evaluation.    Jose Roberto Roman MD  6/25/2024  09:18 EDT    Parts of this note may be an electronic transcription/translation of spoken language to printed text using the Dragon dictation system.

## 2024-06-26 LAB
ANION GAP SERPL CALCULATED.3IONS-SCNC: 10 MMOL/L (ref 5–15)
BASOPHILS # BLD AUTO: 0.08 10*3/MM3 (ref 0–0.2)
BASOPHILS NFR BLD AUTO: 1 % (ref 0–1.5)
BUN SERPL-MCNC: 11 MG/DL (ref 8–23)
BUN/CREAT SERPL: 11 (ref 7–25)
CALCIUM SPEC-SCNC: 8 MG/DL (ref 8.6–10.5)
CHLORIDE SERPL-SCNC: 113 MMOL/L (ref 98–107)
CO2 SERPL-SCNC: 20 MMOL/L (ref 22–29)
CREAT SERPL-MCNC: 1 MG/DL (ref 0.57–1)
DEPRECATED RDW RBC AUTO: 38.8 FL (ref 37–54)
EGFRCR SERPLBLD CKD-EPI 2021: 62.3 ML/MIN/1.73
EOSINOPHIL # BLD AUTO: 0.35 10*3/MM3 (ref 0–0.4)
EOSINOPHIL NFR BLD AUTO: 4.2 % (ref 0.3–6.2)
ERYTHROCYTE [DISTWIDTH] IN BLOOD BY AUTOMATED COUNT: 11.9 % (ref 12.3–15.4)
GLUCOSE BLDC GLUCOMTR-MCNC: 109 MG/DL (ref 70–130)
GLUCOSE BLDC GLUCOMTR-MCNC: 116 MG/DL (ref 70–130)
GLUCOSE BLDC GLUCOMTR-MCNC: 117 MG/DL (ref 70–130)
GLUCOSE BLDC GLUCOMTR-MCNC: 119 MG/DL (ref 70–130)
GLUCOSE BLDC GLUCOMTR-MCNC: 156 MG/DL (ref 70–130)
GLUCOSE BLDC GLUCOMTR-MCNC: 170 MG/DL (ref 70–130)
GLUCOSE BLDC GLUCOMTR-MCNC: 247 MG/DL (ref 70–130)
GLUCOSE BLDC GLUCOMTR-MCNC: 303 MG/DL (ref 70–130)
GLUCOSE BLDC GLUCOMTR-MCNC: 488 MG/DL (ref 70–130)
GLUCOSE SERPL-MCNC: 116 MG/DL (ref 65–99)
HCT VFR BLD AUTO: 33.2 % (ref 34–46.6)
HGB BLD-MCNC: 10.8 G/DL (ref 12–15.9)
IMM GRANULOCYTES # BLD AUTO: 0.04 10*3/MM3 (ref 0–0.05)
IMM GRANULOCYTES NFR BLD AUTO: 0.5 % (ref 0–0.5)
LYMPHOCYTES # BLD AUTO: 1.63 10*3/MM3 (ref 0.7–3.1)
LYMPHOCYTES NFR BLD AUTO: 19.4 % (ref 19.6–45.3)
MCH RBC QN AUTO: 29.3 PG (ref 26.6–33)
MCHC RBC AUTO-ENTMCNC: 32.5 G/DL (ref 31.5–35.7)
MCV RBC AUTO: 90 FL (ref 79–97)
MONOCYTES # BLD AUTO: 0.61 10*3/MM3 (ref 0.1–0.9)
MONOCYTES NFR BLD AUTO: 7.3 % (ref 5–12)
NEUTROPHILS NFR BLD AUTO: 5.69 10*3/MM3 (ref 1.7–7)
NEUTROPHILS NFR BLD AUTO: 67.6 % (ref 42.7–76)
NRBC BLD AUTO-RTO: 0 /100 WBC (ref 0–0.2)
PLATELET # BLD AUTO: 290 10*3/MM3 (ref 140–450)
PMV BLD AUTO: 8.9 FL (ref 6–12)
POTASSIUM SERPL-SCNC: 3.7 MMOL/L (ref 3.5–5.2)
RBC # BLD AUTO: 3.69 10*6/MM3 (ref 3.77–5.28)
SODIUM SERPL-SCNC: 143 MMOL/L (ref 136–145)
WBC NRBC COR # BLD AUTO: 8.4 10*3/MM3 (ref 3.4–10.8)

## 2024-06-26 PROCEDURE — 99232 SBSQ HOSP IP/OBS MODERATE 35: CPT | Performed by: SURGERY

## 2024-06-26 PROCEDURE — 80048 BASIC METABOLIC PNL TOTAL CA: CPT | Performed by: HOSPITALIST

## 2024-06-26 PROCEDURE — 25010000002 PIPERACILLIN SOD-TAZOBACTAM PER 1 G: Performed by: HOSPITALIST

## 2024-06-26 PROCEDURE — 85025 COMPLETE CBC W/AUTO DIFF WBC: CPT | Performed by: HOSPITALIST

## 2024-06-26 PROCEDURE — 82948 REAGENT STRIP/BLOOD GLUCOSE: CPT

## 2024-06-26 PROCEDURE — 63710000001 INSULIN GLARGINE PER 5 UNITS: Performed by: HOSPITALIST

## 2024-06-26 PROCEDURE — 63710000001 ONDANSETRON ODT 4 MG TABLET DISPERSIBLE: Performed by: HOSPITALIST

## 2024-06-26 PROCEDURE — 63710000001 INSULIN LISPRO (HUMAN) PER 5 UNITS: Performed by: HOSPITALIST

## 2024-06-26 RX ORDER — INSULIN LISPRO 100 [IU]/ML
2 INJECTION, SOLUTION INTRAVENOUS; SUBCUTANEOUS ONCE
Status: COMPLETED | OUTPATIENT
Start: 2024-06-27 | End: 2024-06-27

## 2024-06-26 RX ADMIN — METOPROLOL SUCCINATE 12.5 MG: 25 TABLET, EXTENDED RELEASE ORAL at 09:05

## 2024-06-26 RX ADMIN — RANOLAZINE 500 MG: 500 TABLET, FILM COATED, EXTENDED RELEASE ORAL at 20:48

## 2024-06-26 RX ADMIN — PIPERACILLIN AND TAZOBACTAM 3.38 G: 3; .375 INJECTION, POWDER, FOR SOLUTION INTRAVENOUS at 14:32

## 2024-06-26 RX ADMIN — ACETAMINOPHEN 325MG 650 MG: 325 TABLET ORAL at 17:24

## 2024-06-26 RX ADMIN — ROSUVASTATIN CALCIUM 10 MG: 10 TABLET, FILM COATED ORAL at 20:48

## 2024-06-26 RX ADMIN — INSULIN LISPRO 5 UNITS: 100 INJECTION, SOLUTION INTRAVENOUS; SUBCUTANEOUS at 17:24

## 2024-06-26 RX ADMIN — PIPERACILLIN AND TAZOBACTAM 3.38 G: 3; .375 INJECTION, POWDER, FOR SOLUTION INTRAVENOUS at 22:43

## 2024-06-26 RX ADMIN — ONDANSETRON 4 MG: 4 TABLET, ORALLY DISINTEGRATING ORAL at 09:05

## 2024-06-26 RX ADMIN — RANOLAZINE 500 MG: 500 TABLET, FILM COATED, EXTENDED RELEASE ORAL at 10:13

## 2024-06-26 RX ADMIN — INSULIN LISPRO 7 UNITS: 100 INJECTION, SOLUTION INTRAVENOUS; SUBCUTANEOUS at 21:12

## 2024-06-26 RX ADMIN — INSULIN GLARGINE 15 UNITS: 100 INJECTION, SOLUTION SUBCUTANEOUS at 21:12

## 2024-06-26 RX ADMIN — PIPERACILLIN AND TAZOBACTAM 3.38 G: 3; .375 INJECTION, POWDER, FOR SOLUTION INTRAVENOUS at 06:53

## 2024-06-26 RX ADMIN — CLOPIDOGREL BISULFATE 75 MG: 75 TABLET, FILM COATED ORAL at 15:48

## 2024-06-26 NOTE — PLAN OF CARE
Goal Outcome Evaluation:  Plan of Care Reviewed With: patient        Progress: no change  Outcome Evaluation: right great toe red and swollen, right shin redness almost gone, denies pain at this time, iv abx given as ordered, ivf d/c'd and now just running at KVO, up ad edd, voiding well, states she had a bm this morning that was loose, worked hard today to get her MRI read - and then Dr Harrington was made aware the results were in the computer, he is planning on performing surgery tomorrow, pt tolerating diet today- will be NPO after midnight, blood sugars monitored and insulin given as ordered, vss and afebrile

## 2024-06-26 NOTE — NURSING NOTE
I called radiology/film library and spoke with Jaye.  I again asked her if she could find the report for this patient's MRI and have one of the radiologists here read it.  She stated she would try to get a radiologist to read it.

## 2024-06-26 NOTE — CONSULTS
Patient Name: Laxmi Marcus Account #: [id number]    MRN: [id number] Admission Date: 6/22/2024      Consulting Service: Vascular Surgery Date of Evaluation: June 25, 2024    Requesting Provider: ROSALVA Monet MD    CHIEF COMPLAINT: Right first toe diabetic foot infection  HPI: Laxmi Marcus is a 64 y.o. female is being seen for a consultation and evaluation/management of diabetic foot infection involving the right first toe PAD with ulceration.  Awaiting MRI to see if there is bone involvement.  If there is I would recommend amputation primarily.  If not then debridement would be planned.  She appears to have adequate perfusion to heal based on testing.  She is responding favorably to antibiotic therapy.  Will order her MRI stat as it has not yet been done as she has been in the hospital since Saturday per her report.  She is frustrated and I understand that.  Will see if we can get this moved along for her.    PAST MEDICAL HISTORY:   Past Medical History:   Diagnosis Date    Awareness under anesthesia     Breast cancer 2021    Left    CKD (chronic kidney disease)     stage 3    Coronary artery disease     COVID-19 09/05/2021    Diabetes mellitus type 2 with complications, uncontrolled     Frequent UTI     GERD (gastroesophageal reflux disease)     Gout     Heart murmur     History of cancer chemotherapy 12/02/2021    LAST DOSE    History of pneumonia     History of radiation therapy     Hyperlipidemia     Hypertension     NSTEMI (non-ST elevated myocardial infarction) 2019    Obesity     Recent bereavement     SPOUSE 7/2022    Sinus congestion     Slow to wake up after anesthesia     Vitamin B12 deficiency       PAST SURGICAL HISTORY:   Past Surgical History:   Procedure Laterality Date    APPENDECTOMY      BREAST LUMPECTOMY  2006    BREAST LUMPECTOMY WITH SENTINEL NODE BIOPSY AND AXILLARY NODE DISSECTION      CARDIAC CATHETERIZATION  2019    new de nova Ostial Ramus 95% stenosis, severe multivessel diabetic coronary  vasculopathy with small vessels, occluded 3 of 4 bypass conduits, LIMA to LAD patent, all 3 vein grafts are occluded, PTCA/EMMANUEL to ostial Ramus 95% stenosis, patent OM1 and proximal and mid RCA stents x 3 from 2/2019, preserved LVSF, normal LVEDP, no significant AS or MR      CHOLECYSTECTOMY  2006    CORONARY ARTERY BYPASS GRAFT  2017    x4 with LIMA to mLAD, SVG to diagonal, SVG to OM, SVG to distal PDA      CORONARY STENT PLACEMENT  12/2019    Drug eluting stent placement--2.0 x 26 mm Resolute Pierre EMMANUEL to ostial Ramus      CORONARY STENT PLACEMENT  02/2019    Drug eluting stent--2.25 x 38 mm and 2.5 x 15 mm Resolute Perry EMMANUEL to proximal mid RCA, 2.0 x 26 mm Resolute Perry MEMANUEL to mid circumflex/proximal OM1    DENTAL PROCEDURE      HYSTERECTOMY  2009    MASTECTOMY Left 1/4/2022    Procedure: LEFT MODIFIED RADICAL MASTECTOMY;  Surgeon: Indgio Red MD;  Location: [facility];  Service: General;  Laterality: Left;    TONSILLECTOMY  1961    age 1    VENOUS ACCESS DEVICE (PORT) INSERTION Right 10/1/2021    Procedure: INSERTION VENOUS ACCESS DEVICE;  Surgeon: Indigo Red MD;  Location: [facility], [place], operated by [facility];  Service: General;  Laterality: Right;    VENOUS ACCESS DEVICE (PORT) REMOVAL Right 9/13/2022    Procedure: REMOVAL VENOUS ACCESS DEVICE;  Surgeon: Indigo Red MD;  Location: [facility] OR;  Service: General;  Laterality: Right;      FAMILY HISTORY:   Family History   Problem Relation Age of Onset    Heart disease Mother     Coronary artery disease Mother     Heart disease Brother     Heart disease Maternal Uncle     Prostate cancer Maternal Uncle     Heart disease Maternal Grandfather     Malig Hyperthermia Neg Hx       SOCIAL HISTORY:   Social History     Tobacco Use    Smoking status: Never    Smokeless tobacco: Never   Vaping Use    Vaping status: Never Used   Substance Use Topics    Alcohol use: Yes     Comment: VERY RARE    Drug use: Never      MEDICATIONS:   No current  facility-administered medications on file prior to encounter.     Current Outpatient Medications on File Prior to Encounter   Medication Sig Dispense Refill    ACCU-CHEK FASTCLIX LANCETS misc TO CHECK 5 TIMES DAILY 500 each 2    ascorbic acid (VITAMIN C) 250 MG tablet Take 2 tablets by mouth Daily.      BIOTIN PO Take 1 tablet by mouth Daily.      clopidogrel (PLAVIX) 75 MG tablet Take 1 tablet by mouth Daily.      Collagen Hydrolysate powder Use 1 Sachet Daily.      glucose blood (ACCU-CHEK SMARTVIEW) test strip Use as instructed 5 TIMES DAILY 450 each 2    Insulin Aspart, w/Niacinamide, (FIASP FLEXTOUCH SC) Inject 15 Units under the skin into the appropriate area as directed 4 (Four) Times a Day. 15-25 units 4 times daily sliding scale      Insulin Degludec (Tresiba FlexTouch) 200 UNIT/ML solution pen-injector pen injection 34 Units Daily.      metoprolol succinate XL (TOPROL-XL) 25 MG 24 hr tablet Take 0.5 tablets by mouth Every Night.      ranolazine (RANEXA) 500 MG 12 hr tablet 1 tablet 2 (Two) Times a Day.      rosuvastatin (CRESTOR) 20 MG tablet Take 1 tablet by mouth Every Night.      Insulin Pen Needle 32G X 6 MM misc INJECT 1 EACH INTO THE SKIN 4 (FOUR) TIMES DAILY.               ALLERGIES: Dulaglutide, Sulfa antibiotics, Uloric [febuxostat], and Latex   COMPLETE REVIEW OF SYSTEMS:     ENT ROS: negative  Cardiovascular ROS: no chest pain or dyspnea on exertion  Respiratory ROS: no cough, shortness of breath, or wheezing  Gastrointestinal ROS: no abdominal pain, change in bowel habits, or black or bloody stools  Neurological ROS: no TIA or stroke symptoms  Genito-Urinary ROS: no dysuria, trouble voiding, or hematuria  Musculoskeletal ROS: positive for -pain and cellulitis of the right foot  Dermatological ROS: negative  Psychological ROS: negative      PHYSICAL EXAM:   Patient Vitals for the past 24 hrs:   BP Temp Temp src Pulse Resp SpO2   06/25/24 1856 120/57 97.4 °F (36.3 °C) Oral 63 16 99 %   06/25/24  1003 135/53 97.3 °F (36.3 °C) Oral 60 18 99 %   06/25/24 0439 124/57 97.5 °F (36.4 °C) Oral 58 18 99 %        General appearance: oriented to person, place, and time and in mild to moderate distress.  Neurological exam reveals alert, oriented, normal speech, no focal findings or movement disorder noted.  ENT exam reveals - ENT exam normal, no neck nodes or sinus tenderness.  CVS exam: normal rate, regular rhythm, normal S1, S2, no murmurs, rubs, clicks or gallops.  Chest: clear to auscultation, no wheezes, rales or rhonchi, symmetric air entry.  Abdominal exam: soft, nontender, nondistended, no masses or organomegaly.  Examination of the feet reveals warm, good capillary refill.  Right foot cellulitis and right shin cellulitis.  Area of the right toe pad with mal perforans type ulceration.        LABS:      Results Review:       I reviewed the patient's new clinical results.  Results from last 7 days   Lab Units 06/25/24  0452 06/24/24 0441 06/23/24  0517 06/22/24  2043   WBC 10*3/mm3 7.02 10.90* 10.69 13.52*   HEMOGLOBIN g/dL 8.5* 10.3* 11.5* 12.0   PLATELETS 10*3/mm3 212 251 253 255     Results from last 7 days   Lab Units 06/25/24  0452 06/24/24 0441 06/23/24  0517 06/22/24  2043   SODIUM mmol/L 146* 140 138 139   POTASSIUM mmol/L 3.3* 3.9 3.8 4.0   CHLORIDE mmol/L 119* 110* 107 104   CO2 mmol/L 17.7* 19.3* 21.0* 23.5   BUN mg/dL 9 14 26* 36*   CREATININE mg/dL 0.91 1.13* 1.19* 1.52*   GLUCOSE mg/dL 108* 233* 155* 165*   Estimated Creatinine Clearance: 61.8 mL/min (by C-G formula based on SCr of 0.91 mg/dL).  Results from last 7 days   Lab Units 06/25/24  0452 06/24/24 0441 06/23/24  0517 06/22/24  2043   CALCIUM mg/dL 6.6* 8.2* 8.7 9.6   ALBUMIN g/dL  --  3.2*  --  4.0           The following radiologic or non-invasive studies have been reviewed by me: Lower extremity arterial testing reviewed with the review of the waveforms showing an FERNANDA of 0.79 and toe pressure 0.42.    Review of x-ray of the foot also.   No osteo seen    Active Hospital Problems    Diagnosis  POA    **Diabetic ulcer of right great toe [E11.621, L97.519]  Yes      Resolved Hospital Problems   No resolved problems to display.         ASSESSMENT/PLAN: 64 y.o. female with diabetic foot infection of the great toe and the foot and right leg.  She is improving with antibiotics.  She needs some debridement of the toe versus amputation based on findings on the MRI which she has not yet been done.  Patient is frustrated by the delay in getting the study and we will change to stat on her behalf.  I reviewed the rest of her testing and that she appears to have adequate healing potential based on the lower extremity arterial testing.  If the MRI is negative then debridement of the toe tuft is probably the best answer if the MRI is positive then I would recommend amputation but she may decide to go with debridement and hyperbarics.  It will be up to her on that situation should the MRI be positive.  It is unlikely that revascularization will be needed but we will watch along.      I discussed the plan with the patient and she is agreeable to the plan of care at this point. Thank you for this consult.     Josué Ortega MD   06/25/24

## 2024-06-26 NOTE — PROGRESS NOTES
Name: Laxmi Marcus ADMIT: 2024   : 1960  PCP: Hollis Dawkins MD    MRN: [id number] LOS: 3 days   AGE/SEX: 64 y.o. female  ROOM:  [facility] P676/1     Vascular Surgery Progress Note    Patient seen in her room today.  We discussed the results of MRI.  Does not appear to have osteomyelitis.  She does still have fairly large callus with what looks like some necrosis around the edges.  Agree with Dr. Arechiga the patient will need debridement.  Because it took so long to get the MRI read today, it will be difficult to get patient on the add-on schedule for surgery at a reasonable hour.  After discussing with the patient we decided she would go ahead and eat today and we would schedule her for debridement tomorrow.  Details of this procedure including risks and benefits and alternatives were discussed with the patient and she verbalized understanding and agrees to proceed.  Will also ask [facility] [facility] to see patient for diabetic shoe.  NPO after midnight.         Loyd Harrington II, MD  24  15:40 EDT  Office Number [phone]    [facility]

## 2024-06-26 NOTE — PLAN OF CARE
Goal Outcome Evaluation:  Plan of Care Reviewed With: patient        Progress: no change  Outcome Evaluation: Right great toe continues red/purplish in color. Dry callus area is open to air. Mild swelling of foot. Mild redness of right lower extremity. Continues on zosyn. Dr Ortega ([facility]) came to see pt last night at wrote a STAT fo MRI so that it would get done. This has not been read but Dr Ortega put in order for NPO status after MN. When pt was informed of order, she became extremely anxious and upset. She was crying, cussing and calling her family. She states she will not go to surgery. Reassured pt to wait to talk to Dr Ortega in am about plan. She refuses any anxiety meds. She was unable to sleep. Monitoring blood sugar. Pt is up ad edd. Afebrile and VS stable.

## 2024-06-26 NOTE — NURSING NOTE
I called and spoke with radiology department to see if this patient's MRI could be read ASAP.  The tech looked in the computer and noticed it was ordered stat, so she stated she would try to remind the radiologist that it is stat to try to get it read more quickly.

## 2024-06-26 NOTE — PROGRESS NOTES
"  Infectious Diseases Progress Note    Jose Roberto Roman MD     [facility]  Los: 3 days  Patient Identification:  Name: Laxmi Marcus  Age: 64 y.o.  Sex: female  :  1960  MRN: [id number]         Primary Care Physician: Hollis Dawkins MD        Subjective: Doing about the same.  Frustrated about the delays in MRI.  Interval History: See consultation note.  Vascular studies and vascular surgery consultation recommendations reviewed  2024 MRI of the right foot performed results are still pending at the time of creation of this document.    Objective:    Scheduled Meds:ascorbic acid, 500 mg, Oral, Daily  clopidogrel, 75 mg, Oral, Daily  insulin glargine, 15 Units, Subcutaneous, Nightly  insulin lispro, 2-7 Units, Subcutaneous, 4x Daily AC & at Bedtime  insulin lispro, 5 Units, Subcutaneous, TID With Meals  metoprolol succinate XL, 12.5 mg, Oral, Q24H  pantoprazole, 40 mg, Oral, Q AM  piperacillin-tazobactam, 3.375 g, Intravenous, Q8H  ranolazine, 500 mg, Oral, Q12H  rosuvastatin, 10 mg, Oral, Nightly      Continuous Infusions:sodium chloride, 75 mL/hr, Last Rate: 75 mL/hr (24 1737)        Vital signs in last 24 hours:  Temp:  [97 °F (36.1 °C)-97.8 °F (36.6 °C)] 97.7 °F (36.5 °C)  Heart Rate:  [60-67] 62  Resp:  [16] 16  BP: (120-139)/(51-57) 139/51    Intake/Output:    Intake/Output Summary (Last 24 hours) at 2024 1012  Last data filed at 2024 0653  Gross per 24 hour   Intake 200 ml   Output --   Net 200 ml       Exam:  /51 (BP Location: Right arm, Patient Position: Sitting)   Pulse 62   Temp 97.7 °F (36.5 °C) (Oral)   Resp 16   Ht 165.1 cm (65\")   Wt 64.4 kg (141 lb 15.6 oz)   LMP  (LMP Unknown)   SpO2 100%   BMI 23.63 kg/m²   Patient is examined using the personal protective equipment as per guidelines from infection control for this particular patient as enacted.  Hand washing was performed before and after patient interaction.  General Appearance:  Awake " interactive in no acute distress                          Head:    Normocephalic, without obvious abnormality, atraumatic                           Eyes:    PERRL, conjunctivae/corneas clear, EOM's intact, both eyes                         Throat:   Lips, tongue, gums normal; oral mucosa pink and moist                           Neck:   Supple, symmetrical, trachea midline, no JVD                         Lungs:    Clear to auscultation bilaterally, respirations unlabored                 Chest Wall:    No tenderness or deformity                          Heart:  S1-S2 regular                  Abdomen:   Soft nontender                   Extremities:                         Pulses:   Pulses palpable in all extremities                            Skin: Erythema is now confined to the right forefoot with erythema of the leg is much improved.                  Neurologic: Alert and oriented x 3       Data Review:    I reviewed the patient's new clinical results.  Results from last 7 days   Lab Units 06/26/24  0416 06/25/24  0452 06/24/24 0441 06/23/24  0517 06/22/24  2043   WBC 10*3/mm3 8.40 7.02 10.90* 10.69 13.52*   HEMOGLOBIN g/dL 10.8* 8.5* 10.3* 11.5* 12.0   PLATELETS 10*3/mm3 290 212 251 253 255     Results from last 7 days   Lab Units 06/26/24  0416 06/25/24  0452 06/24/24  0441 06/23/24  0517 06/22/24  2043   SODIUM mmol/L 143 146* 140 138 139   POTASSIUM mmol/L 3.7 3.3* 3.9 3.8 4.0   CHLORIDE mmol/L 113* 119* 110* 107 104   CO2 mmol/L 20.0* 17.7* 19.3* 21.0* 23.5   BUN mg/dL 11 9 14 26* 36*   CREATININE mg/dL 1.00 0.91 1.13* 1.19* 1.52*   CALCIUM mg/dL 8.0* 6.6* 8.2* 8.7 9.6   GLUCOSE mg/dL 116* 108* 233* 155* 165*     Microbiology Results (last 10 days)       Procedure Component Value - Date/Time    MRSA Screen, PCR (Inpatient) - Swab, Nares [[id number]]  (Normal) Collected: 06/23/24 1427    Lab Status: Final result Specimen: Swab from Nares Updated: 06/23/24 1717     MRSA PCR No MRSA Detected    Narrative:      The  negative predictive value of this diagnostic test is high and should only be used to consider de-escalating anti-MRSA therapy. A positive result may indicate colonization with MRSA and must be correlated clinically.    Blood Culture - Blood, Arm, Right [[id number]]  (Normal) Collected: 06/22/24 2200    Lab Status: Preliminary result Specimen: Blood from Arm, Right Updated: 06/25/24 2215     Blood Culture No growth at 3 days    Blood Culture - Blood, Arm, Right [[id number]]  (Normal) Collected: 06/22/24 2149    Lab Status: Preliminary result Specimen: Blood from Arm, Right Updated: 06/25/24 2215     Blood Culture No growth at 3 days          XR Foot 3+ View Right    Result Date: 6/22/2024  No acute osseous abnormality seen.  This report was finalized on 6/22/2024 9:50 PM by Dr. Carmelina Gutiérrez M.D on Workstation: [id number]           Assessment:    Diabetic ulcer of right great toe  1-cellulitis of the right leg due to chronic right great toe diabetic wound  2-right great toe diabetic ulcer due to infected callus with risk of underlying abscess  3-insulin-dependent diabetes  4-chronic kidney disease with acute kidney injury improving  5-negative MRSA screen  6.  Peripheral vascular disease     Recommendations/Discussions:  See my discussion and recommendation on 6/23/2024  Continue present antibiotic therapy and follow-up on MRI.  Plans for surgical intervention based on MRI as detailed by vascular surgery service reviewed.      Jose Roberto Roman MD  6/26/2024  10:12 EDT    Parts of this note may be an electronic transcription/translation of spoken language to printed text using the Dragon dictation system.

## 2024-06-26 NOTE — PROGRESS NOTES
"Daily progress note    Primary care physician      Subjective  Doing same with no new complaints and family at bedside    History of present illness  64-year-old white female with history of insulin-dependent diabetes mellitus hypertension hyperlipidemia and coronary artery disease who also have right big toe diabetic wound with ulcer which she follow-up with the podiatrist presented to [facility] emergency with worsening wound with drainage and swelling of the right big toe and also noticed redness swelling and pain in the right lower extremity.  Patient workup in ER revealed right lower extremity cellulitis and also infected wound with ulcer with probable abscess and need to rule out osteomyelitis admitted for management.  Patient complained of chills off and on but denies any fall trauma injury or any fever.  Patient has no chest pain shortness of breath palpitation either.     REVIEW OF SYSTEMS  Unremarkable     PHYSICAL EXAM   Blood pressure 148/57, pulse 65, temperature 97.3 °F (36.3 °C), temperature source Oral, resp. rate 16, height 165.1 cm (65\"), weight 64.4 kg (141 lb 15.6 oz), SpO2 100%, not currently breastfeeding.    GENERAL: Awake and alert, no acute distress  SKIN: Warm, dry, ulcerative wound to plantar aspect of R great toe  HENT: Normocephalic, atraumatic  EYES: no scleral icterus  CV: regular rhythm, regular rate  RESPIRATORY: normal effort, lungs clear  ABDOMEN: soft, nontender, nondistended bowel sounds positive  MUSCULOSKELETAL: no deformity, erythema extending from R great toe proximally to the mid R lower leg, streaking to the R foot  NEURO: alert, moves all extremities, follows commands     LAB RESULTS  Lab Results (last 24 hours)       Procedure Component Value Units Date/Time    POC Glucose Once [[id number]]  (Abnormal) Collected: 06/26/24 1139    Specimen: Blood Updated: 06/26/24 1141     Glucose 170 mg/dL     POC Glucose Once [[id number]]  (Normal) Collected: 06/26/24 " 0623    Specimen: Blood Updated: 06/26/24 0625     Glucose 119 mg/dL     POC Glucose Once [[id number]]  (Normal) Collected: 06/26/24 0519    Specimen: Blood Updated: 06/26/24 0521     Glucose 109 mg/dL     Basic Metabolic Panel [[id number]]  (Abnormal) Collected: 06/26/24 0416    Specimen: Blood from Hand, Right Updated: 06/26/24 0503     Glucose 116 mg/dL      BUN 11 mg/dL      Creatinine 1.00 mg/dL      Sodium 143 mmol/L      Potassium 3.7 mmol/L      Chloride 113 mmol/L      CO2 20.0 mmol/L      Calcium 8.0 mg/dL      BUN/Creatinine Ratio 11.0     Anion Gap 10.0 mmol/L      eGFR 62.3 mL/min/1.73     Narrative:      GFR Normal >60  Chronic Kidney Disease <60  Kidney Failure <15      CBC & Differential [[id number]]  (Abnormal) Collected: 06/26/24 0416    Specimen: Blood from Hand, Right Updated: 06/26/24 0447    Narrative:      The following orders were created for panel order CBC & Differential.  Procedure                               Abnormality         Status                     ---------                               -----------         ------                     CBC Auto Differential[[id number]]        Abnormal            Final result                 Please view results for these tests on the individual orders.    CBC Auto Differential [[id number]]  (Abnormal) Collected: 06/26/24 0416    Specimen: Blood from Hand, Right Updated: 06/26/24 0447     WBC 8.40 10*3/mm3      RBC 3.69 10*6/mm3      Hemoglobin 10.8 g/dL      Hematocrit 33.2 %      MCV 90.0 fL      MCH 29.3 pg      MCHC 32.5 g/dL      RDW 11.9 %      RDW-SD 38.8 fl      MPV 8.9 fL      Platelets 290 10*3/mm3      Neutrophil % 67.6 %      Lymphocyte % 19.4 %      Monocyte % 7.3 %      Eosinophil % 4.2 %      Basophil % 1.0 %      Immature Grans % 0.5 %      Neutrophils, Absolute 5.69 10*3/mm3      Lymphocytes, Absolute 1.63 10*3/mm3      Monocytes, Absolute 0.61 10*3/mm3      Eosinophils, Absolute 0.35 10*3/mm3      Basophils, Absolute 0.08 10*3/mm3       Immature Grans, Absolute 0.04 10*3/mm3      nRBC 0.0 /100 WBC     POC Glucose Once [[id number]]  (Normal) Collected: 06/26/24 0351    Specimen: Blood Updated: 06/26/24 0352     Glucose 116 mg/dL     POC Glucose Once [[id number]]  (Normal) Collected: 06/26/24 0302    Specimen: Blood Updated: 06/26/24 0304     Glucose 117 mg/dL     POC Glucose Once [[id number]]  (Abnormal) Collected: 06/26/24 0154    Specimen: Blood Updated: 06/26/24 0155     Glucose 156 mg/dL     Blood Culture - Blood, Arm, Right [[id number]]  (Normal) Collected: 06/22/24 2149    Specimen: Blood from Arm, Right Updated: 06/25/24 2215     Blood Culture No growth at 3 days    Blood Culture - Blood, Arm, Right [[id number]]  (Normal) Collected: 06/22/24 2200    Specimen: Blood from Arm, Right Updated: 06/25/24 2215     Blood Culture No growth at 3 days    POC Glucose Once [[id number]]  (Abnormal) Collected: 06/25/24 2123    Specimen: Blood Updated: 06/25/24 2124     Glucose 310 mg/dL     POC Glucose Once [[id number]]  (Abnormal) Collected: 06/25/24 1728    Specimen: Blood Updated: 06/25/24 1729     Glucose 137 mg/dL           Imaging Results (Last 24 Hours)       Procedure Component Value Units Date/Time    MRI Foot Right Without Contrast [[id number]] Collected: 06/26/24 1304     Updated: 06/26/24 1304    Narrative:      MRI RIGHT FOOT, WITHOUT INTRAVENOUS CONTRAST     COMPARISON: Radiographs of the right foot dated 06/22/2024.     CLINICAL: Type 2 diabetes, great toe ulceration, evaluate for  osteomyelitis.     Axial T1 and T2 fat-sat, coronal T1 and STIR sequence in addition to  sagittal proton density fat-sat sequence.     FINDINGS: Along the plantar aspect of the great toe, there is a mixed  signal lesion along the skin surface extending from the first  metatarsophalangeal joint to the interphalangeal joint. Minimal  associated skin irregularity. No organized fluid collection seen. Flexor  and extensor tendons preserved. There is forefoot subcutaneous  fat  edema. No metatarsophalangeal or interphalangeal joint effusion.     Demonstrated on the T2 fat-sat sequence there is subtle increased signal  involving the distal phalanx of the great toe. This likely represents  subtle reactive marrow edema for there is no corresponding signal  dropout on the T1 sequence typical for osteomyelitis. No abnormal marrow  signal of the metatarsals or second, third, fourth or fifth toes.  Forefoot musculotendinous planes are preserved.     Findings of this report discussed with Dr. Sixto Ortega at 12:45 p.m. on  06/26/2024.               Current Facility-Administered Medications:     acetaminophen (TYLENOL) tablet 650 mg, 650 mg, Oral, Q4H PRN, Richmond Monet MD, 650 mg at 06/25/24 2241    ascorbic acid (VITAMIN C) tablet 500 mg, 500 mg, Oral, Daily, Richmond Monet MD, 500 mg at 06/25/24 0809    clopidogrel (PLAVIX) tablet 75 mg, 75 mg, Oral, Daily, Richmond Monet MD, 75 mg at 06/25/24 0809    dextrose (D50W) (25 g/50 mL) IV injection 25 g, 25 g, Intravenous, Q15 Min PRN, Richmond Monet MD    dextrose (GLUTOSE) oral gel 15 g, 15 g, Oral, Q15 Min PRN, Richmond Monet MD    glucagon (GLUCAGEN) injection 1 mg, 1 mg, Intramuscular, Q15 Min PRN, Richmond Monet MD    insulin glargine (LANTUS, SEMGLEE) injection 15 Units, 15 Units, Subcutaneous, Nightly, Richmond Monet MD, 15 Units at 06/25/24 2238    insulin lispro (HUMALOG/ADMELOG) injection 2-7 Units, 2-7 Units, Subcutaneous, 4x Daily AC & at Bedtime, Richmond Monet MD, 5 Units at 06/25/24 2237    insulin lispro (HUMALOG/ADMELOG) injection 5 Units, 5 Units, Subcutaneous, TID With Meals, Richmond Monet MD, 5 Units at 06/25/24 1737    metoprolol succinate XL (TOPROL-XL) 24 hr tablet 12.5 mg, 12.5 mg, Oral, Q24H, Richmond Monet MD, 12.5 mg at 06/26/24 0905    ondansetron ODT (ZOFRAN-ODT) disintegrating tablet 4 mg, 4 mg, Translingual, Q6H PRN, Richmond Monet MD, 4 mg at 06/26/24 0905    pantoprazole (PROTONIX) EC tablet 40 mg, 40 mg, Oral, Q AM, Chiki  MD Marcio    piperacillin-tazobactam (ZOSYN) 3.375 g IVPB in 100 mL NS MBP (CD), 3.375 g, Intravenous, Q8H, Marcio Monet MD, Last Rate: 0 mL/hr at 06/23/24 0915, 3.375 g at 06/26/24 0653    ranolazine (RANEXA) 12 hr tablet 500 mg, 500 mg, Oral, Q12H, Marcio Monet MD, 500 mg at 06/26/24 1013    rosuvastatin (CRESTOR) tablet 10 mg, 10 mg, Oral, Nightly, Marcio Monet MD, 10 mg at 06/25/24 2236    sodium chloride 0.45 % infusion, 75 mL/hr, Intravenous, Continuous, Marcio Monet MD, Last Rate: 75 mL/hr at 06/25/24 1737, 75 mL/hr at 06/25/24 1737     ASSESSMENT  Right lower extremity cellulitis  Right big toe diabetic infected wound   Insulin-dependent diabetic mellitus  Hypertension  Hyperlipidemia  Coronary artery disease  Gastroesophageal reflux disease    PLAN  CPM  Continue IVF   Continue IV antibiotics   Vascular surgery consult appreciated  Infectious disease to follow patient  Continue home medications  Stress ulcer DVT prophylaxis  Supportive care  Discussed with nursing staff and family  Follow closely further recommendation current hospital course    MARCIO MONET MD    Copied text in this note has been reviewed and is accurate as of 06/26/24

## 2024-06-26 NOTE — PROGRESS NOTES
Name: Laxmi Marcus ADMIT: 2024   : 1960  PCP: Hollis Dawkins MD    MRN: [id number] LOS: 3 days   AGE/SEX: 64 y.o. female  ROOM: [address]    Chief Complaint   Patient presents with    Wound Check     Pt is diabetic     CC: Foot infection of right great toe and into the foot  Subjective     64 y.o. female right great toe and foot infection secondary to diabetes.  Responding well to antibiotics.  Arterial testing indicates the ability to heal should be adequate.  0.79 with toe pressure 0.42 on the affected toe with good waveform.    Review of Systems anxious and upset with how long things have taken but now resistant to progressing.     Objective     Scheduled Medications:   ascorbic acid, 500 mg, Oral, Daily  clopidogrel, 75 mg, Oral, Daily  insulin glargine, 15 Units, Subcutaneous, Nightly  insulin lispro, 2-7 Units, Subcutaneous, 4x Daily AC & at Bedtime  insulin lispro, 5 Units, Subcutaneous, TID With Meals  metoprolol succinate XL, 12.5 mg, Oral, Q24H  pantoprazole, 40 mg, Oral, Q AM  piperacillin-tazobactam, 3.375 g, Intravenous, Q8H  ranolazine, 500 mg, Oral, Q12H  rosuvastatin, 10 mg, Oral, Nightly        Active Infusions:  sodium chloride, 75 mL/hr, Last Rate: 75 mL/hr (24 1737)        As Needed Medications:    acetaminophen    dextrose    dextrose    glucagon (human recombinant)    ondansetron ODT    Vital Signs  Vital Signs Patient Vitals for the past 24 hrs:   BP Temp Temp src Pulse Resp SpO2   24 0523 138/55 97 °F (36.1 °C) Oral 60 16 96 %   24 2128 137/51 97.8 °F (36.6 °C) Oral 67 16 99 %   24 1856 120/57 97.4 °F (36.3 °C) Oral 63 16 99 %   24 1003 135/53 97.3 °F (36.3 °C) Oral 60 18 99 %     Vital Signs (range)  Temp:  [97 °F (36.1 °C)-97.8 °F (36.6 °C)] 97 °F (36.1 °C)  Heart Rate:  [60-67] 60  Resp:  [16-18] 16  BP: (120-138)/(51-57) 138/55  I/O:  I/O last 3 completed shifts:  In: 1453.3 [I.V.:1053.3; IV Piggyback:400]  Out: -   I/O:    Intake/Output Summary (Last 24 hours) at 6/26/2024 0913  Last data filed at 6/26/2024 0653  Gross per 24 hour   Intake 200 ml   Output --   Net 200 ml     BMI:  Body mass index is 23.63 kg/m².    Physical Exam:  Physical Exam   Right great toe without real change.  Area of ulceration and darkened callus without drainage.  Patient is very sensitive and I cannot really even examine the area well.  Bedside debridement would be impossible.  Lungs clear  Anxious    Results Review:     CBC    Results from last 7 days   Lab Units 06/26/24  0416 06/25/24  0452 06/24/24  0441 06/23/24  0517 06/22/24 2043   WBC 10*3/mm3 8.40 7.02 10.90* 10.69 13.52*   HEMOGLOBIN g/dL 10.8* 8.5* 10.3* 11.5* 12.0   PLATELETS 10*3/mm3 290 212 251 253 255     BMP   Results from last 7 days   Lab Units 06/26/24 0416 06/25/24 0452 06/24/24 0441 06/23/24  0517 06/22/24 2043   SODIUM mmol/L 143 146* 140 138 139   POTASSIUM mmol/L 3.7 3.3* 3.9 3.8 4.0   CHLORIDE mmol/L 113* 119* 110* 107 104   CO2 mmol/L 20.0* 17.7* 19.3* 21.0* 23.5   BUN mg/dL 11 9 14 26* 36*   CREATININE mg/dL 1.00 0.91 1.13* 1.19* 1.52*   GLUCOSE mg/dL 116* 108* 233* 155* 165*     Cr Clearance Estimated Creatinine Clearance: 56.3 mL/min (by C-G formula based on SCr of 1 mg/dL).  Coag     HbA1C   Lab Results   Component Value Date    HGBA1C 7.20 (H) 06/24/2024    HGBA1C 7.4 (H) 04/16/2024    HGBA1C 7.7 (H) 11/29/2023     Blood Glucose   Glucose   Date/Time Value Ref Range Status   06/26/2024 0623 119 70 - 130 mg/dL Final   06/26/2024 0519 109 70 - 130 mg/dL Final   06/26/2024 0351 116 70 - 130 mg/dL Final   06/26/2024 0302 117 70 - 130 mg/dL Final   06/26/2024 0154 156 (H) 70 - 130 mg/dL Final   06/25/2024 2123 310 (H) 70 - 130 mg/dL Final   06/25/2024 1728 137 (H) 70 - 130 mg/dL Final   06/25/2024 1116 218 (H) 70 - 130 mg/dL Final     Infection   Results from last 7 days   Lab Units 06/22/24 2200 06/22/24  2149   BLOODCX  No growth at 3 days No growth at 3 days     CMP    Results from last 7 days   Lab Units 06/26/24  0416 06/25/24  0452 06/24/24  0441 06/23/24  0517 06/22/24  2043   SODIUM mmol/L 143 146* 140 138 139   POTASSIUM mmol/L 3.7 3.3* 3.9 3.8 4.0   CHLORIDE mmol/L 113* 119* 110* 107 104   CO2 mmol/L 20.0* 17.7* 19.3* 21.0* 23.5   BUN mg/dL 11 9 14 26* 36*   CREATININE mg/dL 1.00 0.91 1.13* 1.19* 1.52*   GLUCOSE mg/dL 116* 108* 233* 155* 165*   ALBUMIN g/dL  --   --  3.2*  --  4.0   BILIRUBIN mg/dL  --   --  0.4  --  0.4   ALK PHOS U/L  --   --  69  --  72   AST (SGOT) U/L  --   --  11  --  12   ALT (SGPT) U/L  --   --  13  --  17     ABG      Radiology(recent) No radiology results for the last day    Assessment & Plan     Assessment & Plan      Diabetic ulcer of right great toe      64 y.o. female with diabetic toe infection but good sensation.  Debridement and possible toe amputation are really the likely outcomes here and MRI is critical to decision making.  The patient is not willing to consider any intervention until that has been read and she has a better discussion.  I have discussed the case with my partner Dr. Harrington who will be talking to her later once we get more information.  Will keep her n.p.o. as long as she is willing to stay that way in an effort to try to move things along at her request.      Josué Ortega MD  06/26/24  09:13 EDT    Please call my office with any question: [phone]    Active Hospital Problems    Diagnosis  POA    **Diabetic ulcer of right great toe [E11.621, L97.519]  Yes      Resolved Hospital Problems   No resolved problems to display.

## 2024-06-26 NOTE — CONSULTS
"Visit with patient at bedside for support and conversation. Patient is exhausted after sleeping very little. She is hoping for an extended nap today.  Can we put a sign on her door to curb visitors or staff for a few hours? She also would really like to go outside as she feels as though \"I am in snf!\" I believe patient is very active and use to rising early and accomplishing a lot every day so this time in the hospital feels very difficult. Prayer offered for a plan and comfort and she get through the next few days,   "

## 2024-06-27 ENCOUNTER — ANESTHESIA EVENT (OUTPATIENT)
Dept: PERIOP | Facility: HOSPITAL | Age: 64
End: 2024-06-27
Payer: MEDICARE

## 2024-06-27 ENCOUNTER — ANESTHESIA (OUTPATIENT)
Dept: PERIOP | Facility: HOSPITAL | Age: 64
End: 2024-06-27
Payer: MEDICARE

## 2024-06-27 LAB
ANION GAP SERPL CALCULATED.3IONS-SCNC: 9.4 MMOL/L (ref 5–15)
BACTERIA SPEC AEROBE CULT: NORMAL
BACTERIA SPEC AEROBE CULT: NORMAL
BASOPHILS # BLD AUTO: 0.09 10*3/MM3 (ref 0–0.2)
BASOPHILS NFR BLD AUTO: 1 % (ref 0–1.5)
BUN SERPL-MCNC: 14 MG/DL (ref 8–23)
BUN/CREAT SERPL: 10.8 (ref 7–25)
CALCIUM SPEC-SCNC: 8.9 MG/DL (ref 8.6–10.5)
CHLORIDE SERPL-SCNC: 113 MMOL/L (ref 98–107)
CO2 SERPL-SCNC: 19.6 MMOL/L (ref 22–29)
CREAT SERPL-MCNC: 1.3 MG/DL (ref 0.57–1)
DEPRECATED RDW RBC AUTO: 41.2 FL (ref 37–54)
EGFRCR SERPLBLD CKD-EPI 2021: 45.4 ML/MIN/1.73
EOSINOPHIL # BLD AUTO: 0.36 10*3/MM3 (ref 0–0.4)
EOSINOPHIL NFR BLD AUTO: 3.8 % (ref 0.3–6.2)
ERYTHROCYTE [DISTWIDTH] IN BLOOD BY AUTOMATED COUNT: 12.3 % (ref 12.3–15.4)
GLUCOSE BLDC GLUCOMTR-MCNC: 101 MG/DL (ref 70–130)
GLUCOSE BLDC GLUCOMTR-MCNC: 157 MG/DL (ref 70–130)
GLUCOSE BLDC GLUCOMTR-MCNC: 160 MG/DL (ref 70–130)
GLUCOSE BLDC GLUCOMTR-MCNC: 237 MG/DL (ref 70–130)
GLUCOSE BLDC GLUCOMTR-MCNC: 312 MG/DL (ref 70–130)
GLUCOSE BLDC GLUCOMTR-MCNC: 73 MG/DL (ref 70–130)
GLUCOSE BLDC GLUCOMTR-MCNC: 77 MG/DL (ref 70–130)
GLUCOSE SERPL-MCNC: 88 MG/DL (ref 65–99)
HCT VFR BLD AUTO: 31 % (ref 34–46.6)
HGB BLD-MCNC: 10.2 G/DL (ref 12–15.9)
IMM GRANULOCYTES # BLD AUTO: 0.05 10*3/MM3 (ref 0–0.05)
IMM GRANULOCYTES NFR BLD AUTO: 0.5 % (ref 0–0.5)
LYMPHOCYTES # BLD AUTO: 2.1 10*3/MM3 (ref 0.7–3.1)
LYMPHOCYTES NFR BLD AUTO: 22.2 % (ref 19.6–45.3)
MCH RBC QN AUTO: 30.1 PG (ref 26.6–33)
MCHC RBC AUTO-ENTMCNC: 32.9 G/DL (ref 31.5–35.7)
MCV RBC AUTO: 91.4 FL (ref 79–97)
MONOCYTES # BLD AUTO: 0.79 10*3/MM3 (ref 0.1–0.9)
MONOCYTES NFR BLD AUTO: 8.3 % (ref 5–12)
NEUTROPHILS NFR BLD AUTO: 6.08 10*3/MM3 (ref 1.7–7)
NEUTROPHILS NFR BLD AUTO: 64.2 % (ref 42.7–76)
NRBC BLD AUTO-RTO: 0 /100 WBC (ref 0–0.2)
PLATELET # BLD AUTO: 314 10*3/MM3 (ref 140–450)
PMV BLD AUTO: 8.8 FL (ref 6–12)
POTASSIUM SERPL-SCNC: 4 MMOL/L (ref 3.5–5.2)
RBC # BLD AUTO: 3.39 10*6/MM3 (ref 3.77–5.28)
SODIUM SERPL-SCNC: 142 MMOL/L (ref 136–145)
WBC NRBC COR # BLD AUTO: 9.47 10*3/MM3 (ref 3.4–10.8)

## 2024-06-27 PROCEDURE — 87075 CULTR BACTERIA EXCEPT BLOOD: CPT | Performed by: STUDENT IN AN ORGANIZED HEALTH CARE EDUCATION/TRAINING PROGRAM

## 2024-06-27 PROCEDURE — 87070 CULTURE OTHR SPECIMN AEROBIC: CPT | Performed by: STUDENT IN AN ORGANIZED HEALTH CARE EDUCATION/TRAINING PROGRAM

## 2024-06-27 PROCEDURE — 63710000001 INSULIN LISPRO (HUMAN) PER 5 UNITS: Performed by: HOSPITALIST

## 2024-06-27 PROCEDURE — 80048 BASIC METABOLIC PNL TOTAL CA: CPT | Performed by: HOSPITALIST

## 2024-06-27 PROCEDURE — 82948 REAGENT STRIP/BLOOD GLUCOSE: CPT

## 2024-06-27 PROCEDURE — 0JBQ0ZZ EXCISION OF RIGHT FOOT SUBCUTANEOUS TISSUE AND FASCIA, OPEN APPROACH: ICD-10-PCS | Performed by: STUDENT IN AN ORGANIZED HEALTH CARE EDUCATION/TRAINING PROGRAM

## 2024-06-27 PROCEDURE — 25010000002 PROPOFOL 200 MG/20ML EMULSION

## 2024-06-27 PROCEDURE — 25010000002 PIPERACILLIN SOD-TAZOBACTAM PER 1 G: Performed by: HOSPITALIST

## 2024-06-27 PROCEDURE — 11042 DBRDMT SUBQ TIS 1ST 20SQCM/<: CPT | Performed by: SURGERY

## 2024-06-27 PROCEDURE — 87147 CULTURE TYPE IMMUNOLOGIC: CPT | Performed by: STUDENT IN AN ORGANIZED HEALTH CARE EDUCATION/TRAINING PROGRAM

## 2024-06-27 PROCEDURE — 85025 COMPLETE CBC W/AUTO DIFF WBC: CPT | Performed by: HOSPITALIST

## 2024-06-27 PROCEDURE — 87186 SC STD MICRODIL/AGAR DIL: CPT | Performed by: STUDENT IN AN ORGANIZED HEALTH CARE EDUCATION/TRAINING PROGRAM

## 2024-06-27 PROCEDURE — 25010000002 BUPIVACAINE (PF) 0.25 % SOLUTION 30 ML VIAL: Performed by: STUDENT IN AN ORGANIZED HEALTH CARE EDUCATION/TRAINING PROGRAM

## 2024-06-27 PROCEDURE — 63710000001 INSULIN LISPRO (HUMAN) PER 5 UNITS: Performed by: STUDENT IN AN ORGANIZED HEALTH CARE EDUCATION/TRAINING PROGRAM

## 2024-06-27 PROCEDURE — 25010000002 PROPOFOL 500 MG/50ML EMULSION

## 2024-06-27 PROCEDURE — 63710000001 INSULIN GLARGINE PER 5 UNITS: Performed by: STUDENT IN AN ORGANIZED HEALTH CARE EDUCATION/TRAINING PROGRAM

## 2024-06-27 PROCEDURE — 87205 SMEAR GRAM STAIN: CPT | Performed by: STUDENT IN AN ORGANIZED HEALTH CARE EDUCATION/TRAINING PROGRAM

## 2024-06-27 PROCEDURE — 25810000003 LACTATED RINGERS PER 1000 ML: Performed by: ANESTHESIOLOGY

## 2024-06-27 PROCEDURE — 25010000002 PIPERACILLIN SOD-TAZOBACTAM PER 1 G: Performed by: STUDENT IN AN ORGANIZED HEALTH CARE EDUCATION/TRAINING PROGRAM

## 2024-06-27 PROCEDURE — 25010000002 LIDOCAINE 1 % SOLUTION 20 ML VIAL: Performed by: STUDENT IN AN ORGANIZED HEALTH CARE EDUCATION/TRAINING PROGRAM

## 2024-06-27 PROCEDURE — 87015 SPECIMEN INFECT AGNT CONCNTJ: CPT | Performed by: STUDENT IN AN ORGANIZED HEALTH CARE EDUCATION/TRAINING PROGRAM

## 2024-06-27 RX ORDER — PROPOFOL 10 MG/ML
INJECTION, EMULSION INTRAVENOUS CONTINUOUS PRN
Status: DISCONTINUED | OUTPATIENT
Start: 2024-06-27 | End: 2024-06-27 | Stop reason: SURG

## 2024-06-27 RX ORDER — PROMETHAZINE HYDROCHLORIDE 25 MG/1
25 SUPPOSITORY RECTAL ONCE AS NEEDED
Status: DISCONTINUED | OUTPATIENT
Start: 2024-06-27 | End: 2024-06-27 | Stop reason: HOSPADM

## 2024-06-27 RX ORDER — NALOXONE HCL 0.4 MG/ML
0.2 VIAL (ML) INJECTION AS NEEDED
Status: DISCONTINUED | OUTPATIENT
Start: 2024-06-27 | End: 2024-06-27 | Stop reason: HOSPADM

## 2024-06-27 RX ORDER — PROMETHAZINE HYDROCHLORIDE 25 MG/1
25 TABLET ORAL ONCE AS NEEDED
Status: DISCONTINUED | OUTPATIENT
Start: 2024-06-27 | End: 2024-06-27 | Stop reason: HOSPADM

## 2024-06-27 RX ORDER — FENTANYL CITRATE 50 UG/ML
50 INJECTION, SOLUTION INTRAMUSCULAR; INTRAVENOUS ONCE AS NEEDED
Status: DISCONTINUED | OUTPATIENT
Start: 2024-06-27 | End: 2024-06-27 | Stop reason: HOSPADM

## 2024-06-27 RX ORDER — EPHEDRINE SULFATE 50 MG/ML
INJECTION INTRAVENOUS AS NEEDED
Status: DISCONTINUED | OUTPATIENT
Start: 2024-06-27 | End: 2024-06-27 | Stop reason: SURG

## 2024-06-27 RX ORDER — SODIUM CHLORIDE 0.9 % (FLUSH) 0.9 %
3 SYRINGE (ML) INJECTION EVERY 12 HOURS SCHEDULED
Status: DISCONTINUED | OUTPATIENT
Start: 2024-06-27 | End: 2024-06-27 | Stop reason: HOSPADM

## 2024-06-27 RX ORDER — DROPERIDOL 2.5 MG/ML
0.62 INJECTION, SOLUTION INTRAMUSCULAR; INTRAVENOUS
Status: DISCONTINUED | OUTPATIENT
Start: 2024-06-27 | End: 2024-06-27 | Stop reason: HOSPADM

## 2024-06-27 RX ORDER — HYDROMORPHONE HYDROCHLORIDE 1 MG/ML
0.5 INJECTION, SOLUTION INTRAMUSCULAR; INTRAVENOUS; SUBCUTANEOUS
Status: DISCONTINUED | OUTPATIENT
Start: 2024-06-27 | End: 2024-06-27 | Stop reason: HOSPADM

## 2024-06-27 RX ORDER — LIDOCAINE HYDROCHLORIDE 20 MG/ML
INJECTION, SOLUTION INFILTRATION; PERINEURAL AS NEEDED
Status: DISCONTINUED | OUTPATIENT
Start: 2024-06-27 | End: 2024-06-27 | Stop reason: SURG

## 2024-06-27 RX ORDER — IPRATROPIUM BROMIDE AND ALBUTEROL SULFATE 2.5; .5 MG/3ML; MG/3ML
3 SOLUTION RESPIRATORY (INHALATION) ONCE AS NEEDED
Status: DISCONTINUED | OUTPATIENT
Start: 2024-06-27 | End: 2024-06-27 | Stop reason: HOSPADM

## 2024-06-27 RX ORDER — DIPHENHYDRAMINE HYDROCHLORIDE 50 MG/ML
12.5 INJECTION INTRAMUSCULAR; INTRAVENOUS
Status: DISCONTINUED | OUTPATIENT
Start: 2024-06-27 | End: 2024-06-27 | Stop reason: HOSPADM

## 2024-06-27 RX ORDER — HYDROCODONE BITARTRATE AND ACETAMINOPHEN 5; 325 MG/1; MG/1
1 TABLET ORAL ONCE AS NEEDED
Status: DISCONTINUED | OUTPATIENT
Start: 2024-06-27 | End: 2024-06-27 | Stop reason: HOSPADM

## 2024-06-27 RX ORDER — EPHEDRINE SULFATE 50 MG/ML
5 INJECTION, SOLUTION INTRAVENOUS ONCE AS NEEDED
Status: DISCONTINUED | OUTPATIENT
Start: 2024-06-27 | End: 2024-06-27 | Stop reason: HOSPADM

## 2024-06-27 RX ORDER — HYDRALAZINE HYDROCHLORIDE 20 MG/ML
5 INJECTION INTRAMUSCULAR; INTRAVENOUS
Status: DISCONTINUED | OUTPATIENT
Start: 2024-06-27 | End: 2024-06-27 | Stop reason: HOSPADM

## 2024-06-27 RX ORDER — FAMOTIDINE 10 MG/ML
20 INJECTION, SOLUTION INTRAVENOUS ONCE
Status: COMPLETED | OUTPATIENT
Start: 2024-06-27 | End: 2024-06-27

## 2024-06-27 RX ORDER — OXYCODONE AND ACETAMINOPHEN 7.5; 325 MG/1; MG/1
1 TABLET ORAL EVERY 4 HOURS PRN
Status: DISCONTINUED | OUTPATIENT
Start: 2024-06-27 | End: 2024-06-27 | Stop reason: HOSPADM

## 2024-06-27 RX ORDER — FENTANYL CITRATE 50 UG/ML
50 INJECTION, SOLUTION INTRAMUSCULAR; INTRAVENOUS
Status: DISCONTINUED | OUTPATIENT
Start: 2024-06-27 | End: 2024-06-27 | Stop reason: HOSPADM

## 2024-06-27 RX ORDER — FLUMAZENIL 0.1 MG/ML
0.2 INJECTION INTRAVENOUS AS NEEDED
Status: DISCONTINUED | OUTPATIENT
Start: 2024-06-27 | End: 2024-06-27 | Stop reason: HOSPADM

## 2024-06-27 RX ORDER — LIDOCAINE HYDROCHLORIDE 10 MG/ML
0.5 INJECTION, SOLUTION INFILTRATION; PERINEURAL ONCE AS NEEDED
Status: DISCONTINUED | OUTPATIENT
Start: 2024-06-27 | End: 2024-06-27 | Stop reason: HOSPADM

## 2024-06-27 RX ORDER — ONDANSETRON 2 MG/ML
4 INJECTION INTRAMUSCULAR; INTRAVENOUS ONCE AS NEEDED
Status: DISCONTINUED | OUTPATIENT
Start: 2024-06-27 | End: 2024-06-27 | Stop reason: HOSPADM

## 2024-06-27 RX ORDER — MIDAZOLAM HYDROCHLORIDE 1 MG/ML
0.5 INJECTION INTRAMUSCULAR; INTRAVENOUS
Status: DISCONTINUED | OUTPATIENT
Start: 2024-06-27 | End: 2024-06-27 | Stop reason: HOSPADM

## 2024-06-27 RX ORDER — PROPOFOL 10 MG/ML
INJECTION, EMULSION INTRAVENOUS AS NEEDED
Status: DISCONTINUED | OUTPATIENT
Start: 2024-06-27 | End: 2024-06-27 | Stop reason: SURG

## 2024-06-27 RX ORDER — SODIUM CHLORIDE 0.9 % (FLUSH) 0.9 %
3-10 SYRINGE (ML) INJECTION AS NEEDED
Status: DISCONTINUED | OUTPATIENT
Start: 2024-06-27 | End: 2024-06-27 | Stop reason: HOSPADM

## 2024-06-27 RX ORDER — SODIUM CHLORIDE, SODIUM LACTATE, POTASSIUM CHLORIDE, CALCIUM CHLORIDE 600; 310; 30; 20 MG/100ML; MG/100ML; MG/100ML; MG/100ML
9 INJECTION, SOLUTION INTRAVENOUS CONTINUOUS
Status: DISCONTINUED | OUTPATIENT
Start: 2024-06-27 | End: 2024-06-28

## 2024-06-27 RX ORDER — LABETALOL HYDROCHLORIDE 5 MG/ML
5 INJECTION, SOLUTION INTRAVENOUS
Status: DISCONTINUED | OUTPATIENT
Start: 2024-06-27 | End: 2024-06-27 | Stop reason: HOSPADM

## 2024-06-27 RX ADMIN — FAMOTIDINE 20 MG: 10 INJECTION INTRAVENOUS at 08:53

## 2024-06-27 RX ADMIN — PIPERACILLIN AND TAZOBACTAM 3.38 G: 3; .375 INJECTION, POWDER, FOR SOLUTION INTRAVENOUS at 05:51

## 2024-06-27 RX ADMIN — RANOLAZINE 500 MG: 500 TABLET, FILM COATED, EXTENDED RELEASE ORAL at 21:01

## 2024-06-27 RX ADMIN — PIPERACILLIN AND TAZOBACTAM 3.38 G: 3; .375 INJECTION, POWDER, FOR SOLUTION INTRAVENOUS at 14:05

## 2024-06-27 RX ADMIN — EPHEDRINE SULFATE 10 MG: 50 INJECTION INTRAVENOUS at 10:11

## 2024-06-27 RX ADMIN — INSULIN LISPRO 2 UNITS: 100 INJECTION, SOLUTION INTRAVENOUS; SUBCUTANEOUS at 12:13

## 2024-06-27 RX ADMIN — ROSUVASTATIN CALCIUM 10 MG: 10 TABLET, FILM COATED ORAL at 21:01

## 2024-06-27 RX ADMIN — METOPROLOL SUCCINATE 12.5 MG: 25 TABLET, EXTENDED RELEASE ORAL at 06:55

## 2024-06-27 RX ADMIN — PIPERACILLIN AND TAZOBACTAM 3.38 G: 3; .375 INJECTION, POWDER, FOR SOLUTION INTRAVENOUS at 09:55

## 2024-06-27 RX ADMIN — PROPOFOL 50 MG: 10 INJECTION, EMULSION INTRAVENOUS at 10:02

## 2024-06-27 RX ADMIN — PROPOFOL 100 MCG/KG/MIN: 10 INJECTION, EMULSION INTRAVENOUS at 10:02

## 2024-06-27 RX ADMIN — SODIUM CHLORIDE, POTASSIUM CHLORIDE, SODIUM LACTATE AND CALCIUM CHLORIDE 9 ML/HR: 600; 310; 30; 20 INJECTION, SOLUTION INTRAVENOUS at 08:47

## 2024-06-27 RX ADMIN — INSULIN LISPRO 5 UNITS: 100 INJECTION, SOLUTION INTRAVENOUS; SUBCUTANEOUS at 12:13

## 2024-06-27 RX ADMIN — INSULIN LISPRO 5 UNITS: 100 INJECTION, SOLUTION INTRAVENOUS; SUBCUTANEOUS at 17:14

## 2024-06-27 RX ADMIN — LIDOCAINE HYDROCHLORIDE 60 MG: 20 INJECTION, SOLUTION INFILTRATION; PERINEURAL at 10:02

## 2024-06-27 RX ADMIN — INSULIN GLARGINE 15 UNITS: 100 INJECTION, SOLUTION SUBCUTANEOUS at 21:06

## 2024-06-27 RX ADMIN — INSULIN LISPRO 2 UNITS: 100 INJECTION, SOLUTION INTRAVENOUS; SUBCUTANEOUS at 00:18

## 2024-06-27 RX ADMIN — INSULIN LISPRO 3 UNITS: 100 INJECTION, SOLUTION INTRAVENOUS; SUBCUTANEOUS at 21:06

## 2024-06-27 RX ADMIN — PIPERACILLIN AND TAZOBACTAM 3.38 G: 3; .375 INJECTION, POWDER, FOR SOLUTION INTRAVENOUS at 22:18

## 2024-06-27 RX ADMIN — EPHEDRINE SULFATE 10 MG: 50 INJECTION INTRAVENOUS at 10:14

## 2024-06-27 NOTE — PLAN OF CARE
Goal Outcome Evaluation:  Plan of Care Reviewed With: patient        Progress: improving  Outcome Evaluation: Right great toe continues red but purplish/red color improved. Callus is open to air. NPO after MN for debridement. Pt showered with CHG before bed. continues on zosyn. She will need to sign the consent when consent order is obtained. Blood sugars monitored. Her sugar was 488 before bed. Lantus given Call to Dr Monet and slight increase of s/s insulin given. Up ad edd

## 2024-06-27 NOTE — OP NOTE
Date of Admission:  6/22/2024  Today's Date:  06/27/24  Loyd Harrington II, MD  [facility]    Preoperative Diagnosis:   infected right first toe diabetic foot wound    Postoperative Diagnosis:   Same    Procedure Performed:   Right first toe Sharp excisional debridement of skin and subcutaneous tissue sharp with scalpel, scissors, and electrocautery, 4 x 2 cm    Surgeon:   Loyd Harrington II, MD    Assistant:    Bessy Chase RN, Provided critical assistance in exposure, retraction, and suction that overall decrease blood loss and operative time.    Anesthesia:   MAC with local    Estimated Blood Loss:   Minimal    Findings:    Purulent fluid/abscess underneath first toe ulceration.  Cultures taken for aerobic and anaerobic.  Necrotic superficial skin/callus excised sharply.  Did not appear to track to bone.    Implants:    Nothing was implanted during the procedure    Staff:   Circulator: Tessie Sage RN; Idris Fowler RN  Scrub Person: Emigdio Lauren  Pre-op Nurse: Sarah Saez RN    Specimen:   Cultures swab sent to microbiology    Complications:   none    Dispo:   to PACU    Indication for procedure:   64 y.o. female with an infected right first toe wound.  Patient previously had MRI that did not show any osteomyelitis but due to the large callus and some necrotic appearing skin plans were made for debridement.  Details of this procedure including postoperative care and risk benefits and alternatives were discussed with the patient including potential need for more procedures, new or worsening infection in the future, eventual potential loss of toe, etc. all discussed with patient she verbalized understanding and agrees to proceed.    Description of procedure:   Patient was taken to the OR placed supine on the table.  Sedation was initiated by the anesthesia service.  The patient's right foot was positioned and prepped with Betadine and draped in sterile fashion.  A timeout was performed.  I began  Patient returned call and verbalized understanding for appt. She is scheduled for appt with Halie LEE 2/3/22 at 10:30 AM.    procedure by infiltrating a combination of Marcaine and lidocaine around the toe for a regional field block.  A 15 blade scalpel was used to incise the skin around the callus protruding on the plantar surface of her first toe.  Purulent appearing fluid was expressed, approximately 3 mL.  Culture swab was taken and passed off field be sent to microbiology.  I then completed excision of the callus.  A rongeur was used to debride the underlying tissue.  It did not appear to track to the bone.  There appeared to be some healthy tissue at the base of the wound.  There was necrotic skin extending around the edges of what I had previously excised and these were excised with Metzenbaum scissors.  Electrocautery was used to obtain meticulous hemostasis.  Once I was satisfied that all necrotic skin and debris had been adequately debrided it was irrigated with warm normal saline and then Betadine solution.  I then reinspected for adequate hemostasis and it was then dressed with Betadine gauze, dry gauze, ABD Kerlix and an Ace wrap.  The patient tolerated the procedure well and there were no intraoperative complications and all sponge instrument and needle counts were correct at the end of the case.    Loyd Harrington II, MD  06/27/24     Active Hospital Problems    Diagnosis  POA    **Diabetic ulcer of right great toe [E11.621, L97.519]  Yes      Resolved Hospital Problems   No resolved problems to display.

## 2024-06-27 NOTE — PLAN OF CARE
Goal Outcome Evaluation:  Plan of Care Reviewed With: patient        Progress: improving  Outcome Evaluation: I&D of right great toe this AM. Patient arrived from PACU with no c/o pain. Right foot wrapped in gauze and ace wrap, remaining elevated. Called [facility] to order offloading shoe for patient, should arrive this afternoon. NWB until shoe arrives. Up with assist to bathroom, up in chair this afternoon. NCS diet, accu-checks ac/hs, sliding scale Insulin given. IV antibiotic given as scheduled. VSS on room air.

## 2024-06-27 NOTE — ANESTHESIA PREPROCEDURE EVALUATION
Anesthesia Evaluation     NPO Solid Status: > 8 hours             Airway   Mallampati: II  Dental      Pulmonary    (-) asthma, sleep apnea, not a smoker    ROS comment: Negative patient screen for MARIANNA    Cardiovascular     (+) hypertension, past MI , CAD, CABG, cardiac stents more than 12 months ago , hyperlipidemia      Neuro/Psych  GI/Hepatic/Renal/Endo    (+) GERD, renal disease- CRI, diabetes mellitus type 2 using insulin    Musculoskeletal     Abdominal    Substance History      OB/GYN          Other                    Anesthesia Plan    ASA 3     MAC       Anesthetic plan, risks, benefits, and alternatives have been provided, discussed and informed consent has been obtained with: patient.    CODE STATUS:    Level Of Support Discussed With: Patient  Code Status (Patient has no pulse and is not breathing): CPR (Attempt to Resuscitate)  Medical Interventions (Patient has pulse or is breathing): Full Support

## 2024-06-27 NOTE — PROGRESS NOTES
"Daily progress note    Primary care physician      Subjective  S/p right first toe excisional debridement and patient feeling better with no new complaints and wants to go home.    History of present illness  64-year-old white female with history of insulin-dependent diabetes mellitus hypertension hyperlipidemia and coronary artery disease who also have right big toe diabetic wound with ulcer which she follow-up with the podiatrist presented to [facility] emergency with worsening wound with drainage and swelling of the right big toe and also noticed redness swelling and pain in the right lower extremity.  Patient workup in ER revealed right lower extremity cellulitis and also infected wound with ulcer with probable abscess and need to rule out osteomyelitis admitted for management.  Patient complained of chills off and on but denies any fall trauma injury or any fever.  Patient has no chest pain shortness of breath palpitation either.     REVIEW OF SYSTEMS  Unremarkable     PHYSICAL EXAM   Blood pressure 143/58, pulse 68, temperature 97 °F (36.1 °C), temperature source Oral, resp. rate 16, height 165 cm (64.96\"), weight 64.4 kg (141 lb 15.6 oz), SpO2 99%, not currently breastfeeding.    GENERAL: Awake and alert, no acute distress  SKIN: Warm, dry, ulcerative wound to plantar aspect of R great toe  HENT: Normocephalic, atraumatic  EYES: no scleral icterus  CV: regular rhythm, regular rate  RESPIRATORY: normal effort, lungs clear  ABDOMEN: soft, nontender, nondistended bowel sounds positive  MUSCULOSKELETAL: no deformity, erythema extending from R great toe proximally to the mid R lower leg, streaking to the R foot  NEURO: alert, moves all extremities, follows commands     LAB RESULTS  Lab Results (last 24 hours)       Procedure Component Value Units Date/Time    Wound Culture - Wound, Toe, Right [[id number]] Collected: 06/27/24 1018    Specimen: Wound from Toe, Right Updated: 06/27/24 1327     Gram " Stain Moderate (3+) WBCs per low power field      Rare (1+) Gram positive cocci in clusters    POC Glucose Once [[id number]]  (Abnormal) Collected: 06/27/24 1133    Specimen: Blood Updated: 06/27/24 1134     Glucose 160 mg/dL     POC Glucose Once [[id number]]  (Abnormal) Collected: 06/27/24 1054    Specimen: Blood Updated: 06/27/24 1057     Glucose 157 mg/dL     Anaerobic Culture - Wound, Toe, Right [[id number]] Collected: 06/27/24 1018    Specimen: Wound from Toe, Right Updated: 06/27/24 1047    POC Glucose Once [[id number]]  (Normal) Collected: 06/27/24 0821    Specimen: Blood Updated: 06/27/24 0822     Glucose 101 mg/dL     POC Glucose Once [[id number]]  (Normal) Collected: 06/27/24 0634    Specimen: Blood Updated: 06/27/24 0636     Glucose 77 mg/dL     POC Glucose Once [[id number]]  (Normal) Collected: 06/27/24 0556    Specimen: Blood Updated: 06/27/24 0557     Glucose 73 mg/dL     Basic Metabolic Panel [[id number]]  (Abnormal) Collected: 06/27/24 0355    Specimen: Blood Updated: 06/27/24 0434     Glucose 88 mg/dL      BUN 14 mg/dL      Creatinine 1.30 mg/dL      Sodium 142 mmol/L      Potassium 4.0 mmol/L      Chloride 113 mmol/L      CO2 19.6 mmol/L      Calcium 8.9 mg/dL      BUN/Creatinine Ratio 10.8     Anion Gap 9.4 mmol/L      eGFR 45.4 mL/min/1.73     Narrative:      GFR Normal >60  Chronic Kidney Disease <60  Kidney Failure <15      CBC & Differential [[id number]]  (Abnormal) Collected: 06/27/24 0355    Specimen: Blood Updated: 06/27/24 0415    Narrative:      The following orders were created for panel order CBC & Differential.  Procedure                               Abnormality         Status                     ---------                               -----------         ------                     CBC Auto Differential[[id number]]        Abnormal            Final result                 Please view results for these tests on the individual orders.    CBC Auto Differential [[id number]]  (Abnormal) Collected:  06/27/24 0355    Specimen: Blood Updated: 06/27/24 0415     WBC 9.47 10*3/mm3      RBC 3.39 10*6/mm3      Hemoglobin 10.2 g/dL      Hematocrit 31.0 %      MCV 91.4 fL      MCH 30.1 pg      MCHC 32.9 g/dL      RDW 12.3 %      RDW-SD 41.2 fl      MPV 8.8 fL      Platelets 314 10*3/mm3      Neutrophil % 64.2 %      Lymphocyte % 22.2 %      Monocyte % 8.3 %      Eosinophil % 3.8 %      Basophil % 1.0 %      Immature Grans % 0.5 %      Neutrophils, Absolute 6.08 10*3/mm3      Lymphocytes, Absolute 2.10 10*3/mm3      Monocytes, Absolute 0.79 10*3/mm3      Eosinophils, Absolute 0.36 10*3/mm3      Basophils, Absolute 0.09 10*3/mm3      Immature Grans, Absolute 0.05 10*3/mm3      nRBC 0.0 /100 WBC     POC Glucose Once [[id number]]  (Abnormal) Collected: 06/26/24 2312    Specimen: Blood Updated: 06/26/24 2313     Glucose 247 mg/dL     Blood Culture - Blood, Arm, Right [[id number]]  (Normal) Collected: 06/22/24 2149    Specimen: Blood from Arm, Right Updated: 06/26/24 2215     Blood Culture No growth at 4 days    Blood Culture - Blood, Arm, Right [[id number]]  (Normal) Collected: 06/22/24 2200    Specimen: Blood from Arm, Right Updated: 06/26/24 2215     Blood Culture No growth at 4 days    POC Glucose Once [[id number]]  (Abnormal) Collected: 06/26/24 2102    Specimen: Blood Updated: 06/26/24 2106     Glucose 488 mg/dL     POC Glucose Once [[id number]]  (Abnormal) Collected: 06/26/24 1618    Specimen: Blood Updated: 06/26/24 1619     Glucose 303 mg/dL           Imaging Results (Last 24 Hours)       Procedure Component Value Units Date/Time    MRI Foot Right Without Contrast [[id number]] Collected: 06/26/24 1304     Updated: 06/26/24 1502    Narrative:      MRI RIGHT FOOT, WITHOUT INTRAVENOUS CONTRAST     COMPARISON: Radiographs of the right foot dated 06/22/2024.     CLINICAL: Type 2 diabetes, great toe ulceration, evaluate for  osteomyelitis.        TECHNIQUE  Axial T1 and T2 fat-sat, coronal T1 and STIR sequence in addition  to  sagittal proton density fat-sat sequence.     FINDINGS: Along the plantar aspect of the great toe, there is a mixed  signal lesion along the skin surface extending from the first  metatarsophalangeal joint to the interphalangeal joint. Minimal  associated skin irregularity. No organized fluid collection seen. Flexor  and extensor tendons preserved. There is forefoot subcutaneous fat  edema. No metatarsophalangeal or interphalangeal joint effusion.     Demonstrated on the T2 fat-sat sequence there is subtle increased signal  involving the distal phalanx of the great toe. This likely represents  subtle reactive marrow edema for there is no corresponding signal  dropout on the T1 sequence typical for osteomyelitis. No abnormal marrow  signal of the metatarsals or second, third, fourth or fifth toes.  Forefoot musculotendinous planes are preserved.     Findings of this report discussed with Dr. Sixto Ortega at 12:45 p.m. on  06/26/2024.     This report was finalized on 6/26/2024 2:59 PM by Dr. Lior Rico M.D  on Workstation: [id number]               Current Facility-Administered Medications:     acetaminophen (TYLENOL) tablet 650 mg, 650 mg, Oral, Q4H PRN, Loyd Harrington II, MD, 650 mg at 06/26/24 1724    ascorbic acid (VITAMIN C) tablet 500 mg, 500 mg, Oral, Daily, Loyd Harrington II, MD, 500 mg at 06/25/24 0809    clopidogrel (PLAVIX) tablet 75 mg, 75 mg, Oral, Daily, Loyd Harrington II, MD, 75 mg at 06/26/24 1548    dextrose (D50W) (25 g/50 mL) IV injection 25 g, 25 g, Intravenous, Q15 Min PRN, Loyd Harrington II, MD    dextrose (GLUTOSE) oral gel 15 g, 15 g, Oral, Q15 Min PRN, Loyd Harrington II, MD    glucagon (GLUCAGEN) injection 1 mg, 1 mg, Intramuscular, Q15 Min PRN, Loyd Harrington II, MD    insulin glargine (LANTUS, SEMGLEE) injection 15 Units, 15 Units, Subcutaneous, Nightly, Loyd Harrington II, MD, 15 Units at 06/26/24 2112    insulin lispro (HUMALOG/ADMELOG) injection 2-7 Units, 2-7 Units,  Subcutaneous, 4x Daily AC & at Bedtime, Loyd Harrington II, MD, 2 Units at 06/27/24 1213    insulin lispro (HUMALOG/ADMELOG) injection 5 Units, 5 Units, Subcutaneous, TID With Meals, Loyd Harrington II, MD, 5 Units at 06/27/24 1213    lactated ringers infusion, 9 mL/hr, Intravenous, Continuous, Loyd Harrington II, MD, Stopped at 06/27/24 1028    metoprolol succinate XL (TOPROL-XL) 24 hr tablet 12.5 mg, 12.5 mg, Oral, Q24H, Loyd Harrington II, MD, 12.5 mg at 06/27/24 0655    ondansetron ODT (ZOFRAN-ODT) disintegrating tablet 4 mg, 4 mg, Translingual, Q6H PRN, Loyd Harrington II, MD, 4 mg at 06/26/24 0905    pantoprazole (PROTONIX) EC tablet 40 mg, 40 mg, Oral, Q AM, Loyd Harrington II, MD    piperacillin-tazobactam (ZOSYN) 3.375 g IVPB in 100 mL NS MBP (CD), 3.375 g, Intravenous, Q8H, Loyd Harrington II, MD, Last Rate: 0 mL/hr at 06/23/24 0915, 3.375 g at 06/27/24 1405    ranolazine (RANEXA) 12 hr tablet 500 mg, 500 mg, Oral, Q12H, Loyd Harrington II, MD, 500 mg at 06/26/24 2048    rosuvastatin (CRESTOR) tablet 10 mg, 10 mg, Oral, Nightly, Loyd Harrington II, MD, 10 mg at 06/26/24 2048     ASSESSMENT  Right lower extremity cellulitis resolving  Right big toe diabetic infected wound s/p excisional debridement  Insulin-dependent diabetic mellitus  Hypertension  Hyperlipidemia  Coronary artery disease  Gastroesophageal reflux disease    PLAN  CPM  Postop care  Continue IV antibiotics per infectious disease  Continue home medications  Stress ulcer DVT prophylaxis  Supportive care  Discussed with nursing staff and family  Discharge planning    MARCIO VILLA MD    Copied text in this note has been reviewed and is accurate as of 06/27/24

## 2024-06-27 NOTE — PROGRESS NOTES
"  Infectious Diseases Progress Note    Jose Roberto Roman MD     [facility]  Los: 4 days  Patient Identification:  Name: Laxmi Marcus  Age: 64 y.o.  Sex: female  :  1960  MRN: [id number]         Primary Care Physician: Hollis Dawkins MD        Subjective: Going for debridement.  MRI did not show any evidence of osteomyelitis.  Interval History: See consultation note.  Vascular studies and vascular surgery consultation recommendations reviewed  2024 MRI of the right foot performed results reviewed and it does not show any evidence of contiguous focus osteomyelitis.  2024 going for I&D of the infected callus on the bottom of the first great toe.  Objective:    Scheduled Meds:[Transfer Hold] ascorbic acid, 500 mg, Oral, Daily  [Transfer Hold] clopidogrel, 75 mg, Oral, Daily  [Transfer Hold] insulin glargine, 15 Units, Subcutaneous, Nightly  [Transfer Hold] insulin lispro, 2-7 Units, Subcutaneous, 4x Daily AC & at Bedtime  [Transfer Hold] insulin lispro, 5 Units, Subcutaneous, TID With Meals  metoprolol succinate XL, 12.5 mg, Oral, Q24H  [Transfer Hold] pantoprazole, 40 mg, Oral, Q AM  piperacillin-tazobactam, 3.375 g, Intravenous, Q8H  [Transfer Hold] ranolazine, 500 mg, Oral, Q12H  [Transfer Hold] rosuvastatin, 10 mg, Oral, Nightly  sodium chloride, 3 mL, Intravenous, Q12H      Continuous Infusions:lactated ringers, 9 mL/hr, Last Rate: 9 mL/hr (24 0847)        Vital signs in last 24 hours:  Temp:  [97.1 °F (36.2 °C)-98 °F (36.7 °C)] 98 °F (36.7 °C)  Heart Rate:  [58-67] 58  Resp:  [16] 16  BP: (124-148)/(46-72) 148/46    Intake/Output:    Intake/Output Summary (Last 24 hours) at 2024 0933  Last data filed at 2024 0551  Gross per 24 hour   Intake 625 ml   Output --   Net 625 ml       Exam:  /46 (BP Location: Right arm, Patient Position: Lying)   Pulse 58   Temp 98 °F (36.7 °C) (Oral)   Resp 16   Ht 165 cm (64.96\")   Wt 64.4 kg (141 lb 15.6 oz)   LMP  (LMP " Unknown)   SpO2 100%   BMI 23.65 kg/m²   Patient is examined using the personal protective equipment as per guidelines from infection control for this particular patient as enacted.  Hand washing was performed before and after patient interaction.  General Appearance:  Awake interactive in no acute distress                          Head:    Normocephalic, without obvious abnormality, atraumatic                           Eyes:    PERRL, conjunctivae/corneas clear, EOM's intact, both eyes                         Throat:   Lips, tongue, gums normal; oral mucosa pink and moist                           Neck:   Supple, symmetrical, trachea midline, no JVD                         Lungs:    Clear to auscultation bilaterally, respirations unlabored                 Chest Wall:    No tenderness or deformity                          Heart:  S1-S2 regular                  Abdomen:   Soft nontender                   Extremities:                         Pulses:   Pulses palpable in all extremities                            Skin: Erythema is now confined to the right forefoot with erythema of the leg is much improved.                  Neurologic: Alert and oriented x 3       Data Review:    I reviewed the patient's new clinical results.  Results from last 7 days   Lab Units 06/27/24  0355 06/26/24  0416 06/25/24  0452 06/24/24  0441 06/23/24  0517 06/22/24  2043   WBC 10*3/mm3 9.47 8.40 7.02 10.90* 10.69 13.52*   HEMOGLOBIN g/dL 10.2* 10.8* 8.5* 10.3* 11.5* 12.0   PLATELETS 10*3/mm3 314 290 212 251 253 255     Results from last 7 days   Lab Units 06/27/24  0355 06/26/24  0416 06/25/24  0452 06/24/24  0441 06/23/24  0517 06/22/24  2043   SODIUM mmol/L 142 143 146* 140 138 139   POTASSIUM mmol/L 4.0 3.7 3.3* 3.9 3.8 4.0   CHLORIDE mmol/L 113* 113* 119* 110* 107 104   CO2 mmol/L 19.6* 20.0* 17.7* 19.3* 21.0* 23.5   BUN mg/dL 14 11 9 14 26* 36*   CREATININE mg/dL 1.30* 1.00 0.91 1.13* 1.19* 1.52*   CALCIUM mg/dL 8.9 8.0* 6.6* 8.2*  8.7 9.6   GLUCOSE mg/dL 88 116* 108* 233* 155* 165*     Microbiology Results (last 10 days)       Procedure Component Value - Date/Time    MRSA Screen, PCR (Inpatient) - Swab, Nares [[id number]]  (Normal) Collected: 06/23/24 1427    Lab Status: Final result Specimen: Swab from Nares Updated: 06/23/24 1717     MRSA PCR No MRSA Detected    Narrative:      The negative predictive value of this diagnostic test is high and should only be used to consider de-escalating anti-MRSA therapy. A positive result may indicate colonization with MRSA and must be correlated clinically.    Blood Culture - Blood, Arm, Right [[id number]]  (Normal) Collected: 06/22/24 2200    Lab Status: Preliminary result Specimen: Blood from Arm, Right Updated: 06/26/24 2215     Blood Culture No growth at 4 days    Blood Culture - Blood, Arm, Right [[id number]]  (Normal) Collected: 06/22/24 2149    Lab Status: Preliminary result Specimen: Blood from Arm, Right Updated: 06/26/24 2215     Blood Culture No growth at 4 days          XR Foot 3+ View Right    Result Date: 6/22/2024  No acute osseous abnormality seen.  This report was finalized on 6/22/2024 9:50 PM by Dr. Carmelina Gutiérrez M.D on Workstation: [id number]           Assessment:    Diabetic ulcer of right great toe  1-cellulitis of the right leg due to chronic right great toe diabetic wound  2-right great toe diabetic ulcer due to infected callus with risk of underlying abscess  3-insulin-dependent diabetes  4-chronic kidney disease with acute kidney injury improving  5-negative MRSA screen  6.  Peripheral vascular disease     Recommendations/Discussions:  See my discussion and recommendation on 6/26/2024 and 6/23/2024.  Continue with Zosyn and follow-up on operative culture results and further adjust antibiotic therapy.  Since there is no bone involvement would recommend 10 to 14 days of antibiotic therapy from final surgery with close follow-up.      Jose Roberto Roman MD  6/27/2024  09:33  EDT    Parts of this note may be an electronic transcription/translation of spoken language to printed text using the Dragon dictation system.

## 2024-06-27 NOTE — ANESTHESIA POSTPROCEDURE EVALUATION
Patient: Laxmi Marcus    Procedure Summary       Date: 06/27/24 Room / Location: [facility] OR 08 / [facility] MAIN OR    Anesthesia Start: 0955 Anesthesia Stop: 1035    Procedure: DEBRIDEMENT FIRST TOE (Right) Diagnosis:       Diabetic ulcer of right great toe      (Diabetic ulcer of right great toe [E11.621, L97.519])    Surgeons: Loyd Harrington II, MD Provider: Dinesh Henry MD    Anesthesia Type: MAC ASA Status: 3            Anesthesia Type: MAC    Vitals  Vitals Value Taken Time   /44 06/27/24 1100   Temp 36.3 °C (97.4 °F) 06/27/24 1100   Pulse 71 06/27/24 1106   Resp 18 06/27/24 1100   SpO2 100 % 06/27/24 1106   Vitals shown include unfiled device data.        Anesthesia Post Evaluation

## 2024-06-27 NOTE — PROGRESS NOTES
Continued Stay Note  [facility]     Patient Name: Laxmi Marcus  MRN: [id number]  Today's Date: 6/27/2024    Admit Date: 6/22/2024    Plan: Home (FU for poss needs/equipment/IV Abx)   Discharge Plan       Row Name 06/27/24 1205       Plan    Plan Home (FU for poss needs/equipment/IV Abx)    Plan Comments Debridement of (R) great toe today. CCP is continuing to follow ofr poss needs/equipment/IV Abx.                   Discharge Codes    No documentation.                 Expected Discharge Date and Time       Expected Discharge Date Expected Discharge Time    Jun 30, 2024               Sara Walker RN

## 2024-06-28 ENCOUNTER — TELEPHONE (OUTPATIENT)
Age: 64
End: 2024-06-28
Payer: COMMERCIAL

## 2024-06-28 ENCOUNTER — HOME HEALTH ADMISSION (OUTPATIENT)
Dept: HOME HEALTH SERVICES | Facility: HOME HEALTHCARE | Age: 64
End: 2024-06-28
Payer: MEDICARE

## 2024-06-28 ENCOUNTER — READMISSION MANAGEMENT (OUTPATIENT)
Dept: CALL CENTER | Facility: HOSPITAL | Age: 64
End: 2024-06-28
Payer: COMMERCIAL

## 2024-06-28 ENCOUNTER — TRANSCRIBE ORDERS (OUTPATIENT)
Dept: HOME HEALTH SERVICES | Facility: HOME HEALTHCARE | Age: 64
End: 2024-06-28
Payer: COMMERCIAL

## 2024-06-28 ENCOUNTER — DOCUMENTATION (OUTPATIENT)
Dept: HOME HEALTH SERVICES | Facility: HOME HEALTHCARE | Age: 64
End: 2024-06-28
Payer: COMMERCIAL

## 2024-06-28 VITALS
BODY MASS INDEX: 23.65 KG/M2 | HEIGHT: 65 IN | RESPIRATION RATE: 16 BRPM | DIASTOLIC BLOOD PRESSURE: 60 MMHG | OXYGEN SATURATION: 100 % | TEMPERATURE: 97.2 F | WEIGHT: 141.98 LBS | SYSTOLIC BLOOD PRESSURE: 137 MMHG | HEART RATE: 65 BPM

## 2024-06-28 DIAGNOSIS — L97.519 DIABETIC ULCER OF TOE OF RIGHT FOOT ASSOCIATED WITH TYPE 2 DIABETES MELLITUS, UNSPECIFIED ULCER STAGE: Primary | ICD-10-CM

## 2024-06-28 DIAGNOSIS — E11.621 DIABETIC ULCER OF TOE OF RIGHT FOOT ASSOCIATED WITH TYPE 2 DIABETES MELLITUS, UNSPECIFIED ULCER STAGE: Primary | ICD-10-CM

## 2024-06-28 LAB
ANION GAP SERPL CALCULATED.3IONS-SCNC: 9 MMOL/L (ref 5–15)
BASOPHILS # BLD AUTO: 0.11 10*3/MM3 (ref 0–0.2)
BASOPHILS NFR BLD AUTO: 1.1 % (ref 0–1.5)
BUN SERPL-MCNC: 17 MG/DL (ref 8–23)
BUN/CREAT SERPL: 13.2 (ref 7–25)
CALCIUM SPEC-SCNC: 8.9 MG/DL (ref 8.6–10.5)
CHLORIDE SERPL-SCNC: 109 MMOL/L (ref 98–107)
CO2 SERPL-SCNC: 23 MMOL/L (ref 22–29)
CREAT SERPL-MCNC: 1.29 MG/DL (ref 0.57–1)
DEPRECATED RDW RBC AUTO: 40 FL (ref 37–54)
EGFRCR SERPLBLD CKD-EPI 2021: 45.9 ML/MIN/1.73
EOSINOPHIL # BLD AUTO: 0.28 10*3/MM3 (ref 0–0.4)
EOSINOPHIL NFR BLD AUTO: 2.9 % (ref 0.3–6.2)
ERYTHROCYTE [DISTWIDTH] IN BLOOD BY AUTOMATED COUNT: 12.1 % (ref 12.3–15.4)
GLUCOSE BLDC GLUCOMTR-MCNC: 159 MG/DL (ref 70–130)
GLUCOSE BLDC GLUCOMTR-MCNC: 204 MG/DL (ref 70–130)
GLUCOSE BLDC GLUCOMTR-MCNC: 221 MG/DL (ref 70–130)
GLUCOSE SERPL-MCNC: 169 MG/DL (ref 65–99)
HCT VFR BLD AUTO: 30.8 % (ref 34–46.6)
HGB BLD-MCNC: 10.1 G/DL (ref 12–15.9)
IMM GRANULOCYTES # BLD AUTO: 0.06 10*3/MM3 (ref 0–0.05)
IMM GRANULOCYTES NFR BLD AUTO: 0.6 % (ref 0–0.5)
LYMPHOCYTES # BLD AUTO: 1.76 10*3/MM3 (ref 0.7–3.1)
LYMPHOCYTES NFR BLD AUTO: 18.2 % (ref 19.6–45.3)
MCH RBC QN AUTO: 29.8 PG (ref 26.6–33)
MCHC RBC AUTO-ENTMCNC: 32.8 G/DL (ref 31.5–35.7)
MCV RBC AUTO: 90.9 FL (ref 79–97)
MONOCYTES # BLD AUTO: 0.73 10*3/MM3 (ref 0.1–0.9)
MONOCYTES NFR BLD AUTO: 7.6 % (ref 5–12)
NEUTROPHILS NFR BLD AUTO: 6.72 10*3/MM3 (ref 1.7–7)
NEUTROPHILS NFR BLD AUTO: 69.6 % (ref 42.7–76)
NRBC BLD AUTO-RTO: 0 /100 WBC (ref 0–0.2)
PLATELET # BLD AUTO: 338 10*3/MM3 (ref 140–450)
PMV BLD AUTO: 9.1 FL (ref 6–12)
POTASSIUM SERPL-SCNC: 3.9 MMOL/L (ref 3.5–5.2)
RBC # BLD AUTO: 3.39 10*6/MM3 (ref 3.77–5.28)
SODIUM SERPL-SCNC: 141 MMOL/L (ref 136–145)
WBC NRBC COR # BLD AUTO: 9.66 10*3/MM3 (ref 3.4–10.8)

## 2024-06-28 PROCEDURE — 80048 BASIC METABOLIC PNL TOTAL CA: CPT | Performed by: HOSPITALIST

## 2024-06-28 PROCEDURE — 82948 REAGENT STRIP/BLOOD GLUCOSE: CPT

## 2024-06-28 PROCEDURE — 25010000002 PIPERACILLIN SOD-TAZOBACTAM PER 1 G: Performed by: HOSPITALIST

## 2024-06-28 PROCEDURE — 85025 COMPLETE CBC W/AUTO DIFF WBC: CPT | Performed by: HOSPITALIST

## 2024-06-28 PROCEDURE — 99231 SBSQ HOSP IP/OBS SF/LOW 25: CPT | Performed by: STUDENT IN AN ORGANIZED HEALTH CARE EDUCATION/TRAINING PROGRAM

## 2024-06-28 PROCEDURE — 63710000001 INSULIN LISPRO (HUMAN) PER 5 UNITS: Performed by: STUDENT IN AN ORGANIZED HEALTH CARE EDUCATION/TRAINING PROGRAM

## 2024-06-28 RX ORDER — AMOXICILLIN AND CLAVULANATE POTASSIUM 875; 125 MG/1; MG/1
1 TABLET, FILM COATED ORAL EVERY 12 HOURS SCHEDULED
Qty: 20 TABLET | Refills: 0 | Status: SHIPPED | OUTPATIENT
Start: 2024-06-28 | End: 2024-07-09

## 2024-06-28 RX ORDER — PANTOPRAZOLE SODIUM 40 MG/1
40 TABLET, DELAYED RELEASE ORAL
Qty: 30 TABLET | Refills: 0 | Status: SHIPPED | OUTPATIENT
Start: 2024-06-29 | End: 2024-07-29

## 2024-06-28 RX ORDER — AMOXICILLIN AND CLAVULANATE POTASSIUM 875; 125 MG/1; MG/1
1 TABLET, FILM COATED ORAL EVERY 12 HOURS SCHEDULED
Qty: 20 TABLET | Refills: 0 | Status: DISCONTINUED | OUTPATIENT
Start: 2024-06-28 | End: 2024-06-28 | Stop reason: HOSPADM

## 2024-06-28 RX ADMIN — OXYCODONE HYDROCHLORIDE AND ACETAMINOPHEN 500 MG: 500 TABLET ORAL at 08:31

## 2024-06-28 RX ADMIN — PIPERACILLIN AND TAZOBACTAM 3.38 G: 3; .375 INJECTION, POWDER, FOR SOLUTION INTRAVENOUS at 05:38

## 2024-06-28 RX ADMIN — AMOXICILLIN AND CLAVULANATE POTASSIUM 1 TABLET: 875; 125 TABLET, FILM COATED ORAL at 12:06

## 2024-06-28 RX ADMIN — INSULIN LISPRO 3 UNITS: 100 INJECTION, SOLUTION INTRAVENOUS; SUBCUTANEOUS at 06:53

## 2024-06-28 RX ADMIN — ACETAMINOPHEN 325MG 650 MG: 325 TABLET ORAL at 04:49

## 2024-06-28 RX ADMIN — METOPROLOL SUCCINATE 12.5 MG: 25 TABLET, EXTENDED RELEASE ORAL at 08:31

## 2024-06-28 RX ADMIN — RANOLAZINE 500 MG: 500 TABLET, FILM COATED, EXTENDED RELEASE ORAL at 08:31

## 2024-06-28 RX ADMIN — INSULIN LISPRO 5 UNITS: 100 INJECTION, SOLUTION INTRAVENOUS; SUBCUTANEOUS at 08:31

## 2024-06-28 RX ADMIN — INSULIN LISPRO 5 UNITS: 100 INJECTION, SOLUTION INTRAVENOUS; SUBCUTANEOUS at 12:05

## 2024-06-28 RX ADMIN — CLOPIDOGREL BISULFATE 75 MG: 75 TABLET, FILM COATED ORAL at 08:31

## 2024-06-28 RX ADMIN — INSULIN LISPRO 2 UNITS: 100 INJECTION, SOLUTION INTRAVENOUS; SUBCUTANEOUS at 12:05

## 2024-06-28 NOTE — PLAN OF CARE
Goal Outcome Evaluation:  Plan of Care Reviewed With: patient        Progress: improving  Outcome Evaluation: VSS. minimal amount of pain. no pain med given.  right foot dressing CDI.  Has been up in the chair for a while last night.  using orthopedic shoe when OOB.  blood sugar monitoring

## 2024-06-28 NOTE — DISCHARGE PLACEMENT REQUEST
"Laxmi Fierro (64 y.o. Female)       Date of Birth   1960    Social Security Number       Address   [address] [place], [place] [address]    Home Phone   [phone]    MRN   [id number]       Faith   Taoist    Marital Status                               Admission Date   6/22/24    Admission Type   Emergency    Admitting Provider   Richmond Monet MD    Attending Provider   Richmond Monet MD    Department, Room/Bed   [address], P676/1       Discharge Date       Discharge Disposition       Discharge Destination                                 Attending Provider: Richmond Monet MD    Allergies: Dulaglutide, Sulfa Antibiotics, Uloric [Febuxostat], Latex    Isolation: None   Infection: MRSA/History Only (09/13/21)   Code Status: CPR    Ht: 165 cm (64.96\")   Wt: 64.4 kg (141 lb 15.6 oz)    Admission Cmt: None   Principal Problem: Diabetic ulcer of right great toe [E11.621,L97.519]                   Active Insurance as of 6/22/2024       Primary Coverage       Payor Plan Insurance Group Employer/Plan Group    MEDICARE MEDICARE A & B        Payor Plan Address Payor Plan Phone Number Payor Plan Fax Number Effective Dates    [address] [phone]  1/1/2023 - None Entered    [facility] [id number]         Subscriber Name Subscriber Birth Date Member ID       LAXMI FIERRO 1960 [id number]               Secondary Coverage       Payor Plan Insurance Group Employer/Plan Group    [facility] SUPP [id number]       Payor Plan Address Payor Plan Phone Number Payor Plan Fax Number Effective Dates    [address]   1/1/2023 - None Entered    [facility] [address]         Subscriber Name Subscriber Birth Date Member ID       LAXMI FIERRO 1960 [id number]                     Emergency Contacts        (Rel.) Home Phone Work Phone Mobile Phone    AUBREY FIERRO JR (Son) -- -- [phone]                "

## 2024-06-28 NOTE — PROGRESS NOTES
"Daily progress note    Primary care physician      Subjective  Doing better with no new complaints and wants to go home.  Patient family at bedside.    History of present illness  64-year-old white female with history of insulin-dependent diabetes mellitus hypertension hyperlipidemia and coronary artery disease who also have right big toe diabetic wound with ulcer which she follow-up with the podiatrist presented to [facility] emergency with worsening wound with drainage and swelling of the right big toe and also noticed redness swelling and pain in the right lower extremity.  Patient workup in ER revealed right lower extremity cellulitis and also infected wound with ulcer with probable abscess and need to rule out osteomyelitis admitted for management.  Patient complained of chills off and on but denies any fall trauma injury or any fever.  Patient has no chest pain shortness of breath palpitation either.     REVIEW OF SYSTEMS  Unremarkable     PHYSICAL EXAM   Blood pressure 137/60, pulse 65, temperature 97.2 °F (36.2 °C), temperature source Oral, resp. rate 16, height 165 cm (64.96\"), weight 64.4 kg (141 lb 15.6 oz), SpO2 100%, not currently breastfeeding.    GENERAL: Awake and alert, no acute distress  SKIN: Warm, dry, ulcerative wound to plantar aspect of R great toe  HENT: Normocephalic, atraumatic  EYES: no scleral icterus  CV: regular rhythm, regular rate  RESPIRATORY: normal effort, lungs clear  ABDOMEN: soft, nontender, nondistended bowel sounds positive  MUSCULOSKELETAL: no deformity, erythema extending from R great toe proximally to the mid R lower leg, streaking to the R foot  NEURO: alert, moves all extremities, follows commands     LAB RESULTS  Lab Results (last 24 hours)       Procedure Component Value Units Date/Time    POC Glucose Once [[id number]]  (Abnormal) Collected: 06/28/24 1116    Specimen: Blood Updated: 06/28/24 1117     Glucose 159 mg/dL     Wound Culture - Wound, Toe, " Right [[id number]]  (Abnormal) Collected: 06/27/24 1018    Specimen: Wound from Toe, Right Updated: 06/28/24 0934     Wound Culture Moderate growth (3+) Staphylococcus aureus     Gram Stain Moderate (3+) WBCs per low power field      Rare (1+) Gram positive cocci in clusters    POC Glucose Once [[id number]]  (Abnormal) Collected: 06/28/24 0649    Specimen: Blood Updated: 06/28/24 0651     Glucose 221 mg/dL     Basic Metabolic Panel [[id number]]  (Abnormal) Collected: 06/28/24 0250    Specimen: Blood Updated: 06/28/24 0417     Glucose 169 mg/dL      BUN 17 mg/dL      Creatinine 1.29 mg/dL      Sodium 141 mmol/L      Potassium 3.9 mmol/L      Chloride 109 mmol/L      CO2 23.0 mmol/L      Calcium 8.9 mg/dL      BUN/Creatinine Ratio 13.2     Anion Gap 9.0 mmol/L      eGFR 45.9 mL/min/1.73     Narrative:      GFR Normal >60  Chronic Kidney Disease <60  Kidney Failure <15      CBC & Differential [[id number]]  (Abnormal) Collected: 06/28/24 0250    Specimen: Blood Updated: 06/28/24 0402    Narrative:      The following orders were created for panel order CBC & Differential.  Procedure                               Abnormality         Status                     ---------                               -----------         ------                     CBC Auto Differential[[id number]]        Abnormal            Final result                 Please view results for these tests on the individual orders.    CBC Auto Differential [[id number]]  (Abnormal) Collected: 06/28/24 0250    Specimen: Blood Updated: 06/28/24 0402     WBC 9.66 10*3/mm3      RBC 3.39 10*6/mm3      Hemoglobin 10.1 g/dL      Hematocrit 30.8 %      MCV 90.9 fL      MCH 29.8 pg      MCHC 32.8 g/dL      RDW 12.1 %      RDW-SD 40.0 fl      MPV 9.1 fL      Platelets 338 10*3/mm3      Neutrophil % 69.6 %      Lymphocyte % 18.2 %      Monocyte % 7.6 %      Eosinophil % 2.9 %      Basophil % 1.1 %      Immature Grans % 0.6 %      Neutrophils, Absolute 6.72 10*3/mm3       Lymphocytes, Absolute 1.76 10*3/mm3      Monocytes, Absolute 0.73 10*3/mm3      Eosinophils, Absolute 0.28 10*3/mm3      Basophils, Absolute 0.11 10*3/mm3      Immature Grans, Absolute 0.06 10*3/mm3      nRBC 0.0 /100 WBC     POC Glucose Once [[id number]]  (Abnormal) Collected: 06/28/24 0043    Specimen: Blood Updated: 06/28/24 0153     Glucose 204 mg/dL     Blood Culture - Blood, Arm, Right [[id number]]  (Normal) Collected: 06/22/24 2149    Specimen: Blood from Arm, Right Updated: 06/27/24 2215     Blood Culture No growth at 5 days    Blood Culture - Blood, Arm, Right [[id number]]  (Normal) Collected: 06/22/24 2200    Specimen: Blood from Arm, Right Updated: 06/27/24 2215     Blood Culture No growth at 5 days    POC Glucose Once [[id number]]  (Abnormal) Collected: 06/27/24 2057    Specimen: Blood Updated: 06/27/24 2059     Glucose 237 mg/dL     POC Glucose Once [[id number]]  (Abnormal) Collected: 06/27/24 1705    Specimen: Blood Updated: 06/27/24 1707     Glucose 312 mg/dL           Imaging Results (Last 24 Hours)       ** No results found for the last 24 hours. **            Current Facility-Administered Medications:     amoxicillin-clavulanate (AUGMENTIN) 875-125 MG per tablet 1 tablet, 1 tablet, Oral, Q12H, Jose Roberto Roman MD    ascorbic acid (VITAMIN C) tablet 500 mg, 500 mg, Oral, Daily, Loyd Harrington II, MD, 500 mg at 06/28/24 0831    clopidogrel (PLAVIX) tablet 75 mg, 75 mg, Oral, Daily, Loyd Harrington II, MD, 75 mg at 06/28/24 0831    insulin glargine (LANTUS, SEMGLEE) injection 15 Units, 15 Units, Subcutaneous, Nightly, Loyd Harrington II, MD, 15 Units at 06/27/24 2106    insulin lispro (HUMALOG/ADMELOG) injection 2-7 Units, 2-7 Units, Subcutaneous, 4x Daily AC & at Bedtime, Loyd Harrington II, MD, 3 Units at 06/28/24 0653    insulin lispro (HUMALOG/ADMELOG) injection 5 Units, 5 Units, Subcutaneous, TID With Meals, Loyd Harrington II, MD, 5 Units at 06/28/24 0831    metoprolol succinate XL (TOPROL-XL) 24 hr  tablet 12.5 mg, 12.5 mg, Oral, Q24H, Loyd Harrington II, MD, 12.5 mg at 06/28/24 0831    pantoprazole (PROTONIX) EC tablet 40 mg, 40 mg, Oral, Q AM, Loyd Harrington II, MD    ranolazine (RANEXA) 12 hr tablet 500 mg, 500 mg, Oral, Q12H, Loyd Harrington II, MD, 500 mg at 06/28/24 0831    rosuvastatin (CRESTOR) tablet 10 mg, 10 mg, Oral, Nightly, Loyd Harrington II, MD, 10 mg at 06/27/24 2101     ASSESSMENT  Right lower extremity cellulitis resolving  Right big toe diabetic infected wound s/p excisional debridement  Insulin-dependent diabetic mellitus  Hypertension  Hyperlipidemia  Coronary artery disease  Gastroesophageal reflux disease    PLAN  Discharge home on oral antibiotics  Discharge summary dictated    MARCIO VILLA MD    Copied text in this note has been reviewed and is accurate as of 06/28/24

## 2024-06-28 NOTE — PROGRESS NOTES
Name: Laxmi Marcus ADMIT: 2024   : 1960  PCP: Hollis Dawkins MD    MRN: [id number] LOS: 5 days   AGE/SEX: 64 y.o. female  ROOM:  [facility] P676/1     CC: Postop day 1 status post right first toe debridement  Interval History: Doing well overall.  No pain.  Has Darco shoe on.  Really wants to go home today.  Subjective   Subjective     Review of Systems  Objective   Objective     Vitals:   Temp:  [96.8 °F (36 °C)-97.9 °F (36.6 °C)] 97.9 °F (36.6 °C)  Heart Rate:  [59-76] 61  Resp:  [16-18] 16  BP: (135-157)/(42-64) 142/60    No intake/output data recorded.    Scheduled Meds:     amoxicillin-clavulanate, 1 tablet, Oral, Q12H  ascorbic acid, 500 mg, Oral, Daily  clopidogrel, 75 mg, Oral, Daily  insulin glargine, 15 Units, Subcutaneous, Nightly  insulin lispro, 2-7 Units, Subcutaneous, 4x Daily AC & at Bedtime  insulin lispro, 5 Units, Subcutaneous, TID With Meals  metoprolol succinate XL, 12.5 mg, Oral, Q24H  pantoprazole, 40 mg, Oral, Q AM  ranolazine, 500 mg, Oral, Q12H  rosuvastatin, 10 mg, Oral, Nightly      IV Meds:   lactated ringers, 9 mL/hr, Last Rate: Stopped (24 1028)        Physical Exam  Palpable right DP pulse.  Right first toe debridement site clean with healthy appearing tissue at base.  No ongoing purulence.  Cellulitis improving still    Data Reviewed:  CBC    Results from last 7 days   Lab Units 24  0250 24  0355 24  0416 24  0452 24  0441 24  0517 24  2043   WBC 10*3/mm3 9.66 9.47 8.40 7.02 10.90* 10.69 13.52*   HEMOGLOBIN g/dL 10.1* 10.2* 10.8* 8.5* 10.3* 11.5* 12.0   PLATELETS 10*3/mm3 338 314 290 212 251 253 255     BMP   Results from last 7 days   Lab Units 24  0250 24  0355 24  0416 24  0452 24  0441 24  0517 24  2043   SODIUM mmol/L 141 142 143 146* 140 138 139   POTASSIUM mmol/L 3.9 4.0 3.7 3.3* 3.9 3.8 4.0   CHLORIDE mmol/L 109* 113* 113* 119* 110* 107 104   CO2 mmol/L 23.0 19.6*  "20.0* 17.7* 19.3* 21.0* 23.5   BUN mg/dL 17 14 11 9 14 26* 36*   CREATININE mg/dL 1.29* 1.30* 1.00 0.91 1.13* 1.19* 1.52*   GLUCOSE mg/dL 169* 88 116* 108* 233* 155* 165*     Cr Clearance Estimated Creatinine Clearance: 43.6 mL/min (A) (by C-G formula based on SCr of 1.29 mg/dL (H)).  Coag     HbA1C   Lab Results   Component Value Date    HGBA1C 7.20 (H) 06/24/2024    HGBA1C 7.4 (H) 04/16/2024    HGBA1C 7.7 (H) 11/29/2023     Blood Glucose   Glucose   Date/Time Value Ref Range Status   06/28/2024 0649 221 (H) 70 - 130 mg/dL Final   06/28/2024 0043 204 (H) 70 - 130 mg/dL Final   06/27/2024 2057 237 (H) 70 - 130 mg/dL Final   06/27/2024 1705 312 (H) 70 - 130 mg/dL Final   06/27/2024 1133 160 (H) 70 - 130 mg/dL Final   06/27/2024 1054 157 (H) 70 - 130 mg/dL Final   06/27/2024 0821 101 70 - 130 mg/dL Final   06/27/2024 0634 77 70 - 130 mg/dL Final     Infection   Results from last 7 days   Lab Units 06/22/24  2200 06/22/24  2149   BLOODCX  No growth at 5 days No growth at 5 days     CMP   Results from last 7 days   Lab Units 06/28/24  0250 06/27/24  0355 06/26/24  0416 06/25/24  0452 06/24/24  0441 06/23/24  0517 06/22/24  2043   SODIUM mmol/L 141 142 143 146* 140 138 139   POTASSIUM mmol/L 3.9 4.0 3.7 3.3* 3.9 3.8 4.0   CHLORIDE mmol/L 109* 113* 113* 119* 110* 107 104   CO2 mmol/L 23.0 19.6* 20.0* 17.7* 19.3* 21.0* 23.5   BUN mg/dL 17 14 11 9 14 26* 36*   CREATININE mg/dL 1.29* 1.30* 1.00 0.91 1.13* 1.19* 1.52*   GLUCOSE mg/dL 169* 88 116* 108* 233* 155* 165*   ALBUMIN g/dL  --   --   --   --  3.2*  --  4.0   BILIRUBIN mg/dL  --   --   --   --  0.4  --  0.4   ALK PHOS U/L  --   --   --   --  69  --  72   AST (SGOT) U/L  --   --   --   --  11  --  12   ALT (SGPT) U/L  --   --   --   --  13  --  17     ABG      UA      COLEEN  No results found for: \"POCMETH\", \"POCAMPHET\", \"POCBARBITUR\", \"POCBENZO\", \"POCCOCAINE\", \"POCOPIATES\", \"POCOXYCODO\", \"POCPHENCYC\", \"POCPROPOXY\", \"POCTHC\", \"POCTRICYC\"  Lysis Labs   Results from last 7 " days   Lab Units 06/28/24  0250 06/27/24  0355 06/26/24  0416 06/25/24  0452 06/24/24  0441 06/23/24  0517 06/22/24  2043   HEMOGLOBIN g/dL 10.1* 10.2* 10.8* 8.5* 10.3* 11.5* 12.0   PLATELETS 10*3/mm3 338 314 290 212 251 253 255   CREATININE mg/dL 1.29* 1.30* 1.00 0.91 1.13* 1.19* 1.52*     Radiology(recent) No radiology results for the last day    Most notable findings include: GPCs noted on Gram stain of intraoperative culture.    Active Hospital Problems:   Active Hospital Problems    Diagnosis  POA    **Diabetic ulcer of right great toe [E11.621, L97.519]  Yes      Resolved Hospital Problems   No resolved problems to display.      Assessment & Plan     Assessment / Plan     Pleasant 64-year-old lady postop day 1 status post right first toe debridement for diabetic foot ulcer.  Patient can ambulate in her Darco shoe.  Redressed her wound with Betadine gauze today.  Will consult wound care for wound care recommendations.  She will need outpatient referral to the wound care center.  Antibiotics per infectious disease.  Discussed with case management.  She will likely need home health for assistance with wound care.  I do not think she will do well with her own dressing changes.      Loyd Harrington II, MD  06/28/24  09:33 EDT  Office Number [phone]

## 2024-06-28 NOTE — PROGRESS NOTES
Case Management Discharge Note      Final Note: Discharged home. Sara Walker RN         Selected Continued Care - Discharged on 6/28/2024 Admission date: 6/22/2024 - Discharge disposition: Home or Self Care          Home Medical Care Coordination complete.      Service Provider Selected Services Address Phone Fax Patient Preferred     [facility] [address] [id number] [phone] --                 Transportation Services  Private: Car    Final Discharge Disposition Code: 06 - home with home health care

## 2024-06-28 NOTE — PROGRESS NOTES
[facility] will follow post hospital as requested. Patient agreeable to service. Contact information confirmed. Patient no longer has home phone; she can be reached on mobile # [phone] to schedule home health visits.  Verbal order received from Ceci with vascular surgeon, Dr. Loyd Harrington, for home health nursing.

## 2024-06-28 NOTE — DISCHARGE SUMMARY
Discharge summary    Date of admission 6/22/2024  Date of discharge 6/28/2024    Final diagnosis  Right lower extremity cellulitis resolved  Right big toe diabetic infected wound s/p excisional debridement  Insulin-dependent diabetic mellitus  Hypertension  Hyperlipidemia  Coronary artery disease  Gastroesophageal reflux disease    Discharge medications    Current Facility-Administered Medications:     amoxicillin-clavulanate (AUGMENTIN) 875-125 MG per tablet 1 tablet, 1 tablet, Oral, Q12H, Jose Roberto Roman MD    ascorbic acid (VITAMIN C) tablet 500 mg, 500 mg, Oral, Daily, Loyd Harrington II, MD, 500 mg at 06/28/24 0831    clopidogrel (PLAVIX) tablet 75 mg, 75 mg, Oral, Daily, Loyd Harrington II, MD, 75 mg at 06/28/24 0831    insulin glargine (LANTUS, SEMGLEE) injection 15 Units, 15 Units, Subcutaneous, Nightly, Loyd Harrington II, MD, 15 Units at 06/27/24 2106    insulin lispro (HUMALOG/ADMELOG) injection 2-7 Units, 2-7 Units, Subcutaneous, 4x Daily AC & at Bedtime, Loyd Harrington II, MD, 3 Units at 06/28/24 0653    insulin lispro (HUMALOG/ADMELOG) injection 5 Units, 5 Units, Subcutaneous, TID With Meals, Loyd Harrington II, MD, 5 Units at 06/28/24 0831    metoprolol succinate XL (TOPROL-XL) 24 hr tablet 12.5 mg, 12.5 mg, Oral, Q24H, Loyd Harrington II, MD, 12.5 mg at 06/28/24 0831    pantoprazole (PROTONIX) EC tablet 40 mg, 40 mg, Oral, Q AM, Loyd Harrington II, MD    ranolazine (RANEXA) 12 hr tablet 500 mg, 500 mg, Oral, Q12H, Loyd Harrington II, MD, 500 mg at 06/28/24 0831    rosuvastatin (CRESTOR) tablet 10 mg, 10 mg, Oral, Nightly, Loyd Harrington II, MD, 10 mg at 06/27/24 2101     Consults obtained  Infectious disease  Vascular surgery  Diabetic education nurse  Spiritual care    Procedures  Right first toe excisional debridement of skin and subcutaneous tissue     Hospital course  64-year-old white female with history of diabetes hypertension hyperlipidemia and coronary artery disease admitted to emergency  room with right lower extremity redness pain swelling and also have right big toe diabetic wound with ulcer with worsening.  Patient workup in ER revealed right lower extremity cellulitis and infected right big toe wound with ulcer.  Patient admitted treated with IV antibiotics after obtaining the cultures and her blood cultures remains negative can further evaluated by infectious disease and vascular surgery and recommend debridement of the wound.  Patient underwent right first toe excisional debridement of skin and subcutaneous tissue and started feeling better.  Patient antibiotics changed by mouth and she will finish remaining 11 days at home by mouth.  Patient cleared for discharge from vascular surgery infectious disease.    Discharge diet regular    Activity as tolerated    Medication as above    Follow-up with primary doctor in 1 week and follow-up with infectious disease and vascular surgery per the instructions and take medication as directed.    MARCIO VILLA MD

## 2024-06-28 NOTE — TELEPHONE ENCOUNTER
Pt had a right first toe debridement yesterday, 06.27.24. Dominique from [facility] called and stated that Len put in progress note today that he would like home [facility] to do wound care for patient. Dominique stated that there was no order put in so they are either needing an order placed or she said a verbal order is fine.

## 2024-06-28 NOTE — TELEPHONE ENCOUNTER
I spoke to HH today and let them know that HH is ok to be initiated.They didn't need wound care orders.

## 2024-06-28 NOTE — PROGRESS NOTES
Continued Stay Note  [facility]     Patient Name: Laxmi Marcus  MRN: [id number]  Today's Date: 6/28/2024    Admit Date: 6/22/2024    Plan: Home with HH (referral in [facility]/Pending)   Discharge Plan       Row Name 06/28/24 0948       Plan    Plan Home with HH (referral in [facility]/Pending)    Plan Comments Per MD, patient will need home health. Spoke to patient who denies any home health in past. Agreeable to any accepting home health. Referral placed in [facility]/Pending                   Discharge Codes    No documentation.                 Expected Discharge Date and Time       Expected Discharge Date Expected Discharge Time    Jun 30, 2024               Sara Walker, RN

## 2024-06-29 ENCOUNTER — HOME CARE VISIT (OUTPATIENT)
Dept: HOME HEALTH SERVICES | Facility: HOME HEALTHCARE | Age: 64
End: 2024-06-29

## 2024-06-29 ENCOUNTER — HOME CARE VISIT (OUTPATIENT)
Dept: HOME HEALTH SERVICES | Facility: HOME HEALTHCARE | Age: 64
End: 2024-06-29
Payer: MEDICARE

## 2024-06-29 ENCOUNTER — HOME CARE VISIT (OUTPATIENT)
Dept: HOME HEALTH SERVICES | Facility: HOME HEALTHCARE | Age: 64
End: 2024-06-29
Payer: COMMERCIAL

## 2024-06-29 VITALS
BODY MASS INDEX: 22.66 KG/M2 | HEART RATE: 65 BPM | HEIGHT: 65 IN | SYSTOLIC BLOOD PRESSURE: 122 MMHG | RESPIRATION RATE: 18 BRPM | DIASTOLIC BLOOD PRESSURE: 62 MMHG | WEIGHT: 136 LBS | TEMPERATURE: 97.8 F | OXYGEN SATURATION: 98 %

## 2024-06-29 LAB
BACTERIA SPEC AEROBE CULT: ABNORMAL
GRAM STN SPEC: ABNORMAL
GRAM STN SPEC: ABNORMAL

## 2024-06-29 PROCEDURE — G0299 HHS/HOSPICE OF RN EA 15 MIN: HCPCS

## 2024-06-29 NOTE — OUTREACH NOTE
Prep Survey      Flowsheet Row Responses   [facility] facility patient discharged from? [facility]   Is LACE score < 7 ? No   Eligibility Readm Mgmt   Discharge diagnosis DEBRIDEMENT FIRST TOE   Does the patient have one of the following disease processes/diagnoses(primary or secondary)? General Surgery   Does the patient have Home health ordered? Yes   What is the Home health agency?  [facility]   Prep survey completed? Yes            Ceci VIVAS - Registered Nurse

## 2024-06-29 NOTE — HOME HEALTH
64 year old female admitted to home care services s/p acute hospitalization 6/22-6/28/24 at [facility]. Patient reports that she saw her Podiatrist for a callus on her right posterior toe and states that Podiatrist scraped toe and couple days later there was a red ring around toe and reddnes noted to right lower leg. Patient went to [facility] ER and workup in ER revealed right lower extremity cellulitis and infected right big toe wound with ulcer. Patient was further evaluated by infectious disease and vascular surgery and recommend debridement of the wound.  Patient underwent right first toe excisional debridement of skin and subcutaneous tissue.  Patient recieved IV antibiotics during her stay and sent home with oral Augmentin twice a day for 10 days. Patient lives alone in single family home with steep front steps into home. Patient has significant other (boyfriend) who will be assisting with wound care to right toe. Patient has large dog but she will put outside when nurses make visits. Patient ambulating without assistive device and wearing soft shoe to right foot. Patient with history of CAD s/p 4 stent placement in 2019, and left mastectomy in 2021, Insulin dependent DM2, wears Tamara III for glucose monitoring,  HTN, HLD, and GERD. Patient rates pain 5/10 to right toe, and is taking ES Tylenol. Sn left enough supplies and placed order for more wound supplies. Patient did state she is going on vacation 7/10-7/17. Sn visits 1w1,2w3, 2 prn for focus of care wound care to right posterior toe as ordered, assessment of all systems, pain managment, teach medications and med effects, nutrition, hydration, and bowel regimen. No other disciplines needed.

## 2024-06-30 LAB — BACTERIA SPEC ANAEROBE CULT: NORMAL

## 2024-07-02 ENCOUNTER — HOME CARE VISIT (OUTPATIENT)
Dept: HOME HEALTH SERVICES | Facility: HOME HEALTHCARE | Age: 64
End: 2024-07-02
Payer: COMMERCIAL

## 2024-07-02 VITALS
OXYGEN SATURATION: 100 % | TEMPERATURE: 97.9 F | DIASTOLIC BLOOD PRESSURE: 54 MMHG | SYSTOLIC BLOOD PRESSURE: 132 MMHG | RESPIRATION RATE: 20 BRPM | HEART RATE: 72 BPM

## 2024-07-02 LAB — BACTERIA SPEC ANAEROBE CULT: NORMAL

## 2024-07-02 PROCEDURE — G0299 HHS/HOSPICE OF RN EA 15 MIN: HCPCS

## 2024-07-03 ENCOUNTER — READMISSION MANAGEMENT (OUTPATIENT)
Dept: CALL CENTER | Facility: HOSPITAL | Age: 64
End: 2024-07-03
Payer: COMMERCIAL

## 2024-07-03 NOTE — OUTREACH NOTE
General Surgery Week 1 Survey      Flowsheet Row Responses   [facility] facility patient discharged from? [place]   Does the patient have one of the following disease processes/diagnoses(primary or secondary)? General Surgery   Week 1 attempt successful? No   Unsuccessful attempts Attempt 1            Ceci SAMSON - Registered Nurse

## 2024-07-05 ENCOUNTER — HOME CARE VISIT (OUTPATIENT)
Dept: HOME HEALTH SERVICES | Facility: HOME HEALTHCARE | Age: 64
End: 2024-07-05
Payer: COMMERCIAL

## 2024-07-05 VITALS
DIASTOLIC BLOOD PRESSURE: 52 MMHG | TEMPERATURE: 97.4 F | SYSTOLIC BLOOD PRESSURE: 148 MMHG | OXYGEN SATURATION: 99 % | RESPIRATION RATE: 20 BRPM | HEART RATE: 64 BPM

## 2024-07-05 PROCEDURE — G0299 HHS/HOSPICE OF RN EA 15 MIN: HCPCS

## 2024-07-08 ENCOUNTER — HOME CARE VISIT (OUTPATIENT)
Dept: HOME HEALTH SERVICES | Facility: HOME HEALTHCARE | Age: 64
End: 2024-07-08
Payer: MEDICARE

## 2024-07-08 VITALS
TEMPERATURE: 98 F | HEART RATE: 72 BPM | SYSTOLIC BLOOD PRESSURE: 128 MMHG | OXYGEN SATURATION: 99 % | RESPIRATION RATE: 24 BRPM | DIASTOLIC BLOOD PRESSURE: 64 MMHG

## 2024-07-08 PROCEDURE — G0299 HHS/HOSPICE OF RN EA 15 MIN: HCPCS

## 2024-07-10 ENCOUNTER — HOME CARE VISIT (OUTPATIENT)
Dept: HOME HEALTH SERVICES | Facility: HOME HEALTHCARE | Age: 64
End: 2024-07-10
Payer: MEDICARE

## 2024-07-10 VITALS
OXYGEN SATURATION: 100 % | DIASTOLIC BLOOD PRESSURE: 72 MMHG | SYSTOLIC BLOOD PRESSURE: 138 MMHG | TEMPERATURE: 97.1 F | HEART RATE: 72 BPM | RESPIRATION RATE: 20 BRPM

## 2024-07-10 PROCEDURE — G0299 HHS/HOSPICE OF RN EA 15 MIN: HCPCS

## 2024-07-12 ENCOUNTER — READMISSION MANAGEMENT (OUTPATIENT)
Dept: CALL CENTER | Facility: HOSPITAL | Age: 64
End: 2024-07-12
Payer: COMMERCIAL

## 2024-07-12 NOTE — OUTREACH NOTE
General Surgery Week 3 Survey      Flowsheet Row Responses   [facility] patient discharged from? [place]   Does the patient have one of the following disease processes/diagnoses(primary or secondary)? General Surgery   Week 3 attempt successful? No   Unsuccessful attempts Attempt 1   Revoke Decline to participate  [pt declines as away on vacation]   Discharge diagnosis DEBRIDEMENT FIRST TOE   Week 3 call completed? Yes            SPENSER FERRERA - Registered Nurse

## 2024-07-19 ENCOUNTER — OFFICE VISIT (OUTPATIENT)
Dept: WOUND CARE | Facility: HOSPITAL | Age: 64
End: 2024-07-19
Payer: MEDICARE

## 2024-07-25 NOTE — PROGRESS NOTES
"Chief Complaint  Post-op Follow-up    Follow-up for toe amputation    Subjective        Laxmi Marcus presents to [facility] VASCULAR SURGERY  History of Present Illness    Patient is a pleasant 64-year-old lady who had an infected diabetic foot wound on her right first toe for which she underwent a debridement on 6/27/2024.  Patient was ultimately discharged home on 2 weeks of Augmentin to follow-up with home health wound care and the wound care clinic.  She is here today to see me for her first postoperative follow-up visit.    Of note there was pus underneath the wound at the time of debridement but it did not track down to bone.  The MRI that had been performed prior to surgery did not show osteomyelitis.      She is doing well today.  Her wound is slowly improving.  She has developed a rim of callus around and there is some underlying granulation tissue but no evidence of infection or purulence.  The cellulitis she had in the hospital was quite severe and it is completely resolved.    She is currently using a silver impregnated dressing, gauze, Ace wrap.    Objective   Vital Signs:  /61   Pulse 65   Ht 165.1 cm (65\")   Wt 61.7 kg (136 lb)   BMI 22.63 kg/m²   Estimated body mass index is 22.63 kg/m² as calculated from the following:    Height as of this encounter: 165.1 cm (65\").    Weight as of this encounter: 61.7 kg (136 lb).         BMI is within normal parameters. No other follow-up for BMI required.         Physical Exam   NAD  Right first toe wound with rim of callus around and there is some underlying granulation tissue but no evidence of infection or purulence  Cellulitis resolved  Palpable right DP pulse    Result Review :                     Assessment and Plan       64-year-old lady with diabetes and diabetic foot ulcer on her right first toe doing well status post debridement.  I redressed her wound with some triple antibiotic ointment and gauze today.  I encouraged her " to continue with the silver impregnated dressing changes.  Continue to follow-up with wound care center.  Her Darco shoe has a tear in the insole such that her first toe slips down and puts pressure on the wound.  I have reached out to [facility] and they will see her at their downto office today or next week in the [place] office.  Patient was given contact instructions for local prosthetics.  He has completed her antibiotics and there are no ongoing signs of infection  I will see her back in 1 month.    Diagnoses and all orders for this visit:    1. Diabetic ulcer of right great toe (Primary)  Assessment & Plan:    Status post debridement  Cultures grew Staph aureus, pansensitive.      Orders:  -     Ambulatory Referral to Prosthetist              Patient BMI noted. Educational material for patient for health risks of being overweight added to patient's after visit summary.           Follow Up     Return in about 4 weeks (around 8/23/2024).  Patient was given instructions and counseling regarding her condition or for health maintenance advice. Please see specific information pulled into the AVS if appropriate.

## 2024-07-26 ENCOUNTER — OFFICE VISIT (OUTPATIENT)
Age: 64
End: 2024-07-26
Payer: MEDICARE

## 2024-07-26 VITALS
DIASTOLIC BLOOD PRESSURE: 61 MMHG | BODY MASS INDEX: 22.66 KG/M2 | WEIGHT: 136 LBS | SYSTOLIC BLOOD PRESSURE: 132 MMHG | HEART RATE: 65 BPM | HEIGHT: 65 IN

## 2024-07-26 DIAGNOSIS — E11.621 DIABETIC ULCER OF RIGHT GREAT TOE: Primary | ICD-10-CM

## 2024-07-26 DIAGNOSIS — L97.519 DIABETIC ULCER OF RIGHT GREAT TOE: Primary | ICD-10-CM

## 2024-08-02 ENCOUNTER — OFFICE VISIT (OUTPATIENT)
Dept: WOUND CARE | Facility: HOSPITAL | Age: 64
End: 2024-08-02
Payer: MEDICARE

## 2024-08-09 ENCOUNTER — OFFICE VISIT (OUTPATIENT)
Dept: WOUND CARE | Facility: HOSPITAL | Age: 64
End: 2024-08-09
Payer: MEDICARE

## 2024-08-15 ENCOUNTER — APPOINTMENT (OUTPATIENT)
Dept: WOMENS IMAGING | Facility: HOSPITAL | Age: 64
End: 2024-08-15
Payer: MEDICARE

## 2024-08-15 PROCEDURE — 77067 SCR MAMMO BI INCL CAD: CPT | Performed by: RADIOLOGY

## 2024-08-15 PROCEDURE — 77063 BREAST TOMOSYNTHESIS BI: CPT | Performed by: RADIOLOGY

## 2024-08-16 ENCOUNTER — OFFICE VISIT (OUTPATIENT)
Dept: WOUND CARE | Facility: HOSPITAL | Age: 64
End: 2024-08-16
Payer: MEDICARE

## 2024-08-23 ENCOUNTER — OFFICE VISIT (OUTPATIENT)
Dept: WOUND CARE | Facility: HOSPITAL | Age: 64
End: 2024-08-23
Payer: MEDICARE

## 2024-08-26 NOTE — PROGRESS NOTES
BREAST CARE CENTER     Referring Provider: Will Johnston II, MD     Chief complaint: breast cancer follow up     HPI:   8/20/21:  Seen by Dr Red  Ms. Laxmi Marcus is a 60 yo woman, seen at the request of Dr. Will Johnston, for a new diagnosis of left breast cancer. She has a past history of left breast cancer in 2006, pT1N1, intermediate grade, invasive ductal carcinoma, ER/PA positive, HER-2 negative. She underwent left lumpectomy & SLNB with completion axillary dissection, followed by reexcision for close margins. This was followed by AC x4 and 4 cycles of Taxol, then radiation. She was on tamoxifen for 2-1/2 years, then says she was switched to a different medication for a total of 5 years of endocrine therapy. Currently, I only have access to one note from the [facility] group in 2008. I have tried to get copies of the operative report and pathology report from [facility], however these records are no longer held. The patient says that she thinks she had 9 lymph nodes removed.       She reports that she had a lot of issues with chemo, including severe bone pain requiring Dilaudid for months. Shortly after finishing all of her breast treatment, she had severe pancreatitis and shortly after that she became a brittle diabetic. She has a past history of CAD sp CABGx4 in 2017. Unfortunately it sounds like she had some sort of immediate stenosis, because she had a heart attack in early 2019 requiring stent placement x4. She is followed by cardiology and on Brilinta.     In May of this year, she noticed a painful left breast lump near the prior lumpectomy scar. She does not think it has changed at all since she first noticed. Her work-up is detailed in the oncologic history below. She denies any family history of breast or ovarian cancer.      10/29/21:  Seen by Dr Red  After her last visit, she underwent a PET scan which was negative for distant disease and her genetic testing was negative for mutation.  She started neoadjuvant therapy with carbo/Taxol/Keytruda on 9/7/21. Her second dose of Taxol was temporarily held because she developed Covid, however she received her seventh dose yesterday. She reports that the mass has gotten much smaller and it is now difficult for her to find.    12/10/21:  Seen by Dr Red  She completed her last dose of Taxol on 12/4/21. After her last visit, she saw Dr. Araya. Because of her diabetes and history of a CABG, he recommended delayed reconstruction and the patient is okay with this.    1/19/22:  Seen by Dr Red  She underwent left modified radical mastectomy on 1/4/22. Her pathology was sent out for a second opinion and the final report is still pending. She has been recovering well and has no complaints. She came into the office last week to have her breast drain removed. The axillary drain has had out <20 mL/day for the past few days.    8/8/22:  She returns today for follow up with no breast concerns.  She still has not had her reconstruction, she is frustrated with waiting, has follow with Dr Araya in 11/22.  Her  passed away unexpectedly recently, she is teary today, misses him terribly.      Her last clinic visit with medical oncology was in 6/22:  Last adjvuant Keytruda.  Labs for toxicity- glucose improved    She was seen in OT/LE clinic in 5/22:  L-Dex value today is 2.0 which is WNL.She reports wearing her sleeve about 2 days a week and any time her arm feels tired or uncomfortable. Today we reviewed some new exercises to build some strength and stability. I recommended follow up in 3 months    Right screening mammogram with rich was completed on 7/29/22 at [facility], BiRads 1.(see full report below)    2/6/23:  She returns today for follow up with no breast concerns, no reconstruction left breast by Dr Araya.  He wants to wait for A1C to be at 6 before surgery.  She has not reached that point yet but will follow up in the near future.  PAC removed in  9/22 by Dr Red.    Her last OT/LE clinic visit was in 11/22:   L-Dex value today is 2.0 which is WNL and consistent with her previous measurements. She has no new c/o.    She was last seen by Dr Sandoval in 11/22:  here to discuss starting zometa, education provided, treatment plan placed and signed - she'll discuss with her dentist and we'll tentatively plan for 3 months.     8/23/23, Interval History:  She returns today for follow up with no breast concerns.  She is ready for reconstruction, will follow up with Dr Araya to discuss.  She is adjusting to life without her  after his passing one year ago.    She was seen in OT/LE clinic on 8/22/23:  L-Dex value today is 1.2 which is within normal limits and consistent with her preop baseline. She has no new complaints I recommended follow-up in 3 months or sooner if she has any change or increase in symptoms.     She was seen by Dr Sandoval on 8/9/23: ongoing adjuvant Zometa. Remains without evidence of disease clinically.     Right screening mammogram on 8/14/23 was stable, BiRads 1.  (see full report below)    8/27/2024 interval history  Patient presenting to the office today for routine follow-up.  She last saw her oncologist in February with no changes made to the treatment plan.  On 8/15/2024 she had a right screening mammogram that resulted as BI-RADS 1. Still hasn't had reconstructive surgery       Oncology/Hematology History Overview Note   06/05/2017, Bilateral Diagnostic MMG with Jaswant & Bilateral Breast US ([facility]):  MMG:  Scattered areas pf fibroglandular density.   1. There is a stable post-surgical scar with associated trabecular thickening seen in the upper outer region of the left breast. Post-surgical scar is in an area of prior lumpectomy. Findings are consistent with post-surgical and post-radiation therapy changes.  2. The patient indicates recent onset of pain in the lateral and axillary tail region of the left breast. The symptomatic region is  clearly marked and there is no suspicious finding in the region of clinical concern.  3. There is a stable small intramammary lymph node measuring 7 millimeters seen in the posterior one-third 1:30 o'clock region of the left breast located 8 centimeters from the nipple. There has been no significant change from the prior exam(s).  6. There is a stable area of asymmetric breast tissue seen in the superior region of the right breast.  US:  1. There is no evidence of any solid mass or abnormal cystic elements.  2. There is no evidence of any solid mass or abnormal cystic elements.  3. Ultrasound is suggestive of an oval small intramammary lymph node measuring 7 millimeters.  4. There is a small lymph node measuring 6 millimeters in the left axilla.  5. There is an oval small simple cyst with circumscribed margins measuring 4 millimeters seen in the right breast at 3 o'clock.  6. There is no evidence of any solid mass or abnormal cystic elements.  BI-RADS 2: Benign.    05/2021: Pt noticed a right breast lump.     Malignant neoplasm of upper-outer quadrant of left breast in female, estrogen receptor negative   7/26/2021 Initial Diagnosis    Malignant neoplasm of upper-outer quadrant of left breast in female, estrogen receptor negative (CMS/HCC)     7/27/2021 Imaging    Bilateral Diagnostic MMG with Jaswant & Left Breast US ([facility]):  MMG:  Scattered areas of fibroglandular density.  1. There is a new oval mass measuring 20 mm with indistinct margins seen in the 1:30 o'clock region of the left breast located 4 centimeters from the nipple. This correlates to the palpable mass.   2. There is a stable post-surgical scar seen in the posterior one-third region of the left breast at 2 o'clock. Post-surgical scar is in an area of prior lumpectomy. Findings are consistent with post-surgical and post-radiation therapy changes.   In the right breast, no suspicious masses, significant calcifications or other abnormalities are  seen.  US:  1. Ultrasound demonstrates a new oval parallel solid mass with poorly defined margins measuring 16 x 14 x 15 mm seen in the 1:30 o'clock region of the left breast located 4 centimeters from the nipple. This correlates with the mammographic finding.  3. There is an oval lymph node measuring 9 x 6 x 8 mm in the left axilla. This has an abnormal appearance with little hilum.  BI-RADS 4B: Suspicious.     8/6/2021 Biopsy    Left Breast & Axilla, US-Guided Biopsies ([facility]):    1. Breast, Left 1:30 O'clock, Core Needle Biopsy:  Invasive mammary carcinoma, no special type (invasive ductal carcinoma), combined histologic grade 3 (tubule formation 3, nuclear pleomorphism 3, mitotic activity 2), 12 mm in greatest dimension, present in 5 of 15 cores.   Focal ductal carcinoma in situ (DCIS), high nuclear grade, solid pattern, with associated focal necrosis 1.5 mm in greatest dimension, present in 3 of 15 cores.   Microcalcifications associated with invasive carcinoma, DCIS, and benign mammary ducts/lobules.  -[place] clip.     ER negative (0%)  WV negative (0%)  Her2 negative (IHC 1+)  Ki-67 94.88%    2. Lymph Node, Left Axilla, Core Needle Biopsy:  Benign lymph node tissue with reactive follicular hyperplasia.   No definitive evidence of carcinoma (AE1/AE3).   No definitive evidence of lymphoma (CD3, CD20, CD21, and CD30).  -Hydromark clip. Concordant.     8/18/2021 Imaging    Bilateral Breast MRI ([facility]):  In the posterior one-third lateral aspect of the left breast centered at the 3-o'clock position on the order of 6.5 cm from the nipple there is a focal signal void that is seen within the superior aspect of an irregular heterogenous masslike region that measures on the order of 2.4 cm in the superior inferior dimension, 2.5 cm in the anterior posterior dimension and 2.7 cm in the medial lateral dimension. Some of the internal character of the lesion demonstrates absence of enhancement. This represents the  biopsy-proven site of malignancy with the mini  cork-shaped metallic clip in the superomedial portion of the lesion. Some areas of increased T1-weighted signal intensity are noted in the region and are consistent with areas of postbiopsy related hemorrhage.   There is postsurgical change seen in the left breast in this region associated with prior breast conservation therapy. No other areas of abnormal enhancement or morphology are seen within the left breast. I see no evidence for abnormal left breast skin, nipple or chest wall enhancement and there is no evidence for left axillary adenopathy. The patient is status post median sternotomy which limits the evaluation of the internal mammary chain for internal mammary adenopathy. Mild diffuse left breast skin thickening associated with prior breast conservation therapy is noted, but there is no abnormal enhancement of the skin.    There are no areas of abnormal enhancement or morphology in the right breast. Scattered stippled foci of variable enhancement are seen throughout the right breast, none of which appear dominant or clumped or associated with any suspicious mammographic features. I see no evidence for abnormal right breast skin, nipple or chest wall enhancement and there is no evidence for right axillary or internal mammary chain adenopathy. Visualization of the right internal mammary chain is not prohibited by the artifact from the patient's median sternotomy wires.  BI-RADS 6: Known malignancy.     8/20/2021 Genetic Testing    [employer] Common Hereditary Cancers Panel (47 genes):    Negative     9/3/2021 Imaging    PET CT ([place]):  1. FDG avid left upper outer breast mass compatible with primary breast malignancy.  2. No evidence of metastatic disease.  3. Subcutaneous infiltrative changes in the ventral lower abdomen with low-level FDG uptake which may represent injection sites, panniculitis, or prior port sites.  4. Air-fluid level within the urinary  bladder which may represent cystitis in the absence of recent catheterization.     9/13/2021 - 9/13/2021 Chemotherapy    OP BREAST Pembrolizumab 200 mg / PACLItaxel / CARBOplatin AUC=5     1/4/2022 Surgery    Left modified radical mastectomy  Preliminary Diagnosis  1. Left Breast, Modified Radical Mastectomy S/P Neoadjuvant Chemotherapy (332 grams):                A. FIBROTIC TUMOR BED WITH FOCAL RESIDUAL ATYPICAL CELL GROUPS (SEE COMMENT).                B. Negative for lymphovascular space invasion.  C. Clip and biopsy site changes are present within tumor bed.    D. Marked treatment effect with associated reactive epithelial changes and squamous metaplasia,        consistent with prior procedure-related changes.    E. Atypical lobular hyperplasia.   F. Background breast parenchyma with intraductal papilloma involved by usual ductal hyperplasia,       fibroadenomatoid change, and columnar cell change.    G. Unremarkable skin and nipple.    H. Eleven lymph nodes, negative for carcinoma (0/11):                1. Clip and biopsy site changes present in a single lymph node.  2. All lymph nodes are negative for treatment effect.    3. Separate collection of nerves, suggestive of traumatic neuroma.      2. Left Breast, Additional Tissue Margin at 2:00, Re-excision:   A. Benign skeletal muscle with no significant histopathologic changes.      Final Diagnosis  CONSULTANT DIAGNOSIS:   LEFT BREAST, MODIFIED RADICAL MASTECTOMY STATUS POST NEOADJUVANT CHEMOTHERAPY:               A. NEGATIVE FOR RESIDUAL CARCINOMA (PATHOLOGIC COMPLETE RESPONSE) SEE COMMENT.     Comment:  [...] Within the biopsy site, a cluster of atypical cells, some of which show squamoid change, is noted. We believe this cluster to most likely be the product of epithelial displacement due to the previous biopsy, rather than evidence of residual invasive carcinoma. The presence of p63 positivity reinforces this morphologic impression.     The above  interpretation was rendered by Dr. Fran Cotton of [facility] in [place]. Refer to outside pathology consultation report () for additional details regarding this case.     Therefore, given the aforementioned findings along with eleven benign lymph nodes, this case is classified as a ypT0 N0.     7/29/2022 Imaging    [facility]  Scattered areas of fibroglandular density.  No suspicious masses, significant calcifications or other abnormalities are seen.  There is been no significant change from prior exam.  Impression:  There is no mammographic evidence of malignancy.  Screening mammogram 1 year is recommended.  BI-RADS Category 1     8/14/2023 Imaging    Right screening mammogram with tomosynthesis that [facility]  There are scattered areas of fibroglandular density.  No suspicious masses, significant calcifications or other abnormalities are seen.  Impression:  There is no mammographic evidence of malignancy.  Screening mammogram 1 year is recommended.  BI-RADS Category 1     8/15/2024 Imaging    Right screening mammogram at [facility]  There are scattered areas of fibroglandular density.    No suspicious masses, significant calcifications or other abnormalities are seen.  There are no significant changes  from the prior exam(s).    IMPRESSION:  There is no mammographic evidence of malignancy.    Screening mammogram in 1 year is recommended.    The patient was mailed a notification letter.    BI-RADS Category 1: Negative           Review of Systems:  See interval history.       Medications:    Current Outpatient Medications:     ACCU-CHEK FASTCLIX LANCETS misc, TO CHECK 5 TIMES DAILY, Disp: 500 each, Rfl: 2    ascorbic acid (VITAMIN C) 250 MG tablet, Take 2 tablets by mouth Daily. Indications: supplement, Disp: , Rfl:     BIOTIN PO, Take 1 tablet by mouth Daily. Indications: supplement, Disp: , Rfl:     clopidogrel (PLAVIX) 75 MG tablet, Take 1  tablet by mouth Daily. Indications: Heart Attack, Disp: , Rfl:     Collagen Hydrolysate powder, Use 1 Sachet Daily. Indications: supplement, Disp: , Rfl:     glucose blood (ACCU-CHEK SMARTVIEW) test strip, Use as instructed 5 TIMES DAILY, Disp: 450 each, Rfl: 2    Insulin Aspart, w/Niacinamide, (FIASP FLEXTOUCH SC), Inject 15 Units under the skin into the appropriate area as directed 4 (Four) Times a Day. 15-25 units 4 times daily sliding scale  Indications: DM 2, Disp: , Rfl:     Insulin Degludec (Tresiba FlexTouch) 200 UNIT/ML solution pen-injector pen injection, Inject 34 Units under the skin into the appropriate area as directed Daily. Indications: Type 2 Diabetes, Disp: , Rfl:     Insulin Pen Needle 32G X 6 MM misc, INJECT 1 EACH INTO THE SKIN 4 (FOUR) TIMES DAILY., Disp: , Rfl:     metoprolol succinate XL (TOPROL-XL) 25 MG 24 hr tablet, Take 0.5 tablets by mouth Every Night. Indications: High Blood Pressure Disorder, Disp: , Rfl:     ranolazine (RANEXA) 500 MG 12 hr tablet, 1 tablet 2 (Two) Times a Day. Indications: cardiac, Disp: , Rfl:     rosuvastatin (CRESTOR) 20 MG tablet, Take 1 tablet by mouth Every Night. Indications: Heart Attack, High Amount of Fats in the Blood, Disp: , Rfl:       Allergies   Allergen Reactions    Dulaglutide Nausea And Vomiting     Another name for trulicity    Sulfa Antibiotics Other (See Comments)     Abdominal Pain and NauseA    Uloric [Febuxostat] Other (See Comments)     Mouth sores    Latex Itching       Family History   Problem Relation Age of Onset    Heart disease Mother     Coronary artery disease Mother     Heart disease Brother     Heart disease Maternal Uncle     Prostate cancer Maternal Uncle     Heart disease Maternal Grandfather     Malig Hyperthermia Neg Hx        PHYSICAL EXAMINATION:   There were no vitals filed for this visit.     LMP  (LMP Unknown)     I reviewed physical exam, no changes noted    ECOG 0 - Asymptomatic  General: NAD, well appearing  Psych: a&o  x 3, normal mood and affect  Eyes: EOMI, no scleral icterus  ENMT: neck supple without masses or thyromegaly, mucus membranes moist  MSK: normal gait, normal ROM in bilateral shoulders  Lymph nodes: She still has prior left axillary scar, no axillary swelling  Breast: moderate size, grade 3 ptosis, sternal scar  Right: No visible abnormalities on inspection while seated, with arms raised or hands on hips. No masses, skin changesor nipple abnormalities.    Left: Sp mastectomy with well-healed incision. Flaps healthy.       Assessment:   1)   64 y.o. F with a diagnosis of left breast cancer 8/21: High grade, triple negative, invasive ductal carcinoma; clinical T2N0, anatomic stage IIA, prognostic stage IIB. She completed neoadjuvant carbo/Taxol/Keytruda on 12/4/21. She underwent left modified radical mastectomy on 1/4/22.    2)  She has a past history of left breast cancer in 2006, pT1N1, intermediate grade, invasive ductal carcinoma, ER/CT positive, HER-2 negative. She underwent left lumpectomy & SLNB with completion axillary dissection, followed by reexcision for close margins. This was followed by AC x4 and 4 cycles of Taxol, then radiation and endocrine therapy.     Discussion:    I encouraged her to try to take care of herself and follow up with Dr Araya in the near future to discuss reconstruction.    We discussed her follow up which includes clinical breast exam every 6 months for 2 years, with mammogram annually then  mammogram and exam annually until 5 years from diagnosis.    Plan:  -Follow-up with OT/LE clinic.  - follow-up with Dr. Sandoval.  - follow-up with Dr. Araya for eventual delayed reconstruction.  - right screening mammogram with tomosynthesis in 12 months at [facility] followed by exam.    NINA Odell      CC:  MD Hollis Brennan II, MD Vipul Panchal, MD Blakely Kute, MD

## 2024-08-27 ENCOUNTER — TELEPHONE (OUTPATIENT)
Dept: SURGERY | Facility: CLINIC | Age: 64
End: 2024-08-27
Payer: COMMERCIAL

## 2024-08-27 ENCOUNTER — OFFICE VISIT (OUTPATIENT)
Dept: SURGERY | Facility: CLINIC | Age: 64
End: 2024-08-27
Payer: MEDICARE

## 2024-08-27 VITALS
BODY MASS INDEX: 23.32 KG/M2 | HEIGHT: 65 IN | OXYGEN SATURATION: 99 % | DIASTOLIC BLOOD PRESSURE: 82 MMHG | HEART RATE: 85 BPM | WEIGHT: 140 LBS | SYSTOLIC BLOOD PRESSURE: 160 MMHG

## 2024-08-27 DIAGNOSIS — C50.412 MALIGNANT NEOPLASM OF UPPER-OUTER QUADRANT OF LEFT BREAST IN FEMALE, ESTROGEN RECEPTOR NEGATIVE: Primary | ICD-10-CM

## 2024-08-27 DIAGNOSIS — Z17.1 MALIGNANT NEOPLASM OF UPPER-OUTER QUADRANT OF LEFT BREAST IN FEMALE, ESTROGEN RECEPTOR NEGATIVE: Primary | ICD-10-CM

## 2024-08-27 DIAGNOSIS — Z12.31 ENCOUNTER FOR SCREENING MAMMOGRAM FOR MALIGNANT NEOPLASM OF BREAST: ICD-10-CM

## 2024-08-27 PROCEDURE — 1160F RVW MEDS BY RX/DR IN RCRD: CPT | Performed by: NURSE PRACTITIONER

## 2024-08-27 PROCEDURE — 99213 OFFICE O/P EST LOW 20 MIN: CPT | Performed by: NURSE PRACTITIONER

## 2024-08-27 PROCEDURE — 3077F SYST BP >= 140 MM HG: CPT | Performed by: NURSE PRACTITIONER

## 2024-08-27 PROCEDURE — 3079F DIAST BP 80-89 MM HG: CPT | Performed by: NURSE PRACTITIONER

## 2024-08-27 PROCEDURE — 1159F MED LIST DOCD IN RCRD: CPT | Performed by: NURSE PRACTITIONER

## 2024-08-30 ENCOUNTER — OFFICE VISIT (OUTPATIENT)
Age: 64
End: 2024-08-30
Payer: MEDICARE

## 2024-08-30 VITALS
SYSTOLIC BLOOD PRESSURE: 146 MMHG | DIASTOLIC BLOOD PRESSURE: 53 MMHG | HEIGHT: 65 IN | BODY MASS INDEX: 23.32 KG/M2 | WEIGHT: 140 LBS | HEART RATE: 67 BPM

## 2024-08-30 DIAGNOSIS — L97.519 DIABETIC ULCER OF RIGHT GREAT TOE: Primary | ICD-10-CM

## 2024-08-30 DIAGNOSIS — E11.621 DIABETIC ULCER OF RIGHT GREAT TOE: Primary | ICD-10-CM

## 2024-08-30 NOTE — PROGRESS NOTES
"Chief Complaint  No chief complaint on file.    Follow-up for toe amputation    Subjective        Laxmi Marcus presents to [facility] VASCULAR SURGERY  History of Present Illness    Patient is a pleasant 64-year-old lady who had an infected diabetic foot wound on her right first toe for which she underwent a debridement on 6/27/2024.  Patient was ultimately discharged home on 2 weeks of Augmentin to follow-up with home health wound care and the wound care clinic.  She is here today to see me for her first postoperative follow-up visit.    She was last seen on 7/26/2024.     She is here for routine follow-up.    Her toe wound continues to heal very nicely.  They have switched to collagen based dressing at wound care the patient feels this has helped tremendously.    She has no fevers or significant pain or redness or any signs or symptoms concerning for infection otherwise.    She feels well today.      Objective   Vital Signs:  There were no vitals taken for this visit.  Estimated body mass index is 23.3 kg/m² as calculated from the following:    Height as of 8/27/24: 165.1 cm (65\").    Weight as of 8/27/24: 63.5 kg (140 lb).         BMI is within normal parameters. No other follow-up for BMI required.         Physical Exam   NAD  Right first toe wound with rim of callus around and there is some underlying granulation tissue but no evidence of infection or purulence  Cellulitis resolved  Palpable right DP pulse    Last week at wound care:      Patient's toe wound continues to improve.  It is almost completely scabbed over now.  No erythema or induration or drainage or purulence.  It looks considerably better than the image above which was taken last week at wound care.      Result Review :                     Assessment and Plan       64-year-old lady with diabetes and diabetic foot ulcer on her right first toe doing well status post debridement.    Wound is healing very nicely now.  No ongoing " evidence of infection.    From my standpoint patient can continue to see wound care.  I anticipate this wound will be completely healed in another week or 2.    She can follow-up with me as needed.    Patient verbalized understanding and agreement with this plan.        Diagnoses and all orders for this visit:    1. Diabetic ulcer of right great toe (Primary)                Patient BMI noted. Educational material for patient for health risks of being overweight added to patient's after visit summary.           Follow Up     No follow-ups on file.  Patient was given instructions and counseling regarding her condition or for health maintenance advice. Please see specific information pulled into the AVS if appropriate.

## 2024-09-06 ENCOUNTER — OFFICE VISIT (OUTPATIENT)
Dept: WOUND CARE | Facility: HOSPITAL | Age: 64
End: 2024-09-06
Payer: MEDICARE

## 2024-09-09 ENCOUNTER — HOSPITAL ENCOUNTER (OUTPATIENT)
Dept: OCCUPATIONAL THERAPY | Facility: HOSPITAL | Age: 64
Setting detail: THERAPIES SERIES
Discharge: HOME | End: 2024-09-09
Payer: MEDICARE

## 2024-09-09 DIAGNOSIS — Z17.1 MALIGNANT NEOPLASM OF UPPER-OUTER QUADRANT OF LEFT BREAST IN FEMALE, ESTROGEN RECEPTOR NEGATIVE: ICD-10-CM

## 2024-09-09 DIAGNOSIS — Z91.89 AT RISK FOR LYMPHEDEMA: Primary | ICD-10-CM

## 2024-09-09 DIAGNOSIS — C50.412 MALIGNANT NEOPLASM OF UPPER-OUTER QUADRANT OF LEFT BREAST IN FEMALE, ESTROGEN RECEPTOR NEGATIVE: ICD-10-CM

## 2024-09-09 PROCEDURE — 93702 BIS XTRACELL FLUID ANALYSIS: CPT

## 2024-09-09 PROCEDURE — 97535 SELF CARE MNGMENT TRAINING: CPT

## 2024-09-09 NOTE — THERAPY PROGRESS REPORT/RE-CERT
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    Hyperinsulinism    Hyperlipidemia associated with type 2 diabetes mellitus    Malignant neoplasm of upper-outer quadrant of left breast in female, estrogen receptor negative    Fitting and adjustment of vascular catheter    Diabetic ulcer of right great toe        Past Medical History:   Diagnosis Date    Awareness under anesthesia     Breast cancer     Left    CKD (chronic kidney disease)     stage 3    Coronary artery disease     COVID-19 2021    Diabetes mellitus type 2 with complications, uncontrolled     Frequent UTI     GERD (gastroesophageal reflux disease)     Gout     Heart murmur     History of cancer chemotherapy 2021    LAST DOSE    History of pneumonia     History of radiation therapy     Hyperlipidemia     Hypertension     NSTEMI (non-ST elevated myocardial infarction) 2019    Obesity     Recent bereavement     SPOUSE 2022    Sinus congestion     Slow to wake up after anesthesia     Vitamin B12 deficiency         Past Surgical History:   Procedure Laterality Date    APPENDECTOMY      BREAST LUMPECTOMY  2006    BREAST LUMPECTOMY WITH SENTINEL NODE BIOPSY AND AXILLARY NODE DISSECTION      CARDIAC CATHETERIZATION  2019    new de nova Ostial Ramus 95% stenosis, severe multivessel diabetic coronary vasculopathy with small vessels, occluded 3 of 4 bypass conduits,  HENDERSON to LAD patent, all 3 vein grafts are occluded, PTCA/EMMANUEL to ostial Ramus 95% stenosis, patent OM1 and proximal and mid RCA stents x 3 from 2/2019, preserved LVSF, normal LVEDP, no significant AS or MR      CHOLECYSTECTOMY  2006    CORONARY ARTERY BYPASS GRAFT  2017    x4 with LIMA to mLAD, SVG to diagonal, SVG to OM, SVG to distal PDA      CORONARY STENT PLACEMENT  12/2019    Drug eluting stent placement--2.0 x 26 mm Resolute Milmine EMMANUEL to ostial Ramus      CORONARY STENT PLACEMENT  02/2019    Drug eluting stent--2.25 x 38 mm and 2.5 x 15 mm Resolute Milmine EMMANUEL to proximal mid RCA, 2.0 x 26 mm Resolute Pierre EMMANUEL to mid circumflex/proximal OM1    DENTAL PROCEDURE      HYSTERECTOMY  2009    INCISION AND DRAINAGE LEG Right 6/27/2024    Procedure: DEBRIDEMENT FIRST TOE;  Surgeon: Loyd Harrington II, MD;  Location: [facility];  Service: Vascular;  Laterality: Right;    MASTECTOMY Left 1/4/2022    Procedure: LEFT MODIFIED RADICAL MASTECTOMY;  Surgeon: Indigo Red MD;  Location: [facility] OR;  Service: General;  Laterality: Left;    TONSILLECTOMY  1961    age 1    VENOUS ACCESS DEVICE (PORT) INSERTION Right 10/1/2021    Procedure: INSERTION VENOUS ACCESS DEVICE;  Surgeon: Indigo Red MD;  Location: [facility];  Service: General;  Laterality: Right;    VENOUS ACCESS DEVICE (PORT) REMOVAL Right 9/13/2022    Procedure: REMOVAL VENOUS ACCESS DEVICE;  Surgeon: Indigo Red MD;  Location: [facility] OR;  Service: General;  Laterality: Right;         Visit Dx:      ICD-10-CM ICD-9-CM   1. At risk for lymphedema  Z91.89 V49.89   2. Malignant neoplasm of upper-outer quadrant of left breast in female, estrogen receptor negative  C50.412 174.4    Z17.1 V86.1        Lymphedema       Row Name 09/09/24 1100             Subjective Pain    Able to rate subjective pain? yes  -RE      Pre-Treatment Pain Level 0  -RE      Post-Treatment Pain Level 0  -RE         Subjective    Subjective Comments Had a  serious foot infection which required hospitalization.  -RE         L-Dex Bioimpedence Screening    L-Dex Measurement Extremity LUE  -RE      L-Dex Patient Position Standing  -RE      L-Dex UE Dominate Side Right  -RE      L-Dex UE At Risk Side Left  -RE      L-Dex UE Pre Surgical Value No  -RE      L-Dex UE Score 1.7  -RE      L-Dex UE Baseline Score 2.5  -RE      L-Dex UE Value Change -0.8  -RE      L-Dex UE Comment WNL  -RE                User Key  (r) = Recorded By, (t) = Taken By, (c) = Cosigned By      Initials Name Provider Type    Rachel Beverly, OTR Occupational Therapist                  The patient had a 3-month SOZO measurement which I reviewed today. The score is WNL  see scanned to EMR. Bioimpedance spectroscopy helps identify the   onset of lymphedema in an arm or leg before patients experience noticeable swelling. Research has   shown that 92% of patients with early detection of lymphedema using L-Dex combined with   intervention do not progress to chronic lymphedema through three years. Additionally, as of March 2023, the NCCN Guidelines® for Survivorship recommend proactive screening for lymphedema using   bioimpedance spectroscopy. Whenever possible, patients are tested for baseline L-Dex score before   cancer treatment begins and then are reassessed during regular follow-up visits using the SOZO device.   Otherwise, this can be started postoperatively and continued during regular follow-up visits. If the   patient’s L-Dex score increases above normal levels, that is a sign that lymphedema is developing and a   referral is made to occupational therapy for further evaluation and early compression treatment.   Lymphedema assessment with the SOZO L-Dex score is recommended to be done every 3 months for   the first 3 years and then every 6 months for years 4 and 5 followed by annually afterwards           OT Assessment/Plan       Row Name 09/09/24 6089          OT Assessment    Impairments  Impaired lymphatic circulation  -RE     Assessment Comments Patient returns for bioimpedance reassessment.  Her last measurement was 3 months ago.  L-Dex value today is 1.7 which is within normal limits and consistent with her baseline.  She has no new complaints.  Today we reviewed signs and symptoms of lymphedema.  I recommended follow-up in 3 months.  -RE     OT Diagnosis At risk for lymphedema  -RE     OT Rehab Potential Good  -RE     Patient/caregiver participated in establishment of treatment plan and goals Yes  -RE     Patient would benefit from skilled therapy intervention Yes  -RE        OT Plan    OT Frequency Other (comment)  -RE     Predicted Duration of Therapy Intervention (OT) Bioimpedance every 3 months  -RE     Planned CPT's? OT SELF CARE/MGMT/TRAIN 15 MIN: 40483;OT BIS XTRACELL FLUID ANALYSIS: 95799  -RE     OT Plan Comments Follow-up in 3 months  -RE               User Key  (r) = Recorded By, (t) = Taken By, (c) = Cosigned By      Initials Name Provider Type    RE Rachel Toney, OTR Occupational Therapist                           OT Goals       Row Name 09/09/24 1100          OT Short Term Goals    STG 1 Patient will demonstrate proper awareness of “What is Lymphedema?” and “Healthy Habits” for improved prevention, management, care of symptoms and ease of transition to self-care of condition.  -RE     STG 1 Progress Met  -RE     STG 2 Pt will demonstrate understanding of use of compression sleeve for edema prevention, exercise and air travel.  -RE     STG 2 Progress Met  -RE     STG 3 Patient independent and compliant with initial home exercise program focused on gentle AROM and stretching to improve AROM to pre-surgical level.  -RE     STG 3 Progress Met  -RE        Long Term Goals    LTG Date to Achieve 12/09/24  -RE     LTG 1 Patient will participate in bioimpedance scans every 3 months as a method of early detection of lymphedema to allow for early intervention.  -RE     LTG 1 Progress  Ongoing;Met  -RE     LTG 2 Patient's bioimpedance score to remain below 6.5 for decreased risk of stage II lymphedema.  -RE     LTG 2 Progress Met;Ongoing  -RE     LTG 3 Patient independent and compliant with advanced home exercise program focused on UE strengthening and aerobic daily exercise.  -RE     LTG 3 Progress Met  -RE        Time Calculation    OT Goal Re-Cert Due Date 12/09/24  -RE               User Key  (r) = Recorded By, (t) = Taken By, (c) = Cosigned By      Initials Name Provider Type    Rachel Beverly OTR Occupational Therapist                    Therapy Education  Education Details: Discussed patient's current L-Dex value of 1.7and recommended sleeve wear during air travel and upper extremity exercise as well as heavy household activities.  I reviewed the signs and symptoms that would indicate the need to return for medical assessment and then to return to therapy including swelling, heaviness, or unusual muscle fatigue.  Given: Symptoms/condition management  Program: Reinforced  How Provided: Verbal  Provided to: Patient  Level of Understanding: Teach back education performed, Verbalized  80559 - OT Self Care/Mgmt Minutes: 15                Time Calculation:   OT Start Time: 1135  OT Stop Time: 1200  OT Time Calculation (min): 25 min  Total Timed Code Minutes- OT: 15 minute(s)  Timed Charges  50373 - OT Self Care/Mgmt Minutes: 15  Untimed Charges  50502 - OT Bioimpedence Rx Minutes: 10  Total Minutes  Timed Charges Total Minutes: 15  Untimed Charges Total Minutes: 10   Total Minutes: 25     Therapy Charges for Today       Code Description Service Date Service Provider Modifiers Qty    95943878656 HC OT SELF CARE/MGMT/TRAIN EA 15 MIN 9/9/2024 Rachel Toney OTR GO 1    01055705062 HC OT BIS XTRACELL FLUID ANALYSIS 9/9/2024 Rachel Toney OTR GO 1          Dictated utilizing Dragon dictation:  Much of this encounter note is an electronic transcription/translation of spoken language to printed  text. The electronic translation of spoken language may permit erroneous, or at times, nonsensical words or phrases to be inadvertently transcribed; Although I have reviewed the note for such errors, some may still exist.              Rachel Toney, OTR  9/9/2024

## 2024-09-20 ENCOUNTER — OFFICE VISIT (OUTPATIENT)
Dept: WOUND CARE | Facility: HOSPITAL | Age: 64
End: 2024-09-20
Payer: MEDICARE

## 2024-12-10 ENCOUNTER — HOSPITAL ENCOUNTER (OUTPATIENT)
Dept: OCCUPATIONAL THERAPY | Facility: HOSPITAL | Age: 64
Discharge: HOME | End: 2024-12-10
Admitting: SURGERY
Payer: MEDICARE

## 2024-12-10 DIAGNOSIS — Z17.1 MALIGNANT NEOPLASM OF UPPER-OUTER QUADRANT OF LEFT BREAST IN FEMALE, ESTROGEN RECEPTOR NEGATIVE: ICD-10-CM

## 2024-12-10 DIAGNOSIS — Z91.89 AT RISK FOR LYMPHEDEMA: Primary | ICD-10-CM

## 2024-12-10 DIAGNOSIS — C50.412 MALIGNANT NEOPLASM OF UPPER-OUTER QUADRANT OF LEFT BREAST IN FEMALE, ESTROGEN RECEPTOR NEGATIVE: ICD-10-CM

## 2024-12-10 PROCEDURE — 93702 BIS XTRACELL FLUID ANALYSIS: CPT

## 2024-12-10 PROCEDURE — 97535 SELF CARE MNGMENT TRAINING: CPT

## 2024-12-10 NOTE — THERAPY PROGRESS REPORT/RE-CERT
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    Hyperinsulinism    Hyperlipidemia associated with type 2 diabetes mellitus    Malignant neoplasm of upper-outer quadrant of left breast in female, estrogen receptor negative    Fitting and adjustment of vascular catheter    Diabetic ulcer of right great toe        Past Medical History:   Diagnosis Date    Awareness under anesthesia     Breast cancer     Left    CKD (chronic kidney disease)     stage 3    Coronary artery disease     COVID-19 2021    Diabetes mellitus type 2 with complications, uncontrolled     Frequent UTI     GERD (gastroesophageal reflux disease)     Gout     Heart murmur     History of cancer chemotherapy 2021    LAST DOSE    History of pneumonia     History of radiation therapy     Hyperlipidemia     Hypertension     NSTEMI (non-ST elevated myocardial infarction) 2019    Obesity     Recent bereavement     SPOUSE 2022    Sinus congestion     Slow to wake up after anesthesia     Vitamin B12 deficiency         Past Surgical History:   Procedure Laterality Date    APPENDECTOMY      BREAST LUMPECTOMY  2006    BREAST LUMPECTOMY WITH SENTINEL NODE BIOPSY AND AXILLARY NODE DISSECTION      CARDIAC CATHETERIZATION  2019    new de nova Ostial Ramus 95% stenosis, severe multivessel diabetic coronary vasculopathy with small vessels, occluded 3 of 4 bypass conduits,  HENDERSON to LAD patent, all 3 vein grafts are occluded, PTCA/EMMANUEL to ostial Ramus 95% stenosis, patent OM1 and proximal and mid RCA stents x 3 from 2/2019, preserved LVSF, normal LVEDP, no significant AS or MR      CHOLECYSTECTOMY  2006    CORONARY ARTERY BYPASS GRAFT  2017    x4 with LIMA to mLAD, SVG to diagonal, SVG to OM, SVG to distal PDA      CORONARY STENT PLACEMENT  12/2019    Drug eluting stent placement--2.0 x 26 mm Resolute [place] EMMANUEL to ostial Ramus      CORONARY STENT PLACEMENT  02/2019    Drug eluting stent--2.25 x 38 mm and 2.5 x 15 mm Resolute [place] EMMANUEL to proximal mid RCA, 2.0 x 26 mm Resolute Pierre EMMANUEL to mid circumflex/proximal OM1    DENTAL PROCEDURE      HYSTERECTOMY  2009    INCISION AND DRAINAGE LEG Right 6/27/2024    Procedure: DEBRIDEMENT FIRST TOE;  Surgeon: Loyd Harrington II, MD;  Location: [facility];  Service: Vascular;  Laterality: Right;    MASTECTOMY Left 1/4/2022    Procedure: LEFT MODIFIED RADICAL MASTECTOMY;  Surgeon: Indigo Red MD;  Location: [facility] OR;  Service: General;  Laterality: Left;    TONSILLECTOMY  1961    age 1    VENOUS ACCESS DEVICE (PORT) INSERTION Right 10/1/2021    Procedure: INSERTION VENOUS ACCESS DEVICE;  Surgeon: Indigo Red MD;  Location: [facility] [place];  Service: General;  Laterality: Right;    VENOUS ACCESS DEVICE (PORT) REMOVAL Right 9/13/2022    Procedure: REMOVAL VENOUS ACCESS DEVICE;  Surgeon: Indigo Red MD;  Location: [facility] OR;  Service: General;  Laterality: Right;         Visit Dx:      ICD-10-CM ICD-9-CM   1. At risk for lymphedema  Z91.89 V49.89   2. Malignant neoplasm of upper-outer quadrant of left breast in female, estrogen receptor negative  C50.412 174.4    Z17.1 V86.1        Lymphedema       Row Name 12/10/24 1100             Subjective Pain    Able to rate subjective pain? yes  -RE      Pre-Treatment Pain Level 0  -RE      Post-Treatment Pain Level 0  -RE         L-Dex Bioimpedence Screening    L-Dex  Measurement Extremity LUE  -RE      L-Dex Patient Position Standing  -RE      L-Dex UE Dominate Side Right  -RE      L-Dex UE At Risk Side Left  -RE      L-Dex UE Pre Surgical Value No  -RE      L-Dex UE Score 4  -RE      L-Dex UE Baseline Score 2.5  -RE      L-Dex UE Value Change 1.5  -RE      L-Dex UE Comment WNL  -RE                User Key  (r) = Recorded By, (t) = Taken By, (c) = Cosigned By      Initials Name Provider Type    Rachel Beverly OTR Occupational Therapist                The patient had a 3 month SOZO measurement which I reviewed today. The score is WNL  see scanned to EMR. Bioimpedance spectroscopy helps identify the   onset of lymphedema in an arm or leg before patients experience noticeable swelling. Research has   shown that 92% of patients with early detection of lymphedema using L-Dex combined with   intervention do not progress to chronic lymphedema through three years. Additionally, as of March 2023, the NCCN Guidelines® for Survivorship recommend proactive screening for lymphedema using   bioimpedance spectroscopy. Whenever possible, patients are tested for baseline L-Dex score before   cancer treatment begins and then are reassessed during regular follow-up visits using the SOZO device.   Otherwise, this can be started postoperatively and continued during regular follow-up visits. If the   patient’s L-Dex score increases above normal levels, that is a sign that lymphedema is developing and a   referral is made to occupational therapy for further evaluation and early compression treatment.   Lymphedema assessment with the SOZO L-Dex score is recommended to be done every 3 months for   the first 3 years and then every 6 months for years 4 and 5 followed by annually afterwards             OT Assessment/Plan       Row Name 12/10/24 3186          OT Assessment    Impairments Impaired lymphatic circulation  -RE     Assessment Comments Patient returns for bioimpedance reassessment.  Her last  measurement was 3 months ago.  L-Dex value today is 4.0 which is within normal limits and consistent with her baseline.  She has no new complaints.  Today we reviewed signs and symptoms of lymphedema.  I recommended follow-up in 6 months per protocol.  -RE     OT Diagnosis At risk for lymphedema  -RE     OT Rehab Potential Good  -RE     Patient/caregiver participated in establishment of treatment plan and goals Yes  -RE     Patient would benefit from skilled therapy intervention Yes  -RE        OT Plan    OT Frequency Other (comment)  -RE     Predicted Duration of Therapy Intervention (OT) Bioimpedance every 6  months  -RE     Planned CPT's? OT SELF CARE/MGMT/TRAIN 15 MIN: 74261;OT BIS XTRACELL FLUID ANALYSIS: 04612  -RE     OT Plan Comments Follow-up in 6 months  -RE               User Key  (r) = Recorded By, (t) = Taken By, (c) = Cosigned By      Initials Name Provider Type    RE Rachel Toney, OTR Occupational Therapist                           OT Goals       Row Name 12/10/24 1100          OT Short Term Goals    STG 1 Patient will demonstrate proper awareness of “What is Lymphedema?” and “Healthy Habits” for improved prevention, management, care of symptoms and ease of transition to self-care of condition.  -RE     STG 1 Progress Met  -RE     STG 2 Pt will demonstrate understanding of use of compression sleeve for edema prevention, exercise and air travel.  -RE     STG 2 Progress Met  -RE     STG 3 Patient independent and compliant with initial home exercise program focused on gentle AROM and stretching to improve AROM to pre-surgical level.  -RE     STG 3 Progress Met  -RE        Long Term Goals    LTG Date to Achieve 06/10/25  -RE     LTG 1 Patient will participate in bioimpedance scans every 3 months as a method of early detection of lymphedema to allow for early intervention.  -RE     LTG 1 Progress Ongoing;Met  -RE     LTG 2 Patient's bioimpedance score to remain below 6.5 for decreased risk of stage II  lymphedema.  -RE     LTG 2 Progress Met;Ongoing  -RE     LTG 3 Patient independent and compliant with advanced home exercise program focused on UE strengthening and aerobic daily exercise.  -RE     LTG 3 Progress Met  -RE        Time Calculation    OT Goal Re-Cert Due Date 03/10/25  -RE               User Key  (r) = Recorded By, (t) = Taken By, (c) = Cosigned By      Initials Name Provider Type    Rachel Beverly OTR Occupational Therapist                    Therapy Education  Education Details: Discussed patient's current L-Dex value of  and recommended sleeve wear during air travel and upper extremity exercise as well as heavy household activities.  I reviewed the signs and symptoms that would indicate the need to return for medical assessment and then to return to therapy including swelling, heaviness, or unusual muscle fatigue.I also explained that she can transistion to 6 month bioimpedance measurements since it has been 3 years since her surgery.  Given: Symptoms/condition management  Program: Reinforced  How Provided: Verbal  Provided to: Patient  Level of Understanding: Teach back education performed, Verbalized  25580 - OT Self Care/Mgmt Minutes: 10                Time Calculation:   OT Start Time: 1140  OT Stop Time: 1200  OT Time Calculation (min): 20 min  Total Timed Code Minutes- OT: 10 minute(s)  Timed Charges  61744 - OT Self Care/Mgmt Minutes: 10  Untimed Charges  73481 - OT Bioimpedence Rx Minutes: 10  Total Minutes  Timed Charges Total Minutes: 10  Untimed Charges Total Minutes: 10   Total Minutes: 20     Therapy Charges for Today       Code Description Service Date Service Provider Modifiers Qty    39003281531 HC OT SELF CARE/MGMT/TRAIN EA 15 MIN 12/10/2024 Rachel Toney OTR GO 1    69852624502 HC OT BIS XTRACELL FLUID ANALYSIS 12/10/2024 Rachel Toney OTR GO 1                        CATIE Schumacher  12/10/2024

## 2025-08-26 ENCOUNTER — OFFICE VISIT (OUTPATIENT)
Dept: SURGERY | Facility: CLINIC | Age: 65
End: 2025-08-26
Payer: MEDICARE

## 2025-08-26 ENCOUNTER — TELEPHONE (OUTPATIENT)
Dept: SURGERY | Facility: CLINIC | Age: 65
End: 2025-08-26
Payer: COMMERCIAL

## 2025-08-26 VITALS
SYSTOLIC BLOOD PRESSURE: 150 MMHG | DIASTOLIC BLOOD PRESSURE: 60 MMHG | HEART RATE: 63 BPM | WEIGHT: 140.4 LBS | BODY MASS INDEX: 23.39 KG/M2 | OXYGEN SATURATION: 99 % | HEIGHT: 65 IN

## 2025-08-26 DIAGNOSIS — C50.412 MALIGNANT NEOPLASM OF UPPER-OUTER QUADRANT OF LEFT BREAST IN FEMALE, ESTROGEN RECEPTOR NEGATIVE: Primary | ICD-10-CM

## 2025-08-26 DIAGNOSIS — Z17.1 MALIGNANT NEOPLASM OF UPPER-OUTER QUADRANT OF LEFT BREAST IN FEMALE, ESTROGEN RECEPTOR NEGATIVE: Primary | ICD-10-CM

## 2025-08-26 PROCEDURE — 3078F DIAST BP <80 MM HG: CPT | Performed by: NURSE PRACTITIONER

## 2025-08-26 PROCEDURE — 1160F RVW MEDS BY RX/DR IN RCRD: CPT | Performed by: NURSE PRACTITIONER

## 2025-08-26 PROCEDURE — 99213 OFFICE O/P EST LOW 20 MIN: CPT | Performed by: NURSE PRACTITIONER

## 2025-08-26 PROCEDURE — 1159F MED LIST DOCD IN RCRD: CPT | Performed by: NURSE PRACTITIONER

## 2025-08-26 PROCEDURE — 3077F SYST BP >= 140 MM HG: CPT | Performed by: NURSE PRACTITIONER

## 2025-08-26 RX ORDER — GINSENG 100 MG
1 CAPSULE ORAL DAILY
COMMUNITY

## 2025-08-26 RX ORDER — PEN NEEDLE, DIABETIC 32GX 5/32"
NEEDLE, DISPOSABLE MISCELLANEOUS
COMMUNITY
Start: 2025-05-09

## 2025-08-26 RX ORDER — AMLODIPINE BESYLATE 5 MG/1
5 TABLET ORAL DAILY
COMMUNITY

## 2025-08-26 RX ORDER — INSULIN GLARGINE 300 U/ML
INJECTION, SOLUTION SUBCUTANEOUS
COMMUNITY
Start: 2025-08-12

## 2025-08-26 RX ORDER — ASPIRIN 81 MG/1
81 TABLET ORAL DAILY
COMMUNITY
Start: 2025-06-03

## (undated) DEVICE — ST. SORBAVIEW ULTIMATE IJ SYSTEM A,C: Brand: CENTURION

## (undated) DEVICE — APPL CHLORAPREP HI/LITE 26ML ORNG

## (undated) DEVICE — GAUZE,SPONGE,FLUFF,6"X6.75",STRL,10/TRAY: Brand: MEDLINE

## (undated) DEVICE — SUT MNCRYL PLS ANTIB UD 4/0 PS2 18IN

## (undated) DEVICE — ELECTRD BLD EZ CLN MOD XLNG 2.75IN

## (undated) DEVICE — SPNG LAP 18X18IN LF STRL PK/5

## (undated) DEVICE — BIOPATCH™ ANTIMICROBIAL DRESSING WITH CHLORHEXIDINE GLUCONATE IS A HYDROPHILLIC POLYURETHANE ABSORPTIVE FOAM WITH CHLORHEXIDINE GLUCONATE (CHG) WHICH INHIBITS BACTERIAL GROWTH UNDER THE DRESSING. THE DRESSING IS INTENDED TO BE USED TO ABSORB EXUDATE, COVER A WOUND CAUSED BY VASCULAR AND NONVASCULAR PERCUTANEOUS MEDICAL DEVICES DURING SURGERY, AS WELL AS REDUCE LOCAL INFECTION AND COLONIZATION OF MICROORGANISMS.: Brand: BIOPATCH

## (undated) DEVICE — 3M™ IOBAN™ 2 ANTIMICROBIAL INCISE DRAPE 6650EZ: Brand: IOBAN™ 2

## (undated) DEVICE — TOWEL,OR,DSP,ST,BLUE,STD,4/PK,20PK/CS: Brand: MEDLINE

## (undated) DEVICE — Device

## (undated) DEVICE — SUT VIC 3/0 TIES 18IN J110T

## (undated) DEVICE — TRAP FLD MINIVAC MEGADYNE 100ML

## (undated) DEVICE — GAUZE,SPONGE,4"X4",16PLY,XRAY,STRL,LF: Brand: MEDLINE

## (undated) DEVICE — GLV SURG SENSICARE POLYISPRN W/ALOE PF LF 6.5 GRN STRL

## (undated) DEVICE — STCKNT IMPERV 9X36IN STRL

## (undated) DEVICE — SOL NS 500ML

## (undated) DEVICE — PATIENT RETURN ELECTRODE, SINGLE-USE, CONTACT QUALITY MONITORING, ADULT, WITH 9FT CORD, FOR PATIENTS WEIGING OVER 33LBS. (15KG): Brand: MEGADYNE

## (undated) DEVICE — GLV SURG BIOGEL LTX PF 7 1/2

## (undated) DEVICE — ANTIBACTERIAL UNDYED BRAIDED (POLYGLACTIN 910), SYNTHETIC ABSORBABLE SUTURE: Brand: COATED VICRYL

## (undated) DEVICE — JACKSON-PRATT 100CC BULB RESERVOIR: Brand: CARDINAL HEALTH

## (undated) DEVICE — SUT PROLN 3/0 SH D/A 36IN 8522H

## (undated) DEVICE — PENCL E/S ULTRAVAC TELESCP NOSE HOLSTR 10FT

## (undated) DEVICE — ADHS SKIN SURG TISS VISC PREMIERPRO EXOFIN HI/VISC FAST/DRY

## (undated) DEVICE — NDL HYPO ECLPS SFTY 22G 1 1/2IN

## (undated) DEVICE — DRAPE,REIN 53X77,STERILE: Brand: MEDLINE

## (undated) DEVICE — SUT MNCRYL 4/0 PS2 18 IN

## (undated) DEVICE — BNDG ELAS ELITE V/CLOSE 6IN 5YD LF STRL

## (undated) DEVICE — TBG PENCL TELESCP MEGADYNE SMOKE EVAC 10FT

## (undated) DEVICE — GLV SURG SENSICARE PI MIC PF SZ6.5 LF STRL

## (undated) DEVICE — BANDAGE,GAUZE,BULKEE II,4.5"X4.1YD,STRL: Brand: MEDLINE

## (undated) DEVICE — PK ORTHO MINOR 40

## (undated) DEVICE — SUT SILK 2/0 SH 30IN K833H

## (undated) DEVICE — LOU MINOR PROCEDURE: Brand: MEDLINE INDUSTRIES, INC.

## (undated) DEVICE — PENCL SMOKE/EVAC MEGADYNE TELESCP 10FT

## (undated) DEVICE — GOWN,SIRUS,NON REINFRCD,LARGE,SET IN SL: Brand: MEDLINE

## (undated) DEVICE — CVR PROB GEN PURP W ISOSILK 6X48

## (undated) DEVICE — STPLR SKIN VISISTAT WD 35CT

## (undated) DEVICE — INTENDED FOR TISSUE SEPARATION, AND OTHER PROCEDURES THAT REQUIRE A SHARP SURGICAL BLADE TO PUNCTURE OR CUT.: Brand: BARD-PARKER ® STAINLESS STEEL BLADES

## (undated) DEVICE — PENCL ES MEGADINE EZ/CLEAN BUTN W/HOLSTR 10FT

## (undated) DEVICE — DRP C/ARM 41X74IN

## (undated) DEVICE — TOTAL TRAY, 16FR 10ML SIL FOLEY, URN: Brand: MEDLINE

## (undated) DEVICE — PIN SFTY LG LF

## (undated) DEVICE — GLV SURG SENSICARE PI LF PF 7.5 GRN STRL

## (undated) DEVICE — DECANT BG O JET

## (undated) DEVICE — PK ENT 40

## (undated) DEVICE — INTENDED FOR TISSUE SEPARATION, AND OTHER PROCEDURES THAT REQUIRE A SHARP SURGICAL BLADE TO PUNCTURE OR CUT.: Brand: BARD-PARKER ® CARBON RIB-BACK BLADES

## (undated) DEVICE — PK UNIV COMPL 40

## (undated) DEVICE — LEGGINGS, PAIR, 31X48, STERILE: Brand: MEDLINE

## (undated) DEVICE — HARMONIC FOCUS SHEARS 9CM LENGTH + ADAPTIVE TISSUE TECHNOLOGY FOR USE WITH BLUE HAND PIECE ONLY: Brand: HARMONIC FOCUS

## (undated) DEVICE — SUT ETHLN 3/0 PS1 18IN 1663H